# Patient Record
Sex: FEMALE | Race: WHITE | Employment: FULL TIME | URBAN - METROPOLITAN AREA
[De-identification: names, ages, dates, MRNs, and addresses within clinical notes are randomized per-mention and may not be internally consistent; named-entity substitution may affect disease eponyms.]

---

## 2017-01-24 DIAGNOSIS — N20.0 RENAL CALCULUS OR STONE: ICD-10-CM

## 2017-01-24 RX ORDER — OXYCODONE AND ACETAMINOPHEN 5; 325 MG/1; MG/1
1 TABLET ORAL
Qty: 30 TAB | Refills: 0 | Status: SHIPPED | OUTPATIENT
Start: 2017-01-24 | End: 2017-07-03 | Stop reason: SDUPTHER

## 2017-01-24 NOTE — TELEPHONE ENCOUNTER
----- Message from Alexandra Lee sent at 1/24/2017 10:36 AM EST -----  Regarding: Dr. Herman Morales Telephone  Patient would like a call regarding her back.  Contact is [phone]

## 2017-03-02 DIAGNOSIS — F32.A DEPRESSION, UNSPECIFIED DEPRESSION TYPE: ICD-10-CM

## 2017-03-02 RX ORDER — CITALOPRAM 20 MG/1
TABLET, FILM COATED ORAL
Qty: 30 TAB | Refills: 2 | Status: SHIPPED | OUTPATIENT
Start: 2017-03-02 | End: 2018-08-20

## 2017-04-03 RX ORDER — ZOLPIDEM TARTRATE 10 MG/1
TABLET ORAL
Qty: 30 TAB | Refills: 0 | OUTPATIENT
Start: 2017-04-03 | End: 2017-05-08 | Stop reason: SDUPTHER

## 2017-05-08 RX ORDER — ZOLPIDEM TARTRATE 10 MG/1
TABLET ORAL
Qty: 30 TAB | Refills: 3 | Status: SHIPPED | OUTPATIENT
Start: 2017-05-08 | End: 2017-08-04 | Stop reason: SDUPTHER

## 2017-07-03 ENCOUNTER — OFFICE VISIT (OUTPATIENT)
Dept: FAMILY MEDICINE CLINIC | Age: 47
End: 2017-07-03

## 2017-07-03 VITALS
HEART RATE: 77 BPM | OXYGEN SATURATION: 98 % | HEIGHT: 62 IN | WEIGHT: 186.2 LBS | RESPIRATION RATE: 22 BRPM | DIASTOLIC BLOOD PRESSURE: 72 MMHG | SYSTOLIC BLOOD PRESSURE: 110 MMHG | TEMPERATURE: 98.1 F | BODY MASS INDEX: 34.27 KG/M2

## 2017-07-03 DIAGNOSIS — R05.9 COUGH: ICD-10-CM

## 2017-07-03 DIAGNOSIS — E11.9 TYPE 2 DIABETES MELLITUS WITHOUT COMPLICATION, WITHOUT LONG-TERM CURRENT USE OF INSULIN (HCC): Primary | ICD-10-CM

## 2017-07-03 DIAGNOSIS — N20.0 RENAL CALCULUS OR STONE: ICD-10-CM

## 2017-07-03 DIAGNOSIS — M54.50 CHRONIC MIDLINE LOW BACK PAIN WITHOUT SCIATICA: ICD-10-CM

## 2017-07-03 DIAGNOSIS — E78.2 MIXED HYPERLIPIDEMIA: Chronic | ICD-10-CM

## 2017-07-03 DIAGNOSIS — G89.29 CHRONIC MIDLINE LOW BACK PAIN WITHOUT SCIATICA: ICD-10-CM

## 2017-07-03 DIAGNOSIS — F17.200 TOBACCO USE DISORDER: Chronic | ICD-10-CM

## 2017-07-03 LAB
BILIRUB UR QL STRIP: NEGATIVE
GLUCOSE POC: 131 MG/DL
GLUCOSE UR-MCNC: NEGATIVE MG/DL
HBA1C MFR BLD HPLC: 7 %
KETONES P FAST UR STRIP-MCNC: NEGATIVE MG/DL
PH UR STRIP: 6 [PH] (ref 4.6–8)
PROT UR QL STRIP: NEGATIVE MG/DL
SP GR UR STRIP: 1.02 (ref 1–1.03)
UA UROBILINOGEN AMB POC: NORMAL (ref 0.2–1)
URINALYSIS CLARITY POC: CLEAR
URINALYSIS COLOR POC: YELLOW
URINE BLOOD POC: NORMAL
URINE LEUKOCYTES POC: NEGATIVE
URINE NITRITES POC: NEGATIVE

## 2017-07-03 RX ORDER — OXYCODONE AND ACETAMINOPHEN 5; 325 MG/1; MG/1
1 TABLET ORAL
Qty: 30 TAB | Refills: 0 | Status: SHIPPED | OUTPATIENT
Start: 2017-07-03 | End: 2017-10-02 | Stop reason: SDUPTHER

## 2017-07-03 NOTE — PROGRESS NOTES
Subjective:   51 y.o. female for Well Woman Check. Her gyne and breast care is done elsewhere by her Ob-Gyne physician. Patient Active Problem List   Diagnosis Code    Tobacco use disorder F17.200    Renal calculus or stone N20.0    OA (osteoarthritis) M19.90    BMI 35.0-35.9,adult Z68.35    Mixed hyperlipidemia E78.2    Encounter for long-term (current) use of other medications Z79.899    Allergic rhinitis, cause unspecified J30.9    Insomnia G47.00    Symptomatic anemia D64.9    Midline low back pain without sciatica M54.5    Type 2 diabetes mellitus without complication ([facility] [address] W29.2     Patient Active Problem List    Diagnosis Date Noted    Type 2 diabetes mellitus without complication ([facility] [address] 46/04/2257    Midline low back pain without sciatica 05/22/2015    Symptomatic anemia 05/08/2015    Insomnia 10/18/2013    Allergic rhinitis, cause unspecified 04/23/2013    Mixed hyperlipidemia 07/08/2012    Encounter for long-term (current) use of other medications 07/08/2012    Tobacco use disorder 04/27/2010    Renal calculus or stone 04/27/2010    OA (osteoarthritis) 04/27/2010    BMI 35.0-35.9,adult 04/27/2010     Class: Chronic     Current Outpatient Prescriptions   Medication Sig Dispense Refill    zolpidem (AMBIEN) 10 mg tablet take 1 tablet by mouth at bedtime if needed for sleep 30 Tab 3    citalopram (CELEXA) 20 mg tablet take 1 tablet by mouth once daily 30 Tab 2    oxyCODONE-acetaminophen (PERCOCET) 5-325 mg per tablet Take 1 Tab by mouth every four (4) hours as needed for Pain. Max Daily Amount: 6 Tabs. 30 Tab 0    ferrous sulfate 325 mg (65 mg iron) tablet Take  by mouth Daily (before breakfast).  Blood-Glucose Meter monitoring kit Test daily as needed 1 Kit 0    glucose blood VI test strips (BLOOD GLUCOSE TEST) strip by Does Not Apply route Daily (before breakfast).  1 Package 11     No Known Allergies  Past Medical History:   Diagnosis Date    Anemia     Depression     Encounter for long-term (current) use of other medications 2012    LBP (low back pain) 2010    Mixed hyperlipidemia 2012    OA (osteoarthritis) 2010    Obesity 2010    Renal calculus or stone 2010    Tobacco use disorder 2010    Type II or unspecified type diabetes mellitus without mention of complication, not stated as uncontrolled 3/9/2015    Diagnosed 3/9/2015 with A1c of 8.8%; treatment initiated with dietary changes, exercise 5 days/wk and started on Metformin XR 500mg daily x 2 weeks, then increase to 500mg AC breakfast and dinner. Past Surgical History:   Procedure Laterality Date    HX ANKLE FRACTURE TX      HX CYST REMOVAL      HX GYN       X 3    HX TONSILLECTOMY       Family History   Problem Relation Age of Onset    Arthritis-osteo Mother     Cancer Father      Social History   Substance Use Topics    Smoking status: Current Every Day Smoker     Packs/day: 0.50    Smokeless tobacco: Never Used    Alcohol use No             Specific concerns today: check up. Review of Systems  Constitutional: negative  Eyes: negative  Ears, nose, mouth, throat, and face: negative  Respiratory: negative  Cardiovascular: negative  Gastrointestinal: negative  Genitourinary:negative  Integument/breast: negative    Objective:   Blood pressure 110/72, pulse 77, temperature 98.1 °F (36.7 °C), temperature source Oral, resp. rate 22, height 5' 2\" (1.575 m), weight 186 lb 3.2 oz (84.5 kg), SpO2 98 %.    Physical Examination:   General appearance - alert, well appearing, and in no distress  Mental status - alert, oriented to person, place, and time  Eyes - pupils equal and reactive, extraocular eye movements intact  Ears - bilateral TM's and external ear canals normal  Nose - normal and patent, no erythema, discharge or polyps  Mouth - mucous membranes moist, pharynx normal without lesions  Neck - supple, no significant adenopathy, carotids upstroke normal bilaterally, no bruits, thyroid exam: thyroid is normal in size without nodules or tenderness  Lymphatics - no palpable lymphadenopathy, no hepatosplenomegaly  Chest - clear to auscultation, no wheezes, rales or rhonchi, symmetric air entry  Heart - normal rate, regular rhythm, normal S1, S2, no murmurs, rubs, clicks or gallops  Abdomen - soft, nontender, nondistended, no masses or organomegaly  Rectal - normal rectal, no masses, guaiac negative stool obtained  Back exam - tenderness noted lumbar paraspinals  Musculoskeletal - no joint tenderness, deformity or swelling  Extremities - peripheral pulses normal, no pedal edema, no clubbing or cyanosis  Skin - normal coloration and turgor, no rashes, no suspicious skin lesions noted     Assessment/Plan:     lose weight, increase physical activity, stop smoking (advice and handout given), continue present plan, routine labs ordered  Diana was seen today for well woman. Diagnoses and all orders for this visit:    Type 2 diabetes mellitus without complication, without long-term current use of insulin (HCC)  -     AMB POC HEMOGLOBIN P2B  -     METABOLIC PANEL, COMPREHENSIVE  -     MICROALBUMIN, UR, RAND W/ MICROALBUMIN/CREA RATIO  -     AMB POC GLUCOSE, QUANTITATIVE, BLOOD    Mixed hyperlipidemia  -     LIPID PANEL    Tobacco use disorder  -     XR CHEST PA LAT; Future    Chronic midline low back pain without sciatica  -     AMB POC URINALYSIS DIP STICK AUTO W/O MICRO    Renal calculus or stone  -     oxyCODONE-acetaminophen (PERCOCET) 5-325 mg per tablet; Take 1 Tab by mouth every four (4) hours as needed for Pain. Max Daily Amount: 6 Tabs. Cough  -     XR CHEST PA LAT; Future      Follow-up Disposition:  Return in about 1 year (around 7/3/2018).

## 2017-07-03 NOTE — MR AVS SNAPSHOT
Visit Information Date & Time Provider Department Dept. Phone Encounter #  
 7/3/2017 10:15 AM Myrtha Opitz, [place] [phone] [id number] Follow-up Instructions Return in about 1 year (around 7/3/2018). Upcoming Health Maintenance Date Due  
 EYE EXAM RETINAL OR DILATED Q1 8/23/1979 Pneumococcal 19-64 Medium Risk (1 of 1 - PPSV23) 8/23/1988 HEMOGLOBIN A1C Q6M 1/1/2017 FOOT EXAM Q1 7/1/2017 MICROALBUMIN Q1 7/1/2017 LIPID PANEL Q1 7/1/2017 INFLUENZA AGE 9 TO ADULT 8/1/2017 PAP AKA CERVICAL CYTOLOGY 5/6/2018 DTaP/Tdap/Td series (2 - Td) 7/1/2026 Allergies as of 7/3/2017  Review Complete On: 7/3/2017 By: Christiano Mccray No Known Allergies Current Immunizations  Reviewed on 9/16/2015 No immunizations on file. Not reviewed this visit You Were Diagnosed With   
  
 Codes Comments Type 2 diabetes mellitus without complication, without long-term current use of insulin (HCC)    -  Primary ICD-10-CM: E11.9 ICD-9-CM: 250.00 Mixed hyperlipidemia     ICD-10-CM: E78.2 ICD-9-CM: 272.2 Tobacco use disorder     ICD-10-CM: F17.200 ICD-9-CM: 305.1 Chronic midline low back pain without sciatica     ICD-10-CM: M54.5, G89.29 ICD-9-CM: 724.2, 338.29 Renal calculus or stone     ICD-10-CM: N20.0 ICD-9-CM: 592.0 Cough     ICD-10-CM: R05 ICD-9-CM: 914. 2 Vitals BP Pulse Temp Resp Height(growth percentile) Weight(growth percentile) 110/72 (BP 1 Location: Left arm, BP Patient Position: Sitting) 77 98.1 °F (36.7 °C) (Oral) 22 5' 2\" (1.575 m) 186 lb 3.2 oz (84.5 kg) SpO2 BMI OB Status Smoking Status 98% 34.06 kg/m2 Having regular periods Current Every Day Smoker Vitals History BMI and BSA Data Body Mass Index Body Surface Area 34.06 kg/m 2 1.92 m 2 Preferred Pharmacy Pharmacy Name Phone [id number] Anitha Mendez [phone] Your Updated Medication List  
  
   
This list is accurate as of: 7/3/17 11:26 AM.  Always use your most recent med list.  
  
  
  
  
 Blood-Glucose Meter monitoring kit Test daily as needed  
  
 citalopram 20 mg tablet Commonly known as:  CELEXA  
take 1 tablet by mouth once daily  
  
 ferrous sulfate 325 mg (65 mg iron) tablet Take  by mouth Daily (before breakfast). glucose blood VI test strips strip Commonly known as:  blood glucose test  
by Does Not Apply route Daily (before breakfast). oxyCODONE-acetaminophen 5-325 mg per tablet Commonly known as:  PERCOCET Take 1 Tab by mouth every four (4) hours as needed for Pain. Max Daily Amount: 6 Tabs.  
  
 zolpidem 10 mg tablet Commonly known as:  AMBIEN  
take 1 tablet by mouth at bedtime if needed for sleep Prescriptions Printed Refills  
 oxyCODONE-acetaminophen (PERCOCET) 5-325 mg per tablet 0 Sig: Take 1 Tab by mouth every four (4) hours as needed for Pain. Max Daily Amount: 6 Tabs. Class: Print Route: Oral  
  
We Performed the Following AMB POC GLUCOSE, QUANTITATIVE, BLOOD [94275 CPT(R)] AMB POC HEMOGLOBIN A1C [93966 CPT(R)] AMB POC URINALYSIS DIP STICK AUTO W/O MICRO [37101 CPT(R)] LIPID PANEL [20148 CPT(R)] METABOLIC PANEL, COMPREHENSIVE [54722 CPT(R)] MICROALBUMIN, UR, RAND W/ MICROALBUMIN/CREA RATIO [id number] CPT(R)] Follow-up Instructions Return in about 1 year (around 7/3/2018). To-Do List   
 07/03/2017 Imaging:  XR CHEST PA LAT Introducing [facility] & HEALTH SERVICES! Dear Corey Bansal: Thank you for requesting a [employer] account. Our records indicate that you have previously registered for a [employer] account but its currently inactive. Please call our [employer] support line at [phone]. Additional Information If you have questions, please visit the Frequently Asked Questions section of the [employer] website at [email or web address] Remember, [employer] is NOT to be used for urgent needs. For medical emergencies, dial 911. Now available from your iPhone and Android! Please provide this summary of care documentation to your next provider. Your primary care clinician is listed as Collin Dennis. If you have any questions after today's visit, please call [phone].

## 2017-07-04 LAB
ALBUMIN SERPL-MCNC: 4.3 G/DL (ref 3.5–5.5)
ALBUMIN/CREAT UR: <2.9 MG/G CREAT (ref 0–30)
ALBUMIN/GLOB SERPL: 1.3 {RATIO} (ref 1.2–2.2)
ALP SERPL-CCNC: 89 IU/L (ref 39–117)
ALT SERPL-CCNC: 13 IU/L (ref 0–32)
AST SERPL-CCNC: 17 IU/L (ref 0–40)
BILIRUB SERPL-MCNC: 0.5 MG/DL (ref 0–1.2)
BUN SERPL-MCNC: 10 MG/DL (ref 6–24)
BUN/CREAT SERPL: 14 (ref 9–23)
CALCIUM SERPL-MCNC: 9.7 MG/DL (ref 8.7–10.2)
CHLORIDE SERPL-SCNC: 101 MMOL/L (ref 96–106)
CHOLEST SERPL-MCNC: 179 MG/DL (ref 100–199)
CO2 SERPL-SCNC: 22 MMOL/L (ref 18–29)
CREAT SERPL-MCNC: 0.69 MG/DL (ref 0.57–1)
CREAT UR-MCNC: 102.2 MG/DL
GLOBULIN SER CALC-MCNC: 3.3 G/DL (ref 1.5–4.5)
GLUCOSE SERPL-MCNC: 133 MG/DL (ref 65–99)
HDLC SERPL-MCNC: 32 MG/DL
INTERPRETATION, 910389: NORMAL
LDLC SERPL CALC-MCNC: 119 MG/DL (ref 0–99)
Lab: NORMAL
Lab: NORMAL
MICROALBUMIN UR-MCNC: <3 UG/ML
POTASSIUM SERPL-SCNC: 4.4 MMOL/L (ref 3.5–5.2)
PROT SERPL-MCNC: 7.6 G/DL (ref 6–8.5)
SODIUM SERPL-SCNC: 140 MMOL/L (ref 134–144)
TRIGL SERPL-MCNC: 140 MG/DL (ref 0–149)
VLDLC SERPL CALC-MCNC: 28 MG/DL (ref 5–40)

## 2017-08-04 RX ORDER — ZOLPIDEM TARTRATE 10 MG/1
TABLET ORAL
Qty: 30 TAB | Refills: 0 | Status: SHIPPED | OUTPATIENT
Start: 2017-08-04 | End: 2017-09-26 | Stop reason: SDUPTHER

## 2017-09-27 RX ORDER — ZOLPIDEM TARTRATE 10 MG/1
TABLET ORAL
Qty: 30 TAB | Refills: 3 | Status: SHIPPED | OUTPATIENT
Start: 2017-09-27 | End: 2018-01-16 | Stop reason: SDUPTHER

## 2017-10-02 DIAGNOSIS — N20.0 RENAL CALCULUS OR STONE: ICD-10-CM

## 2017-10-02 DIAGNOSIS — E11.9 TYPE 2 DIABETES MELLITUS WITHOUT COMPLICATION, WITHOUT LONG-TERM CURRENT USE OF INSULIN (HCC): Primary | ICD-10-CM

## 2017-10-02 DIAGNOSIS — G89.29 CHRONIC MIDLINE LOW BACK PAIN WITHOUT SCIATICA: ICD-10-CM

## 2017-10-02 DIAGNOSIS — M54.50 CHRONIC MIDLINE LOW BACK PAIN WITHOUT SCIATICA: ICD-10-CM

## 2017-10-02 RX ORDER — OXYCODONE AND ACETAMINOPHEN 5; 325 MG/1; MG/1
1 TABLET ORAL
Qty: 30 TAB | Refills: 0 | Status: SHIPPED | OUTPATIENT
Start: 2017-10-02 | End: 2018-08-14 | Stop reason: SDUPTHER

## 2018-03-11 DIAGNOSIS — G47.00 INSOMNIA, UNSPECIFIED TYPE: ICD-10-CM

## 2018-03-12 RX ORDER — ZOLPIDEM TARTRATE 10 MG/1
TABLET ORAL
Qty: 30 TAB | Refills: 1 | Status: SHIPPED | OUTPATIENT
Start: 2018-03-12 | End: 2018-05-03 | Stop reason: SDUPTHER

## 2018-03-23 ENCOUNTER — OFFICE VISIT (OUTPATIENT)
Dept: FAMILY MEDICINE CLINIC | Age: 48
End: 2018-03-23

## 2018-03-23 VITALS
DIASTOLIC BLOOD PRESSURE: 80 MMHG | HEIGHT: 62 IN | BODY MASS INDEX: 34.23 KG/M2 | RESPIRATION RATE: 28 BRPM | HEART RATE: 83 BPM | TEMPERATURE: 98.2 F | WEIGHT: 186 LBS | SYSTOLIC BLOOD PRESSURE: 130 MMHG | OXYGEN SATURATION: 97 %

## 2018-03-23 DIAGNOSIS — S23.9XXD THORACIC BACK SPRAIN, SUBSEQUENT ENCOUNTER: Primary | ICD-10-CM

## 2018-03-23 DIAGNOSIS — N20.0 RENAL CALCULUS OR STONE: ICD-10-CM

## 2018-03-23 RX ORDER — OXYCODONE AND ACETAMINOPHEN 5; 325 MG/1; MG/1
1 TABLET ORAL
Qty: 20 TAB | Refills: 0 | Status: SHIPPED | OUTPATIENT
Start: 2018-03-23 | End: 2018-08-20 | Stop reason: ALTCHOICE

## 2018-03-23 RX ORDER — PREDNISONE 10 MG/1
10 TABLET ORAL 2 TIMES DAILY
Qty: 10 TAB | Refills: 0 | Status: SHIPPED | OUTPATIENT
Start: 2018-03-23 | End: 2018-08-14 | Stop reason: ALTCHOICE

## 2018-03-23 NOTE — MR AVS SNAPSHOT
[address] [facility] 
 
 
 [address] [facility] [place] [facility] [id number] [phone] Patient: Stevenson Murcia MRN: [id number] :1970 Visit Information Date & Time Provider Department Dept. Phone Encounter #  
 3/23/2018 12:30 PM Francisco Javier Sanderson [phone] [id number] Follow-up Instructions Return in about 3 months (around 2018). Upcoming Health Maintenance Date Due  
 EYE EXAM RETINAL OR DILATED Q1 1979 Pneumococcal 19-64 Medium Risk (1 of 1 - PPSV23) 1988 FOOT EXAM Q1 2017 Influenza Age 5 to Adult 2017 HEMOGLOBIN A1C Q6M 1/3/2018 PAP AKA CERVICAL CYTOLOGY 2018 MICROALBUMIN Q1 7/3/2018 LIPID PANEL Q1 7/3/2018 DTaP/Tdap/Td series (2 - Td) 2026 Allergies as of 3/23/2018  Review Complete On: 3/23/2018 By: Daniel Torres No Known Allergies Current Immunizations  Reviewed on 2015 No immunizations on file. Not reviewed this visit You Were Diagnosed With   
  
 Codes Comments Thoracic back sprain, subsequent encounter    -  Primary ICD-10-CM: S23. 9XXD ICD-9-CM: V58.89, 847.1 Renal calculus or stone     ICD-10-CM: N20.0 ICD-9-CM: 592.0 Vitals BP Pulse Temp Resp Height(growth percentile) Weight(growth percentile) 130/80 (BP 1 Location: Left arm, BP Patient Position: Sitting) 83 98.2 °F (36.8 °C) (Oral) 28 5' 2\" (1.575 m) 186 lb (84.4 kg) SpO2 BMI OB Status Smoking Status 97% 34.02 kg/m2 Having regular periods Current Every Day Smoker Vitals History BMI and BSA Data Body Mass Index Body Surface Area 34.02 kg/m 2 1.92 m 2 Preferred Pharmacy Pharmacy Name Phone [id number] Anitha Nichole [phone] Your Updated Medication List  
  
   
This list is accurate as of 3/23/18  1:08 PM.  Always use your most recent med list.  
 Blood-Glucose Meter monitoring kit Test daily as needed  
  
 citalopram 20 mg tablet Commonly known as:  CELEXA  
take 1 tablet by mouth once daily  
  
 ferrous sulfate 325 mg (65 mg iron) tablet Take  by mouth Daily (before breakfast). * glucose blood VI test strips strip Commonly known as:  blood glucose test  
by Does Not Apply route Daily (before breakfast). * glucose blood VI test strips strip Commonly known as:  blood glucose test  
by Does Not Apply route Daily (before breakfast). * oxyCODONE-acetaminophen 5-325 mg per tablet Commonly known as:  PERCOCET Take 1 Tab by mouth every four (4) hours as needed for Pain. Max Daily Amount: 6 Tabs. * oxyCODONE-acetaminophen 5-325 mg per tablet Commonly known as:  PERCOCET Take 1 Tab by mouth every four (4) hours as needed for Pain. Max Daily Amount: 6 Tabs. predniSONE 10 mg tablet Commonly known as:  Jamila Skaggs Take 1 Tab by mouth two (2) times a day. zolpidem 10 mg tablet Commonly known as:  AMBIEN  
take 1 tablet by mouth at bedtime if needed for sleep * Notice: This list has 4 medication(s) that are the same as other medications prescribed for you. Read the directions carefully, and ask your doctor or other care provider to review them with you. Prescriptions Printed Refills  
 predniSONE (DELTASONE) 10 mg tablet 0 Sig: Take 1 Tab by mouth two (2) times a day. Class: Print Route: Oral  
 oxyCODONE-acetaminophen (PERCOCET) 5-325 mg per tablet 0 Sig: Take 1 Tab by mouth every four (4) hours as needed for Pain. Max Daily Amount: 6 Tabs. Class: Print Route: Oral  
  
We Performed the Following REFERRAL TO ORTHOPEDICS [GVF779 Custom] Follow-up Instructions Return in about 3 months (around 6/23/2018). To-Do List   
 03/23/2018 Imaging:  MRI [facility] SPINE WO CONT Referral Information Referral ID Referred By Referred To [id number] [address] [facility] [address] Phone: [phone] Visits Status Start Date End Date 1 New Request 3/23/18 3/23/19 If your referral has a status of pending review or denied, additional information will be sent to support the outcome of this decision. Introducing [facility] & HEALTH SERVICES! Dear Talon Hope: Thank you for requesting a [employer] account. Our records indicate that you have previously registered for a [employer] account but its currently inactive. Please call our [employer] support line at [phone]. Additional Information If you have questions, please visit the Frequently Asked Questions section of the [employer] website at [email or web address] Remember, [employer] is NOT to be used for urgent needs. For medical emergencies, dial 911. Now available from your iPhone and Android! Please provide this summary of care documentation to your next provider. Your primary care clinician is listed as Delores Miles. If you have any questions after today's visit, please call [phone].

## 2018-03-23 NOTE — PROGRESS NOTES
HISTORY OF PRESENT ILLNESS  Adela Smith is a 49 y.o. female. with moderately upper back pain x 1 week. Previously seen by ortho ,mri denied  Back Pain    The history is provided by the patient. This is a recurrent problem. The problem has not changed since onset. The problem occurs hourly. The pain is associated with no known injury. The pain is present in the thoracic spine. The quality of the pain is described as stabbing. The pain does not radiate. The pain is at a severity of 7/10. The pain is severe. The symptoms are aggravated by twisting, bending and certain positions. The pain is worse during the day. Pertinent negatives include no chest pain, no fever, no weight loss, no abdominal pain, no abdominal swelling, no bowel incontinence, no dysuria and no paresthesias. Review of Systems   Constitutional: Negative for chills, fever and weight loss. Cardiovascular: Negative for chest pain. Gastrointestinal: Negative for abdominal pain, blood in stool, bowel incontinence, constipation, diarrhea and heartburn. Genitourinary: Negative for dysuria, frequency, hematuria and urgency. Musculoskeletal: Positive for back pain. Negative for joint pain. Neurological: Negative for paresthesias. Psychiatric/Behavioral: Negative for depression. Physical Exam   Constitutional: She appears well-developed and well-nourished. She appears distressed. HENT:   Head: Normocephalic. Right Ear: External ear normal.   Nose: Nose normal.   Mouth/Throat: Oropharynx is clear and moist.   Cardiovascular: Normal rate and regular rhythm. Pulmonary/Chest: Effort normal and breath sounds normal.   Abdominal: Soft. Bowel sounds are normal.   Musculoskeletal:        Thoracic back: She exhibits decreased range of motion, tenderness and bony tenderness. She exhibits no deformity. Back:    SLRs 90/90,DTRs normal and symmetrical         ASSESSMENT and PLAN  Diagnoses and all orders for this visit:    1.  Thoracic back sprain, subsequent encounter  -     MRI [facility] SPINE WO CONT; Future  -     REFERRAL TO ORTHOPEDICS  -     predniSONE (DELTASONE) 10 mg tablet; Take 1 Tab by mouth two (2) times a day. -     oxyCODONE-acetaminophen (PERCOCET) 5-325 mg per tablet; Take 1 Tab by mouth every four (4) hours as needed for Pain. Max Daily Amount: 6 Tabs. 2. Renal calculus or stone      Follow-up Disposition:  Return in about 3 months (around 6/23/2018).

## 2018-05-03 DIAGNOSIS — G47.00 INSOMNIA, UNSPECIFIED TYPE: ICD-10-CM

## 2018-05-07 RX ORDER — ZOLPIDEM TARTRATE 10 MG/1
TABLET ORAL
Qty: 30 TAB | Refills: 1 | Status: SHIPPED | OUTPATIENT
Start: 2018-05-07 | End: 2018-07-02 | Stop reason: SDUPTHER

## 2018-07-02 ENCOUNTER — TELEPHONE (OUTPATIENT)
Dept: FAMILY MEDICINE CLINIC | Age: 48
End: 2018-07-02

## 2018-07-02 DIAGNOSIS — G47.00 INSOMNIA, UNSPECIFIED TYPE: ICD-10-CM

## 2018-07-02 RX ORDER — ZOLPIDEM TARTRATE 10 MG/1
TABLET ORAL
Qty: 30 TAB | Refills: 1 | Status: SHIPPED | OUTPATIENT
Start: 2018-07-02 | End: 2018-09-04 | Stop reason: SDUPTHER

## 2018-08-14 ENCOUNTER — OFFICE VISIT (OUTPATIENT)
Dept: FAMILY MEDICINE CLINIC | Age: 48
End: 2018-08-14

## 2018-08-14 VITALS
DIASTOLIC BLOOD PRESSURE: 82 MMHG | SYSTOLIC BLOOD PRESSURE: 122 MMHG | TEMPERATURE: 98.1 F | RESPIRATION RATE: 22 BRPM | WEIGHT: 187.8 LBS | HEIGHT: 62 IN | OXYGEN SATURATION: 98 % | BODY MASS INDEX: 34.56 KG/M2 | HEART RATE: 86 BPM

## 2018-08-14 DIAGNOSIS — M54.50 CHRONIC MIDLINE LOW BACK PAIN WITHOUT SCIATICA: Primary | ICD-10-CM

## 2018-08-14 DIAGNOSIS — G89.29 CHRONIC MIDLINE LOW BACK PAIN WITHOUT SCIATICA: Primary | ICD-10-CM

## 2018-08-14 DIAGNOSIS — S23.9XXD THORACIC BACK SPRAIN, SUBSEQUENT ENCOUNTER: ICD-10-CM

## 2018-08-14 RX ORDER — PREDNISONE 10 MG/1
10 TABLET ORAL 2 TIMES DAILY
Qty: 10 TAB | Refills: 0 | Status: SHIPPED | OUTPATIENT
Start: 2018-08-14 | End: 2018-08-20 | Stop reason: ALTCHOICE

## 2018-08-14 RX ORDER — OXYCODONE AND ACETAMINOPHEN 5; 325 MG/1; MG/1
1 TABLET ORAL
Qty: 30 TAB | Refills: 0 | Status: SHIPPED | OUTPATIENT
Start: 2018-08-14 | End: 2018-08-20 | Stop reason: SDUPTHER

## 2018-08-14 NOTE — MR AVS SNAPSHOT
[address] [facility] 
 
 
 [address] [facility] [id number] [phone] Patient: Brenda Parrish MRN: [id number] :1970 Visit Information Date & Time Provider Department Dept. Phone Encounter #  
 2018  2:45 PM Dolly Campbell [phone] [id number] Upcoming Health Maintenance Date Due  
 EYE EXAM RETINAL OR DILATED Q1 1979 Pneumococcal 19-64 Medium Risk (1 of 1 - PPSV23) 1988 FOOT EXAM Q1 2017 HEMOGLOBIN A1C Q6M 1/3/2018 PAP AKA CERVICAL CYTOLOGY 2018 MICROALBUMIN Q1 7/3/2018 LIPID PANEL Q1 7/3/2018 Influenza Age 5 to Adult 2018 DTaP/Tdap/Td series (2 - Td) 2026 Allergies as of 2018  Review Complete On: 2018 By: Benjy Csatellanos No Known Allergies Current Immunizations  Reviewed on 2015 No immunizations on file. Not reviewed this visit You Were Diagnosed With   
  
 Codes Comments Chronic midline low back pain without sciatica    -  Primary ICD-10-CM: M54.5, G89.29 ICD-9-CM: 724.2, 338.29 Thoracic back sprain, subsequent encounter     ICD-10-CM: S23. 9XXD ICD-9-CM: V58.89, 847.1 Vitals BP Pulse Temp Resp Height(growth percentile) Weight(growth percentile) 122/82 (BP 1 Location: Left arm, BP Patient Position: Sitting) 86 98.1 °F (36.7 °C) (Oral) 22 5' 2\" (1.575 m) 187 lb 12.8 oz (85.2 kg) SpO2 BMI OB Status Smoking Status 98% 34.35 kg/m2 Having regular periods Current Every Day Smoker Vitals History BMI and BSA Data Body Mass Index Body Surface Area  
 34.35 kg/m 2 1.93 m 2 Your Updated Medication List  
  
   
This list is accurate as of 18  3:21 PM.  Always use your most recent med list.  
  
  
  
  
 Blood-Glucose Meter monitoring kit Test daily as needed  
  
 citalopram 20 mg tablet Commonly known as:  CELEXA  
take 1 tablet by mouth once daily ferrous sulfate 325 mg (65 mg iron) tablet Take  by mouth Daily (before breakfast). * glucose blood VI test strips strip Commonly known as:  blood glucose test  
by Does Not Apply route Daily (before breakfast). * glucose blood VI test strips strip Commonly known as:  blood glucose test  
by Does Not Apply route Daily (before breakfast). * oxyCODONE-acetaminophen 5-325 mg per tablet Commonly known as:  PERCOCET Take 1 Tab by mouth every four (4) hours as needed for Pain. Max Daily Amount: 6 Tabs. * oxyCODONE-acetaminophen 5-325 mg per tablet Commonly known as:  PERCOCET Take 1 Tab by mouth every four (4) hours as needed for Pain. Max Daily Amount: 6 Tabs. predniSONE 10 mg tablet Commonly known as:  Shelton Felling Take 1 Tab by mouth two (2) times a day. zolpidem 10 mg tablet Commonly known as:  AMBIEN  
take 1 tablet by mouth at bedtime if needed for sleep * Notice: This list has 4 medication(s) that are the same as other medications prescribed for you. Read the directions carefully, and ask your doctor or other care provider to review them with you. Prescriptions Printed Refills  
 predniSONE (DELTASONE) 10 mg tablet 0 Sig: Take 1 Tab by mouth two (2) times a day. Class: Print Route: Oral  
 oxyCODONE-acetaminophen (PERCOCET) 5-325 mg per tablet 0 Sig: Take 1 Tab by mouth every four (4) hours as needed for Pain. Max Daily Amount: 6 Tabs. Class: Print Route: Oral  
  
Introducing \[facility]\"" & [facility] SERVICES! [employer] introduces [employer] patient portal. Now you can access parts of your medical record, email your doctor's office, and request medication refills online. 1. In your internet browser, go to [email or web address] 2. Click on the First Time User? Click Here link in the Sign In box. You will see the New Member Sign Up page. 3. Enter your [employer] Access Code exactly as it appears below.  You will not need to use this code after youve completed the sign-up process. If you do not sign up before the expiration date, you must request a new code. · [employer] Access Code: [email or web address] Expires: 11/12/2018  2:45 PM 
 
4. Enter the last four digits of your Social Security Number (xxxx) and Date of Birth (mm/dd/yyyy) as indicated and click Submit. You will be taken to the next sign-up page. 5. Create a [employer] ID. This will be your [employer] login ID and cannot be changed, so think of one that is secure and easy to remember. 6. Create a [employer] password. You can change your password at any time. 7. Enter your Password Reset Question and Answer. This can be used at a later time if you forget your password. 8. Enter your e-mail address. You will receive e-mail notification when new information is available in [address]. 9. Click Sign Up. You can now view and download portions of your medical record. 10. Click the Download Summary menu link to download a portable copy of your medical information. If you have questions, please visit the Frequently Asked Questions section of the [employer] website. Remember, [employer] is NOT to be used for urgent needs. For medical emergencies, dial 911. Now available from your iPhone and Android! Please provide this summary of care documentation to your next provider. Your primary care clinician is listed as Dennis Husain. If you have any questions after today's visit, please call [phone].

## 2018-08-14 NOTE — PROGRESS NOTES
HISTORY OF PRESENT ILLNESS  Adam Real is a 49 y.o. female. with recurrence of  upper back pain x 5 days,no apparent injury. Previously seen by ortho ,mri denied  Back Pain    The history is provided by the patient. This is a recurrent problem. The problem has been gradually worsening. The problem occurs hourly. The pain is associated with no known injury. The pain is present in the thoracic spine. The quality of the pain is described as stabbing. The pain does not radiate. The pain is at a severity of 6/10. The pain is severe. The symptoms are aggravated by twisting, bending and certain positions. The pain is worse during the day. Pertinent negatives include no chest pain, no fever, no weight loss, no abdominal pain, no abdominal swelling, no bowel incontinence, no dysuria and no paresthesias. Review of Systems   Constitutional: Negative for chills, fever and weight loss. Cardiovascular: Negative for chest pain and palpitations. Gastrointestinal: Negative for abdominal pain, blood in stool, bowel incontinence, constipation, diarrhea and heartburn. Genitourinary: Negative for dysuria, frequency, hematuria and urgency. Musculoskeletal: Positive for back pain. Negative for joint pain. Neurological: Negative for paresthesias. Psychiatric/Behavioral: Negative for depression. Physical Exam   Constitutional: She appears well-developed and well-nourished. She appears distressed. HENT:   Head: Normocephalic. Right Ear: External ear normal.   Nose: Nose normal.   Mouth/Throat: Oropharynx is clear and moist.   Cardiovascular: Normal rate and regular rhythm. Pulmonary/Chest: Effort normal and breath sounds normal.   Abdominal: Soft. Bowel sounds are normal.   Musculoskeletal:        Thoracic back: She exhibits decreased range of motion, tenderness and bony tenderness. She exhibits no deformity.         Back:    SLRs 90/90,DTRs normal and symmetrical  ROM limited       Diagnoses and all orders for this visit:    1. Chronic midline low back pain without sciatica  -     predniSONE (DELTASONE) 10 mg tablet; Take 1 Tab by mouth two (2) times a day. -     oxyCODONE-acetaminophen (PERCOCET) 5-325 mg per tablet; Take 1 Tab by mouth every four (4) hours as needed for Pain. Max Daily Amount: 6 Tabs. 2. Thoracic back sprain, subsequent encounter  -     predniSONE (DELTASONE) 10 mg tablet; Take 1 Tab by mouth two (2) times a day. -     oxyCODONE-acetaminophen (PERCOCET) 5-325 mg per tablet; Take 1 Tab by mouth every four (4) hours as needed for Pain. Max Daily Amount: 6 Tabs.       Follow-up Disposition: Not on File    Follow-up Disposition: Not on File

## 2018-08-20 ENCOUNTER — OFFICE VISIT (OUTPATIENT)
Dept: FAMILY MEDICINE CLINIC | Age: 48
End: 2018-08-20

## 2018-08-20 VITALS
DIASTOLIC BLOOD PRESSURE: 84 MMHG | BODY MASS INDEX: 34.52 KG/M2 | TEMPERATURE: 97.1 F | HEART RATE: 101 BPM | SYSTOLIC BLOOD PRESSURE: 123 MMHG | RESPIRATION RATE: 16 BRPM | WEIGHT: 187.6 LBS | HEIGHT: 62 IN | OXYGEN SATURATION: 95 %

## 2018-08-20 DIAGNOSIS — S29.019D ACUTE THORACIC MYOFASCIAL STRAIN, SUBSEQUENT ENCOUNTER: Primary | ICD-10-CM

## 2018-08-20 RX ORDER — CYCLOBENZAPRINE HCL 5 MG
TABLET ORAL
Qty: 30 TAB | Refills: 0 | Status: SHIPPED | OUTPATIENT
Start: 2018-08-20 | End: 2019-03-18 | Stop reason: SDUPTHER

## 2018-08-20 RX ORDER — NAPROXEN 500 MG/1
500 TABLET ORAL
Qty: 30 TAB | Refills: 0 | Status: SHIPPED | OUTPATIENT
Start: 2018-08-20 | End: 2019-03-18 | Stop reason: SDUPTHER

## 2018-08-20 RX ORDER — TRAMADOL HYDROCHLORIDE 50 MG/1
50 TABLET ORAL
Qty: 10 TAB | Refills: 0 | Status: SHIPPED | OUTPATIENT
Start: 2018-08-20 | End: 2020-12-22 | Stop reason: SDUPTHER

## 2018-08-20 NOTE — PROGRESS NOTES
Chief Complaint   Patient presents with    Back Pain     seen by Dr. Scar Phelps 8/14/18     Patient here for reoccurrent back pain. Seen by Dr. Idalia Velazquez on 8/14/18. Prednisone completed. Patient states no relief.

## 2018-08-20 NOTE — PROGRESS NOTES
HISTORY OF PRESENT ILLNESS  Toño Hopkins is a 49 y.o. female. HPI  Patient of Dr. Mireille Moore with PMHx significant for T2DM, renal calculi, OA, midline low back pain here for an acute visit with CC of low back pain. Was seen by her PCP for the same on 8/14/18, Rx'ed a medrol dose pack and percocet. Symptoms started 8/12/18 without any known injury. She was at a celebration of life the day that symptoms started, and was on her feet for a long time wearing flat sandals. She has recurrent back pain, though it has been improving over the past few months. She had pain in the upper left back. She has seen an orthopedist (Dr. Frida Bethea) in the remote past, last about 1 year ago, but the MRI that was ordered was denied by her insurance. Symptoms used to recur every 3 - 4 weeks, and have responded to medication in the past; this is the first episode of the symptoms in a few months. Pain is now concentrated on the left side above her bra strap, and is exacerbated with movement. Heat, rest, and Aspercreme have been helping, as well as hunching over. Pain is described as constant, and is a \"stabbing\" pain. Did PT in the remote past. Tried Aleve Back and Muscle, which did not help much. Past Medical History:   Diagnosis Date    Anemia     Depression     Encounter for long-term (current) use of other medications 7/8/2012    LBP (low back pain) 4/27/2010    Mixed hyperlipidemia 7/8/2012    OA (osteoarthritis) 4/27/2010    Obesity 4/27/2010    Renal calculus or stone 4/27/2010    Tobacco use disorder 4/27/2010    Type II or unspecified type diabetes mellitus without mention of complication, not stated as uncontrolled 3/9/2015    Diagnosed 3/9/2015 with A1c of 8.8%; treatment initiated with dietary changes, exercise 5 days/wk and started on Metformin XR 500mg daily x 2 weeks, then increase to 500mg AC breakfast and dinner.       Past Surgical History:   Procedure Laterality Date    HX ANKLE FRACTURE TX      HX CYST REMOVAL      HX GYN       X 3    HX TONSILLECTOMY       Family History   Problem Relation Age of Onset    Arthritis-osteo Mother     Cancer Father      Social History     Social History    Marital status:      Spouse name: N/A    Number of children: N/A    Years of education: N/A     Social History Main Topics    Smoking status: Current Every Day Smoker     Packs/day: 0.50    Smokeless tobacco: Never Used    Alcohol use No    Drug use: No    Sexual activity: Yes     Partners: Male     Other Topics Concern    None     Social History Narrative     Patient Active Problem List   Diagnosis Code    Tobacco use disorder F17.200    Renal calculus or stone N20.0    OA (osteoarthritis) M19.90    BMI 35.0-35.9,adult Z68.35    Mixed hyperlipidemia E78.2    Encounter for long-term (current) use of other medications Z79.899    Allergic rhinitis, cause unspecified J30.9    Insomnia G47.00    Symptomatic anemia D64.9    Midline low back pain without sciatica M54.5    Type 2 diabetes mellitus without complication ([facility] [address] L36.3     Outpatient Encounter Prescriptions as of 2018   Medication Sig Dispense Refill    cyclobenzaprine (FLEXERIL) 5 mg tablet Take 1 - 2 tablets by mouth every 8 hours as needed for muscle spasm. 30 Tab 0    naproxen (NAPROSYN) 500 mg tablet Take 1 Tab by mouth two (2) times daily as needed. Take with food or milk. Indications: Pain 30 Tab 0    traMADol (ULTRAM) 50 mg tablet Take 1 Tab by mouth every eight (8) hours as needed for Pain. Max Daily Amount: 150 mg. 10 Tab 0    glucose blood VI test strips (BLOOD GLUCOSE TEST) strip by Does Not Apply route Daily (before breakfast). 50 Strip 11    ferrous sulfate 325 mg (65 mg iron) tablet Take  by mouth Daily (before breakfast).  Blood-Glucose Meter monitoring kit Test daily as needed 1 Kit 0    glucose blood VI test strips (BLOOD GLUCOSE TEST) strip by Does Not Apply route Daily (before breakfast).  1 Package 11  [DISCONTINUED] predniSONE (DELTASONE) 10 mg tablet Take 1 Tab by mouth two (2) times a day. 10 Tab 0    [DISCONTINUED] oxyCODONE-acetaminophen (PERCOCET) 5-325 mg per tablet Take 1 Tab by mouth every four (4) hours as needed for Pain. Max Daily Amount: 6 Tabs. 30 Tab 0    zolpidem (AMBIEN) 10 mg tablet take 1 tablet by mouth at bedtime if needed for sleep 30 Tab 1    [DISCONTINUED] oxyCODONE-acetaminophen (PERCOCET) 5-325 mg per tablet Take 1 Tab by mouth every four (4) hours as needed for Pain. Max Daily Amount: 6 Tabs. (Patient not taking: Reported on 8/14/2018) 20 Tab 0    [DISCONTINUED] citalopram (CELEXA) 20 mg tablet take 1 tablet by mouth once daily 30 Tab 2     No facility-administered encounter medications on file as of 8/20/2018. Visit Vitals    /84 (BP 1 Location: Right arm, BP Patient Position: Sitting)    Pulse (!) 101    Temp 97.1 °F (36.2 °C) (Oral)    Resp 16    Ht 5' 2\" (1.575 m)    Wt 187 lb 9.6 oz (85.1 kg)    LMP 07/09/2018    SpO2 95%    BMI 34.31 kg/m2           Review of Systems   Constitutional: Negative for fever. Musculoskeletal: Positive for back pain. Negative for falls, joint pain, myalgias and neck pain. Neurological: Negative for tingling and focal weakness. Physical Exam   Constitutional: She is oriented to person, place, and time. She appears well-developed and well-nourished. No distress. In obvious discomfort   HENT:   Head: Normocephalic and atraumatic. Cardiovascular: Normal rate, regular rhythm and normal heart sounds. Pulmonary/Chest: Effort normal and breath sounds normal. No respiratory distress. She has no wheezes. Musculoskeletal:        Thoracic back: She exhibits decreased range of motion, tenderness, pain and spasm. She exhibits no bony tenderness, no swelling, no edema, no deformity and no laceration.    Tenderness to palpation without obvious defect over left lower scapula; muscle spasms noted   Neurological: She is alert and oriented to person, place, and time. She has normal reflexes. Skin: Skin is warm and dry. She is not diaphoretic. Psychiatric: She has a normal mood and affect. Her behavior is normal. Judgment normal.   Nursing note and vitals reviewed. ASSESSMENT and PLAN    ICD-10-CM ICD-9-CM    1. Acute thoracic myofascial strain, subsequent encounter S29.019D V58.89 cyclobenzaprine (FLEXERIL) 5 mg tablet      naproxen (NAPROSYN) 500 mg tablet      REFERRAL TO PHYSICAL THERAPY      traMADol (ULTRAM) 50 mg tablet     1. Acute thoracic back pain/muscle strain - Referred to PT for recurring pain, and patient given information to schedule. Rx for flexeril and naproxen, and #10 tramadol to be used for breakthrough pain only. Encouraged topical therapy, massage, light stretching. Follow up if symptoms worsen or fail to improve. Follow-up Disposition:  Return if symptoms worsen or fail to improve.

## 2018-08-20 NOTE — MR AVS SNAPSHOT
[facility] 
 
 
 [address] [facility] [id number] 
[phone] Patient: Renetta Henry MRN: [id number] :1970 Visit Information Date & Time Provider Department Dept. Phone Encounter #  
 2018 12:30 PM Sonja German NP [facility] [phone] [id number] Follow-up Instructions Return if symptoms worsen or fail to improve. Upcoming Health Maintenance Date Due  
 EYE EXAM RETINAL OR DILATED Q1 1979 Pneumococcal 19-64 Medium Risk (1 of 1 - PPSV23) 1988 FOOT EXAM Q1 2017 HEMOGLOBIN A1C Q6M 1/3/2018 PAP AKA CERVICAL CYTOLOGY 2018 MICROALBUMIN Q1 7/3/2018 LIPID PANEL Q1 7/3/2018 Influenza Age 5 to Adult 2018 DTaP/Tdap/Td series (2 - Td) 2026 Allergies as of 2018  Review Complete On: 2018 By: Sonja German NP No Known Allergies Current Immunizations  Reviewed on 2015 No immunizations on file. Not reviewed this visit You Were Diagnosed With   
  
 Codes Comments Acute thoracic myofascial strain, subsequent encounter    -  Primary ICD-10-CM: B59.311Y ICD-9-CM: V58.89 Vitals BP Pulse Temp Resp Height(growth percentile) Weight(growth percentile) 123/84 (BP 1 Location: Right arm, BP Patient Position: Sitting) (!) 101 97.1 °F (36.2 °C) (Oral) 16 5' 2\" (1.575 m) 187 lb 9.6 oz (85.1 kg) LMP SpO2 BMI OB Status Smoking Status 2018 95% 34.31 kg/m2 Perimenopausal Current Every Day Smoker Vitals History BMI and BSA Data Body Mass Index Body Surface Area  
 34.31 kg/m 2 1.93 m 2 Preferred Pharmacy Pharmacy Name Phone [id number] [address] Michael [facility]illa [phone] Your Updated Medication List  
  
   
This list is accurate as of 18  1:03 PM.  Always use your most recent med list.  
  
  
  
  
 Blood-Glucose Meter monitoring kit Test daily as needed  
  
 cyclobenzaprine 5 mg tablet Commonly known as:  FLEXERIL Take 1 - 2 tablets by mouth every 8 hours as needed for muscle spasm. ferrous sulfate 325 mg (65 mg iron) tablet Take  by mouth Daily (before breakfast). * glucose blood VI test strips strip Commonly known as:  blood glucose test  
by Does Not Apply route Daily (before breakfast). * glucose blood VI test strips strip Commonly known as:  blood glucose test  
by Does Not Apply route Daily (before breakfast). naproxen 500 mg tablet Commonly known as:  NAPROSYN Take 1 Tab by mouth two (2) times daily as needed. Take with food or milk. Indications: Pain  
  
 traMADol 50 mg tablet Commonly known as:  ULTRAM  
Take 1 Tab by mouth every eight (8) hours as needed for Pain. Max Daily Amount: 150 mg.  
  
 zolpidem 10 mg tablet Commonly known as:  AMBIEN  
take 1 tablet by mouth at bedtime if needed for sleep * Notice: This list has 2 medication(s) that are the same as other medications prescribed for you. Read the directions carefully, and ask your doctor or other care provider to review them with you. Prescriptions Printed Refills  
 traMADol (ULTRAM) 50 mg tablet 0 Sig: Take 1 Tab by mouth every eight (8) hours as needed for Pain. Max Daily Amount: 150 mg.  
 Class: Print Route: Oral  
  
Prescriptions Sent to Pharmacy Refills  
 cyclobenzaprine (FLEXERIL) 5 mg tablet 0 Sig: Take 1 - 2 tablets by mouth every 8 hours as needed for muscle spasm. Class: Normal  
 Pharmacy: [id number], [address] Ph #: [phone]  
 naproxen (NAPROSYN) 500 mg tablet 0 Sig: Take 1 Tab by mouth two (2) times daily as needed. Take with food or milk. Indications: Pain Class: Normal  
 Pharmacy: [id number], [address] Ph #: [phone]  Route: Oral  
  
 We Performed the Following REFERRAL TO PHYSICAL THERAPY [YON24 Custom] Follow-up Instructions Return if symptoms worsen or fail to improve. Referral Information Referral ID Referred By Referred To  
  
 [id number] Colin TEMPLETON Not Available Visits Status Start Date End Date 1 New Request 8/20/18 8/20/19 If your referral has a status of pending review or denied, additional information will be sent to support the outcome of this decision. Introducing [facility]! [employer] introduces [employer]amp patient portal. Now you can access parts of your medical record, email your doctor's office, and request medication refills online. 1. In your internet browser, go to [email or web address] 2. Click on the First Time User? Click Here link in the Sign In box. You will see the New Member Sign Up page. 3. Enter your Memamp Access Code exactly as it appears below. You will not need to use this code after youve completed the sign-up process. If you do not sign up before the expiration date, you must request a new code. · Memamp Access Code: [email or web address] Expires: 11/12/2018  2:45 PM 
 
4. Enter the last four digits of your Social Security Number (xxxx) and Date of Birth (mm/dd/yyyy) as indicated and click Submit. You will be taken to the next sign-up page. 5. Create a Memamp ID. This will be your Memamp login ID and cannot be changed, so think of one that is secure and easy to remember. 6. Create a Memamp password. You can change your password at any time. 7. Enter your Password Reset Question and Answer. This can be used at a later time if you forget your password. 8. Enter your e-mail address. You will receive e-mail notification when new information is available in [address] 9. Click Sign Up. You can now view and download portions of your medical record.  
10. Click the Download Summary menu link to download a portable copy of your medical information. If you have questions, please visit the Frequently Asked Questions section of the [employer] website. Remember, [employer] is NOT to be used for urgent needs. For medical emergencies, dial 911. Now available from your iPhone and Android! Please provide this summary of care documentation to your next provider. Your primary care clinician is listed as Merry Box. If you have any questions after today's visit, please call [phone].

## 2018-08-22 ENCOUNTER — APPOINTMENT (OUTPATIENT)
Dept: PHYSICAL THERAPY | Age: 48
End: 2018-08-22

## 2018-09-04 DIAGNOSIS — G47.00 INSOMNIA, UNSPECIFIED TYPE: ICD-10-CM

## 2018-09-04 RX ORDER — ZOLPIDEM TARTRATE 10 MG/1
TABLET ORAL
Qty: 30 TAB | Refills: 1 | Status: SHIPPED | OUTPATIENT
Start: 2018-09-04 | End: 2018-10-26 | Stop reason: SDUPTHER

## 2018-09-04 NOTE — TELEPHONE ENCOUNTER
Patient needs a refill on zolpidem (AMBIEN) 10 mg tablet and can be reached at [phone] with any questions.

## 2018-09-23 ENCOUNTER — HOSPITAL ENCOUNTER (EMERGENCY)
Age: 48
Discharge: HOME | End: 2018-09-23
Attending: EMERGENCY MEDICINE
Payer: COMMERCIAL

## 2018-09-23 VITALS
DIASTOLIC BLOOD PRESSURE: 80 MMHG | HEART RATE: 109 BPM | BODY MASS INDEX: 35.3 KG/M2 | RESPIRATION RATE: 16 BRPM | SYSTOLIC BLOOD PRESSURE: 149 MMHG | HEIGHT: 62 IN | TEMPERATURE: 97.8 F | OXYGEN SATURATION: 96 % | WEIGHT: 191.8 LBS

## 2018-09-23 DIAGNOSIS — S61.210A LACERATION OF RIGHT INDEX FINGER WITHOUT FOREIGN BODY WITHOUT DAMAGE TO NAIL, INITIAL ENCOUNTER: Primary | ICD-10-CM

## 2018-09-23 DIAGNOSIS — Z23 NEED FOR TETANUS BOOSTER: ICD-10-CM

## 2018-09-23 PROCEDURE — 74011250636 HC RX REV CODE- 250/636: Performed by: PHYSICIAN ASSISTANT

## 2018-09-23 PROCEDURE — 75810000293 HC SIMP/SUPERF WND  RPR

## 2018-09-23 PROCEDURE — 77030031132 HC SUT NYL COVD -A

## 2018-09-23 PROCEDURE — 90471 IMMUNIZATION ADMIN: CPT

## 2018-09-23 PROCEDURE — 77030018836 HC SOL IRR NACL ICUM -A

## 2018-09-23 PROCEDURE — 99282 EMERGENCY DEPT VISIT SF MDM: CPT

## 2018-09-23 PROCEDURE — 90715 TDAP VACCINE 7 YRS/> IM: CPT | Performed by: PHYSICIAN ASSISTANT

## 2018-09-23 RX ORDER — LIDOCAINE HYDROCHLORIDE 20 MG/ML
5 INJECTION, SOLUTION EPIDURAL; INFILTRATION; INTRACAUDAL; PERINEURAL ONCE
Status: COMPLETED | OUTPATIENT
Start: 2018-09-23 | End: 2018-09-23

## 2018-09-23 RX ADMIN — TETANUS TOXOID, REDUCED DIPHTHERIA TOXOID AND ACELLULAR PERTUSSIS VACCINE, ADSORBED 0.5 ML: 5; 2.5; 8; 8; 2.5 SUSPENSION INTRAMUSCULAR at 22:47

## 2018-09-23 RX ADMIN — LIDOCAINE HYDROCHLORIDE 100 MG: 20 INJECTION, SOLUTION EPIDURAL; INFILTRATION; INTRACAUDAL; PERINEURAL at 22:25

## 2018-09-23 NOTE — LETTER
[address] 
[facility] EMERGENCY DEPT 
[address] [id number] [phone] Work/School Note Date: 9/23/2018 To Whom It May concern: 
 
Elier Mckay was seen and treated today in the emergency room by the following provider(s): 
Attending Provider: Sheron Laboy MD 
Physician Assistant: David Gee PA-C. Elier Mckay may return to work on 25 September 2018. Sincerely, David Gee PA-C

## 2018-09-23 NOTE — LETTER
[address] 
[facility] EMERGENCY DEPT 
[address] [address] 
[phone] Work/School Note Date: 9/23/2018 To Whom It May concern: 
 
Hoang Lowery was seen and treated today in the emergency room by the following provider(s): 
Attending Provider: Hima Hill MD 
Physician Assistant: Dexter Busby PA-C. The daughter of Hoang Lowery may return to school on 25 September 2018. Sincerely, Dexter Busby PA-C

## 2018-09-24 NOTE — ED NOTES
Pt discharged by AWILDA Tyson. Pt provided with discharge instructions Rx and instructions on follow up care. Pt out of ED in stable condition accompanied by family.

## 2018-09-24 NOTE — DISCHARGE INSTRUCTIONS
Please follow up with your Primary care doctor, urgent care, or ED for suture removal in 10 days. After the sutures are removed, please place sunscreen on the area for next few months to prevent scarring. Please keep wound clean and dry for next 24 hours. After 24 hours can get the wound wet but keep clean. Please return to ER for signs of infection including redness around the site, pus drainage, fevers, worsening swelling despite pain medications and ice packs. Thank you!     Thank you for allowing us to provide you with excellent care today. We hope we addressed all of your concerns and needs. We strive to provide excellent quality care in the Emergency Department. You will receive a survey after your visit to evaluate the care you were provided.      Please rate us a level 5 (excellent), as anything less than excellent does not meet our goals.      If you feel that you have not received excellent quality care or timely care, please ask to speak to the nurse manager. Please choose us in the future for your continued health care needs. ______________________________________________________________________    The exam and treatment you received in the Emergency Department were for an urgent problem and are not intended as complete care. It is important that you follow-up with a doctor, nurse practitioner, or physician assistant to:  (1) confirm your diagnosis,  (2) re-evaluation of changes in your illness and treatment, and  (3) for ongoing care. If your symptoms become worse or you do not improve as expected and you are unable to reach your usual health care provider, you should return to the Emergency Department. We are available 24 hours a day. Take this sheet with you when you go to your follow-up visit. If you have any problem arranging the follow-up visit, contact [address] [place] [address] 21 [phone])    Make an appointment with your Primary Care doctor for follow up of this visit.  Return to the ER if you are unable to be seen in the time recommended on your discharge instructions. Cuts on the Hand Closed With Stitches: Care Instructions  Your Care Instructions    A cut on your hand can be on your fingers, your thumb, or the front or back of your hand. Sometimes a cut can injure the tendons, blood vessels, or nerves of your hand. The doctor used stitches to close the cut. Using stitches also helps the cut heal and reduces scarring. The doctor may have given you a splint to help prevent you from moving your hand, fingers, or thumb. If the cut went deep and through the skin, the doctor put in two layers of stitches. The deeper layer brings the deep part of the cut together. These stitches will dissolve and don't need to be removed. The stitches in the upper layer are the ones you see on the cut. You will probably have a bandage. You will need to have the stitches removed, usually in 7 to 14 days. The doctor may suggest that you see a hand specialist if the cut is very deep or if you have trouble moving your fingers or have less feeling in your hand. The doctor has checked you carefully, but problems can develop later. If you notice any problems or new symptoms, get medical treatment right away. Follow-up care is a key part of your treatment and safety. Be sure to make and go to all appointments, and call your doctor if you are having problems. It's also a good idea to know your test results and keep a list of the medicines you take. How can you care for yourself at home? · Keep the cut dry for the first 24 to 48 hours. After this, you can shower if your doctor okays it. Pat the cut dry. · Don't soak the cut, such as in a bathtub. Your doctor will tell you when it's safe to get the cut wet. · If your doctor told you how to care for your cut, follow your doctor's instructions.  If you did not get instructions, follow this general advice:  ¨ After the first 24 to 48 hours, wash around the cut with clean water 2 times a day. Don't use hydrogen peroxide or alcohol, which can slow healing. ¨ You may cover the cut with a thin layer of petroleum jelly, such as Vaseline, and a nonstick bandage. ¨ Apply more petroleum jelly and replace the bandage as needed. · Prop up the sore hand on a pillow anytime you sit or lie down during the next 3 days. Try to keep it above the level of your heart. This will help reduce swelling. · Avoid any activity that could cause your cut to reopen. · Do not remove the stitches on your own. Your doctor will tell you when to come back to have the stitches removed. · Be safe with medicines. Take pain medicines exactly as directed. ¨ If the doctor gave you a prescription medicine for pain, take it as prescribed. ¨ If you are not taking a prescription pain medicine, ask your doctor if you can take an over-the-counter medicine. When should you call for help? Call your doctor now or seek immediate medical care if:    · You have new pain, or your pain gets worse.     · The skin near the cut is cold or pale or changes color.     · You have tingling, weakness, or numbness near the cut.     · The cut starts to bleed, and blood soaks through the bandage. Oozing small amounts of blood is normal.     · You have trouble moving the area of the hand near the cut.     · You have symptoms of infection, such as:  ¨ Increased pain, swelling, warmth, or redness around the cut. ¨ Red streaks leading from the cut. ¨ Pus draining from the cut. ¨ A fever.    Watch closely for changes in your health, and be sure to contact your doctor if:    · You do not get better as expected. Where can you learn more? Go to http://coni-crow.info/. Enter T250 in the search box to learn more about \"Cuts on the Hand Closed With Stitches: Care Instructions. \"  Current as of: November 20, 2017  Content Version: 11.7  © 3377-1326 [employer].  Care instructions adapted under license by [employer] (which disclaims liability or warranty for this information). If you have questions about a medical condition or this instruction, always ask your healthcare professional. [address] any warranty or liability for your use of this information.

## 2018-09-24 NOTE — ED NOTES
Pt states she was cleaning a knife when it slipped and cut her right index finger.  Minimal bleeding at ER

## 2018-09-24 NOTE — ED PROVIDER NOTES
EMERGENCY DEPARTMENT HISTORY AND PHYSICAL EXAM 
 
 
Date: 9/23/2018 Patient Name: Hayes Saleh History of Presenting Illness Chief Complaint Patient presents with  Laceration  
  to right second finger with  knife. History Provided By: Patient HPI: Hayes Saleh, 51 y.o. female with PMHx significant for HLD, anemia, depression, and chronic back pain, presents ambulatory to the ED with cc of laceration to right index finger. Patient states that around 11 AM today she was cleaning a freshly sharpened 's knife in the sink when the knife slipped and cut her right index finger. She states that she attempted to clean the wound today, but due to persistent bleeding, she presents to the ED. Patient denies any difficulty moving her hand or fingers. She is unsure of her last tetanus shot. Patient denies any other injuries at this time. Patient notes she is right hand dominant. Chief Complaint: laceration Duration: 11 AM today Timing:  Acute Location: right index finger Quality: Aching Severity: 6 out of 10 Modifying Factors: none Associated Symptoms: denies any other associated signs or symptoms There are no other complaints, changes, or physical findings at this time. PCP: Maryjo Kaplan MD 
 
Current Outpatient Prescriptions Medication Sig Dispense Refill  zolpidem (AMBIEN) 10 mg tablet take 1 tablet by mouth at bedtime if needed for sleep 30 Tab 1  
 ferrous sulfate 325 mg (65 mg iron) tablet Take  by mouth Daily (before breakfast).  cyclobenzaprine (FLEXERIL) 5 mg tablet Take 1 - 2 tablets by mouth every 8 hours as needed for muscle spasm. 30 Tab 0  
 naproxen (NAPROSYN) 500 mg tablet Take 1 Tab by mouth two (2) times daily as needed. Take with food or milk. Indications: Pain 30 Tab 0  
 traMADol (ULTRAM) 50 mg tablet Take 1 Tab by mouth every eight (8) hours as needed for Pain.  Max Daily Amount: 150 mg. 10 Tab 0  
  glucose blood VI test strips (BLOOD GLUCOSE TEST) strip by Does Not Apply route Daily (before breakfast). 50 Strip 11  Blood-Glucose Meter monitoring kit Test daily as needed 1 Kit 0  
 glucose blood VI test strips (BLOOD GLUCOSE TEST) strip by Does Not Apply route Daily (before breakfast). 1 Package 11 Past History Past Medical History: 
Past Medical History:  
Diagnosis Date  Anemia  Depression  Encounter for long-term (current) use of other medications 2012  LBP (low back pain) 2010  Mixed hyperlipidemia 2012  OA (osteoarthritis) 2010  Obesity 2010  Renal calculus or stone 2010  Tobacco use disorder 2010  Type II or unspecified type diabetes mellitus without mention of complication, not stated as uncontrolled 3/9/2015 Diagnosed 3/9/2015 with A1c of 8.8%; treatment initiated with dietary changes, exercise 5 days/wk and started on Metformin XR 500mg daily x 2 weeks, then increase to 500mg AC breakfast and dinner. Past Surgical History: 
Past Surgical History:  
Procedure Laterality Date  HX ANKLE FRACTURE TX    
 HX CYST REMOVAL    
 HX GYN    
  X 3  
 HX TONSILLECTOMY Family History: 
Family History Problem Relation Age of Onset [facility] Arthritis-osteo Mother  Cancer Father Social History: 
Social History Substance Use Topics  Smoking status: Current Every Day Smoker Packs/day: 1.00  Smokeless tobacco: Never Used  Alcohol use 10.0 oz/week 20 Standard drinks or equivalent per week Comment: occasionally Allergies: 
No Known Allergies Review of Systems Review of Systems Constitutional: Negative for chills and fever. HENT: Negative for sore throat. Eyes: Negative for pain. Respiratory: Negative for cough and shortness of breath. Cardiovascular: Negative for chest pain.   
Gastrointestinal: Negative for abdominal pain, diarrhea, nausea and vomiting. Genitourinary: Negative for dysuria and hematuria. Musculoskeletal: Negative for arthralgias and myalgias. Skin: Positive for wound (right index finger). Negative for rash. Neurological: Negative for dizziness, light-headedness, numbness and headaches. Psychiatric/Behavioral: Negative for behavioral problems and confusion. Physical Exam  
Physical Exam  
Constitutional: She is oriented to person, place, and time. She appears well-developed and well-nourished. No distress. HENT:  
Head: Normocephalic and atraumatic. Right Ear: External ear normal.  
Left Ear: External ear normal.  
Nose: Nose normal.  
Eyes: Conjunctivae and EOM are normal.  
Neck: Normal range of motion. Neck supple. Cardiovascular: Normal rate, regular rhythm and normal heart sounds. Pulmonary/Chest: Effort normal and breath sounds normal. She has no decreased breath sounds. She has no wheezes. Abdominal: Soft. Bowel sounds are normal. She exhibits no distension. There is no tenderness. There is no guarding. Musculoskeletal: Normal range of motion. She exhibits no edema or tenderness. Hands: 
Neurological: She is alert and oriented to person, place, and time. Skin: Skin is warm and dry. Laceration noted. She is not diaphoretic. Psychiatric: She has a normal mood and affect. Her behavior is normal. Judgment normal.  
Nursing note and vitals reviewed. Diagnostic Study Results Labs - No results found for this or any previous visit (from the past 12 hour(s)). Radiologic Studies - Medical Decision Making I am the first provider for this patient. I reviewed the vital signs, available nursing notes, past medical history, past surgical history, family history and social history. Vital Signs-Reviewed the patient's vital signs. Patient Vitals for the past 12 hrs: 
 Temp Pulse Resp BP SpO2  
09/23/18 2201 97.8 °F (36.6 °C) (!) 109 16 149/80 96 % Records Reviewed: Nursing Notes and Old Medical Records Provider Notes (Medical Decision Making):  
Patient presents with laceration. DDx: simple laceration vs complex laceration (open fracture, foreign body retention). Will perform repair, update tetanus, and explore wound. Follow up plan and return instructions have been reviewed and discussed with the patient. The patient has had the opportunity to ask questions about their care. The patient expresses understanding and agreement with diagnosis, follow up and return instructions. The patient agrees to return for suture removal in the discussed time period and expresses understanding that leaving sutures in place for longer periods will lead to scarring and infection. The patient expresses understanding that although appropriate care was taken in wound management that scarring and infection can still occur. ED Course:  
Initial assessment performed. The patients presenting problems have been discussed, and they are in agreement with the care plan formulated and outlined with them. I have encouraged them to ask questions as they arise throughout their visit. Procedure Note - Digital Block:  
11:00 PM 
Performed by: Blank Sampson PA-C Lidocaine 2% without epinephrine used to perform digital block of Right index finger(s). The procedure took 15 minutes, and pt tolerated well. Procedure Note - Laceration Repair: 
11:15 PM 
Procedure by Blank Sampson PA-C. Complexity: Simple 2.5 cm linear laceration to right index finger was irrigated copiously with NS under jet lavage, prepped with Hibiclens and draped in a sterile fashion. The area was anesthetized with 4 mLs of  Lidocaine 2% without epinephrine via digital block. The wound was explored with the following results: No foreign bodies found, No tendon laceration seen.   The wound was repaired with One layer suture closure: Skin Layer:  5 sutures placed, stitch type:simple interrupted, suture: 5-0 nylon. .  The wound was closed with good hemostasis and approximation. Sterile dressing applied. Estimated blood loss: < 5 mL The procedure took 20 minutes, and pt tolerated well. Disposition: 
DISCHARGE NOTE: 
11:30 PM 
The care plan has been outline with the patient and/or family, who verbally conveyed understanding and agreement. Available results have been reviewed. Patient and/or family understand the follow up plan as outlined and discharge instructions. Should their condition deterioration at any time after discharge the patient agrees to return, follow up sooner than outlined or seek medical assistance at the closest Emergency Room as soon as possible. Questions have been answered. Discharge instructions and educational information regarding the patient's diagnosis as well a list of reasons why the patient would want to seek immediate medical attention, should their condition change, were reviewed directly with the patient/family PLAN: 
1. Discharge home 2. F/u with PCP in 10 days to have sutures removed 3. Do not soak wounds in water. Keep clean and dry. 4. Apply sunscreen to area after sutures are removed to prevent discoloration 5. Return precautions reviewed Discharge Medication List as of 9/23/2018 11:26 PM  
  
 
 
5. Follow-up Information Follow up With Details Comments Contact Info Moisés Norris MD In 10 days For suture removal [address] [facility] [address] [id number] [phone] [facility] EMERGENCY DEPT  As needed, If symptoms worsen, For suture removal [address] [facility] 
[phone] Return to ED if worse Diagnosis Clinical Impression: 1. Laceration of right index finger without foreign body without damage to nail, initial encounter 2. Need for tetanus booster This note will not be viewable in [address]

## 2018-10-26 DIAGNOSIS — G47.00 INSOMNIA, UNSPECIFIED TYPE: ICD-10-CM

## 2018-10-26 NOTE — TELEPHONE ENCOUNTER
Last Visit: 8/20  Next Appt: none  Previous Refill Encounter: 9/4-30+1    Requested Prescriptions     Pending Prescriptions Disp Refills    zolpidem (AMBIEN) 10 mg tablet 30 Tab 1     Sig: take 1 tablet by mouth at bedtime if needed for sleep

## 2018-10-29 RX ORDER — ZOLPIDEM TARTRATE 10 MG/1
TABLET ORAL
Qty: 30 TAB | Refills: 1 | Status: SHIPPED | OUTPATIENT
Start: 2018-10-29 | End: 2018-12-18 | Stop reason: SDUPTHER

## 2018-12-03 ENCOUNTER — OFFICE VISIT (OUTPATIENT)
Dept: FAMILY MEDICINE CLINIC | Age: 48
End: 2018-12-03

## 2018-12-03 DIAGNOSIS — E78.2 MIXED HYPERLIPIDEMIA: ICD-10-CM

## 2018-12-03 DIAGNOSIS — G89.29 CHRONIC MIDLINE LOW BACK PAIN WITHOUT SCIATICA: ICD-10-CM

## 2018-12-03 DIAGNOSIS — F17.200 TOBACCO USE DISORDER: ICD-10-CM

## 2018-12-03 DIAGNOSIS — E11.9 TYPE 2 DIABETES MELLITUS WITHOUT COMPLICATION, WITHOUT LONG-TERM CURRENT USE OF INSULIN (HCC): Primary | ICD-10-CM

## 2018-12-03 DIAGNOSIS — M54.50 CHRONIC MIDLINE LOW BACK PAIN WITHOUT SCIATICA: ICD-10-CM

## 2018-12-18 DIAGNOSIS — G47.00 INSOMNIA, UNSPECIFIED TYPE: ICD-10-CM

## 2018-12-18 RX ORDER — ZOLPIDEM TARTRATE 10 MG/1
TABLET ORAL
Qty: 30 TAB | Refills: 1 | Status: SHIPPED | OUTPATIENT
Start: 2018-12-18 | End: 2019-02-12 | Stop reason: SDUPTHER

## 2018-12-18 NOTE — TELEPHONE ENCOUNTER
Last Visit: 12/3  Next Appt: none  Previous Refill Encounter: 10/29-30+1    Requested Prescriptions     Pending Prescriptions Disp Refills    zolpidem (AMBIEN) 10 mg tablet 30 Tab 1     Sig: take 1 tablet by mouth at bedtime if needed for sleep

## 2018-12-30 PROBLEM — E66.01 SEVERE OBESITY (HCC): Status: ACTIVE | Noted: 2018-12-30

## 2019-01-11 DIAGNOSIS — F17.200 TOBACCO USE DISORDER: Primary | ICD-10-CM

## 2019-01-11 RX ORDER — BUPROPION HYDROCHLORIDE 150 MG/1
150 TABLET, EXTENDED RELEASE ORAL 2 TIMES DAILY
Qty: 60 TAB | Refills: 1 | Status: SHIPPED | OUTPATIENT
Start: 2019-01-11 | End: 2020-10-09

## 2019-02-12 DIAGNOSIS — G47.00 INSOMNIA, UNSPECIFIED TYPE: ICD-10-CM

## 2019-02-12 RX ORDER — ZOLPIDEM TARTRATE 10 MG/1
TABLET ORAL
Qty: 30 TAB | Refills: 1 | Status: SHIPPED | OUTPATIENT
Start: 2019-02-12 | End: 2019-02-19 | Stop reason: SDUPTHER

## 2019-02-12 NOTE — TELEPHONE ENCOUNTER
Last Visit: 12/3  Next Appt: None  Previous Refill Encounter: 12/18-30+1    Requested Prescriptions     Pending Prescriptions Disp Refills    zolpidem (AMBIEN) 10 mg tablet 30 Tab 1     Sig: take 1 tablet by mouth at bedtime if needed for sleep

## 2019-02-19 DIAGNOSIS — G47.00 INSOMNIA, UNSPECIFIED TYPE: ICD-10-CM

## 2019-02-19 RX ORDER — ZOLPIDEM TARTRATE 10 MG/1
TABLET ORAL
Qty: 30 TAB | Refills: 1 | Status: SHIPPED | OUTPATIENT
Start: 2019-02-19 | End: 2019-04-14 | Stop reason: SDUPTHER

## 2019-03-18 ENCOUNTER — OFFICE VISIT (OUTPATIENT)
Dept: FAMILY MEDICINE CLINIC | Age: 49
End: 2019-03-18

## 2019-03-18 VITALS
TEMPERATURE: 97.3 F | DIASTOLIC BLOOD PRESSURE: 84 MMHG | HEIGHT: 62 IN | BODY MASS INDEX: 33.82 KG/M2 | OXYGEN SATURATION: 100 % | HEART RATE: 80 BPM | SYSTOLIC BLOOD PRESSURE: 122 MMHG | RESPIRATION RATE: 22 BRPM | WEIGHT: 183.8 LBS

## 2019-03-18 DIAGNOSIS — E11.9 TYPE 2 DIABETES MELLITUS WITHOUT COMPLICATION, WITHOUT LONG-TERM CURRENT USE OF INSULIN (HCC): ICD-10-CM

## 2019-03-18 DIAGNOSIS — S29.019D ACUTE THORACIC MYOFASCIAL STRAIN, SUBSEQUENT ENCOUNTER: ICD-10-CM

## 2019-03-18 DIAGNOSIS — M54.6 THORACIC SPINE PAIN: Primary | ICD-10-CM

## 2019-03-18 LAB
GLUCOSE POC: 230 MG/DL
HBA1C MFR BLD HPLC: 7.5 %

## 2019-03-18 RX ORDER — CYCLOBENZAPRINE HCL 5 MG
TABLET ORAL
Qty: 30 TAB | Refills: 0 | Status: SHIPPED | OUTPATIENT
Start: 2019-03-18 | End: 2020-10-09

## 2019-03-18 RX ORDER — NAPROXEN 500 MG/1
500 TABLET ORAL 2 TIMES DAILY WITH MEALS
Qty: 30 TAB | Refills: 0 | Status: SHIPPED | OUTPATIENT
Start: 2019-03-18 | End: 2021-06-29 | Stop reason: ALTCHOICE

## 2019-03-18 RX ORDER — OXYCODONE AND ACETAMINOPHEN 5; 325 MG/1; MG/1
1 TABLET ORAL
Qty: 20 TAB | Refills: 0 | Status: SHIPPED | OUTPATIENT
Start: 2019-03-18 | End: 2019-04-18

## 2019-03-18 NOTE — LETTER
NOTIFICATION OF RETURN TO WORK / SCHOOL 
 
3/18/2019 12:19 PM 
 
Ms. Renetta Rubio [address] [facility] [address] [address] [place] To Whom It May Concern: 
 
Renetta Rubio was under the care of [facility] from 3/18/19. She will be able to return to work/school on 3/20/19 with no restrictions. If there are questions or concerns please have the patient contact our office. Sincerely, Elisa Perez MD

## 2019-03-18 NOTE — PROGRESS NOTES
HISTORY OF PRESENT ILLNESS  Agustín Encarnacion is a 51 y.o. female. with recurrence of  upper back pain x 3 days,no apparent injury. Previously seen by ortho ,mri denied. Has done well inpast year  Back Pain    The history is provided by the patient. This is a recurrent problem. The problem has not changed since onset. The problem occurs hourly. The pain is associated with no known injury. The pain is present in the thoracic spine. The quality of the pain is described as stabbing. The pain does not radiate. The pain is at a severity of 6/10. The pain is severe. The symptoms are aggravated by twisting, bending and certain positions. The pain is worse during the day. Pertinent negatives include no chest pain, no fever, no weight loss, no abdominal pain, no abdominal swelling, no bowel incontinence, no dysuria, no paresthesias and no weakness. Diabetes   The history is provided by the patient. This is a chronic problem. The problem occurs daily. The problem has not changed since onset. Pertinent negatives include no chest pain and no abdominal pain. Review of Systems   Constitutional: Negative for chills, fever and weight loss. Eyes: Negative for blurred vision. Cardiovascular: Negative for chest pain and palpitations. Gastrointestinal: Negative for abdominal pain, blood in stool, bowel incontinence, constipation, diarrhea and heartburn. Genitourinary: Negative for dysuria, frequency, hematuria and urgency. Musculoskeletal: Positive for back pain. Negative for joint pain. Neurological: Negative for weakness and paresthesias. Psychiatric/Behavioral: Negative for depression. Physical Exam   Constitutional: She appears well-developed and well-nourished. HENT:   Head: Normocephalic. Right Ear: External ear normal.   Nose: Nose normal.   Mouth/Throat: Oropharynx is clear and moist.   Cardiovascular: Normal rate and regular rhythm.    Pulmonary/Chest: Effort normal and breath sounds normal. Abdominal: Soft. Bowel sounds are normal.   Musculoskeletal:        Thoracic back: She exhibits decreased range of motion, tenderness and bony tenderness. She exhibits no deformity. Back:    SLRs 90/90,DTRs normal and symmetrical  ROM limited       Diagnoses and all orders for this visit:    1. Thoracic spine pain  -     cyclobenzaprine (FLEXERIL) 5 mg tablet; Take 1 - 2 tablets by mouth every 8 hours as needed for muscle spasm.  -     XR SPINE THORAC 3 V; Future  -     oxyCODONE-acetaminophen (PERCOCET) 5-325 mg per tablet; Take 1 Tab by mouth every six (6) hours as needed for Pain for up to 3 days. Max Daily Amount: 4 Tabs. -     MRI [facility] SPINE WO CONT; Future    2. Acute thoracic myofascial strain, subsequent encounter  -     cyclobenzaprine (FLEXERIL) 5 mg tablet; Take 1 - 2 tablets by mouth every 8 hours as needed for muscle spasm.  -     naproxen (NAPROSYN) 500 mg tablet; Take 1 Tab by mouth two (2) times daily (with meals). Take with food or milk. -     XR SPINE THORAC 3 V; Future  -     CBC WITH AUTOMATED DIFF  -     oxyCODONE-acetaminophen (PERCOCET) 5-325 mg per tablet; Take 1 Tab by mouth every six (6) hours as needed for Pain for up to 3 days. Max Daily Amount: 4 Tabs. -     MRI [facility] SPINE WO CONT; Future    3. Type 2 diabetes mellitus without complication, without long-term current use of insulin (HCC)  -     METABOLIC PANEL, COMPREHENSIVE  -     CBC WITH AUTOMATED DIFF  -     CHOLESTEROL, TOTAL  -     AMB POC GLUCOSE, QUANTITATIVE, BLOOD  -     AMB POC HEMOGLOBIN A1C        Follow-up Disposition:  Return in about 4 weeks (around 4/15/2019).

## 2019-03-19 LAB
ALBUMIN SERPL-MCNC: 4.1 G/DL (ref 3.5–5.5)
ALBUMIN/GLOB SERPL: 1.2 {RATIO} (ref 1.2–2.2)
ALP SERPL-CCNC: 101 IU/L (ref 39–117)
ALT SERPL-CCNC: 12 IU/L (ref 0–32)
AST SERPL-CCNC: 18 IU/L (ref 0–40)
BASOPHILS # BLD AUTO: 0 X10E3/UL (ref 0–0.2)
BASOPHILS NFR BLD AUTO: 0 %
BILIRUB SERPL-MCNC: <0.2 MG/DL (ref 0–1.2)
BUN SERPL-MCNC: 7 MG/DL (ref 6–24)
BUN/CREAT SERPL: 11 (ref 9–23)
CALCIUM SERPL-MCNC: 9.7 MG/DL (ref 8.7–10.2)
CHLORIDE SERPL-SCNC: 100 MMOL/L (ref 96–106)
CHOLEST SERPL-MCNC: 229 MG/DL (ref 100–199)
CO2 SERPL-SCNC: 22 MMOL/L (ref 20–29)
CREAT SERPL-MCNC: 0.66 MG/DL (ref 0.57–1)
EOSINOPHIL # BLD AUTO: 0.3 X10E3/UL (ref 0–0.4)
EOSINOPHIL NFR BLD AUTO: 3 %
ERYTHROCYTE [DISTWIDTH] IN BLOOD BY AUTOMATED COUNT: 14.3 % (ref 12.3–15.4)
GLOBULIN SER CALC-MCNC: 3.3 G/DL (ref 1.5–4.5)
GLUCOSE SERPL-MCNC: 209 MG/DL (ref 65–99)
HCT VFR BLD AUTO: 45.4 % (ref 34–46.6)
HGB BLD-MCNC: 14.8 G/DL (ref 11.1–15.9)
IMM GRANULOCYTES # BLD AUTO: 0 X10E3/UL (ref 0–0.1)
IMM GRANULOCYTES NFR BLD AUTO: 0 %
LYMPHOCYTES # BLD AUTO: 1.8 X10E3/UL (ref 0.7–3.1)
LYMPHOCYTES NFR BLD AUTO: 19 %
MCH RBC QN AUTO: 33 PG (ref 26.6–33)
MCHC RBC AUTO-ENTMCNC: 32.6 G/DL (ref 31.5–35.7)
MCV RBC AUTO: 101 FL (ref 79–97)
MONOCYTES # BLD AUTO: 0.5 X10E3/UL (ref 0.1–0.9)
MONOCYTES NFR BLD AUTO: 5 %
NEUTROPHILS # BLD AUTO: 7.3 X10E3/UL (ref 1.4–7)
NEUTROPHILS NFR BLD AUTO: 73 %
PLATELET # BLD AUTO: 175 X10E3/UL (ref 150–379)
POTASSIUM SERPL-SCNC: 4.3 MMOL/L (ref 3.5–5.2)
PROT SERPL-MCNC: 7.4 G/DL (ref 6–8.5)
RBC # BLD AUTO: 4.49 X10E6/UL (ref 3.77–5.28)
SODIUM SERPL-SCNC: 139 MMOL/L (ref 134–144)
WBC # BLD AUTO: 9.9 X10E3/UL (ref 3.4–10.8)

## 2019-04-14 DIAGNOSIS — G47.00 INSOMNIA, UNSPECIFIED TYPE: ICD-10-CM

## 2019-04-15 RX ORDER — ZOLPIDEM TARTRATE 10 MG/1
TABLET ORAL
Qty: 30 TAB | Refills: 1 | Status: SHIPPED | OUTPATIENT
Start: 2019-04-15 | End: 2019-06-06 | Stop reason: SDUPTHER

## 2019-04-18 DIAGNOSIS — M54.6 THORACIC SPINE PAIN: ICD-10-CM

## 2019-04-18 DIAGNOSIS — S29.019D ACUTE THORACIC MYOFASCIAL STRAIN, SUBSEQUENT ENCOUNTER: ICD-10-CM

## 2019-04-18 RX ORDER — OXYCODONE AND ACETAMINOPHEN 5; 325 MG/1; MG/1
1 TABLET ORAL
Qty: 15 TAB | Refills: 0 | Status: SHIPPED | OUTPATIENT
Start: 2019-04-18 | End: 2019-04-21

## 2019-04-18 RX ORDER — PREDNISONE 10 MG/1
10 TABLET ORAL 2 TIMES DAILY
Qty: 10 TAB | Refills: 0 | Status: SHIPPED | OUTPATIENT
Start: 2019-04-18 | End: 2020-06-11 | Stop reason: ALTCHOICE

## 2019-06-06 DIAGNOSIS — G47.00 INSOMNIA, UNSPECIFIED TYPE: ICD-10-CM

## 2019-06-10 RX ORDER — ZOLPIDEM TARTRATE 10 MG/1
TABLET ORAL
Qty: 30 TAB | Refills: 1 | Status: SHIPPED | OUTPATIENT
Start: 2019-06-10 | End: 2019-08-01 | Stop reason: SDUPTHER

## 2019-08-01 DIAGNOSIS — G47.00 INSOMNIA, UNSPECIFIED TYPE: ICD-10-CM

## 2019-08-02 ENCOUNTER — TELEPHONE (OUTPATIENT)
Dept: FAMILY MEDICINE CLINIC | Age: 49
End: 2019-08-02

## 2019-08-02 RX ORDER — ZOLPIDEM TARTRATE 10 MG/1
TABLET ORAL
Qty: 30 TAB | Refills: 1 | Status: SHIPPED | OUTPATIENT
Start: 2019-08-02 | End: 2019-10-02 | Stop reason: SDUPTHER

## 2019-10-02 DIAGNOSIS — G47.00 INSOMNIA, UNSPECIFIED TYPE: ICD-10-CM

## 2019-10-03 RX ORDER — ZOLPIDEM TARTRATE 10 MG/1
10 TABLET ORAL
Qty: 30 TAB | Refills: 1 | Status: SHIPPED | OUTPATIENT
Start: 2019-10-03 | End: 2019-10-04 | Stop reason: SDUPTHER

## 2019-10-04 DIAGNOSIS — G47.00 INSOMNIA, UNSPECIFIED TYPE: ICD-10-CM

## 2019-10-07 RX ORDER — ZOLPIDEM TARTRATE 10 MG/1
TABLET ORAL
Qty: 30 TAB | Refills: 1 | Status: SHIPPED | OUTPATIENT
Start: 2019-10-07 | End: 2019-12-02 | Stop reason: SDUPTHER

## 2019-12-28 DIAGNOSIS — G47.00 INSOMNIA, UNSPECIFIED TYPE: ICD-10-CM

## 2019-12-30 RX ORDER — ZOLPIDEM TARTRATE 10 MG/1
TABLET ORAL
Qty: 30 TAB | Refills: 1 | Status: SHIPPED | OUTPATIENT
Start: 2019-12-30 | End: 2020-02-24

## 2020-03-17 DIAGNOSIS — G47.00 INSOMNIA, UNSPECIFIED TYPE: ICD-10-CM

## 2020-03-18 RX ORDER — ZOLPIDEM TARTRATE 10 MG/1
TABLET ORAL
Qty: 30 TAB | Refills: 0 | Status: SHIPPED | OUTPATIENT
Start: 2020-03-18 | End: 2020-04-22 | Stop reason: SDUPTHER

## 2020-04-21 DIAGNOSIS — G47.00 INSOMNIA, UNSPECIFIED TYPE: ICD-10-CM

## 2020-04-22 DIAGNOSIS — G47.00 INSOMNIA, UNSPECIFIED TYPE: ICD-10-CM

## 2020-04-22 RX ORDER — ZOLPIDEM TARTRATE 10 MG/1
TABLET ORAL
Qty: 30 TAB | Refills: 0 | OUTPATIENT
Start: 2020-04-22

## 2020-04-22 RX ORDER — ZOLPIDEM TARTRATE 10 MG/1
TABLET ORAL
Qty: 30 TAB | Refills: 0 | Status: SHIPPED | OUTPATIENT
Start: 2020-04-22 | End: 2020-05-15

## 2020-05-15 DIAGNOSIS — G47.00 INSOMNIA, UNSPECIFIED TYPE: ICD-10-CM

## 2020-05-15 RX ORDER — ZOLPIDEM TARTRATE 10 MG/1
TABLET ORAL
Qty: 30 TAB | Refills: 0 | Status: SHIPPED | OUTPATIENT
Start: 2020-05-15 | End: 2020-06-11 | Stop reason: SDUPTHER

## 2020-06-11 ENCOUNTER — VIRTUAL VISIT (OUTPATIENT)
Dept: FAMILY MEDICINE CLINIC | Age: 50
End: 2020-06-11

## 2020-06-11 DIAGNOSIS — Z87.442 HISTORY OF RENAL STONE: ICD-10-CM

## 2020-06-11 DIAGNOSIS — G47.00 INSOMNIA, UNSPECIFIED TYPE: ICD-10-CM

## 2020-06-11 DIAGNOSIS — N23 RENAL COLIC ON LEFT SIDE: Primary | ICD-10-CM

## 2020-06-11 RX ORDER — ZOLPIDEM TARTRATE 10 MG/1
TABLET ORAL
Qty: 30 TAB | Refills: 0 | Status: SHIPPED | OUTPATIENT
Start: 2020-06-11 | End: 2020-07-06 | Stop reason: SDUPTHER

## 2020-06-11 RX ORDER — TAMSULOSIN HYDROCHLORIDE 0.4 MG/1
0.4 CAPSULE ORAL DAILY
Qty: 10 CAP | Refills: 1 | Status: SHIPPED | OUTPATIENT
Start: 2020-06-11 | End: 2020-08-07 | Stop reason: SDUPTHER

## 2020-06-11 RX ORDER — OXYCODONE AND ACETAMINOPHEN 5; 325 MG/1; MG/1
1 TABLET ORAL
Qty: 15 TAB | Refills: 0 | Status: SHIPPED | OUTPATIENT
Start: 2020-06-11 | End: 2020-06-14

## 2020-06-11 NOTE — PROGRESS NOTES
Chief Complaint   Patient presents with    UTI     Pt state she has back pain, urinary retention; she states may be poss kidney stone. 1. Have you been to the ER, urgent care clinic since your last visit? Hospitalized since your last visit? No    2. Have you seen or consulted any other health care providers outside of the [address] [place] [address] since your last visit? Include any pap smears or colon screening. No      Pt state no pain at the present time.

## 2020-06-11 NOTE — PROGRESS NOTES
HISTORY OF PRESENT ILLNESS  Patient encounter by synchronous (real time) audio-visual technology which is patient initiated. The Patient is aware that this encounter is a billable service with coverage determined by their insurance carrier,as discussed at the time of check in. The patient has given verbal consent to proceed    Jaki Haines is a 47 y.o. female. c/o worsening l flank pain x 2 days,similar to prior episodes of renalcolic/stone passage. No fever ,hematuria,nausea or vomiting  Flank Pain    The history is provided by the patient. This is a new problem. The current episode started more than 2 days ago. The problem has been gradually worsening. The problem occurs hourly. The pain is associated with no known injury. The pain is present in the lower back. The quality of the pain is described as aching. The pain does not radiate. The pain is at a severity of 6/10. The pain is moderate. Pertinent negatives include no fever, no abdominal pain, no abdominal swelling, no dysuria and no paresthesias. Kidney Stone   The history is provided by the patient. This is a recurrent problem. The problem occurs rarely. The problem has been gradually worsening. Pertinent negatives include no abdominal pain. Review of Systems   Constitutional: Positive for malaise/fatigue. Negative for chills and fever. Gastrointestinal: Negative for abdominal pain, blood in stool, constipation and melena. Genitourinary: Positive for flank pain and urgency. Negative for dysuria, frequency and hematuria. Skin: Negative for rash. Neurological: Negative for paresthesias. Physical Exam deferred    ASSESSMENT and PLAN  Diagnoses and all orders for this visit:    1. Renal colic on left side  -     tamsulosin (FLOMAX) 0.4 mg capsule; Take 1 Cap by mouth daily. -     oxyCODONE-acetaminophen (PERCOCET) 5-325 mg per tablet; Take 1 Tab by mouth every six (6) hours as needed for Pain for up to 3 days.  Max Daily Amount: 4 Tabs.    2. History of renal stone  -     tamsulosin (FLOMAX) 0.4 mg capsule; Take 1 Cap by mouth daily. -     oxyCODONE-acetaminophen (PERCOCET) 5-325 mg per tablet; Take 1 Tab by mouth every six (6) hours as needed for Pain for up to 3 days. Max Daily Amount: 4 Tabs. 3. Insomnia, unspecified type  -     zolpidem (AMBIEN) 10 mg tablet; Take 1 tablet by mouth nightly at bedtime    to strain urine,call or to ER if sx worsen    Due to this being a telehealth encounter (During  [address] [facility]),evaluation of the following organ systems was limited:Vitals/Constitutional/EENT,Respiratory,CV,GI,,MS,Neuro,Skin /Heme-Lymph-Imm  Pursuant to the emergency declaration under the [address] [place] [address] waiver authority and the [employer] and [facility] Appropriations Act,this Virtual Visit was conducted ,with patient consent,to reduce the patients risk of exposure to Covid 19 and to provide continuity of care  for an established patient. Services were provided through a video synchronous discussion virtually to substitute for in person appointment. This visit was completed using [employer]. me    Time in Virtual Visit:  15  minutes

## 2020-07-06 ENCOUNTER — OFFICE VISIT (OUTPATIENT)
Dept: FAMILY MEDICINE CLINIC | Age: 50
End: 2020-07-06

## 2020-07-06 VITALS
WEIGHT: 165.6 LBS | TEMPERATURE: 97.8 F | BODY MASS INDEX: 29.34 KG/M2 | DIASTOLIC BLOOD PRESSURE: 83 MMHG | SYSTOLIC BLOOD PRESSURE: 135 MMHG | HEART RATE: 99 BPM | RESPIRATION RATE: 18 BRPM | OXYGEN SATURATION: 97 % | HEIGHT: 63 IN

## 2020-07-06 DIAGNOSIS — G47.00 INSOMNIA, UNSPECIFIED TYPE: ICD-10-CM

## 2020-07-06 DIAGNOSIS — E78.2 MIXED HYPERLIPIDEMIA: ICD-10-CM

## 2020-07-06 DIAGNOSIS — Z80.0 FAMILY HISTORY OF COLON CANCER: ICD-10-CM

## 2020-07-06 DIAGNOSIS — F17.200 TOBACCO USE DISORDER: ICD-10-CM

## 2020-07-06 DIAGNOSIS — E11.9 TYPE 2 DIABETES MELLITUS WITHOUT COMPLICATION, WITHOUT LONG-TERM CURRENT USE OF INSULIN (HCC): ICD-10-CM

## 2020-07-06 DIAGNOSIS — Z00.00 ANNUAL PHYSICAL EXAM: Primary | ICD-10-CM

## 2020-07-06 DIAGNOSIS — Z87.442 HISTORY OF RENAL STONE: ICD-10-CM

## 2020-07-06 LAB
BILIRUB UR QL STRIP: NEGATIVE
GLUCOSE UR-MCNC: NORMAL MG/DL
HBA1C MFR BLD HPLC: 12.7 %
KETONES P FAST UR STRIP-MCNC: NORMAL MG/DL
PH UR STRIP: 5 [PH] (ref 4.6–8)
PROT UR QL STRIP: NEGATIVE
SP GR UR STRIP: 1.01 (ref 1–1.03)
UA UROBILINOGEN AMB POC: NORMAL (ref 0.2–1)
URINALYSIS CLARITY POC: CLEAR
URINALYSIS COLOR POC: YELLOW
URINE BLOOD POC: NORMAL
URINE LEUKOCYTES POC: NEGATIVE
URINE NITRITES POC: NEGATIVE

## 2020-07-06 RX ORDER — ZOLPIDEM TARTRATE 10 MG/1
TABLET ORAL
Qty: 30 TAB | Refills: 0 | Status: SHIPPED | OUTPATIENT
Start: 2020-07-06 | End: 2020-08-03

## 2020-07-06 RX ORDER — INSULIN PUMP SYRINGE, 3 ML
EACH MISCELLANEOUS
Qty: 1 KIT | Refills: 0 | Status: SHIPPED | OUTPATIENT
Start: 2020-07-06 | End: 2020-08-07 | Stop reason: SDUPTHER

## 2020-07-06 RX ORDER — GLIMEPIRIDE 2 MG/1
2 TABLET ORAL
Qty: 30 TAB | Refills: 5 | Status: SHIPPED | OUTPATIENT
Start: 2020-07-06 | End: 2021-07-08

## 2020-07-06 RX ORDER — METFORMIN HYDROCHLORIDE 750 MG/1
750 TABLET, EXTENDED RELEASE ORAL DAILY
Qty: 30 TAB | Refills: 5 | Status: SHIPPED | OUTPATIENT
Start: 2020-07-06 | End: 2021-06-21

## 2020-07-06 RX ORDER — IBUPROFEN 200 MG
CAPSULE ORAL
Qty: 50 STRIP | Refills: 5 | Status: SHIPPED | OUTPATIENT
Start: 2020-07-06 | End: 2020-08-07 | Stop reason: SDUPTHER

## 2020-07-06 RX ORDER — VARENICLINE TARTRATE 1 MG/1
1 TABLET, FILM COATED ORAL DAILY
Qty: 30 TAB | Refills: 2 | Status: SHIPPED | OUTPATIENT
Start: 2020-07-06 | End: 2020-07-16

## 2020-07-06 NOTE — PROGRESS NOTES
Subjective:   47 y.o. female for Well Woman Check. Her gyne and breast care is done elsewhere by her Ob-Gyne physician. Patient Active Problem List   Diagnosis Code    Tobacco use disorder F17.200    Renal calculus or stone N20.0    OA (osteoarthritis) M19.90    BMI 35.0-35.9,adult Z68.35    Mixed hyperlipidemia E78.2    Encounter for long-term (current) use of other medications Z79.899    Allergic rhinitis, cause unspecified J30.9    Insomnia G47.00    Symptomatic anemia D64.9    Midline low back pain without sciatica M54.5    Type 2 diabetes mellitus without complication (HCC) E11.3    Severe obesity ([facility]) E66.01     Patient Active Problem List    Diagnosis Date Noted    Severe obesity ([facility] [address] 12/30/2018    Type 2 diabetes mellitus without complication ([facility] [address] 70/32/2732    Midline low back pain without sciatica 05/22/2015    Symptomatic anemia 05/08/2015    Insomnia 10/18/2013    Allergic rhinitis, cause unspecified 04/23/2013    Mixed hyperlipidemia 07/08/2012    Encounter for long-term (current) use of other medications 07/08/2012    Tobacco use disorder 04/27/2010    Renal calculus or stone 04/27/2010    OA (osteoarthritis) 04/27/2010    BMI 35.0-35.9,adult 04/27/2010     Class: Chronic     Current Outpatient Medications   Medication Sig Dispense Refill    zolpidem (AMBIEN) 10 mg tablet Take 1 tablet by mouth nightly at bedtime 30 Tab 0    tamsulosin (FLOMAX) 0.4 mg capsule Take 1 Cap by mouth daily. 10 Cap 1    glucose blood VI test strips (BLOOD GLUCOSE TEST) strip by Does Not Apply route Daily (before breakfast). 50 Strip 11    ferrous sulfate 325 mg (65 mg iron) tablet Take  by mouth Daily (before breakfast).  Blood-Glucose Meter monitoring kit Test daily as needed 1 Kit 0    glucose blood VI test strips (BLOOD GLUCOSE TEST) strip by Does Not Apply route Daily (before breakfast).  1 Package 11    cyclobenzaprine (FLEXERIL) 5 mg tablet Take 1 - 2 tablets by mouth every 8 hours as needed for muscle spasm. (Patient not taking: Reported on 2020) 30 Tab 0    naproxen (NAPROSYN) 500 mg tablet Take 1 Tab by mouth two (2) times daily (with meals). Take with food or milk. (Patient not taking: Reported on 2020) 30 Tab 0    buPROPion SR (WELLBUTRIN SR) 150 mg SR tablet Take 1 Tab by mouth two (2) times a day. (Patient not taking: Reported on 2020) 60 Tab 1    traMADol (ULTRAM) 50 mg tablet Take 1 Tab by mouth every eight (8) hours as needed for Pain. Max Daily Amount: 150 mg. (Patient not taking: Reported on 2020) 10 Tab 0     No Known Allergies  Past Medical History:   Diagnosis Date    Anemia     Depression     Encounter for long-term (current) use of other medications 2012    LBP (low back pain) 2010    Mixed hyperlipidemia 2012    OA (osteoarthritis) 2010    Obesity 2010    Renal calculus or stone 2010    Tobacco use disorder 2010    Type II or unspecified type diabetes mellitus without mention of complication, not stated as uncontrolled 3/9/2015    Diagnosed 3/9/2015 with A1c of 8.8%; treatment initiated with dietary changes, exercise 5 days/wk and started on Metformin XR 500mg daily x 2 weeks, then increase to 500mg AC breakfast and dinner. Past Surgical History:   Procedure Laterality Date    BREAST SURGERY PROCEDURE UNLISTED      HX ANKLE FRACTURE TX      HX CYST REMOVAL      HX GYN       X 3    HX TONSILLECTOMY       Family History   Problem Relation Age of Onset    Arthritis-osteo Mother     Cancer Mother     Cancer Father      Social History     Tobacco Use    Smoking status: Current Every Day Smoker     Packs/day: 1.00    Smokeless tobacco: Never Used   Substance Use Topics    Alcohol use: Yes     Alcohol/week: 16.7 standard drinks     Types: 20 Standard drinks or equivalent per week     Comment: occasionally             Specific concerns today: check up  .     Review of Systems  Constitutional: negative  Eyes: negative  Ears, nose, mouth, throat, and face: negative  Respiratory: negative  Cardiovascular: negative  Gastrointestinal: negative  Genitourinary:negative  Integument/breast: negative  Musculoskeletal:positive for myalgias  Neurological: negative  Behavioral/Psych: negative    Objective:   Blood pressure 135/83, pulse 99, temperature 97.8 °F (36.6 °C), temperature source Oral, resp. rate 18, height 5' 3\" (1.6 m), weight 165 lb 9.6 oz (75.1 kg), SpO2 97 %. Physical Examination:   General appearance - alert, well appearing, and in no distress  Mental status - alert, oriented to person, place, and time  Eyes - pupils equal and reactive, extraocular eye movements intact  Ears - bilateral TM's and external ear canals normal  Nose - normal and patent, no erythema, discharge or polyps  Mouth - mucous membranes moist, pharynx normal without lesions  Neck - supple, no significant adenopathy, carotids upstroke normal bilaterally, no bruits, thyroid exam: thyroid is normal in size without nodules or tenderness  Chest - clear to auscultation, no wheezes, rales or rhonchi, symmetric air entry  Heart - normal rate, regular rhythm, normal S1, S2, no murmurs, rubs, clicks or gallops  Abdomen - soft, nontender, nondistended, no masses or organomegaly  Musculoskeletal - no joint tenderness, deformity or swelling  Extremities - peripheral pulses normal, no pedal edema, no clubbing or cyanosis  Skin - normal coloration and turgor, no rashes, no suspicious skin lesions noted     Assessment/Plan:   Well Woman Exam  lose weight, increase physical activity, limit alcohol consumption, stop smoking (advice and handout given), routine labs ordered, call if any problems  Diagnoses and all orders for this visit:    1. Annual physical exam    2. History of renal stone  -     CBC WITH AUTOMATED DIFF  -     AMB POC URINALYSIS DIP STICK AUTO W/O MICRO    3.  Insomnia, unspecified type  -     zolpidem (AMBIEN) 10 mg tablet; Take 1 tablet by mouth nightly at bedtime    4. Type 2 diabetes mellitus without complication, without long-term current use of insulin (HCC),uncontrolled  -     METABOLIC PANEL, COMPREHENSIVE  -     AMB POC HEMOGLOBIN A1C  -     metFORMIN ER (GLUCOPHAGE XR) 750 mg tablet; Take 1 Tab by mouth daily.  -     glimepiride (AMARYL) 2 mg tablet; Take 1 Tab by mouth every morning.  -     Blood-Glucose Meter monitoring kit; by Does Not Apply route Daily (before breakfast). -     glucose blood VI test strips (blood glucose test) strip; by Does Not Apply route Daily (before breakfast). 5. Mixed hyperlipidemia  -     LIPID PANEL    6. Tobacco use disorder  -     XR CHEST PA LAT; Future  -     varenicline (CHANTIX) 1 mg tablet; Take 1 Tab by mouth daily. 7. Family history of colon cancer  -     REFERRAL TO GASTROENTEROLOGY      Follow-up and Dispositions    · Return in about 1 year (around 7/6/2021).

## 2020-07-06 NOTE — PROGRESS NOTES
Chief Complaint   Patient presents with    Well Woman     physical     1. Have you been to the ER, urgent care clinic since your last visit? Hospitalized since your last visit?no    2. Have you seen or consulted any other health care providers outside of the [address] [place] [address] since your last visit? Include any pap smears or colon screening.  no

## 2020-07-07 LAB
ALBUMIN SERPL-MCNC: 4.5 G/DL (ref 3.8–4.8)
ALBUMIN/GLOB SERPL: 1.3 {RATIO} (ref 1.2–2.2)
ALP SERPL-CCNC: 131 IU/L (ref 39–117)
ALT SERPL-CCNC: 24 IU/L (ref 0–32)
AST SERPL-CCNC: 29 IU/L (ref 0–40)
BASOPHILS # BLD AUTO: 0.1 X10E3/UL (ref 0–0.2)
BASOPHILS NFR BLD AUTO: 1 %
BILIRUB SERPL-MCNC: 0.7 MG/DL (ref 0–1.2)
BUN SERPL-MCNC: 6 MG/DL (ref 6–24)
BUN/CREAT SERPL: 9 (ref 9–23)
CALCIUM SERPL-MCNC: 10.2 MG/DL (ref 8.7–10.2)
CHLORIDE SERPL-SCNC: 90 MMOL/L (ref 96–106)
CHOLEST SERPL-MCNC: 258 MG/DL (ref 100–199)
CO2 SERPL-SCNC: 24 MMOL/L (ref 20–29)
CREAT SERPL-MCNC: 0.64 MG/DL (ref 0.57–1)
EOSINOPHIL # BLD AUTO: 0.1 X10E3/UL (ref 0–0.4)
EOSINOPHIL NFR BLD AUTO: 1 %
ERYTHROCYTE [DISTWIDTH] IN BLOOD BY AUTOMATED COUNT: 12.9 % (ref 11.7–15.4)
GLOBULIN SER CALC-MCNC: 3.4 G/DL (ref 1.5–4.5)
GLUCOSE SERPL-MCNC: 334 MG/DL (ref 65–99)
HCT VFR BLD AUTO: 47.7 % (ref 34–46.6)
HDLC SERPL-MCNC: 23 MG/DL
HGB BLD-MCNC: 15.8 G/DL (ref 11.1–15.9)
IMM GRANULOCYTES # BLD AUTO: 0.1 X10E3/UL (ref 0–0.1)
IMM GRANULOCYTES NFR BLD AUTO: 1 %
INTERPRETATION, 910389: NORMAL
LDLC SERPL CALC-MCNC: ABNORMAL MG/DL (ref 0–99)
LYMPHOCYTES # BLD AUTO: 1.6 X10E3/UL (ref 0.7–3.1)
LYMPHOCYTES NFR BLD AUTO: 16 %
Lab: NORMAL
MCH RBC QN AUTO: 33.5 PG (ref 26.6–33)
MCHC RBC AUTO-ENTMCNC: 33.1 G/DL (ref 31.5–35.7)
MCV RBC AUTO: 101 FL (ref 79–97)
MONOCYTES # BLD AUTO: 0.6 X10E3/UL (ref 0.1–0.9)
MONOCYTES NFR BLD AUTO: 6 %
NEUTROPHILS # BLD AUTO: 7.6 X10E3/UL (ref 1.4–7)
NEUTROPHILS NFR BLD AUTO: 75 %
PLATELET # BLD AUTO: 214 X10E3/UL (ref 150–450)
POTASSIUM SERPL-SCNC: 4.3 MMOL/L (ref 3.5–5.2)
PROT SERPL-MCNC: 7.9 G/DL (ref 6–8.5)
RBC # BLD AUTO: 4.71 X10E6/UL (ref 3.77–5.28)
SODIUM SERPL-SCNC: 130 MMOL/L (ref 134–144)
TRIGL SERPL-MCNC: 868 MG/DL (ref 0–149)
VLDLC SERPL CALC-MCNC: ABNORMAL MG/DL (ref 5–40)
WBC # BLD AUTO: 10.1 X10E3/UL (ref 3.4–10.8)

## 2020-07-16 ENCOUNTER — TELEPHONE (OUTPATIENT)
Dept: FAMILY MEDICINE CLINIC | Age: 50
End: 2020-07-16

## 2020-07-16 DIAGNOSIS — F17.200 TOBACCO USE DISORDER: ICD-10-CM

## 2020-07-16 RX ORDER — VARENICLINE TARTRATE 1 MG/1
1 TABLET, FILM COATED ORAL 2 TIMES DAILY
Qty: 60 TAB | Refills: 2 | OUTPATIENT
Start: 2020-07-16 | End: 2020-11-30 | Stop reason: SDUPTHER

## 2020-07-31 DIAGNOSIS — S29.019D ACUTE THORACIC MYOFASCIAL STRAIN, SUBSEQUENT ENCOUNTER: Primary | ICD-10-CM

## 2020-07-31 RX ORDER — TIZANIDINE 2 MG/1
2 TABLET ORAL
Qty: 30 TAB | Refills: 1 | Status: SHIPPED | OUTPATIENT
Start: 2020-07-31 | End: 2020-12-22 | Stop reason: SDUPTHER

## 2020-07-31 RX ORDER — PREDNISONE 10 MG/1
10 TABLET ORAL 2 TIMES DAILY
Qty: 10 TAB | Refills: 0 | Status: SHIPPED | OUTPATIENT
Start: 2020-07-31 | End: 2020-08-07 | Stop reason: ALTCHOICE

## 2020-08-03 DIAGNOSIS — G47.00 INSOMNIA, UNSPECIFIED TYPE: ICD-10-CM

## 2020-08-03 RX ORDER — ZOLPIDEM TARTRATE 10 MG/1
TABLET ORAL
Qty: 30 TAB | Refills: 0 | Status: SHIPPED | OUTPATIENT
Start: 2020-08-03 | End: 2021-06-29

## 2020-08-07 ENCOUNTER — OFFICE VISIT (OUTPATIENT)
Dept: FAMILY MEDICINE CLINIC | Age: 50
End: 2020-08-07
Payer: COMMERCIAL

## 2020-08-07 VITALS
HEIGHT: 63 IN | WEIGHT: 172.4 LBS | DIASTOLIC BLOOD PRESSURE: 76 MMHG | HEART RATE: 81 BPM | RESPIRATION RATE: 18 BRPM | SYSTOLIC BLOOD PRESSURE: 124 MMHG | OXYGEN SATURATION: 98 % | BODY MASS INDEX: 30.55 KG/M2 | TEMPERATURE: 97.5 F

## 2020-08-07 DIAGNOSIS — Z23 ENCOUNTER FOR IMMUNIZATION: ICD-10-CM

## 2020-08-07 DIAGNOSIS — E78.2 MIXED HYPERLIPIDEMIA: ICD-10-CM

## 2020-08-07 DIAGNOSIS — F17.200 TOBACCO USE DISORDER: ICD-10-CM

## 2020-08-07 DIAGNOSIS — E11.9 TYPE 2 DIABETES MELLITUS WITHOUT COMPLICATION, WITHOUT LONG-TERM CURRENT USE OF INSULIN (HCC): Primary | ICD-10-CM

## 2020-08-07 DIAGNOSIS — B35.4 TINEA CORPORIS: ICD-10-CM

## 2020-08-07 PROCEDURE — 99212 OFFICE O/P EST SF 10 MIN: CPT | Performed by: FAMILY MEDICINE

## 2020-08-07 PROCEDURE — 90471 IMMUNIZATION ADMIN: CPT

## 2020-08-07 PROCEDURE — 90732 PPSV23 VACC 2 YRS+ SUBQ/IM: CPT

## 2020-08-07 RX ORDER — LANCETS
EACH MISCELLANEOUS 2 TIMES DAILY WITH MEALS
Qty: 1 PACKAGE | Refills: 11 | Status: ON HOLD | OUTPATIENT
Start: 2020-08-07 | End: 2021-11-18 | Stop reason: CLARIF

## 2020-08-07 RX ORDER — KETOCONAZOLE 20 MG/G
CREAM TOPICAL DAILY
Qty: 30 G | Refills: 1 | Status: SHIPPED | OUTPATIENT
Start: 2020-08-07 | End: 2021-06-29

## 2020-08-07 RX ORDER — VARENICLINE TARTRATE 1 MG/1
1 TABLET, FILM COATED ORAL 2 TIMES DAILY
Qty: 60 TAB | Refills: 1 | Status: SHIPPED | OUTPATIENT
Start: 2020-08-07 | End: 2020-09-06

## 2020-08-07 NOTE — PROGRESS NOTES
HISTORY OF PRESENT ILLNESS  Semaj Saenz is a 47 y.o. female. f/u new DM2,tolerating glimiperide ,metformin well,fasting sugars less than 180. New rash rt medial foot x weeks  Diabetes   The history is provided by the patient. This is a new problem. The current episode started more than 1 week ago. The problem has been gradually improving. Pertinent negatives include no chest pain, no abdominal pain, no headaches and no shortness of breath. Nicotine Dependence   The history is provided by the patient. This is a chronic problem. The problem occurs daily. The problem has not changed since onset. Pertinent negatives include no chest pain, no abdominal pain, no headaches and no shortness of breath. Skin Problem   The history is provided by the patient. This is a chronic problem. The problem occurs daily. The problem has not changed since onset. Pertinent negatives include no chest pain, no abdominal pain, no headaches and no shortness of breath. Review of Systems   Constitutional: Negative for malaise/fatigue. Respiratory: Negative for shortness of breath. Cardiovascular: Negative for chest pain. Gastrointestinal: Negative for abdominal pain, blood in stool and diarrhea. Genitourinary: Negative for frequency. Skin: Positive for itching and rash. Neurological: Negative for headaches. Physical Exam  Constitutional:       Appearance: Normal appearance. HENT:      Head: Normocephalic and atraumatic. Right Ear: Tympanic membrane normal.      Left Ear: Tympanic membrane normal.   Neck:      Musculoskeletal: Normal range of motion and neck supple. Cardiovascular:      Rate and Rhythm: Normal rate and regular rhythm. Pulses: Normal pulses. Heart sounds: Normal heart sounds. Skin:     General: Skin is warm and dry. Comments: Scaly red patch rt plantar arch   Neurological:      Mental Status: She is alert.          ASSESSMENT and PLAN  Diagnoses and all orders for this visit: 1. Type 2 diabetes mellitus without complication, without long-term current use of insulin (HCC),improving    -     lancets misc; Take  by mouth two (2) times daily (with meals). 2. Mixed hyperlipidemia    3. Tobacco use disorder  -     varenicline (CHANTIX) 1 mg tablet; Take 1 Tab by mouth two (2) times a day for 30 days. 4. Tinea corporis  -     ketoconazole (NIZORAL) 2 % topical cream; Apply  to affected area daily. 5. Encounter for immunization  -     PNEUMOCOCCAL POLYSACCHARIDE VACCINE, 23-VALENT, ADULT OR IMMUNOSUPPRESSED PT DOSE,    Continue current meds and treatments. Follow-up and Dispositions    · Return in about 2 months (around 10/7/2020).

## 2020-08-07 NOTE — PROGRESS NOTES
Chief Complaint   Patient presents with    Diabetes     follow up     1. Have you been to the ER, urgent care clinic since your last visit? Hospitalized since your last visit?no    2. Have you seen or consulted any other health care providers outside of the [address] [place] [address] since your last visit? Include any pap smears or colon screening.  no

## 2020-09-02 DIAGNOSIS — G47.00 INSOMNIA, UNSPECIFIED TYPE: Primary | ICD-10-CM

## 2020-09-02 RX ORDER — ZOLPIDEM TARTRATE 5 MG/1
5 TABLET ORAL
Qty: 30 TAB | Refills: 0 | Status: SHIPPED | OUTPATIENT
Start: 2020-09-02 | End: 2020-09-30

## 2020-09-29 DIAGNOSIS — G47.00 INSOMNIA, UNSPECIFIED TYPE: ICD-10-CM

## 2020-09-30 RX ORDER — ZOLPIDEM TARTRATE 5 MG/1
TABLET ORAL
Qty: 30 TAB | Refills: 0 | Status: SHIPPED | OUTPATIENT
Start: 2020-09-30 | End: 2020-10-26

## 2020-10-09 ENCOUNTER — OFFICE VISIT (OUTPATIENT)
Dept: FAMILY MEDICINE CLINIC | Age: 50
End: 2020-10-09
Payer: COMMERCIAL

## 2020-10-09 VITALS
HEART RATE: 87 BPM | BODY MASS INDEX: 33.03 KG/M2 | HEIGHT: 63 IN | TEMPERATURE: 97.1 F | DIASTOLIC BLOOD PRESSURE: 98 MMHG | RESPIRATION RATE: 20 BRPM | OXYGEN SATURATION: 98 % | SYSTOLIC BLOOD PRESSURE: 138 MMHG | WEIGHT: 186.4 LBS

## 2020-10-09 DIAGNOSIS — E11.9 TYPE 2 DIABETES MELLITUS WITHOUT COMPLICATION, WITHOUT LONG-TERM CURRENT USE OF INSULIN (HCC): Primary | ICD-10-CM

## 2020-10-09 DIAGNOSIS — M54.6 THORACIC SPINE PAIN: ICD-10-CM

## 2020-10-09 DIAGNOSIS — K63.5 HYPERPLASTIC COLONIC POLYP, UNSPECIFIED PART OF COLON: ICD-10-CM

## 2020-10-09 DIAGNOSIS — F17.200 TOBACCO USE DISORDER: ICD-10-CM

## 2020-10-09 DIAGNOSIS — E78.2 MIXED HYPERLIPIDEMIA: ICD-10-CM

## 2020-10-09 LAB
GLUCOSE POC: 229 MG/DL
HBA1C MFR BLD HPLC: 7.5 %

## 2020-10-09 PROCEDURE — 82947 ASSAY GLUCOSE BLOOD QUANT: CPT | Performed by: FAMILY MEDICINE

## 2020-10-09 PROCEDURE — 83036 HEMOGLOBIN GLYCOSYLATED A1C: CPT | Performed by: FAMILY MEDICINE

## 2020-10-09 PROCEDURE — 3051F HG A1C>EQUAL 7.0%<8.0%: CPT | Performed by: FAMILY MEDICINE

## 2020-10-09 PROCEDURE — 99213 OFFICE O/P EST LOW 20 MIN: CPT | Performed by: FAMILY MEDICINE

## 2020-10-09 RX ORDER — TRAMADOL HYDROCHLORIDE 50 MG/1
50 TABLET ORAL
Qty: 40 TAB | Refills: 1 | Status: SHIPPED | OUTPATIENT
Start: 2020-10-09 | End: 2020-10-12

## 2020-10-09 RX ORDER — TIZANIDINE 2 MG/1
2 TABLET ORAL
Qty: 40 TAB | Refills: 1 | Status: SHIPPED | OUTPATIENT
Start: 2020-10-09 | End: 2021-05-13 | Stop reason: SDUPTHER

## 2020-10-09 NOTE — PROGRESS NOTES
HISTORY OF PRESENT ILLNESS  Emmanuelle Barrios is a 45 y.o. female. f/u new DM2 ,tolerating meds ok,needs diet guidance. Has recurrance of l upper back pain/sprain  Diabetes   The history is provided by the patient. This is a new problem. The problem occurs daily. The problem has been gradually improving. Pertinent negatives include no shortness of breath. Back Pain    This is a recurrent problem. The current episode started more than 1 week ago. The problem has not changed since onset. The problem occurs hourly. The pain is associated with no known injury. The pain is present in the thoracic spine and left side. The quality of the pain is described as aching. The pain is at a severity of 5/10. The pain is moderate. The symptoms are aggravated by certain positions, twisting and bending. Pertinent negatives include no fever and no dysuria. Review of Systems   Constitutional: Negative for fever and malaise/fatigue. Respiratory: Negative for cough and shortness of breath. Genitourinary: Negative for dysuria, frequency, hematuria and urgency. Musculoskeletal: Positive for back pain. Negative for myalgias. Neurological: Negative for dizziness. Physical Exam  Vitals signs reviewed. Constitutional:       Appearance: Normal appearance. HENT:      Head: Normocephalic and atraumatic. Right Ear: Tympanic membrane normal.   Neck:      Musculoskeletal: Normal range of motion and neck supple. Cardiovascular:      Rate and Rhythm: Normal rate and regular rhythm. Heart sounds: Normal heart sounds. Pulmonary:      Effort: Pulmonary effort is normal.      Breath sounds: Normal breath sounds. Abdominal:      General: Abdomen is flat. Palpations: Abdomen is soft. Musculoskeletal:      Thoracic back: She exhibits decreased range of motion and tenderness. She exhibits no spasm. Back:    Neurological:      Mental Status: She is alert.          ASSESSMENT and PLAN  Diagnoses and all orders for this visit:    1. Type 2 diabetes mellitus without complication, without long-term current use of insulin ([facility]),improving control  -     METABOLIC PANEL, COMPREHENSIVE  -     AMB POC GLUCOSE, QUANTITATIVE, BLOOD  -     AMB POC HEMOGLOBIN A1C  -      DIABETES EDUCATION    2. Mixed hyperlipidemia  -     LIPID PANEL    3. Tobacco use disorder    4. Thoracic spine pain  -     tiZANidine (ZANAFLEX) 2 mg tablet; Take 1 Tab by mouth four (4) times daily as needed for Muscle Spasm(s). -     traMADoL (ULTRAM) 50 mg tablet; Take 1 Tab by mouth every six (6) hours as needed for Pain for up to 3 days. Max Daily Amount: 200 mg.    5. Hyperplastic colonic polyp, unspecified part of colon    Other orders  -     CVD REPORT  -     DIABETES PATIENT EDUCATION      Follow-up and Dispositions    · Return in about 3 months (around 1/9/2021).

## 2020-10-09 NOTE — PROGRESS NOTES
Chief Complaint   Patient presents with    Diabetes     f/u    Back Pain     Back pain x 1 week, left side worse than the right. 1. Have you been to the ER, urgent care clinic since your last visit? Hospitalized since your last visit?no    2. Have you seen or consulted any other health care providers outside of the [address] [place] [address] since your last visit? Include any pap smears or colon screening.  no

## 2020-10-10 LAB
ALBUMIN SERPL-MCNC: 4.2 G/DL (ref 3.8–4.9)
ALBUMIN/GLOB SERPL: 1.4 {RATIO} (ref 1.2–2.2)
ALP SERPL-CCNC: 119 IU/L (ref 39–117)
ALT SERPL-CCNC: 36 IU/L (ref 0–32)
AST SERPL-CCNC: 46 IU/L (ref 0–40)
BILIRUB SERPL-MCNC: 0.4 MG/DL (ref 0–1.2)
BUN SERPL-MCNC: 8 MG/DL (ref 6–24)
BUN/CREAT SERPL: 12 (ref 9–23)
CALCIUM SERPL-MCNC: 9.3 MG/DL (ref 8.7–10.2)
CHLORIDE SERPL-SCNC: 101 MMOL/L (ref 96–106)
CHOLEST SERPL-MCNC: 177 MG/DL (ref 100–199)
CO2 SERPL-SCNC: 20 MMOL/L (ref 20–29)
CREAT SERPL-MCNC: 0.65 MG/DL (ref 0.57–1)
GLOBULIN SER CALC-MCNC: 3 G/DL (ref 1.5–4.5)
GLUCOSE SERPL-MCNC: 227 MG/DL (ref 65–99)
HDLC SERPL-MCNC: 30 MG/DL
INTERPRETATION, 910389: NORMAL
LDLC SERPL CALC-MCNC: 92 MG/DL (ref 0–99)
Lab: NORMAL
POTASSIUM SERPL-SCNC: 3.9 MMOL/L (ref 3.5–5.2)
PROT SERPL-MCNC: 7.2 G/DL (ref 6–8.5)
SODIUM SERPL-SCNC: 138 MMOL/L (ref 134–144)
TRIGL SERPL-MCNC: 331 MG/DL (ref 0–149)
VLDLC SERPL CALC-MCNC: 55 MG/DL (ref 5–40)

## 2020-10-26 DIAGNOSIS — G47.00 INSOMNIA, UNSPECIFIED TYPE: ICD-10-CM

## 2020-10-26 RX ORDER — ZOLPIDEM TARTRATE 5 MG/1
TABLET ORAL
Qty: 30 TAB | Refills: 0 | Status: SHIPPED | OUTPATIENT
Start: 2020-10-26 | End: 2020-11-29

## 2020-10-29 ENCOUNTER — OFFICE VISIT (OUTPATIENT)
Dept: FAMILY MEDICINE CLINIC | Age: 50
End: 2020-10-29

## 2020-10-29 VITALS
SYSTOLIC BLOOD PRESSURE: 130 MMHG | WEIGHT: 187 LBS | DIASTOLIC BLOOD PRESSURE: 78 MMHG | HEART RATE: 84 BPM | BODY MASS INDEX: 33.13 KG/M2

## 2020-10-29 DIAGNOSIS — E11.9 TYPE 2 DIABETES MELLITUS WITHOUT COMPLICATION, WITHOUT LONG-TERM CURRENT USE OF INSULIN (HCC): Primary | ICD-10-CM

## 2020-10-29 RX ORDER — SEMAGLUTIDE 1.34 MG/ML
INJECTION, SOLUTION SUBCUTANEOUS
Qty: 1 PEN | Refills: 1 | Status: SHIPPED | OUTPATIENT
Start: 2020-10-29 | End: 2021-01-29

## 2020-10-29 NOTE — PROGRESS NOTES
CC:  Diabetes management/education    S/O: Ryanne Zuniga is a 45 y.o. female referred by Dr. Ruth Bishop MD for diabetes management. HPI:  Patient was seen for DM follow up due to requesting additional information about her BG control. Patient was diagnosed with diabetes approximately 4 years ago. Her most recent A1C was 7.5 on 10/9, which is down from 12.7 on 7/6. Patient states that she had gotten lax about what she was eating. She has been paying closer attention to what she is eating since her 7/6 A1C but she is still eating sweets and tends to over eat. Patient has an active prescription for Chantix. She has not started taking it yet but plans to start in November      Current Diabetes Regimen:  Metformin ER 750mg daily  Glimepiride 2mg daily     ROS:   Patient denies hypoglycemic and hyperglycemic signs/symptoms, chest pain, shortness of breath, neuropathy, vision changes, and any foot changes. Self-Monitoring Blood Glucose:  Every other day: 130-190 fasting    Diet:  Coffee with splenda and sugar free creamer, regular pepsi, tea with splenda, doesn't drink as much water as she should   Lower carb bread, likes carbs   Tenderized meat, hard for her to chew right now with gum disease  Fruit: all types  Vegetables: green beans, asparagus, broccoli, butter beans   Eats a desert every day     Exercise:  Is not able to with her back hurting        Data reviewed:  Lab Results   Component Value Date/Time    Hemoglobin A1c (POC) 7.5 10/09/2020 08:39 AM         Diabetes Health Maintenance:  Last foot exam: 7/1/2016  Last influenza vaccine: 10/7/2020  Last Pneumovax 23 vaccine: 8/7/2020  Last Prevnar-13 vaccine: None  Hepatitis B Series: None  ASA Therapy: None  ACE/ARB Therapy: None  Statin Therapy: None    Assessment/Plan:     1.   Diabetes:    Based on patient's A1C, concerns of weight gain on her current regimen, and tendency to snack during the day, will start patient on Ozempic 0.25mg weekly. Educated patient on how the medication works, how and where to inject it, potential side effects, how to store the medication, and how to pick a day to inject the medication. Will see patient in 2-3 weeks to assess how she is tolerating the Ozempic. Educated patient on side effects of her current medications including the likelihood of weight gain with glimepiride. Discussed dietary modifications with the patient such as using the plate method and being conscious of decreasing or eliminating the amount of carbs she eats with a meal if she is going to have a desert afterwards. 2.   Smoking Cessation  Will follow up with patient's plan to start Chantix at her follow up appointment in 2-3 weeks. Patient verbalized understanding of the information presented and all of the patients questions were answered. AVS was handed to the patient and information reviewed. Patient advised to call me or Dr. Sharonda Miller, Roz Montanez MD with any additional questions or concerns. Follow-up: 2-3 weeks to assess tolerability of Ozempic. Notification of recommendations will be sent to Dr. Colletta Pale, MD for review.       Kings Joshua, PharmD  PGY1 Resident  [facility]

## 2020-11-02 NOTE — PROGRESS NOTES
Agree with documentation noted by Shereen Dugan PharmD  Pt referred for diabetes education and consideration of additional medication therapy, new referral from Dr. Rosemary Frankel. Nutrition education and written materials provided. Ozempic started per Diabetes CPA with Dr. Corey Thacker in detail, how to inject, store, attach pen needle and dispose of pen needle, use on needle per injection , injection sites, rotation of sites, once weekly - pick day - what to do if miss day, potential side effects and what to do, holding pen in site after injecting to make sure medication is fully injected, expected effects, dose titration, amount of medication in pen, how the medication works, documenting doses given on ozempic box. Patient able to demonstrate on sample pen appropriate use and then inject her first dose on her own today in clinic correctly and without incident. Will follow up in 2-3 weeks; advised patient to call if any issues before this visit. Patient verbalized understanding of information presented. Answered all of the patient's questions.       PRIYA GarnerD, CDE     CLINICAL PHARMACY CONSULT: MED RECONCILIATION/REVIEW ADDENDUM    For Pharmacy Admin Tracking Only    PHSO: PHSO Patient?: Yes  Total # of Interventions Recommended: Count: 2  - New Order #: 2 New Medication Order Reason(s): Needs Additional Medication Therapy  Total Interventions Accepted: 2  Time Spent (min): 60

## 2020-11-19 ENCOUNTER — PATIENT MESSAGE (OUTPATIENT)
Dept: FAMILY MEDICINE CLINIC | Age: 50
End: 2020-11-19

## 2020-11-28 DIAGNOSIS — G47.00 INSOMNIA, UNSPECIFIED TYPE: ICD-10-CM

## 2020-11-29 RX ORDER — ZOLPIDEM TARTRATE 5 MG/1
TABLET ORAL
Qty: 30 TAB | Refills: 0 | Status: SHIPPED | OUTPATIENT
Start: 2020-11-29 | End: 2020-12-30

## 2020-11-30 DIAGNOSIS — F17.200 TOBACCO USE DISORDER: ICD-10-CM

## 2020-11-30 RX ORDER — VARENICLINE TARTRATE 1 MG/1
1 TABLET, FILM COATED ORAL 2 TIMES DAILY
Qty: 60 TAB | Refills: 2 | Status: SHIPPED | OUTPATIENT
Start: 2020-11-30 | End: 2021-05-13 | Stop reason: SDUPTHER

## 2020-12-22 DIAGNOSIS — S29.019D ACUTE THORACIC MYOFASCIAL STRAIN, SUBSEQUENT ENCOUNTER: ICD-10-CM

## 2020-12-22 RX ORDER — PREDNISONE 10 MG/1
10 TABLET ORAL 2 TIMES DAILY
Qty: 10 TAB | Refills: 0 | Status: SHIPPED | OUTPATIENT
Start: 2020-12-22 | End: 2021-05-13 | Stop reason: ALTCHOICE

## 2020-12-22 RX ORDER — TRAMADOL HYDROCHLORIDE 50 MG/1
50 TABLET ORAL
Qty: 40 TAB | Refills: 0 | Status: SHIPPED | OUTPATIENT
Start: 2020-12-22 | End: 2021-03-24

## 2020-12-22 RX ORDER — TIZANIDINE 2 MG/1
2 TABLET ORAL
Qty: 30 TAB | Refills: 1 | Status: SHIPPED | OUTPATIENT
Start: 2020-12-22 | End: 2021-05-13 | Stop reason: ALTCHOICE

## 2020-12-22 NOTE — TELEPHONE ENCOUNTER
----- Message from Olinda Richard sent at 12/22/2020 11:14 AM EST ----- Regarding: Dr Valentino Heaps 1 Level 1/Escalated Issue Caller's first and last name and relationship (if not the patient):N/A Best contact number(s):[phone] What are the symptoms:severe back pain across upper back above bra area Transfer successful - yes/no (include outcome):No/ no answer Transfer declined - yes/no (include reason):no Was caller advised to seek appropriate level of care - yes/no:yes Details to clarify the request:Pt is requesting a call back for assistance with severe back pain (8-10): across upper back above bra area Olinda Richard

## 2020-12-22 NOTE — TELEPHONE ENCOUNTER
Patient states that she is having back pain above her bra. Pain is rated 8/10. Patient is requesting muscle relaxer, prednisone and pain medication. Please call to advise. [phone]

## 2020-12-29 DIAGNOSIS — G47.00 INSOMNIA, UNSPECIFIED TYPE: ICD-10-CM

## 2020-12-30 RX ORDER — ZOLPIDEM TARTRATE 5 MG/1
TABLET ORAL
Qty: 30 TAB | Refills: 0 | Status: SHIPPED | OUTPATIENT
Start: 2020-12-30 | End: 2021-01-25

## 2021-01-25 DIAGNOSIS — G47.00 INSOMNIA, UNSPECIFIED TYPE: ICD-10-CM

## 2021-01-25 RX ORDER — ZOLPIDEM TARTRATE 5 MG/1
TABLET ORAL
Qty: 30 TAB | Refills: 0 | Status: SHIPPED | OUTPATIENT
Start: 2021-01-25 | End: 2021-02-23

## 2021-02-23 DIAGNOSIS — G47.00 INSOMNIA, UNSPECIFIED TYPE: ICD-10-CM

## 2021-02-23 RX ORDER — ZOLPIDEM TARTRATE 5 MG/1
TABLET ORAL
Qty: 30 TAB | Refills: 0 | Status: SHIPPED | OUTPATIENT
Start: 2021-02-23 | End: 2021-03-23

## 2021-03-22 DIAGNOSIS — G47.00 INSOMNIA, UNSPECIFIED TYPE: ICD-10-CM

## 2021-03-23 RX ORDER — ZOLPIDEM TARTRATE 5 MG/1
TABLET ORAL
Qty: 30 TAB | Refills: 0 | Status: SHIPPED | OUTPATIENT
Start: 2021-03-23 | End: 2021-04-20 | Stop reason: SDUPTHER

## 2021-03-24 DIAGNOSIS — S29.019D ACUTE THORACIC MYOFASCIAL STRAIN, SUBSEQUENT ENCOUNTER: ICD-10-CM

## 2021-03-24 RX ORDER — TRAMADOL HYDROCHLORIDE 50 MG/1
TABLET ORAL
Qty: 40 TAB | Refills: 0 | Status: SHIPPED | OUTPATIENT
Start: 2021-03-24 | End: 2021-04-23

## 2021-04-06 ENCOUNTER — TELEPHONE (OUTPATIENT)
Dept: FAMILY MEDICINE CLINIC | Age: 51
End: 2021-04-06

## 2021-04-20 DIAGNOSIS — G47.00 INSOMNIA, UNSPECIFIED TYPE: ICD-10-CM

## 2021-04-21 RX ORDER — ZOLPIDEM TARTRATE 5 MG/1
5 TABLET ORAL
Qty: 30 TAB | Refills: 0 | Status: SHIPPED | OUTPATIENT
Start: 2021-04-21 | End: 2021-05-24 | Stop reason: SDUPTHER

## 2021-05-13 ENCOUNTER — OFFICE VISIT (OUTPATIENT)
Dept: FAMILY MEDICINE CLINIC | Age: 51
End: 2021-05-13
Payer: COMMERCIAL

## 2021-05-13 VITALS
SYSTOLIC BLOOD PRESSURE: 118 MMHG | OXYGEN SATURATION: 98 % | DIASTOLIC BLOOD PRESSURE: 68 MMHG | HEART RATE: 66 BPM | WEIGHT: 175.6 LBS | TEMPERATURE: 98.1 F | RESPIRATION RATE: 18 BRPM | HEIGHT: 63 IN | BODY MASS INDEX: 31.11 KG/M2

## 2021-05-13 DIAGNOSIS — S29.019D ACUTE THORACIC MYOFASCIAL STRAIN, SUBSEQUENT ENCOUNTER: ICD-10-CM

## 2021-05-13 DIAGNOSIS — R19.06 EPIGASTRIC MASS: Primary | ICD-10-CM

## 2021-05-13 DIAGNOSIS — M54.6 THORACIC SPINE PAIN: ICD-10-CM

## 2021-05-13 DIAGNOSIS — E11.9 TYPE 2 DIABETES MELLITUS WITHOUT COMPLICATION, WITHOUT LONG-TERM CURRENT USE OF INSULIN (HCC): ICD-10-CM

## 2021-05-13 LAB — HBA1C MFR BLD HPLC: 7.4 %

## 2021-05-13 PROCEDURE — 99213 OFFICE O/P EST LOW 20 MIN: CPT | Performed by: FAMILY MEDICINE

## 2021-05-13 PROCEDURE — 83036 HEMOGLOBIN GLYCOSYLATED A1C: CPT | Performed by: FAMILY MEDICINE

## 2021-05-13 RX ORDER — TIZANIDINE 2 MG/1
2 TABLET ORAL
Qty: 40 TAB | Refills: 1 | Status: SHIPPED | OUTPATIENT
Start: 2021-05-13 | End: 2021-07-26 | Stop reason: SDUPTHER

## 2021-05-13 RX ORDER — OXYCODONE AND ACETAMINOPHEN 5; 325 MG/1; MG/1
1 TABLET ORAL
Qty: 30 TAB | Refills: 0 | Status: SHIPPED | OUTPATIENT
Start: 2021-05-13 | End: 2021-06-01 | Stop reason: SDUPTHER

## 2021-05-13 RX ORDER — IBUPROFEN 200 MG
CAPSULE ORAL
Qty: 50 STRIP | Refills: 11 | Status: ON HOLD | OUTPATIENT
Start: 2021-05-13 | End: 2021-11-18 | Stop reason: CLARIF

## 2021-05-13 NOTE — PROGRESS NOTES
Chief Complaint   Patient presents with    Cyst     Pt state she has 3 knots in upper abdominals.  Back Pain     Pt having back pain. 1. Have you been to the ER, urgent care clinic since your last visit? Hospitalized since your last visit? No    2. Have you seen or consulted any other health care providers outside of the [address] [place] [address] since your last visit? Include any pap smears or colon screening.  No

## 2021-05-14 LAB
ALBUMIN SERPL-MCNC: 3.1 G/DL (ref 3.5–5)
ALBUMIN/GLOB SERPL: 0.7 {RATIO} (ref 1.1–2.2)
ALP SERPL-CCNC: 216 U/L (ref 45–117)
ALT SERPL-CCNC: 26 U/L (ref 12–78)
ANION GAP SERPL CALC-SCNC: 8 MMOL/L (ref 5–15)
AST SERPL-CCNC: 77 U/L (ref 15–37)
BILIRUB SERPL-MCNC: 1 MG/DL (ref 0.2–1)
BUN SERPL-MCNC: 5 MG/DL (ref 6–20)
BUN/CREAT SERPL: 9 (ref 12–20)
CALCIUM SERPL-MCNC: 9.9 MG/DL (ref 8.5–10.1)
CHLORIDE SERPL-SCNC: 102 MMOL/L (ref 97–108)
CO2 SERPL-SCNC: 31 MMOL/L (ref 21–32)
CREAT SERPL-MCNC: 0.58 MG/DL (ref 0.55–1.02)
CREAT UR-MCNC: 204 MG/DL
GLOBULIN SER CALC-MCNC: 4.4 G/DL (ref 2–4)
GLUCOSE SERPL-MCNC: 153 MG/DL (ref 65–100)
MICROALBUMIN UR-MCNC: 1.61 MG/DL
MICROALBUMIN/CREAT UR-RTO: 8 MG/G (ref 0–30)
POTASSIUM SERPL-SCNC: 3.3 MMOL/L (ref 3.5–5.1)
PROT SERPL-MCNC: 7.5 G/DL (ref 6.4–8.2)
SODIUM SERPL-SCNC: 141 MMOL/L (ref 136–145)

## 2021-05-14 NOTE — PROGRESS NOTES
HISTORY OF PRESENT ILLNESS  Cory Hdez is a 45 y.o. female. has noted multiple midline epigastric nontender masses. Appear to be new and fairly small. F/U DM2 with good sugar control. Has recurrance of mid back myofascial  pain  Cyst  The history is provided by the patient. This is a new problem. The current episode started more than 1 week ago. The problem occurs daily. The problem has not changed since onset. Pertinent negatives include no chest pain and no abdominal pain. Back Pain   The history is provided by the patient. This is a recurrent problem. The current episode started more than 1 week ago. The problem has been gradually worsening. The pain is present in the middle back. The pain does not radiate. The pain is at a severity of 6/10. The pain is moderate. Pertinent negatives include no chest pain, no fever, no abdominal pain and no dysuria. Diabetes  The history is provided by the patient. This is a chronic problem. The problem occurs daily. The problem has been gradually improving. Pertinent negatives include no chest pain and no abdominal pain. Review of Systems   Constitutional: Positive for malaise/fatigue. Negative for fever. Cardiovascular: Negative for chest pain. Gastrointestinal: Negative for abdominal pain, blood in stool, constipation, heartburn and melena. Genitourinary: Negative for dysuria, frequency and urgency. Musculoskeletal: Positive for back pain. Physical Exam  Constitutional:       Appearance: Normal appearance. She is obese. Cardiovascular:      Rate and Rhythm: Normal rate and regular rhythm. Pulses: Normal pulses. Heart sounds: Normal heart sounds. Pulmonary:      Breath sounds: Normal breath sounds. Abdominal:      General: Abdomen is flat. Palpations: Abdomen is soft. There is mass. Comments: 3 small midline,nontender epigastric masses,soft;likely ventral hernias   Skin:     General: Skin is warm and dry.    Neurological: Mental Status: She is alert. ASSESSMENT and PLAN  Diagnoses and all orders for this visit:    1. Epigastric masses,likel hernias,will get CT  -     METABOLIC PANEL, COMPREHENSIVE; Future  -     CT ABD W CONT; Future    2. Type 2 diabetes mellitus without complication, without long-term current use of insulin (HCC),well controlled  -     glucose blood VI test strips (blood glucose test) strip; by Does Not Apply route Daily (before breakfast). -     AMB POC HEMOGLOBIN A1C  -     MICROALBUMIN, UR, RAND W/ MICROALB/CREAT RATIO; Future    3. Thoracic spine pain  -     tiZANidine (ZANAFLEX) 2 mg tablet; Take 1 Tab by mouth four (4) times daily as needed for Muscle Spasm(s). -     oxyCODONE-acetaminophen (PERCOCET) 5-325 mg per tablet; Take 1 Tab by mouth every six (6) hours as needed for Pain for up to 30 days. Max Daily Amount: 4 Tabs. 4. Acute thoracic myofascial strain, subsequent encounter,heat, rest,analgesics  -     MONITOR SCREEN 10-DRUG CLASS PROFILE; Future  -     oxyCODONE-acetaminophen (PERCOCET) 5-325 mg per tablet; Take 1 Tab by mouth every six (6) hours as needed for Pain for up to 30 days. Max Daily Amount: 4 Tabs. Follow-up and Dispositions    · Return in about 3 months (around 8/13/2021).

## 2021-05-17 LAB
AMPHETAMINES UR QL SCN: NEGATIVE NG/ML
BARBITURATES UR QL SCN: NEGATIVE NG/ML
BENZODIAZ UR QL SCN: NEGATIVE NG/ML
BZE UR QL SCN: NEGATIVE NG/ML
CANNABINOIDS UR QL SCN: POSITIVE NG/ML
CREAT UR-MCNC: 189.6 MG/DL (ref 20–300)
METHADONE UR QL SCN: NEGATIVE NG/ML
OPIATES UR QL SCN: NEGATIVE NG/ML
OXYCODONE+OXYMORPHONE UR QL SCN: NEGATIVE NG/ML
PCP UR QL: NEGATIVE NG/ML
PH UR: 6.1 [PH] (ref 4.5–8.9)
PLEASE NOTE:, 733163: ABNORMAL
PROPOXYPH UR QL SCN: NEGATIVE NG/ML

## 2021-05-24 DIAGNOSIS — G47.00 INSOMNIA, UNSPECIFIED TYPE: ICD-10-CM

## 2021-05-25 RX ORDER — ZOLPIDEM TARTRATE 5 MG/1
5 TABLET ORAL
Qty: 30 TABLET | Refills: 0 | Status: SHIPPED | OUTPATIENT
Start: 2021-05-25 | End: 2021-06-21 | Stop reason: SDUPTHER

## 2021-05-28 ENCOUNTER — HOSPITAL ENCOUNTER (OUTPATIENT)
Dept: CT IMAGING | Age: 51
Discharge: HOME | End: 2021-05-28
Attending: FAMILY MEDICINE
Payer: COMMERCIAL

## 2021-05-28 DIAGNOSIS — R19.06 EPIGASTRIC MASS: ICD-10-CM

## 2021-05-28 PROCEDURE — 74011000250 HC RX REV CODE- 250: Performed by: FAMILY MEDICINE

## 2021-05-28 PROCEDURE — 74160 CT ABDOMEN W/CONTRAST: CPT

## 2021-05-28 PROCEDURE — 74011000636 HC RX REV CODE- 636: Performed by: FAMILY MEDICINE

## 2021-05-28 RX ORDER — BARIUM SULFATE 20 MG/ML
900 SUSPENSION ORAL
Status: COMPLETED | OUTPATIENT
Start: 2021-05-28 | End: 2021-05-28

## 2021-05-28 RX ADMIN — IOPAMIDOL 100 ML: 755 INJECTION, SOLUTION INTRAVENOUS at 07:11

## 2021-05-28 RX ADMIN — BARIUM SULFATE 900 ML: 21 SUSPENSION ORAL at 07:11

## 2021-06-01 DIAGNOSIS — M54.6 THORACIC SPINE PAIN: ICD-10-CM

## 2021-06-01 DIAGNOSIS — S29.019D ACUTE THORACIC MYOFASCIAL STRAIN, SUBSEQUENT ENCOUNTER: ICD-10-CM

## 2021-06-02 DIAGNOSIS — K74.60 CIRRHOSIS OF LIVER WITHOUT ASCITES, UNSPECIFIED HEPATIC CIRRHOSIS TYPE (HCC): Primary | ICD-10-CM

## 2021-06-03 RX ORDER — OXYCODONE AND ACETAMINOPHEN 5; 325 MG/1; MG/1
1 TABLET ORAL
Qty: 30 TABLET | Refills: 0 | Status: SHIPPED | OUTPATIENT
Start: 2021-06-03 | End: 2021-08-10 | Stop reason: SDUPTHER

## 2021-06-07 ENCOUNTER — TELEPHONE (OUTPATIENT)
Dept: FAMILY MEDICINE CLINIC | Age: 51
End: 2021-06-07

## 2021-06-07 NOTE — TELEPHONE ENCOUNTER
TT 1   Diana Mejía  Female, 45 y.o., 1970  Weight:   175 lb 9.6 oz (79.7 kg)  MRN:   [id number]  Phone:   [phone] Rossi Gonzalez)  PCP:   Juany Hwang MD  Primary Cvg:   [facility]  Last Appt With Me  None  Next Appt With Me  None  Next Appt  06/29/2021 - Sania Jewell NP - [facility]  Test Results Question    Gordo Nichols MD 15 minutes ago (8:40 AM)     Hi     I forgot to ask what the three lumps in my upper stomach were .      Also is it okay for me to do gentle yoga?  I was thinking it might help my back.      Thanks  Khloe Engel     Encounter Messages    Read Composed From To  Subject   Y 6/7/2021  8:40 AM Diana Barrera MD  Test Results Question

## 2021-06-21 DIAGNOSIS — G47.00 INSOMNIA, UNSPECIFIED TYPE: ICD-10-CM

## 2021-06-21 RX ORDER — ZOLPIDEM TARTRATE 5 MG/1
5 TABLET ORAL
Qty: 30 TABLET | Refills: 0 | Status: SHIPPED | OUTPATIENT
Start: 2021-06-21 | End: 2021-07-19 | Stop reason: SDUPTHER

## 2021-06-29 ENCOUNTER — OFFICE VISIT (OUTPATIENT)
Dept: HEMATOLOGY | Age: 51
End: 2021-06-29
Payer: COMMERCIAL

## 2021-06-29 VITALS
SYSTOLIC BLOOD PRESSURE: 127 MMHG | HEART RATE: 105 BPM | DIASTOLIC BLOOD PRESSURE: 77 MMHG | BODY MASS INDEX: 30.12 KG/M2 | RESPIRATION RATE: 22 BRPM | WEIGHT: 170 LBS | HEIGHT: 63 IN | TEMPERATURE: 97.2 F | OXYGEN SATURATION: 98 %

## 2021-06-29 DIAGNOSIS — K74.69 OTHER CIRRHOSIS OF LIVER (HCC): ICD-10-CM

## 2021-06-29 DIAGNOSIS — R74.8 ELEVATED LIVER ENZYMES: Primary | ICD-10-CM

## 2021-06-29 PROBLEM — M54.6 RIGHT-SIDED THORACIC BACK PAIN: Status: ACTIVE | Noted: 2021-06-29

## 2021-06-29 PROCEDURE — 99204 OFFICE O/P NEW MOD 45 MIN: CPT | Performed by: NURSE PRACTITIONER

## 2021-06-29 NOTE — PROGRESS NOTES
[address] [facility]      Huy Matthews MD, Johanna Lambert, Franny Lane MD, MPH      CARLEY Sanchez, [facility]-BC     Sania Jewell, [facility]   Sunil Hansen, P-C    Kimo Moreno, [facility]       [facility] Mc[facility] 136    at [address], [address] [place] [address], [facility] 22.    [phone]    FAX: [address] [place] [address], [address]    [phone]    FAX: [phone]       Patient Care Team:  Tony Vigil MD as PCP - [facility], Ivan Ferro MD as PCP - [facility] Provider      Problem List  Date Reviewed: 6/30/2021        Codes Class Noted    Other cirrhosis of liver [facility]) ICD-10-CM: K74.69  ICD-9-CM: 571.5  6/30/2021        Right-sided thoracic back pain ICD-10-CM: M54.6  ICD-9-CM: 724.1  6/29/2021        Elevated liver enzymes ICD-10-CM: R74.8  ICD-9-CM: 790.5  6/29/2021        Severe obesity ([facility] [facility] ICD-10-CM: E66.01  ICD-9-CM: 278.01  12/30/2018        Type 2 diabetes mellitus without complication ([facility] TAN-99-RS: E11.9  ICD-9-CM: 250.00  7/1/2016        Symptomatic anemia ICD-10-CM: D64.9  ICD-9-CM: 285.9  5/8/2015        Insomnia ICD-10-CM: G47.00  ICD-9-CM: 780.52  10/18/2013        Allergic rhinitis, cause unspecified (Chronic) ICD-10-CM: J30.9  ICD-9-CM: 477.9  4/23/2013        Encounter for long-term (current) use of other medications ICD-10-CM: Z79.899  ICD-9-CM: V58.69  7/8/2012        Tobacco use disorder (Chronic) ICD-10-CM: F17.200  ICD-9-CM: 305.1  4/27/2010        Renal calculus or stone ICD-10-CM: N20.0  ICD-9-CM: 592.0  4/27/2010        BMI 35.0-35.9,adult (Chronic) ICD-10-CM: E06.61  ICD-9-CM: V85.35 Chronic 4/27/2010    Overview Signed 3/10/2015  2:30 AM by Gil Walter Anthropometric:   Weight Loss Metrics 3/9/2015 11/6/2013 10/28/2013 10/18/2013 9/10/2013 4/23/2013 1/29/2013   Today's Wt 192 lb 163 lb 12.8 oz 164 lb 14.5 oz 161 lb 161 lb 184 lb 9.6 oz 188 lb 6.4 oz   BMI 35.11 kg/m2 29.95 kg/m2 30.15 kg/m2 28.07 kg/m2 28.07 kg/m2 32.18 kg/m2 34.45 kg/m2                      The clinicians listed above have asked me to see Kimani Cedeño in consultation regarding management of cirrhosis secondary to an undefined cause, suspicious for alcohol or fatty liver. All medical records sent by the referring physicians were reviewed including imaging studies. The patient is a 45 y.o.  female who was found to have chronic liver disease and cirrhosis   in 5/2021 when a CT scan was done for evaluation of an abdominal hernia. She has a history of past heavy alcohol use up to 1 pint per day for 3 years. This was decreased to weekend use only 2 years ago. She has now remained abstinent since 6/01/2021. An assessment of liver fibrosis with biopsy or elastography has not been performed. Serologic evaluation for markers of chronic liver disease has either not been performed or the results are not available. The patient has not developed complications of cirrhosis to date. The patient does not have any symptoms which could be attributed to the liver disorder. The patient is not experiencing the following symptoms which are commonly seen in this liver disorder: fatigue, pain in the right side over the liver, yellowing of the eyes or skin, itching, or swelling of the abdomen. The patient completes all daily activities without any functional limitations. ASSESSMENT AND PLAN:  Cirrhosis  The diagnosis of cirrhosis is based upon imaging and laboratory studies. Cirrhosis is secondary to an undefined cause. There is a history of heavy alcohol use and risk factors for fatty liver. The patient has normal liver function.     The patient has never developed any complications of cirrhosis to date. The CTP is 5. Child class A. The MELD score is 6. Laboratory testing to monitor liver function and degree of liver injury ordered by outside provider reviewed. Will order follow-up testing today. This included BMP, hepatic panel, CBC with platelet count and INR. The patient has abstained from all alcohol since 5/2021. The AST and ALP are elevated. The ALT is normal. Liver function is normal.  The platelet count is   normal.      Serologic testing for causes of chronic liver disease were ordered. The patient has cirrhosis but does not require liver transplant evaluation testing at this time because   the MELD score is low. Screening for Esophageal varices   The patient has not had an EGD to screen for varices. There is no reason to perform EGD to screen for varices at this time since the PLT count is normal.     Hepatic encephalopathy   Overt HE has not developed to date. There is no reason for treatment with lactulose of xifaxan  There is no need to restrict dietary protein at this time. Screening for Hepatocellular Carcinoma  HCC screening was performed with CT of Abdomen 5/2021 and negative. AFP ordered today. Treatment of other medical problems in patients with chronic liver disease  There are no contraindications for the patient to take most medications that are necessary for treatment of other medical issues. The patient can take: Any medications utilized for treatment of DM. Statins to treat hypercholesterolemia. Normal doses of acetaminophen, as recommended on the label of the bottle, are not hepatotoxic except in the setting of daily alcohol use, even in patients with cirrhosis and can be utilized for pain. Counseling for alcohol in patients with chronic liver disease  The patient was counseled regarding alcohol consumption and the effect of alcohol on chronic liver disease. The patient has not consumed alcohol since 5/2021. There was prior heavy use. Osteoporosis  The risk of osteoporosis is increased in patients with cirrhosis. This should be ordered by the patients primary care physician. Vaccinations   The need for vaccination against viral hepatitis A and B will be assessed with serologic and instituted as appropriate. Routine vaccinations against other bacterial and viral agents can be performed as indicated. Annual flu vaccination should be administered if indicated. ALLERGIES  No Known Allergies    MEDICATIONS  Current Outpatient Medications   Medication Sig    zolpidem (AMBIEN) 5 mg tablet Take 1 Tablet by mouth nightly as needed for Sleep. Max Daily Amount: 5 mg.  metFORMIN ER (GLUCOPHAGE XR) 750 mg tablet TAKE ONE TABLET BY MOUTH ONE TIME DAILY     oxyCODONE-acetaminophen (PERCOCET) 5-325 mg per tablet Take 1 Tablet by mouth every six (6) hours as needed for Pain for up to 30 days. Max Daily Amount: 4 Tablets.  glucose blood VI test strips (blood glucose test) strip by Does Not Apply route Daily (before breakfast).  tiZANidine (ZANAFLEX) 2 mg tablet Take 1 Tab by mouth four (4) times daily as needed for Muscle Spasm(s).  semaglutide (Ozempic) 0.25 mg/0.2 mL (2 mg/1.5 mL) sub-q pen INJECT 0.25 MG ONCE WEEKLY FOR 2 WEEKS, THEN INCREASE TO 0.5MG ONCE WEEKLY THEREAFTER. (Patient taking differently: 0.5 mg. INJECT 0.25 MG ONCE WEEKLY FOR 2 WEEKS, THEN INCREASE TO 0.5MG ONCE WEEKLY THEREAFTER. Indications: Weekly)    lancets misc Take  by mouth two (2) times daily (with meals).  glimepiride (AMARYL) 2 mg tablet Take 1 Tab by mouth every morning.  ferrous sulfate 325 mg (65 mg iron) tablet Take  by mouth Daily (before breakfast).  Blood-Glucose Meter monitoring kit Test daily as needed     No current facility-administered medications for this visit. SYSTEM REVIEW NOT RELATED TO LIVER DISEASE OR REVIEWED ABOVE:  Constitution systems: Negative for fever, chills, weight gain, weight loss. Eyes: Negative for visual changes. ENT: Negative for sore throat, painful swallowing. Respiratory: Negative for cough, hemoptysis, SOB. Cardiology: Negative for chest pain, palpitations. GI:  Negative for constipation or diarrhea. : Negative for urinary frequency, dysuria, hematuria, nocturia. Skin: Negative for rash. Hematology: Negative for easy bruising, blood clots. Musculo-skelatal: Negative for back pain, muscle pain, weakness. Neurologic: Negative for headaches, dizziness, vertigo, memory problems not related to HE. Psychology: Negative for anxiety, depression. FAMILY HISTORY:  The father  of lung cancer   The mother has/had the following chronic disease(s): Colon cancer, COPD, Osteoporosis. There is no family history of liver disease. There is no family history of immune disorders. SOCIAL HISTORY:  The patient is . The patient has 3 children, 1 passed of SIDS. The patient currently smokes 1/2 pack of tobacco daily. The patient drank up to 1 pint per day for 3 years. She decreased to weekends only 2 years ago. All alcohol was discontinued 2021. The patient currently works full-time as . PHYSICAL EXAMINATION:  Visit Vitals  /77 (BP 1 Location: Right upper arm, BP Patient Position: Sitting, BP Cuff Size: Adult)   Pulse (!) 105   Temp 97.2 °F (36.2 °C) (Temporal)   Resp 22   Ht 5' 3\" (1.6 m)   Wt 170 lb (77.1 kg)   SpO2 98%   BMI 30.11 kg/m²     General: No acute distress. Eyes: Sclera anicteric. ENT: No oral lesions. Thyroid normal.  Nodes: No adenopathy. Skin: No spider angiomata. No jaundice. No palmar erythema. Respiratory: Lungs clear to auscultation. Cardiovascular: Regular heart rate. No murmurs. No JVD. Abdomen: Soft non-tender. Liver size normal to percussion/palpation. Spleen not palpable. No obvious ascites. Extremities: No edema. No muscle wasting.   No gross arthritic changes. Neurologic: Alert and oriented. Cranial nerves grossly intact. No asterixis. LABORATORY STUDIES:  [address] Units 6/29/2021 5/13/2021   WBC 3.4 - 10.8 x10E3/uL 7.7    ANC 1.4 - 7.0 x10E3/uL 6.0    HGB 11.1 - 15.9 g/dL 11.7     - 450 x10E3/uL 193    INR 0.9 - 1.2 1.0    AST 0 - 40 IU/L 57 (H) 77 (H)   ALT 0 - 32 IU/L 30 26   Alk Phos 48 - 121 IU/L 164 (H) 216 (H)   Bili, Total 0.0 - 1.2 mg/dL 1.0 1.0   Bili, Direct 0.00 - 0.40 mg/dL 0.53 (H)    Albumin 3.8 - 4.9 g/dL 4.0 3.1 (L)   BUN 6 - 24 mg/dL 7 5 (L)   Creat 0.57 - 1.00 mg/dL 0.64 0.58   Na 134 - 144 mmol/L 135 141   K 3.5 - 5.2 mmol/L 3.4 (L) 3.3 (L)   Cl 96 - 106 mmol/L 96 102   CO2 20 - 29 mmol/L 30 (H) 31   Glucose 65 - 99 mg/dL 164 (H) 153 (H)     Liver [facility] Latest Ref Rng & Units 10/9/2020   WBC 3.4 - 10.8 x10E3/uL    ANC 1.4 - 7.0 x10E3/uL    HGB 11.1 - 15.9 g/dL     - 450 x10E3/uL    INR 0.9 - 1.2    AST 0 - 40 IU/L 46 (H)   ALT 0 - 32 IU/L 36 (H)   Alk Phos 48 - 121 IU/L 119 (H)   Bili, Total 0.0 - 1.2 mg/dL 0.4   Bili, Direct 0.00 - 0.40 mg/dL    Albumin 3.8 - 4.9 g/dL 4.2   BUN 6 - 24 mg/dL 8   Creat 0.57 - 1.00 mg/dL 0.65   Na 134 - 144 mmol/L 138   K 3.5 - 5.2 mmol/L 3.9   Cl 96 - 106 mmol/L 101   CO2 20 - 29 mmol/L 20   Glucose 65 - 99 mg/dL 227 (H)     SEROLOGIES:  Serologies Latest Ref Rng & Units 6/29/2021   Hep A Ab, Total Negative Negative   Hep B Surface Ag Negative Negative   Hep B Core Ab, Total Negative Negative   Hep B Surface AB QL  Non Reactive   Ferritin 15 - 150 ng/mL 284 (H)   Iron % Saturation 15 - 55 % 12 (L)   Ceruloplasmin 19.0 - 39.0 mg/dL 30.2   Alpha-1 antitrypsin level 101 - 187 mg/dL 187     Additional serologies pending. LIVER HISTOLOGY:  Not available or performed    ENDOSCOPIC PROCEDURES:  Not available or performed    RADIOLOGY:  5/2021. CT of Abdomen. Cirrhotic appearance of the liver. Cholelithiasis. No ductal dilatation.  Right renal calculi. Splenomegaly. No ascites. OTHER TESTING:  Not available or performed    FOLLOW-UP:  All of the issues listed above in the Assessment and Plan were discussed with the patient. All questions were answered. The patient expressed a clear understanding of the above. [address] [address] in 4 weeks for a Fibroscan. [facility] of [address] [address] [facility] [id number] Juan Daniel Raya, [address] [place] [address] 22.  201 [facility]

## 2021-06-29 NOTE — PROGRESS NOTES
Identified pt with two pt identifiers(name and ). Reviewed record in preparation for visit and have obtained necessary documentation. Chief Complaint   Patient presents with    New Patient    Cirrhosis Of Liver      Vitals:    21 1526   BP: 127/77   Pulse: (!) 105   Resp: 22   Temp: 97.2 °F (36.2 °C)   TempSrc: Temporal   SpO2: 98%   Weight: 170 lb (77.1 kg)   Height: 5' 3\" (1.6 m)   PainSc:   7   PainLoc: Back       Health Maintenance Review: Patient reminded of \"due or due soon\" health maintenance. I have asked the patient to contact his/her primary care provider (PCP) for follow-up on his/her health maintenance. Coordination of Care Questionnaire:  :   1) Have you been to an emergency room, urgent care, or hospitalized since your last visit? If yes, where when, and reason for visit? no       2. Have seen or consulted any other health care provider since your last visit? If yes, where when, and reason for visit? NO      Patient is accompanied by patient I have received verbal consent from Hazel Davis to discuss any/all medical information while they are present in the room.

## 2021-06-30 PROBLEM — K74.69 OTHER CIRRHOSIS OF LIVER (HCC): Status: ACTIVE | Noted: 2021-06-30

## 2021-06-30 LAB
A1AT SERPL-MCNC: 187 MG/DL (ref 101–187)
ACTIN IGG SERPL-ACNC: 14 UNITS (ref 0–19)
AFP L3 MFR SERPL: 10.3 % (ref 0–9.9)
AFP SERPL-MCNC: 5.4 NG/ML (ref 0–8)
ALBUMIN SERPL-MCNC: 4 G/DL (ref 3.8–4.9)
ALP SERPL-CCNC: 164 IU/L (ref 48–121)
ALT SERPL-CCNC: 30 IU/L (ref 0–32)
AST SERPL-CCNC: 57 IU/L (ref 0–40)
BASOPHILS # BLD AUTO: 0.1 X10E3/UL (ref 0–0.2)
BASOPHILS NFR BLD AUTO: 1 %
BILIRUB DIRECT SERPL-MCNC: 0.53 MG/DL (ref 0–0.4)
BILIRUB SERPL-MCNC: 1 MG/DL (ref 0–1.2)
BUN SERPL-MCNC: 7 MG/DL (ref 6–24)
BUN/CREAT SERPL: 11 (ref 9–23)
CALCIUM SERPL-MCNC: 11.4 MG/DL (ref 8.7–10.2)
CERULOPLASMIN SERPL-MCNC: 30.2 MG/DL (ref 19–39)
CHLORIDE SERPL-SCNC: 96 MMOL/L (ref 96–106)
CO2 SERPL-SCNC: 30 MMOL/L (ref 20–29)
CREAT SERPL-MCNC: 0.64 MG/DL (ref 0.57–1)
EOSINOPHIL # BLD AUTO: 0.1 X10E3/UL (ref 0–0.4)
EOSINOPHIL NFR BLD AUTO: 2 %
ERYTHROCYTE [DISTWIDTH] IN BLOOD BY AUTOMATED COUNT: 13.9 % (ref 11.7–15.4)
FERRITIN SERPL-MCNC: 284 NG/ML (ref 15–150)
GLUCOSE SERPL-MCNC: 164 MG/DL (ref 65–99)
HAV AB SER QL IA: NEGATIVE
HBV CORE AB SERPL QL IA: NEGATIVE
HBV SURFACE AB SER QL: NON REACTIVE
HBV SURFACE AG SERPL QL IA: NEGATIVE
HCT VFR BLD AUTO: 35.7 % (ref 34–46.6)
HGB BLD-MCNC: 11.7 G/DL (ref 11.1–15.9)
IMM GRANULOCYTES # BLD AUTO: 0 X10E3/UL (ref 0–0.1)
IMM GRANULOCYTES NFR BLD AUTO: 0 %
INR PPP: 1 (ref 0.9–1.2)
IRON SATN MFR SERPL: 12 % (ref 15–55)
IRON SERPL-MCNC: 44 UG/DL (ref 27–159)
LYMPHOCYTES # BLD AUTO: 1.1 X10E3/UL (ref 0.7–3.1)
LYMPHOCYTES NFR BLD AUTO: 14 %
MCH RBC QN AUTO: 35.5 PG (ref 26.6–33)
MCHC RBC AUTO-ENTMCNC: 32.8 G/DL (ref 31.5–35.7)
MCV RBC AUTO: 108 FL (ref 79–97)
MITOCHONDRIA M2 IGG SER-ACNC: 41.7 UNITS (ref 0–20)
MONOCYTES # BLD AUTO: 0.4 X10E3/UL (ref 0.1–0.9)
MONOCYTES NFR BLD AUTO: 6 %
NEUTROPHILS # BLD AUTO: 6 X10E3/UL (ref 1.4–7)
NEUTROPHILS NFR BLD AUTO: 77 %
PLATELET # BLD AUTO: 193 X10E3/UL (ref 150–450)
POTASSIUM SERPL-SCNC: 3.4 MMOL/L (ref 3.5–5.2)
PROT SERPL-MCNC: 7.5 G/DL (ref 6–8.5)
PROTHROMBIN TIME: 11.2 SEC (ref 9.1–12)
RBC # BLD AUTO: 3.3 X10E6/UL (ref 3.77–5.28)
SODIUM SERPL-SCNC: 135 MMOL/L (ref 134–144)
TIBC SERPL-MCNC: 382 UG/DL (ref 250–450)
UIBC SERPL-MCNC: 338 UG/DL (ref 131–425)
WBC # BLD AUTO: 7.7 X10E3/UL (ref 3.4–10.8)

## 2021-07-01 LAB
ANA TITR SER IF: NEGATIVE {TITER}
C-ANCA TITR SER IF: NORMAL TITER
P-ANCA ATYPICAL TITR SER IF: NORMAL TITER
P-ANCA TITR SER IF: NORMAL TITER

## 2021-07-06 DIAGNOSIS — K74.3 PRIMARY BILIARY CHOLANGITIS (HCC): Primary | ICD-10-CM

## 2021-07-06 RX ORDER — URSODIOL 500 MG/1
500 TABLET, FILM COATED ORAL 2 TIMES DAILY
Qty: 60 TABLET | Refills: 5 | Status: ON HOLD | OUTPATIENT
Start: 2021-07-06 | End: 2021-11-18 | Stop reason: CLARIF

## 2021-07-06 NOTE — PROGRESS NOTES
My chart message and VM left for patient regarding blood work results. The labs are consistent with underlying PBC. Plan to start MANE. She will return for Fibroscan.

## 2021-07-08 ENCOUNTER — TELEPHONE (OUTPATIENT)
Dept: FAMILY MEDICINE CLINIC | Age: 51
End: 2021-07-08

## 2021-07-08 NOTE — TELEPHONE ENCOUNTER
Johnny Leyva,     I went to the liver doctor and it looks like I have [facility].      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 This is treated with a medication called ursodiol. I think this in combination with the alcohol use caused more damage in the liver. I would like to get you started on the medication prior to you coming back for your next visit. I also left you a voicemail. I am happy to discuss further by phone. Please let me know when you receive this or my voicemail so I can order the medication.       Sania Jewell NP      I just wanted to keep you in the loop.     Thanks  Bard Chen

## 2021-07-19 DIAGNOSIS — G47.00 INSOMNIA, UNSPECIFIED TYPE: ICD-10-CM

## 2021-07-19 RX ORDER — ZOLPIDEM TARTRATE 5 MG/1
5 TABLET ORAL
Qty: 30 TABLET | Refills: 0 | Status: SHIPPED | OUTPATIENT
Start: 2021-07-19 | End: 2021-08-17 | Stop reason: SDUPTHER

## 2021-07-26 DIAGNOSIS — M54.6 THORACIC SPINE PAIN: ICD-10-CM

## 2021-07-26 RX ORDER — TIZANIDINE 2 MG/1
2 TABLET ORAL
Qty: 40 TABLET | Refills: 1 | Status: SHIPPED | OUTPATIENT
Start: 2021-07-26 | End: 2021-08-25 | Stop reason: SDUPTHER

## 2021-08-10 ENCOUNTER — OFFICE VISIT (OUTPATIENT)
Dept: FAMILY MEDICINE CLINIC | Age: 51
End: 2021-08-10
Payer: COMMERCIAL

## 2021-08-10 VITALS
SYSTOLIC BLOOD PRESSURE: 102 MMHG | HEIGHT: 63 IN | OXYGEN SATURATION: 99 % | BODY MASS INDEX: 29.77 KG/M2 | DIASTOLIC BLOOD PRESSURE: 62 MMHG | HEART RATE: 84 BPM | TEMPERATURE: 97.9 F | RESPIRATION RATE: 18 BRPM | WEIGHT: 168 LBS

## 2021-08-10 DIAGNOSIS — K74.3 PRIMARY BILIARY CIRRHOSIS (HCC): ICD-10-CM

## 2021-08-10 DIAGNOSIS — E11.9 TYPE 2 DIABETES MELLITUS WITHOUT COMPLICATION, WITHOUT LONG-TERM CURRENT USE OF INSULIN (HCC): ICD-10-CM

## 2021-08-10 DIAGNOSIS — F17.200 TOBACCO USE DISORDER: ICD-10-CM

## 2021-08-10 DIAGNOSIS — E78.2 MIXED HYPERLIPIDEMIA: ICD-10-CM

## 2021-08-10 DIAGNOSIS — M54.6 THORACIC SPINE PAIN: Primary | ICD-10-CM

## 2021-08-10 DIAGNOSIS — S29.019D ACUTE THORACIC MYOFASCIAL STRAIN, SUBSEQUENT ENCOUNTER: ICD-10-CM

## 2021-08-10 PROCEDURE — 3051F HG A1C>EQUAL 7.0%<8.0%: CPT | Performed by: FAMILY MEDICINE

## 2021-08-10 PROCEDURE — 99213 OFFICE O/P EST LOW 20 MIN: CPT | Performed by: FAMILY MEDICINE

## 2021-08-10 RX ORDER — OXYCODONE AND ACETAMINOPHEN 5; 325 MG/1; MG/1
1 TABLET ORAL
Qty: 30 TABLET | Refills: 0 | Status: SHIPPED | OUTPATIENT
Start: 2021-08-10 | End: 2021-08-20 | Stop reason: SDUPTHER

## 2021-08-10 NOTE — LETTER
NOTIFICATION OF RETURN TO WORK / SCHOOL    8/10/2021 10:51 AM    Ms. Virginie Shah  [address] [phone]  . To Whom It May Concern:    Virginie Shah was under the care of [facility] from 8/10/21     She will be able to return to work/school on 8/11/21 with no restrictions. If there are questions or concerns please have the patient contact our office.     Sincerely,      Fawn Joe MD

## 2021-08-10 NOTE — PROGRESS NOTES
Chief Complaint   Patient presents with    Back Pain     Pt having back pain. 1. Have you been to the ER, urgent care clinic since your last visit? Hospitalized since your last visit? No  2. Have you seen or consulted any other health care providers outside of the [address] [place] [address] since your last visit? Include any pap smears or colon screening.  No

## 2021-08-10 NOTE — PROGRESS NOTES
HISTORY OF PRESENT ILLNESS  Tatyana Aquino is a 45 y.o. female. f/u  recurrance of l upper back pain/sprain ongoing for many years. Seen by ortho in the past without improvement when MRI as denied. No apparent injury. Seeing Hepatology for PBR. Has halted alcohol use  Back Pain   The history is provided by the patient. This is a recurrent problem. The problem has not changed since onset. The problem occurs hourly. The pain is associated with no known injury. The pain is present in the thoracic spine and left side. The quality of the pain is described as aching. The pain is at a severity of 5/10. The pain is moderate. The symptoms are aggravated by certain positions, twisting and bending. Pertinent negatives include no fever and no dysuria. Review of Systems   Constitutional: Negative for fever and malaise/fatigue. Respiratory: Negative for cough. Genitourinary: Negative for dysuria, frequency, hematuria and urgency. Musculoskeletal: Positive for back pain. Negative for myalgias. Neurological: Negative for dizziness. Physical Exam  Vitals reviewed. Constitutional:       Appearance: Normal appearance. HENT:      Head: Normocephalic and atraumatic. Right Ear: Tympanic membrane normal.   Cardiovascular:      Rate and Rhythm: Normal rate and regular rhythm. Heart sounds: Normal heart sounds. Pulmonary:      Effort: Pulmonary effort is normal.      Breath sounds: Normal breath sounds. Abdominal:      General: Abdomen is flat. Palpations: Abdomen is soft. Musculoskeletal:      Cervical back: Normal range of motion and neck supple. Thoracic back: Tenderness present. No swelling, deformity, spasms or bony tenderness. Decreased range of motion. No scoliosis. Back:    Skin:     General: Skin is warm and dry. Neurological:      General: No focal deficit present. Mental Status: She is alert and oriented to person, place, and time.    Psychiatric:         Behavior: Behavior normal.         Diagnoses and all orders for this visit:    1. Thoracic spine pain,chronic,severe  -     oxyCODONE-acetaminophen (PERCOCET) 5-325 mg per tablet; Take 1 Tablet by mouth every six (6) hours as needed for Pain for up to 30 days. Max Daily Amount: 4 Tablets.  -     REFERRAL TO ORTHOPEDICS  -     [facility] SPINE W CONT; Future    2. Primary biliary cirrhosis ([facility]    3. Type 2 diabetes mellitus without complication, without long-term current use of insulin ([facility]    4. Tobacco use disorder    5. Mixed hyperlipidemia    6. Acute thoracic myofascial strain, subsequent encounter  -     oxyCODONE-acetaminophen (PERCOCET) 5-325 mg per tablet; Take 1 Tablet by mouth every six (6) hours as needed for Pain for up to 30 days. Max Daily Amount: 4 Tablets.

## 2021-08-16 ENCOUNTER — OFFICE VISIT (OUTPATIENT)
Dept: HEMATOLOGY | Age: 51
End: 2021-08-16
Payer: COMMERCIAL

## 2021-08-16 VITALS
DIASTOLIC BLOOD PRESSURE: 68 MMHG | BODY MASS INDEX: 30.55 KG/M2 | HEIGHT: 63 IN | WEIGHT: 172.4 LBS | SYSTOLIC BLOOD PRESSURE: 122 MMHG | TEMPERATURE: 98 F | HEART RATE: 81 BPM | OXYGEN SATURATION: 98 % | RESPIRATION RATE: 20 BRPM

## 2021-08-16 DIAGNOSIS — K74.3 PRIMARY BILIARY CHOLANGITIS (HCC): ICD-10-CM

## 2021-08-16 DIAGNOSIS — K74.69 OTHER CIRRHOSIS OF LIVER (HCC): Primary | ICD-10-CM

## 2021-08-16 PROCEDURE — 91200 LIVER ELASTOGRAPHY: CPT | Performed by: NURSE PRACTITIONER

## 2021-08-16 PROCEDURE — 99214 OFFICE O/P EST MOD 30 MIN: CPT | Performed by: NURSE PRACTITIONER

## 2021-08-16 NOTE — PROGRESS NOTES
Hetal Lozano is a 45 y.o. female    Chief Complaint   Patient presents with    Follow-up     Fibroscan      1. Have you been to the ER, urgent care clinic since your last visit? Hospitalized since your last visit? No     2. Have you seen or consulted any other health care providers outside of the [address] [place] [address] since your last visit? Include any pap smears or colon screening.   No        Visit Vitals  /68   Pulse 81   Temp 98 °F (36.7 °C)   Resp 20   Ht 5' 3\" (1.6 m)   Wt 172 lb 6.4 oz (78.2 kg)   SpO2 98%   BMI 30.54 kg/m²

## 2021-08-16 NOTE — PROGRESS NOTES
[address] [facility]      Jacki Menezes MD, Reji Hernández, Faye Yousif MD, MPH      Jose Hay, PA-KODAK Linda, ACN-BC     Sania Jewell, [facility]   Aaron Quintero, FNP-C    Cassy Valencia, [facility]       Shamika Deputado Saurav De Lainez 136    at [facility]    [address] [facility], [address] [place] [address] Anna Raya  22.    [phone]    FAX: [address], [address]    [phone]    FAX: [phone]       Patient Care Team:  Britta Lao MD as PCP - [facility], Dontae Figueroa MD as PCP - [facility] Provider      Problem List  Date Reviewed: 6/30/2021        Codes Class Noted    Other cirrhosis of liver [facility]) ICD-10-CM: K74.69  ICD-9-CM: 571.5  6/30/2021        Right-sided thoracic back pain ICD-10-CM: M54.6  ICD-9-CM: 724.1  6/29/2021        Elevated liver enzymes ICD-10-CM: R74.8  ICD-9-CM: 790.5  6/29/2021        Severe obesity ([facility] ICD-10-CM: E66.01  ICD-9-CM: 278.01  12/30/2018        Type 2 diabetes mellitus without complication ([facility] JACLYN-37-DI: E11.9  ICD-9-CM: 250.00  7/1/2016        Symptomatic anemia ICD-10-CM: D64.9  ICD-9-CM: 285.9  5/8/2015        Insomnia ICD-10-CM: G47.00  ICD-9-CM: 780.52  10/18/2013        Allergic rhinitis, cause unspecified (Chronic) ICD-10-CM: J30.9  ICD-9-CM: 477.9  4/23/2013        Encounter for long-term (current) use of other medications ICD-10-CM: Z79.899  ICD-9-CM: V58.69  7/8/2012        Tobacco use disorder (Chronic) ICD-10-CM: F17.200  ICD-9-CM: 305.1  4/27/2010        Renal calculus or stone ICD-10-CM: N20.0  ICD-9-CM: 592.0  4/27/2010        BMI 35.0-35.9,adult (Chronic) ICD-10-CM: L18.34  ICD-9-CM: V85.35 Chronic 4/27/2010    Overview Signed 3/10/2015  2:30 AM by Colonel Hurst Anthropometric:   Weight Loss Metrics 3/9/2015 11/6/2013 10/28/2013 10/18/2013 9/10/2013 4/23/2013 1/29/2013   Today's Wt 192 lb 163 lb 12.8 oz 164 lb 14.5 oz 161 lb 161 lb 184 lb 9.6 oz 188 lb 6.4 oz   BMI 35.11 kg/m2 29.95 kg/m2 30.15 kg/m2 28.07 kg/m2 28.07 kg/m2 32.18 kg/m2 34.45 kg/m2                    Brandon Pennington is being seen at [address] for management 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 The active problem list, all pertinent past medical history, medications,   radiologic findings and laboratory findings related to the liver disorder were reviewed and discussed with the patient. The patient is a 45 y.o.  female who was found to have chronic liver disease and cirrhosis   in 5/2021 when a CT scan was done for evaluation of an abdominal hernia. She has a history of past heavy alcohol use up to 1 pint per day for 3 years. This was decreased to weekend use only 2 years ago. She has now remained abstinent since 6/01/2021. An assessment of liver fibrosis with biopsy or elastography has not been performed. She returns for Fibroscan today. Serologic evaluation for markers of chronic liver disease was positive for AMA. Treatment for PBC was initiated with MANE 1000 mg daily 7/2021. She is tolerating the medication well with only mild intermittent GI discomfort. The patient has not developed complications of cirrhosis to date. The patient does not have any symptoms which could be attributed to the liver disorder. The patient is not experiencing the following symptoms which are commonly seen in this liver disorder: fatigue, pain in the right side over the liver, yellowing of the eyes or skin, itching, or swelling of the abdomen. The patient completes all daily activities without any functional limitations. ASSESSMENT AND PLAN:  Cirrhosis  The diagnosis of cirrhosis is based upon imaging, Fibroscan and laboratory studies. Cirrhosis is secondary to PBC and alcohol. She has remained abstinent since 6/2021. The patient has normal liver function. The patient has never developed any complications of cirrhosis to date. The CTP is 5. Child class A. The MELD score is 6. Primary Biliary Cholangitis  The diagnosis is based upon serology and an elevation in ALP. A liver biopsy has not been performed. Assessment of liver fibrosis was performed with Fibroscan in the office today. The result was 75.0 kPa which correlates with cirrhosis. The CAP score of 350 suggests hepatic steatosis. Have performed laboratory testing to monitor liver function and degree of liver injury. This included BMP, hepatic panel, CBC with platelet count and INR. Laboratory testing from 6/29/2021 reviewed in detail. Follow-up testing ordered today to evaluate response to treatment. Liver function and platelet count are normal.     The AMA is strongly positive and the ALP is significantly elevated. The patient meets criteria for PBC and histologic confirmation is not necessary at this time. The patient is being treated with MANE 1000 mg every day     The side effects of MANE including a 2% risk of itching and a 2% risk of diarrhea were discussed. Hypercholesterolemia   This is common in patients with PBC. Controlled clinical trials demonstrate that women with PBC do not have an increased risk of CAD depsite the elevated total cholesterol. The cholesterol is typically HDL and does not always require treatment. Stains are not contraindicated if felt to be clinically warranted. Vitamin malabsorption  Patients with PBC do not efficiently absorb fat soluble vitamins A,D,E, K. It has been recommended that the patient take multivitamins with excess fat soluble vitamins. Breast cancer screeing   Women with PBC have an increased risk of breast cancer and should undergo regular mammography screenings.     Screening for Esophageal varices   The patient has not had an EGD to screen for varices. There is no reason to perform EGD to screen for varices at this time since the PLT count is normal.     Hepatic encephalopathy   Overt HE has not developed to date. There is no reason for treatment with lactulose of xifaxan  There is no need to restrict dietary protein at this time. Screening for Hepatocellular Carcinoma  HCC screening was performed with CT of Abdomen 5/2021 and negative. AFP L3 was slightly elevated. Will repeat today. Ultrasound ordered to be due 11/2021. Treatment of other medical problems in patients with chronic liver disease  There are no contraindications for the patient to take most medications that are necessary for treatment of other medical issues. The patient can take: Any medications utilized for treatment of DM. Statins to treat hypercholesterolemia. Normal doses of acetaminophen, as recommended on the label of the bottle, are not hepatotoxic except in the setting of daily alcohol use, even in patients with cirrhosis and can be utilized for pain. Counseling for alcohol in patients with chronic liver disease  The patient was counseled regarding alcohol consumption and the effect of alcohol on chronic liver disease. The patient has not consumed alcohol since 5/2021. There was prior heavy use. Osteoporosis  The risk of osteoporosis is increased in patients with cirrhosis. This should be ordered by the patients primary care physician. Vaccinations   The need for vaccination against viral hepatitis A and B will be assessed with serologic and instituted as appropriate. Routine vaccinations against other bacterial and viral agents can be performed as indicated. Annual flu vaccination should be administered if indicated.       ALLERGIES  No Known Allergies    MEDICATIONS  Current Outpatient Medications   Medication Sig    oxyCODONE-acetaminophen (PERCOCET) 5-325 mg per tablet Take 1 Tablet by mouth every six (6) hours as needed for Pain for up to 30 days. Max Daily Amount: 4 Tablets.  semaglutide (Ozempic) 0.25 mg or 0.5 mg/dose (2 mg/1.5 ml) subq pen 0.5 mg by SubCUTAneous route every seven (7) days. INJECT 0.25 MG ONCE WEEKLY FOR 2 WEEKS, THEN INCREASE TO 0.5MG ONCE WEEKLY THEREAFTER. Indications: Weekly    tiZANidine (ZANAFLEX) 2 mg tablet Take 1 Tablet by mouth four (4) times daily as needed for Muscle Spasm(s).  zolpidem (AMBIEN) 5 mg tablet Take 1 Tablet by mouth nightly as needed for Sleep. Max Daily Amount: 5 mg.  glimepiride (AMARYL) 2 mg tablet TAKE ONE TABLET BY MOUTH EVERY DAY IN THE MORNING.  ursodioL (MANE Forte) 500 mg tablet Take 1 Tablet by mouth two (2) times a day.  metFORMIN ER (GLUCOPHAGE XR) 750 mg tablet TAKE ONE TABLET BY MOUTH ONE TIME DAILY     glucose blood VI test strips (blood glucose test) strip by Does Not Apply route Daily (before breakfast).  lancets misc Take  by mouth two (2) times daily (with meals).  ferrous sulfate 325 mg (65 mg iron) tablet Take  by mouth Daily (before breakfast).  Blood-Glucose Meter monitoring kit Test daily as needed     No current facility-administered medications for this visit. SYSTEM REVIEW NOT RELATED TO LIVER DISEASE OR REVIEWED ABOVE:  Constitution systems: Negative for fever, chills, weight gain, weight loss. Eyes: Negative for visual changes. ENT: Negative for sore throat, painful swallowing. Respiratory: Negative for cough, hemoptysis, SOB. Cardiology: Negative for chest pain, palpitations. GI:  Negative for constipation or diarrhea. : Negative for urinary frequency, dysuria, hematuria, nocturia. Skin: Negative for rash. Hematology: Negative for easy bruising, blood clots. Musculo-skeletal: Negative for back pain, muscle pain, weakness. Neurologic: Negative for headaches, dizziness, vertigo, memory problems not related to HE. Psychology: Negative for anxiety, depression. FAMILY HISTORY:  The father  of lung cancer   The mother has/had the following chronic disease(s): Colon cancer, COPD, Osteoporosis. There is no family history of liver disease. There is no family history of immune disorders. SOCIAL HISTORY:  The patient is . The patient has 3 children, 1 passed of SIDS. The patient currently smokes 1/2 pack of tobacco daily. The patient drank up to 1 pint per day for 3 years. She decreased to weekends only 2 years ago. All alcohol was discontinued 2021. The patient currently works full-time as . PHYSICAL EXAMINATION:  Visit Vitals  /68   Pulse 81   Temp 98 °F (36.7 °C)   Resp 20   Ht 5' 3\" (1.6 m)   Wt 172 lb 6.4 oz (78.2 kg)   SpO2 98%   BMI 30.54 kg/m²       General: No acute distress. Eyes: Sclera anicteric. ENT: No oral lesions. Thyroid normal.  Nodes: No adenopathy. Skin: No spider angiomata. No jaundice. No palmar erythema. Respiratory: Lungs clear to auscultation. Cardiovascular: Regular heart rate. No murmurs. No JVD. Abdomen: Soft non-tender, liver size normal to percussion/palpation. Spleen not palpable. No obvious ascites. Extremities: No edema. No muscle wasting. No gross arthritic changes. Neurologic: Alert and oriented. Cranial nerves grossly intact. No asterixis.     LABORATORY STUDIES:  Liver [place] of [address] [place] [address] Units 2021   WBC 3.4 - 10.8 x10E3/uL 7.7    ANC 1.4 - 7.0 x10E3/uL 6.0    HGB 11.1 - 15.9 g/dL 11.7     - 450 x10E3/uL 193    INR 0.9 - 1.2 1.0    AST 0 - 40 IU/L 57 (H) 77 (H)   ALT 0 - 32 IU/L 30 26   Alk Phos 48 - 121 IU/L 164 (H) 216 (H)   Bili, Total 0.0 - 1.2 mg/dL 1.0 1.0   Bili, Direct 0.00 - 0.40 mg/dL 0.53 (H)    Albumin 3.8 - 4.9 g/dL 4.0 3.1 (L)   BUN 6 - 24 mg/dL 7 5 (L)   Creat 0.57 - 1.00 mg/dL 0.64 0.58   Na 134 - 144 mmol/L 135 141   K 3.5 - 5.2 mmol/L 3.4 (L) 3.3 (L)   Cl 96 - 106 mmol/L 96 102   CO2 20 - 29 mmol/L 30 (H) 31   Glucose 65 - 99 mg/dL 164 (H) 153 (H)     Liver [place] of [address] [place] [address] Ref Rng & Units 10/9/2020   WBC 3.4 - 10.8 x10E3/uL    ANC 1.4 - 7.0 x10E3/uL    HGB 11.1 - 15.9 g/dL     - 450 x10E3/uL    INR 0.9 - 1.2    AST 0 - 40 IU/L 46 (H)   ALT 0 - 32 IU/L 36 (H)   Alk Phos 48 - 121 IU/L 119 (H)   Bili, Total 0.0 - 1.2 mg/dL 0.4   Bili, Direct 0.00 - 0.40 mg/dL    Albumin 3.8 - 4.9 g/dL 4.2   BUN 6 - 24 mg/dL 8   Creat 0.57 - 1.00 mg/dL 0.65   Na 134 - 144 mmol/L 138   K 3.5 - 5.2 mmol/L 3.9   Cl 96 - 106 mmol/L 101   CO2 20 - 29 mmol/L 20   Glucose 65 - 99 mg/dL 227 (H)     Cancer Screening Latest Ref Rng & Units 6/29/2021   AFP, Serum 0.0 - 8.0 ng/mL 5.4   AFP-L3% 0.0 - 9.9 % 10.3 (H)     Laboratory testing from [facility] reviewed in detail. Additional testing included to evaluate progression or regression of disease. Laboratory testing results from today will be communicated by My Chart. SEROLOGIES:  Serologies Latest Ref Rng & Units 6/29/2021   Hep A Ab, Total Negative Negative   Hep B Surface Ag Negative Negative   Hep B Core Ab, Total Negative Negative   Hep B Surface AB QL  Non Reactive   Ferritin 15 - 150 ng/mL 284 (H)   Iron % Saturation 15 - 55 % 12 (L)   LIMA, IFA  Negative   C-ANCA Neg:<1:20 titer <1:20   P-ANCA Neg:<1:20 titer <1:20   ANCA Neg:<1:20 titer <1:20   ASMCA 0 - 19 Units 14   M2 Ab 0.0 - 20.0 Units 41.7 (H)   Ceruloplasmin 19.0 - 39.0 mg/dL 30.2   Alpha-1 antitrypsin level 101 - 187 mg/dL 187     LIVER HISTOLOGY:  8/2021. FibroScan performed at [address]. EkPa was 75.0. IQR/med 0%. . The results suggested a fibrosis level of F4. The CAP score suggests there is hepatic steatosis. ENDOSCOPIC PROCEDURES:  Not available or performed    RADIOLOGY:  5/2021. CT of Abdomen. Cirrhotic appearance of the liver. Cholelithiasis. No ductal dilatation. Right renal calculi. Splenomegaly. No ascites.      OTHER TESTING:  Not available or performed    FOLLOW-UP:  All of the issues listed above in the Assessment and Plan were discussed with the patient. All questions were answered. The patient expressed a clear understanding of the above. [address] [address] in 3 months to assess the effects and tolerability of MANE. Sania Jewell, [facility] of [address] [id number] Juan Daniel Raya, [address] [place] [address] 22.  [address] [facility]

## 2021-08-17 DIAGNOSIS — G47.00 INSOMNIA, UNSPECIFIED TYPE: ICD-10-CM

## 2021-08-17 LAB
ALBUMIN SERPL-MCNC: 3.7 G/DL (ref 3.8–4.9)
ALP SERPL-CCNC: 146 IU/L (ref 48–121)
ALT SERPL-CCNC: 10 IU/L (ref 0–32)
AST SERPL-CCNC: 31 IU/L (ref 0–40)
BASOPHILS # BLD AUTO: 0 X10E3/UL (ref 0–0.2)
BASOPHILS NFR BLD AUTO: 0 %
BILIRUB DIRECT SERPL-MCNC: 0.33 MG/DL (ref 0–0.4)
BILIRUB SERPL-MCNC: 0.6 MG/DL (ref 0–1.2)
BUN SERPL-MCNC: 5 MG/DL (ref 6–24)
BUN/CREAT SERPL: 10 (ref 9–23)
CALCIUM SERPL-MCNC: 9.5 MG/DL (ref 8.7–10.2)
CHLORIDE SERPL-SCNC: 103 MMOL/L (ref 96–106)
CO2 SERPL-SCNC: 26 MMOL/L (ref 20–29)
CREAT SERPL-MCNC: 0.51 MG/DL (ref 0.57–1)
EOSINOPHIL # BLD AUTO: 0.1 X10E3/UL (ref 0–0.4)
EOSINOPHIL NFR BLD AUTO: 2 %
ERYTHROCYTE [DISTWIDTH] IN BLOOD BY AUTOMATED COUNT: 14.6 % (ref 11.7–15.4)
GLUCOSE SERPL-MCNC: 125 MG/DL (ref 65–99)
HCT VFR BLD AUTO: 35.6 % (ref 34–46.6)
HGB BLD-MCNC: 11.5 G/DL (ref 11.1–15.9)
IMM GRANULOCYTES # BLD AUTO: 0 X10E3/UL (ref 0–0.1)
IMM GRANULOCYTES NFR BLD AUTO: 0 %
INR PPP: 1.1 (ref 0.9–1.2)
LYMPHOCYTES # BLD AUTO: 0.8 X10E3/UL (ref 0.7–3.1)
LYMPHOCYTES NFR BLD AUTO: 9 %
MCH RBC QN AUTO: 31.9 PG (ref 26.6–33)
MCHC RBC AUTO-ENTMCNC: 32.3 G/DL (ref 31.5–35.7)
MCV RBC AUTO: 99 FL (ref 79–97)
MONOCYTES # BLD AUTO: 0.5 X10E3/UL (ref 0.1–0.9)
MONOCYTES NFR BLD AUTO: 6 %
NEUTROPHILS # BLD AUTO: 7.6 X10E3/UL (ref 1.4–7)
NEUTROPHILS NFR BLD AUTO: 83 %
PLATELET # BLD AUTO: 148 X10E3/UL (ref 150–450)
POTASSIUM SERPL-SCNC: 4 MMOL/L (ref 3.5–5.2)
PROT SERPL-MCNC: 7.1 G/DL (ref 6–8.5)
PROTHROMBIN TIME: 11.6 SEC (ref 9.1–12)
RBC # BLD AUTO: 3.6 X10E6/UL (ref 3.77–5.28)
SODIUM SERPL-SCNC: 141 MMOL/L (ref 134–144)
WBC # BLD AUTO: 9.1 X10E3/UL (ref 3.4–10.8)

## 2021-08-17 RX ORDER — ZOLPIDEM TARTRATE 5 MG/1
5 TABLET ORAL
Qty: 30 TABLET | Refills: 0 | Status: SHIPPED | OUTPATIENT
Start: 2021-08-17 | End: 2021-09-15 | Stop reason: SDUPTHER

## 2021-08-18 LAB
AFP L3 MFR SERPL: 9.4 % (ref 0–9.9)
AFP SERPL-MCNC: 3.8 NG/ML (ref 0–8)

## 2021-08-20 DIAGNOSIS — S29.019D ACUTE THORACIC MYOFASCIAL STRAIN, SUBSEQUENT ENCOUNTER: ICD-10-CM

## 2021-08-20 DIAGNOSIS — M54.6 THORACIC SPINE PAIN: ICD-10-CM

## 2021-08-20 RX ORDER — OXYCODONE AND ACETAMINOPHEN 5; 325 MG/1; MG/1
1 TABLET ORAL
Qty: 30 TABLET | Refills: 0 | Status: SHIPPED | OUTPATIENT
Start: 2021-08-20 | End: 2021-08-30 | Stop reason: SDUPTHER

## 2021-08-20 RX ORDER — OXYCODONE AND ACETAMINOPHEN 5; 325 MG/1; MG/1
1 TABLET ORAL
Qty: 30 TABLET | Refills: 0 | Status: CANCELLED | OUTPATIENT
Start: 2021-08-20 | End: 2021-09-19

## 2021-08-20 NOTE — PROGRESS NOTES
Called patient with blood work results. The liver numbers have improved. Will continue medications as ordered.

## 2021-08-21 ENCOUNTER — HOSPITAL ENCOUNTER (OUTPATIENT)
Dept: MRI IMAGING | Age: 51
Discharge: HOME | End: 2021-08-21
Attending: FAMILY MEDICINE
Payer: COMMERCIAL

## 2021-08-21 DIAGNOSIS — M54.6 THORACIC SPINE PAIN: ICD-10-CM

## 2021-08-21 PROCEDURE — 72146 MRI CHEST SPINE W/O DYE: CPT

## 2021-08-25 ENCOUNTER — OFFICE VISIT (OUTPATIENT)
Dept: FAMILY MEDICINE CLINIC | Age: 51
End: 2021-08-25
Payer: COMMERCIAL

## 2021-08-25 VITALS
RESPIRATION RATE: 20 BRPM | HEIGHT: 63 IN | HEART RATE: 84 BPM | OXYGEN SATURATION: 98 % | DIASTOLIC BLOOD PRESSURE: 76 MMHG | BODY MASS INDEX: 29.23 KG/M2 | SYSTOLIC BLOOD PRESSURE: 138 MMHG | TEMPERATURE: 97.8 F | WEIGHT: 165 LBS

## 2021-08-25 DIAGNOSIS — E11.9 TYPE 2 DIABETES MELLITUS WITHOUT COMPLICATION, WITHOUT LONG-TERM CURRENT USE OF INSULIN (HCC): ICD-10-CM

## 2021-08-25 DIAGNOSIS — K74.3 PRIMARY BILIARY CIRRHOSIS (HCC): ICD-10-CM

## 2021-08-25 DIAGNOSIS — F17.200 TOBACCO USE DISORDER: ICD-10-CM

## 2021-08-25 DIAGNOSIS — E78.2 MIXED HYPERLIPIDEMIA: ICD-10-CM

## 2021-08-25 DIAGNOSIS — Z00.00 ANNUAL PHYSICAL EXAM: Primary | ICD-10-CM

## 2021-08-25 DIAGNOSIS — M54.6 THORACIC SPINE PAIN: ICD-10-CM

## 2021-08-25 PROCEDURE — 3051F HG A1C>EQUAL 7.0%<8.0%: CPT | Performed by: FAMILY MEDICINE

## 2021-08-25 PROCEDURE — 99396 PREV VISIT EST AGE 40-64: CPT | Performed by: FAMILY MEDICINE

## 2021-08-25 RX ORDER — TIZANIDINE 2 MG/1
2 TABLET ORAL
Qty: 40 TABLET | Refills: 1 | Status: SHIPPED | OUTPATIENT
Start: 2021-08-25 | End: 2021-10-01 | Stop reason: SDUPTHER

## 2021-08-25 NOTE — PROGRESS NOTES
Chief Complaint   Patient presents with    Well Woman     physical      1. Have you been to the ER, urgent care clinic since your last visit? Hospitalized since your last visit?no    2. Have you seen or consulted any other health care providers outside of the [address] [place] [address] since your last visit? Include any pap smears or colon screening.  no

## 2021-08-25 NOTE — PROGRESS NOTES
Subjective:   45 y.o. female for Well Woman Check. Her gyne and breast care is done elsewhere by her Ob-Gyne physician. Patient Active Problem List    Diagnosis Date Noted    Primary biliary cholangitis ([facility] 08/16/2021    Other cirrhosis of liver ([facility] 06/30/2021    Right-sided thoracic back pain 06/29/2021    Elevated liver enzymes 06/29/2021    Severe obesity ([facility] 12/30/2018    Type 2 diabetes mellitus without complication ([facility] 30/26/0678    Symptomatic anemia 05/08/2015    Insomnia 10/18/2013    Allergic rhinitis, cause unspecified 04/23/2013    Encounter for long-term (current) use of other medications 07/08/2012    Tobacco use disorder 04/27/2010    Renal calculus or stone 04/27/2010    BMI 35.0-35.9,adult 04/27/2010     Current Outpatient Medications   Medication Sig Dispense Refill    oxyCODONE-acetaminophen (PERCOCET) 5-325 mg per tablet Take 1 Tablet by mouth every six (6) hours as needed for Pain for up to 30 days. Max Daily Amount: 4 Tablets. 30 Tablet 0    zolpidem (AMBIEN) 5 mg tablet Take 1 Tablet by mouth nightly as needed for Sleep. Max Daily Amount: 5 mg. 30 Tablet 0    semaglutide (Ozempic) 0.25 mg or 0.5 mg/dose (2 mg/1.5 ml) subq pen 0.5 mg by SubCUTAneous route every seven (7) days. INJECT 0.25 MG ONCE WEEKLY FOR 2 WEEKS, THEN INCREASE TO 0.5MG ONCE WEEKLY THEREAFTER. Indications: Weekly 4 Pen 5    tiZANidine (ZANAFLEX) 2 mg tablet Take 1 Tablet by mouth four (4) times daily as needed for Muscle Spasm(s). 40 Tablet 1    glimepiride (AMARYL) 2 mg tablet TAKE ONE TABLET BY MOUTH EVERY DAY IN THE MORNING. 30 Tablet 5    ursodioL (MANE Forte) 500 mg tablet Take 1 Tablet by mouth two (2) times a day. 60 Tablet 5    metFORMIN ER (GLUCOPHAGE XR) 750 mg tablet TAKE ONE TABLET BY MOUTH ONE TIME DAILY  30 Tablet 11    glucose blood VI test strips (blood glucose test) strip by Does Not Apply route Daily (before breakfast).  50 Strip 11    lancets misc Take  by mouth two (2) times daily (with meals). 1 Package 11    Blood-Glucose Meter monitoring kit Test daily as needed 1 Kit 0    ferrous sulfate 325 mg (65 mg iron) tablet Take  by mouth Daily (before breakfast). (Patient not taking: Reported on 2021)       No Known Allergies  Past Medical History:   Diagnosis Date    Anemia     Depression     Encounter for long-term (current) use of other medications 2012    LBP (low back pain) 2010    Mixed hyperlipidemia 2012    OA (osteoarthritis) 2010    Obesity 2010    Renal calculus or stone 2010    Tobacco use disorder 2010    Type II or unspecified type diabetes mellitus without mention of complication, not stated as uncontrolled 3/9/2015    Diagnosed 3/9/2015 with A1c of 8.8%; treatment initiated with dietary changes, exercise 5 days/wk and started on Metformin XR 500mg daily x 2 weeks, then increase to 500mg AC breakfast and dinner. Past Surgical History:   Procedure Laterality Date    HX ANKLE FRACTURE TX      HX CYST REMOVAL      HX GYN       X 3    HX TONSILLECTOMY      DE BREAST SURGERY PROCEDURE UNLISTED       Family History   Problem Relation Age of Onset    Arthritis-osteo Mother     Cancer Mother     Cancer Father      Social History     Tobacco Use    Smoking status: Current Every Day Smoker     Packs/day: 1.00    Smokeless tobacco: Never Used   Substance Use Topics    Alcohol use:  Yes     Alcohol/week: 16.7 standard drinks     Types: 20 Standard drinks or equivalent per week     Comment: occasionally             Specific concerns today: check up    Review of Systems  Constitutional: positive for fatigue  Eyes: negative  Ears, nose, mouth, throat, and face: negative  Respiratory: negative  Cardiovascular: negative  Gastrointestinal: positive for nausea  Genitourinary:negative  Integument/breast: abdominal wall subcutaneous nodules  Hematologic/lymphatic: negative  Musculoskeletal:positive for back pain  Neurological: negative  Behavioral/Psych: negative    Objective:   Blood pressure 138/76, pulse 84, temperature 97.8 °F (36.6 °C), temperature source Oral, resp. rate 20, height 5' 3\" (1.6 m), weight 165 lb (74.8 kg), SpO2 98 %. Physical Examination:   General appearance - alert, well appearing, and in no distress  Mental status - alert, oriented to person, place, and time  Eyes - pupils equal and reactive, extraocular eye movements intact  Ears - bilateral TM's and external ear canals normal  Nose - normal and patent, no erythema, discharge or polyps  Mouth - mucous membranes moist, pharynx normal without lesions  Neck - supple, no significant adenopathy, carotids upstroke normal bilaterally, no bruits, thyroid exam: thyroid is normal in size without nodules or tenderness  Chest - clear to auscultation, no wheezes, rales or rhonchi, symmetric air entry  Heart - normal rate, regular rhythm, normal S1, S2, no murmurs, rubs, clicks or gallops  Abdomen - soft, nontender, nondistended, no masses or organomegaly  Musculoskeletal - no joint tenderness, deformity or swelling. TenderT Spine ,limited ROM  Extremities - peripheral pulses normal, no pedal edema, no clubbing or cyanosis  Skin - normal coloration and turgor, no rashes, no suspicious skin lesions noted     Assessment/Plan:     continue present plan, call if any problems  Diagnoses and all orders for this visit:    1. Annual physical exam    2. Primary biliary cirrhosis ([address]    3. Type 2 diabetes mellitus without complication, without long-term current use of insulin ([address]    4. Thoracic spine pain,has Ortho appt coming up  -     tiZANidine (ZANAFLEX) 2 mg tablet; Take 1 Tablet by mouth four (4) times daily as needed for Muscle Spasm(s). 5. Tobacco use disorder    6. Mixed hyperlipidemia      Follow-up and Dispositions    · Return in about 3 months (around 11/25/2021).

## 2021-08-30 DIAGNOSIS — S29.019D ACUTE THORACIC MYOFASCIAL STRAIN, SUBSEQUENT ENCOUNTER: ICD-10-CM

## 2021-08-30 DIAGNOSIS — M54.6 THORACIC SPINE PAIN: ICD-10-CM

## 2021-08-30 RX ORDER — OXYCODONE AND ACETAMINOPHEN 5; 325 MG/1; MG/1
1 TABLET ORAL
Qty: 30 TABLET | Refills: 0 | Status: SHIPPED | OUTPATIENT
Start: 2021-08-30 | End: 2021-10-01 | Stop reason: SDUPTHER

## 2021-08-30 NOTE — TELEPHONE ENCOUNTER
Patient is calling, because she needs a refill on her:oxyCODONE-acetaminophen (PERCOCET) 5-325 mg per tablet. She cannot get into for the [facility] doctor soon.   Please call [phone].      Order Details

## 2021-09-08 ENCOUNTER — TELEPHONE (OUTPATIENT)
Dept: FAMILY MEDICINE CLINIC | Age: 51
End: 2021-09-08

## 2021-09-08 DIAGNOSIS — M54.6 THORACIC SPINE PAIN: Primary | ICD-10-CM

## 2021-09-08 RX ORDER — TRAMADOL HYDROCHLORIDE 50 MG/1
50 TABLET ORAL
Qty: 40 TABLET | Refills: 0 | Status: SHIPPED | OUTPATIENT
Start: 2021-09-08 | End: 2021-10-08

## 2021-09-08 NOTE — TELEPHONE ENCOUNTER
TT 1   Diana Mejía  Female, 45 y.o., 1970  Weight:   165 lb (74.8 kg)  MRN:   [id number]  Phone:   [phone] (M)  PCP:   Aruna Fernandez MD  Primary Cvg:   [facility]  Last Appt With Me  None  Next Appt With Me  None  Next Appt  11/16/2021 - Sania Jewell, NP - [address]  Prescription Question    Cherelle Sky MD 1 hour ago (9:27 AM)     Hi     I am still not able to get into the back doctor until 9/13. In the past you gave me a pain medication that was not oxycodone. I believe it started with a T.      Could I get a refill on that medication?    Thanks  Tete Gonzalez     Encounter Messages    Read Composed From To  Subject   Y 9/8/2021  9:27 AM Diana Anderson, Trish Swan MD  Prescription Question

## 2021-09-15 DIAGNOSIS — G47.00 INSOMNIA, UNSPECIFIED TYPE: ICD-10-CM

## 2021-09-16 RX ORDER — ZOLPIDEM TARTRATE 5 MG/1
5 TABLET ORAL
Qty: 30 TABLET | Refills: 0 | Status: SHIPPED | OUTPATIENT
Start: 2021-09-16 | End: 2021-10-13 | Stop reason: SDUPTHER

## 2021-10-01 DIAGNOSIS — S29.019D ACUTE THORACIC MYOFASCIAL STRAIN, SUBSEQUENT ENCOUNTER: ICD-10-CM

## 2021-10-01 DIAGNOSIS — M54.6 THORACIC SPINE PAIN: ICD-10-CM

## 2021-10-01 RX ORDER — OXYCODONE AND ACETAMINOPHEN 5; 325 MG/1; MG/1
1 TABLET ORAL
Qty: 30 TABLET | Refills: 0 | Status: SHIPPED | OUTPATIENT
Start: 2021-10-01 | End: 2021-10-21 | Stop reason: SDUPTHER

## 2021-10-01 RX ORDER — TIZANIDINE 2 MG/1
2 TABLET ORAL
Qty: 40 TABLET | Refills: 1 | Status: SHIPPED | OUTPATIENT
Start: 2021-10-01 | End: 2021-10-21 | Stop reason: SDUPTHER

## 2021-10-04 ENCOUNTER — TELEPHONE (OUTPATIENT)
Dept: HEMATOLOGY | Age: 51
End: 2021-10-04

## 2021-10-04 DIAGNOSIS — K74.69 OTHER CIRRHOSIS OF LIVER (HCC): Primary | ICD-10-CM

## 2021-10-04 NOTE — TELEPHONE ENCOUNTER
Called patient to let her know what was available, no answer, LVM to call the office back at [phone] asap          ----- Message from Sania Kan NP sent at 10/4/2021  1:19 PM EDT -----  Do I have any research slots or 4:20 slots to fit this patient in for a 2 week follow-up. She was hospitalized.

## 2021-10-13 DIAGNOSIS — G47.00 INSOMNIA, UNSPECIFIED TYPE: ICD-10-CM

## 2021-10-15 RX ORDER — ZOLPIDEM TARTRATE 5 MG/1
5 TABLET ORAL
Qty: 30 TABLET | Refills: 0 | Status: SHIPPED | OUTPATIENT
Start: 2021-10-15 | End: 2021-11-12 | Stop reason: SDUPTHER

## 2021-10-19 ENCOUNTER — OFFICE VISIT (OUTPATIENT)
Dept: HEMATOLOGY | Age: 51
End: 2021-10-19
Payer: COMMERCIAL

## 2021-10-19 VITALS
HEART RATE: 73 BPM | HEIGHT: 63 IN | SYSTOLIC BLOOD PRESSURE: 159 MMHG | TEMPERATURE: 97.2 F | OXYGEN SATURATION: 99 % | DIASTOLIC BLOOD PRESSURE: 80 MMHG | BODY MASS INDEX: 28.77 KG/M2 | WEIGHT: 162.4 LBS | RESPIRATION RATE: 20 BRPM

## 2021-10-19 DIAGNOSIS — K74.69 OTHER CIRRHOSIS OF LIVER (HCC): Primary | ICD-10-CM

## 2021-10-19 PROCEDURE — 99214 OFFICE O/P EST MOD 30 MIN: CPT | Performed by: HOSPITALIST

## 2021-10-19 RX ORDER — NADOLOL 40 MG/1
40 TABLET ORAL DAILY
Qty: 90 TABLET | Refills: 3 | Status: SHIPPED | OUTPATIENT
Start: 2021-10-19

## 2021-10-19 RX ORDER — NADOLOL 40 MG/1
40 TABLET ORAL DAILY
COMMUNITY
End: 2021-10-19 | Stop reason: SDUPTHER

## 2021-10-19 NOTE — PROGRESS NOTES
Ora Nicole is a 45 y.o. female    Chief Complaint   Patient presents with   [address]     Patient is a hospital follow up from [facility]      1. Have you been to the ER, urgent care clinic since your last visit? Hospitalized since your last visit? No     2. Have you seen or consulted any other health care providers outside of the [address] [place] [address] since your last visit? Include any pap smears or colon screening.   No     Visit Vitals  BP (!) 159/80   Pulse 73   Temp 97.2 °F (36.2 °C)   Resp 20   Ht 5' 3\" (1.6 m)   Wt 162 lb 6.4 oz (73.7 kg)   SpO2 99%   BMI 28.77 kg/m²

## 2021-10-19 NOTE — PROGRESS NOTES
[address] [facility]      Cynthia Michelle MD, Magalie Shelbye, Rosalio Smith MD, MPH      Kaitlyn Hebert, PAAURELIO Renee, ACN-BC     Sania Jewell, [facility]   Chandana Oakley, FNP-C    Timmy Ramirez, [facility]       Shamika Deputado Saurav De Lainez 136    at [address], [address] [place] [address], [facility].    [phone]    FAX: [address] [place] [address], [address], [address]    [phone]    FAX: [phone]       Patient Care Team:  Celestino Frankel MD as PCP - [facility], Brent Pearson MD as PCP - [facility] Provider      Problem List  Date Reviewed: 10/19/2021        Codes Class Noted    Primary biliary cholangitis [facility]) ICD-10-CM: K74.3  ICD-9-CM: 571.6  8/16/2021        Other cirrhosis of liver ([facility] 75.) ICD-10-CM: K74.69  ICD-9-CM: 571.5  6/30/2021        Right-sided thoracic back pain ICD-10-CM: M54.6  ICD-9-CM: 724.1  6/29/2021        Elevated liver enzymes ICD-10-CM: R74.8  ICD-9-CM: 790.5  6/29/2021        Severe obesity ([facility] 75.) ICD-10-CM: E66.01  ICD-9-CM: 278.01  12/30/2018        Type 2 diabetes mellitus without complication ([facility] 75.) WSO-32-KV: E11.9  ICD-9-CM: 250.00  7/1/2016        Symptomatic anemia ICD-10-CM: D64.9  ICD-9-CM: 285.9  5/8/2015        Insomnia ICD-10-CM: G47.00  ICD-9-CM: 780.52  10/18/2013        Allergic rhinitis, cause unspecified (Chronic) ICD-10-CM: J30.9  ICD-9-CM: 477.9  4/23/2013        Encounter for long-term (current) use of other medications ICD-10-CM: Z79.899  ICD-9-CM: V58.69  7/8/2012        Tobacco use disorder (Chronic) ICD-10-CM: P24.698  ICD-9-CM: 305.1  4/27/2010        Renal calculus or stone ICD-10-CM: N20.0  ICD-9-CM: 592.0  4/27/2010        BMI 35.0-35.9,adult (Chronic) ICD-10-CM: O95.28  ICD-9-CM: V85.35 Chronic 4/27/2010    Overview Signed 3/10/2015  2:30 AM by Rom GOMEZ     Anthropometric:   Weight Loss Metrics 3/9/2015 11/6/2013 10/28/2013 10/18/2013 9/10/2013 4/23/2013 1/29/2013   Today's Wt 192 lb 163 lb 12.8 oz 164 lb 14.5 oz 161 lb 161 lb 184 lb 9.6 oz 188 lb 6.4 oz   BMI 35.11 kg/m2 29.95 kg/m2 30.15 kg/m2 28.07 kg/m2 28.07 kg/m2 32.18 kg/m2 34.45 kg/m2                    Michaela Santana is being seen at [address] for management 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 The active problem list, all pertinent past medical history, medications,   radiologic findings and laboratory findings related to the liver disorder were reviewed and discussed with the patient. The patient is a 45 y.o.  female who was found to have chronic liver disease and cirrhosis   in 5/2021 when a CT scan was done for evaluation of an abdominal hernia. She has a history of past heavy alcohol use up to 1 pint per day for 3 years. This was decreased to weekend use only 2 years ago. She has now remained abstinent since 6/01/2021. An assessment of liver fibrosis with biopsy or elastography has not been performed. She returns for Fibroscan today. Serologic evaluation for markers of chronic liver disease was positive for AMA. Treatment for PBC was initiated with MANE 1000 mg daily 7/2021. She is tolerating the medication well with only mild intermittent GI discomfort. The patient has not developed complications of cirrhosis to date. The patient does not have any symptoms which could be attributed to the liver disorder. The patient is not experiencing the following symptoms which are commonly seen in this liver disorder: fatigue, pain in the right side over the liver, yellowing of the eyes or skin, itching, or swelling of the abdomen. The patient completes all daily activities without any functional limitations.       Since last clinic appointment  She was last seen in our clinic in 08/2021  Since last clinic visit; she was admitted at [facility] on 09/23- 09/28 for upper GI bleeding due to esophageal variceal hemorrhage    She underwent EGD on admission and four bands were placed  She was also transfused with 2 units of packed red blood cell    Presently, she reports fatigue  There is no abdominal pain over the liver, no jaundice, ascites or lower extremity swelling. ASSESSMENT AND PLAN:  Cirrhosis  The diagnosis of cirrhosis is based upon imaging, Fibroscan and laboratory studies. Cirrhosis is secondary to PBC and alcohol. She has remained abstinent since 6/2021. The patient has normal liver function. The patient has never developed any complications of cirrhosis to date. The CTP is 5. Child class A. The MELD score is 6. We will repeat laboratory studies to monitor liver enzymes and function and to recalculate meld score. Primary Biliary Cholangitis  The diagnosis is based upon serology and an elevation in ALP. A liver biopsy has not been performed. Assessment of liver fibrosis was performed with Fibroscan in the office today. The result was 75.0 kPa which correlates with cirrhosis. The CAP score of 350 suggests hepatic steatosis. Have performed laboratory testing to monitor liver function and degree of liver injury. This included BMP, hepatic panel, CBC with platelet count and INR. Laboratory testing from 6/29/2021 reviewed in detail. Follow-up testing ordered today to evaluate response to treatment. Liver function and platelet count are normal.     The AMA is strongly positive and the ALP is significantly elevated. The patient meets criteria for PBC and histologic confirmation is not necessary at this time.     The patient is being treated with MANE 1000 mg every day   The side effects of MANE including a 2% risk of itching and a 2% risk of diarrhea were discussed. Hypercholesterolemia   This is common in patients with PBC. Controlled clinical trials demonstrate that women with PBC do not have an increased risk of CAD depsite the elevated total cholesterol. The cholesterol is typically HDL and does not always require treatment. Stains are not contraindicated if felt to be clinically warranted. Vitamin malabsorption  Patients with PBC do not efficiently absorb fat soluble vitamins A,D,E, K. It has been recommended that the patient take multivitamins with excess fat soluble vitamins. Breast cancer screeing   Women with PBC have an increased risk of breast cancer and should undergo regular mammography screenings. Screening for Esophageal varices   EGD was performed in 09/27/2021 at [facility]. There was esophageal varices and 4 bands were placed  We will schedule for repeat EGD within 4 weeks for banding protocol. Continue nadolol 40 mg daily for secondary esophageal variceal hemorrhage prophylaxis    Hepatic encephalopathy   Overt HE has not developed to date. There is no reason for treatment with lactulose of xifaxan  There is no need to restrict dietary protein at this time. Screening for Hepatocellular Carcinoma  HCC screening was performed with CT of Abdomen 5/2021 and negative. AFP L3 was slightly elevated. Repeat ultrasound of the liver was recently performed on admission at [facility]. Recommend obtaining results of recent liver ultrasound. Treatment of other medical problems in patients with chronic liver disease  There are no contraindications for the patient to take most medications that are necessary for treatment of other medical issues. The patient can take: Any medications utilized for treatment of DM. Statins to treat hypercholesterolemia.      Normal doses of acetaminophen, as recommended on the label of the bottle, are not hepatotoxic except in the setting of daily alcohol use, even in patients with cirrhosis and can be utilized for pain. Counseling for alcohol in patients with chronic liver disease  The patient was counseled regarding alcohol consumption and the effect of alcohol on chronic liver disease. The patient has not consumed alcohol since 5/2021. There was prior heavy use. Osteoporosis  The risk of osteoporosis is increased in patients with cirrhosis. This should be ordered by the patients primary care physician. Vaccinations   Vaccination for viral hepatitis A is recommended since the patient has no serologic evidence of previous exposure or vaccination with immunity. Routine vaccinations against other bacterial and viral agents can be performed as indicated. Annual flu vaccination should be administered if indicated. ALLERGIES  No Known Allergies    MEDICATIONS  Current Outpatient Medications   Medication Sig    nadoloL (CORGARD) 40 mg tablet Take 1 Tablet by mouth daily. Indications: prevent bleeding varicose vein in the esophagus    zolpidem (AMBIEN) 5 mg tablet Take 1 Tablet by mouth nightly as needed for Sleep. Max Daily Amount: 5 mg.  tiZANidine (ZANAFLEX) 2 mg tablet Take 1 Tablet by mouth four (4) times daily as needed for Muscle Spasm(s).  oxyCODONE-acetaminophen (PERCOCET) 5-325 mg per tablet Take 1 Tablet by mouth every six (6) hours as needed for Pain for up to 30 days. Max Daily Amount: 4 Tablets.  semaglutide (Ozempic) 0.25 mg or 0.5 mg/dose (2 mg/1.5 ml) subq pen 0.5 mg by SubCUTAneous route every seven (7) days. INJECT 0.25 MG ONCE WEEKLY FOR 2 WEEKS, THEN INCREASE TO 0.5MG ONCE WEEKLY THEREAFTER. Indications: Weekly    glimepiride (AMARYL) 2 mg tablet TAKE ONE TABLET BY MOUTH EVERY DAY IN THE MORNING.  ursodioL (MANE Forte) 500 mg tablet Take 1 Tablet by mouth two (2) times a day.     metFORMIN ER (GLUCOPHAGE XR) 750 mg tablet TAKE ONE TABLET BY MOUTH ONE TIME DAILY     glucose blood VI test strips (blood glucose test) strip by Does Not Apply route Daily (before breakfast).  lancets misc Take  by mouth two (2) times daily (with meals).  Blood-Glucose Meter monitoring kit Test daily as needed    ferrous sulfate 325 mg (65 mg iron) tablet Take  by mouth Daily (before breakfast). Indications: OTC (Patient not taking: Reported on 10/19/2021)     No current facility-administered medications for this visit. SYSTEM REVIEW NOT RELATED TO LIVER DISEASE OR REVIEWED ABOVE:  Constitution systems: Negative for fever, chills, weight gain, weight loss. Eyes: Negative for visual changes. ENT: Negative for sore throat, painful swallowing. Respiratory: Negative for cough, hemoptysis, SOB. Cardiology: Negative for chest pain, palpitations. GI:  Negative for constipation or diarrhea. : Negative for urinary frequency, dysuria, hematuria, nocturia. Skin: Negative for rash. Hematology: Negative for easy bruising, blood clots. Musculo-skeletal: Negative for back pain, muscle pain, weakness. Neurologic: Negative for headaches, dizziness, vertigo, memory problems not related to HE. Psychology: Negative for anxiety, depression. FAMILY HISTORY:  The father  of lung cancer   The mother has/had the following chronic disease(s): Colon cancer, COPD, Osteoporosis. There is no family history of liver disease. There is no family history of immune disorders. SOCIAL HISTORY:  The patient is . The patient has 3 children, 1 passed of SIDS. The patient currently smokes 1/2 pack of tobacco daily. The patient drank up to 1 pint per day for 3 years. She decreased to weekends only 2 years ago. All alcohol was discontinued 2021. The patient currently works full-time as . PHYSICAL EXAMINATION:  Visit Vitals  BP (!) 159/80   Pulse 73   Temp 97.2 °F (36.2 °C)   Resp 20   Ht 5' 3\" (1.6 m)   Wt 162 lb 6.4 oz (73.7 kg)   SpO2 99%   BMI 28.77 kg/m²       General: No acute distress. Eyes: Sclera anicteric.    ENT: No oral lesions. Thyroid normal.  Nodes: No adenopathy. Skin: No spider angiomata. No jaundice. No palmar erythema. Respiratory: Lungs clear to auscultation. Cardiovascular: Regular heart rate. No murmurs. No JVD. Abdomen: Soft non-tender, hepatomegaly. Spleen not palpable. No obvious ascites. Extremities: No edema. No muscle wasting. No gross arthritic changes. Neurologic: Alert and oriented. Cranial nerves grossly intact. No asterixis. LABORATORY STUDIES:  [address] Units 6/29/2021 5/13/2021   WBC 3.4 - 10.8 x10E3/uL 7.7    ANC 1.4 - 7.0 x10E3/uL 6.0    HGB 11.1 - 15.9 g/dL 11.7     - 450 x10E3/uL 193    INR 0.9 - 1.2 1.0    AST 0 - 40 IU/L 57 (H) 77 (H)   ALT 0 - 32 IU/L 30 26   Alk Phos 48 - 121 IU/L 164 (H) 216 (H)   Bili, Total 0.0 - 1.2 mg/dL 1.0 1.0   Bili, Direct 0.00 - 0.40 mg/dL 0.53 (H)    Albumin 3.8 - 4.9 g/dL 4.0 3.1 (L)   BUN 6 - 24 mg/dL 7 5 (L)   Creat 0.57 - 1.00 mg/dL 0.64 0.58   Na 134 - 144 mmol/L 135 141   K 3.5 - 5.2 mmol/L 3.4 (L) 3.3 (L)   Cl 96 - 106 mmol/L 96 102   CO2 20 - 29 mmol/L 30 (H) 31   Glucose 65 - 99 mg/dL 164 (H) 153 (H)     Liver [facility] Latest Ref Rng & Units 10/9/2020   WBC 3.4 - 10.8 x10E3/uL    ANC 1.4 - 7.0 x10E3/uL    HGB 11.1 - 15.9 g/dL     - 450 x10E3/uL    INR 0.9 - 1.2    AST 0 - 40 IU/L 46 (H)   ALT 0 - 32 IU/L 36 (H)   Alk Phos 48 - 121 IU/L 119 (H)   Bili, Total 0.0 - 1.2 mg/dL 0.4   Bili, Direct 0.00 - 0.40 mg/dL    Albumin 3.8 - 4.9 g/dL 4.2   BUN 6 - 24 mg/dL 8   Creat 0.57 - 1.00 mg/dL 0.65   Na 134 - 144 mmol/L 138   K 3.5 - 5.2 mmol/L 3.9   Cl 96 - 106 mmol/L 101   CO2 20 - 29 mmol/L 20   Glucose 65 - 99 mg/dL 227 (H)     Cancer Screening Latest Ref Rng & Units 6/29/2021   AFP, Serum 0.0 - 8.0 ng/mL 5.4   AFP-L3% 0.0 - 9.9 % 10.3 (H)     Laboratory testing from [id number] reviewed in detail. Additional testing included to evaluate progression or regression of disease.  Laboratory testing results from today will be communicated by My Chart. SEROLOGIES:  Serologies Latest Ref Rng & Units 6/29/2021   Hep A Ab, Total Negative Negative   Hep B Surface Ag Negative Negative   Hep B Core Ab, Total Negative Negative   Hep B Surface AB QL  Non Reactive   Ferritin 15 - 150 ng/mL 284 (H)   Iron % Saturation 15 - 55 % 12 (L)   LIMA, IFA  Negative   C-ANCA Neg:<1:20 titer <1:20   P-ANCA Neg:<1:20 titer <1:20   ANCA Neg:<1:20 titer <1:20   ASMCA 0 - 19 Units 14   M2 Ab 0.0 - 20.0 Units 41.7 (H)   Ceruloplasmin 19.0 - 39.0 mg/dL 30.2   Alpha-1 antitrypsin level 101 - 187 mg/dL 187     LIVER HISTOLOGY:  8/2021. FibroScan performed at [address]. EkPa was 75.0. IQR/med 0%. . The results suggested a fibrosis level of F4. The CAP score suggests there is hepatic steatosis. ENDOSCOPIC PROCEDURES:  09/2021: EGD performed at [facility] showed esophageal varices. Four bands was placed    RADIOLOGY:  5/2021. CT of Abdomen. Cirrhotic appearance of the liver. Cholelithiasis. No ductal dilatation. Right renal calculi. Splenomegaly. No ascites. OTHER TESTING:  Not available or performed    FOLLOW-UP:  All of the issues listed above in the Assessment and Plan were discussed with the patient. All questions were answered. The patient expressed a clear understanding of the above. [facility] [place] [address] in 4 weeks with [facility] for routine monitoring.       Berny Bruner MD, MPH  Advanced Hepatology  [facility] 13 of [address] [facility] [address] [address] [address] [place], [address] [facility] 22.  [phone]  [address] [facility]

## 2021-10-19 NOTE — LETTER
10/19/2021    Patient: Venkatesh Moser   YOB: 1970   Date of Visit: 10/19/2021     Dawood Deal, [address] [address]  [address] [place]    Dear Dawood Deal MD,      Thank you for referring Ms. Mulu Goetz to [address] for evaluation. My notes for this consultation are attached. If you have questions, please do not hesitate to call me. I look forward to following your patient along with you.       Sincerely,    Silvana Shepherd MD

## 2021-10-20 DIAGNOSIS — K74.69 OTHER CIRRHOSIS OF LIVER (HCC): Primary | ICD-10-CM

## 2021-10-20 DIAGNOSIS — K21.01 GASTROESOPHAGEAL REFLUX DISEASE WITH ESOPHAGITIS AND HEMORRHAGE: Primary | ICD-10-CM

## 2021-10-20 LAB
ALBUMIN SERPL-MCNC: 3.9 G/DL (ref 3.8–4.9)
ALBUMIN/GLOB SERPL: 1 {RATIO} (ref 1.2–2.2)
ALP SERPL-CCNC: 139 IU/L (ref 44–121)
ALT SERPL-CCNC: 22 IU/L (ref 0–32)
AST SERPL-CCNC: 34 IU/L (ref 0–40)
BASOPHILS # BLD AUTO: 0.1 X10E3/UL (ref 0–0.2)
BASOPHILS NFR BLD AUTO: 0 %
BILIRUB SERPL-MCNC: 1.1 MG/DL (ref 0–1.2)
BUN SERPL-MCNC: 9 MG/DL (ref 6–24)
BUN/CREAT SERPL: 13 (ref 9–23)
CALCIUM SERPL-MCNC: 9.8 MG/DL (ref 8.7–10.2)
CHLORIDE SERPL-SCNC: 108 MMOL/L (ref 96–106)
CO2 SERPL-SCNC: 22 MMOL/L (ref 20–29)
CREAT SERPL-MCNC: 0.68 MG/DL (ref 0.57–1)
EOSINOPHIL # BLD AUTO: 0.1 X10E3/UL (ref 0–0.4)
EOSINOPHIL NFR BLD AUTO: 1 %
ERYTHROCYTE [DISTWIDTH] IN BLOOD BY AUTOMATED COUNT: 18.7 % (ref 11.7–15.4)
GLOBULIN SER CALC-MCNC: 3.8 G/DL (ref 1.5–4.5)
GLUCOSE SERPL-MCNC: 162 MG/DL (ref 65–99)
HCT VFR BLD AUTO: 32.1 % (ref 34–46.6)
HGB BLD-MCNC: 10.3 G/DL (ref 11.1–15.9)
IMM GRANULOCYTES # BLD AUTO: 0.1 X10E3/UL (ref 0–0.1)
IMM GRANULOCYTES NFR BLD AUTO: 1 %
INR PPP: 1.1 (ref 0.9–1.2)
LYMPHOCYTES # BLD AUTO: 1.1 X10E3/UL (ref 0.7–3.1)
LYMPHOCYTES NFR BLD AUTO: 6 %
MCH RBC QN AUTO: 31.1 PG (ref 26.6–33)
MCHC RBC AUTO-ENTMCNC: 32.1 G/DL (ref 31.5–35.7)
MCV RBC AUTO: 97 FL (ref 79–97)
MONOCYTES # BLD AUTO: 1.1 X10E3/UL (ref 0.1–0.9)
MONOCYTES NFR BLD AUTO: 6 %
NEUTROPHILS # BLD AUTO: 16.8 X10E3/UL (ref 1.4–7)
NEUTROPHILS NFR BLD AUTO: 86 %
PLATELET # BLD AUTO: 178 X10E3/UL (ref 150–450)
POTASSIUM SERPL-SCNC: 4.1 MMOL/L (ref 3.5–5.2)
PROT SERPL-MCNC: 7.7 G/DL (ref 6–8.5)
PROTHROMBIN TIME: 11.9 SEC (ref 9.1–12)
RBC # BLD AUTO: 3.31 X10E6/UL (ref 3.77–5.28)
SODIUM SERPL-SCNC: 142 MMOL/L (ref 134–144)
WBC # BLD AUTO: 19.4 X10E3/UL (ref 3.4–10.8)

## 2021-10-20 RX ORDER — PANTOPRAZOLE SODIUM 40 MG/1
40 TABLET, DELAYED RELEASE ORAL DAILY
Qty: 30 TABLET | Refills: 5 | Status: SHIPPED | OUTPATIENT
Start: 2021-10-20 | End: 2022-07-18

## 2021-10-21 ENCOUNTER — TELEPHONE (OUTPATIENT)
Dept: FAMILY MEDICINE CLINIC | Age: 51
End: 2021-10-21

## 2021-10-21 DIAGNOSIS — M54.6 THORACIC SPINE PAIN: ICD-10-CM

## 2021-10-21 DIAGNOSIS — S29.019D ACUTE THORACIC MYOFASCIAL STRAIN, SUBSEQUENT ENCOUNTER: ICD-10-CM

## 2021-10-21 RX ORDER — TIZANIDINE 2 MG/1
2 TABLET ORAL
Qty: 40 TABLET | Refills: 1 | Status: SHIPPED | OUTPATIENT
Start: 2021-10-21 | End: 2021-12-28 | Stop reason: SDUPTHER

## 2021-10-21 RX ORDER — OXYCODONE AND ACETAMINOPHEN 5; 325 MG/1; MG/1
1 TABLET ORAL
Qty: 30 TABLET | Refills: 0 | Status: SHIPPED | OUTPATIENT
Start: 2021-10-21 | End: 2021-11-02 | Stop reason: SDUPTHER

## 2021-10-21 NOTE — TELEPHONE ENCOUNTER
Hi     I need another game plan for my back. I went to [facility] and had to see 3 different doctors. They decided to give me 4 shots in my back last Thursday. The insurance company refused to pay for it so I paid out of pocket $2200.00. But it has not touched the pain at all. It is still the same.     I am over this pain and not being able to do anything.  What would be my next move?     Thanks  Godfrey Fry  [phone]

## 2021-10-26 ENCOUNTER — TELEPHONE (OUTPATIENT)
Dept: HEMATOLOGY | Age: 51
End: 2021-10-26

## 2021-10-26 NOTE — TELEPHONE ENCOUNTER
Faxed lab orders to [facility] per Sania Jewell NP          ----- Message from April Taty Clifford NP sent at 10/25/2021 12:33 PM EDT -----  Can you please send lab orders to this location for Diana:    Pema Engel on [facility] [address] by my work.     I'm sure you could call if you need more information

## 2021-10-29 LAB
BASOPHILS # BLD AUTO: 0.1 X10E3/UL (ref 0–0.2)
BASOPHILS NFR BLD AUTO: 1 %
EOSINOPHIL # BLD AUTO: 0.2 X10E3/UL (ref 0–0.4)
EOSINOPHIL NFR BLD AUTO: 2 %
ERYTHROCYTE [DISTWIDTH] IN BLOOD BY AUTOMATED COUNT: 17.4 % (ref 11.7–15.4)
HCT VFR BLD AUTO: 34.5 % (ref 34–46.6)
HGB BLD-MCNC: 11.1 G/DL (ref 11.1–15.9)
IMM GRANULOCYTES # BLD AUTO: 0.1 X10E3/UL (ref 0–0.1)
IMM GRANULOCYTES NFR BLD AUTO: 1 %
LYMPHOCYTES # BLD AUTO: 1.1 X10E3/UL (ref 0.7–3.1)
LYMPHOCYTES NFR BLD AUTO: 9 %
MCH RBC QN AUTO: 31.4 PG (ref 26.6–33)
MCHC RBC AUTO-ENTMCNC: 32.2 G/DL (ref 31.5–35.7)
MCV RBC AUTO: 98 FL (ref 79–97)
MONOCYTES # BLD AUTO: 0.7 X10E3/UL (ref 0.1–0.9)
MONOCYTES NFR BLD AUTO: 5 %
NEUTROPHILS # BLD AUTO: 10.8 X10E3/UL (ref 1.4–7)
NEUTROPHILS NFR BLD AUTO: 82 %
PLATELET # BLD AUTO: 213 X10E3/UL (ref 150–450)
RBC # BLD AUTO: 3.53 X10E6/UL (ref 3.77–5.28)
WBC # BLD AUTO: 12.9 X10E3/UL (ref 3.4–10.8)

## 2021-10-30 LAB
ALBUMIN SERPL-MCNC: 3.8 G/DL (ref 3.8–4.9)
ALP SERPL-CCNC: 140 IU/L (ref 44–121)
ALT SERPL-CCNC: 26 IU/L (ref 0–32)
AST SERPL-CCNC: 35 IU/L (ref 0–40)
BILIRUB DIRECT SERPL-MCNC: 0.37 MG/DL (ref 0–0.4)
BILIRUB SERPL-MCNC: 0.9 MG/DL (ref 0–1.2)
BUN SERPL-MCNC: 10 MG/DL (ref 6–24)
BUN/CREAT SERPL: 14 (ref 9–23)
CALCIUM SERPL-MCNC: 10.2 MG/DL (ref 8.7–10.2)
CHLORIDE SERPL-SCNC: 101 MMOL/L (ref 96–106)
CO2 SERPL-SCNC: 25 MMOL/L (ref 20–29)
CREAT SERPL-MCNC: 0.72 MG/DL (ref 0.57–1)
GLUCOSE SERPL-MCNC: 237 MG/DL (ref 65–99)
POTASSIUM SERPL-SCNC: 4.1 MMOL/L (ref 3.5–5.2)
PROT SERPL-MCNC: 7.3 G/DL (ref 6–8.5)
SODIUM SERPL-SCNC: 139 MMOL/L (ref 134–144)

## 2021-11-01 NOTE — PROGRESS NOTES
My chart message sent regarding the blood work results. The ALP is elevated but stable. The other liver numbers and function are normal. The WBC has decreased. The elevation was most likely due to steroid injections for her chronic back pain.  The blood counts and renal function are normal.

## 2021-11-02 ENCOUNTER — TELEPHONE (OUTPATIENT)
Dept: FAMILY MEDICINE CLINIC | Age: 51
End: 2021-11-02

## 2021-11-02 DIAGNOSIS — M54.6 THORACIC SPINE PAIN: ICD-10-CM

## 2021-11-02 DIAGNOSIS — S29.019D ACUTE THORACIC MYOFASCIAL STRAIN, SUBSEQUENT ENCOUNTER: ICD-10-CM

## 2021-11-02 RX ORDER — OXYCODONE AND ACETAMINOPHEN 5; 325 MG/1; MG/1
1 TABLET ORAL
Qty: 30 TABLET | Refills: 0 | Status: ON HOLD | OUTPATIENT
Start: 2021-11-02 | End: 2021-11-18 | Stop reason: CLARIF

## 2021-11-02 NOTE — TELEPHONE ENCOUNTER
Hello,     It appears the shot did not work on the left area of my back.  I called [facility] and they are moving my follow up appt from December to Nov. 10th to discuss our next move.     I know you don't like to give me pain medicine but can you give me something else until I get to see the doctor?     Thanks  Sonny Zaldivar  [phone]

## 2021-11-12 ENCOUNTER — TELEPHONE (OUTPATIENT)
Dept: FAMILY MEDICINE CLINIC | Age: 51
End: 2021-11-12

## 2021-11-12 DIAGNOSIS — G47.00 INSOMNIA, UNSPECIFIED TYPE: ICD-10-CM

## 2021-11-13 RX ORDER — ZOLPIDEM TARTRATE 5 MG/1
5 TABLET ORAL
Qty: 30 TABLET | Refills: 0 | Status: SHIPPED | OUTPATIENT
Start: 2021-11-13 | End: 2021-12-09 | Stop reason: SDUPTHER

## 2021-11-15 ENCOUNTER — HOSPITAL ENCOUNTER (OUTPATIENT)
Dept: PREADMISSION TESTING | Age: 51
Discharge: HOME | End: 2021-11-15
Attending: INTERNAL MEDICINE
Payer: COMMERCIAL

## 2021-11-15 ENCOUNTER — TRANSCRIBE ORDER (OUTPATIENT)
Dept: REGISTRATION | Age: 51
End: 2021-11-15

## 2021-11-15 DIAGNOSIS — Z01.812 PRE-PROCEDURE LAB EXAM: ICD-10-CM

## 2021-11-15 DIAGNOSIS — Z01.812 PRE-PROCEDURE LAB EXAM: Primary | ICD-10-CM

## 2021-11-15 PROCEDURE — U0005 INFEC AGEN DETEC AMPLI PROBE: HCPCS

## 2021-11-16 LAB
SARS-COV-2, XPLCVT: NOT DETECTED
SOURCE, COVRS: NORMAL

## 2021-11-18 ENCOUNTER — HOSPITAL ENCOUNTER (OUTPATIENT)
Age: 51
Setting detail: OUTPATIENT SURGERY
Discharge: HOME | End: 2021-11-18
Attending: INTERNAL MEDICINE | Admitting: INTERNAL MEDICINE
Payer: COMMERCIAL

## 2021-11-18 ENCOUNTER — ANESTHESIA EVENT (OUTPATIENT)
Dept: ENDOSCOPY | Age: 51
End: 2021-11-18
Payer: COMMERCIAL

## 2021-11-18 ENCOUNTER — ANESTHESIA (OUTPATIENT)
Dept: ENDOSCOPY | Age: 51
End: 2021-11-18
Payer: COMMERCIAL

## 2021-11-18 VITALS
DIASTOLIC BLOOD PRESSURE: 90 MMHG | TEMPERATURE: 97.8 F | WEIGHT: 155.2 LBS | RESPIRATION RATE: 13 BRPM | SYSTOLIC BLOOD PRESSURE: 154 MMHG | BODY MASS INDEX: 27.5 KG/M2 | HEART RATE: 76 BPM | OXYGEN SATURATION: 99 % | HEIGHT: 63 IN

## 2021-11-18 DIAGNOSIS — I85.10 SECONDARY ESOPHAGEAL VARICES WITHOUT BLEEDING (HCC): ICD-10-CM

## 2021-11-18 DIAGNOSIS — K31.89 PORTAL HYPERTENSIVE GASTROPATHY (HCC): ICD-10-CM

## 2021-11-18 DIAGNOSIS — K31.89 RETAINED FOOD IN STOMACH: ICD-10-CM

## 2021-11-18 DIAGNOSIS — K76.6 PORTAL HYPERTENSIVE GASTROPATHY (HCC): ICD-10-CM

## 2021-11-18 PROCEDURE — 43244 EGD VARICES LIGATION: CPT | Performed by: INTERNAL MEDICINE

## 2021-11-18 PROCEDURE — 74011250636 HC RX REV CODE- 250/636: Performed by: INTERNAL MEDICINE

## 2021-11-18 PROCEDURE — 74011000250 HC RX REV CODE- 250: Performed by: NURSE ANESTHETIST, CERTIFIED REGISTERED

## 2021-11-18 PROCEDURE — 74011250636 HC RX REV CODE- 250/636: Performed by: NURSE ANESTHETIST, CERTIFIED REGISTERED

## 2021-11-18 PROCEDURE — 2709999900 HC NON-CHARGEABLE SUPPLY: Performed by: INTERNAL MEDICINE

## 2021-11-18 PROCEDURE — 76060000031 HC ANESTHESIA FIRST 0.5 HR: Performed by: INTERNAL MEDICINE

## 2021-11-18 PROCEDURE — 77030014243 HC BND LIG VRCES BSC -D: Performed by: INTERNAL MEDICINE

## 2021-11-18 PROCEDURE — 76040000019: Performed by: INTERNAL MEDICINE

## 2021-11-18 RX ORDER — EPINEPHRINE 0.1 MG/ML
1 INJECTION INTRACARDIAC; INTRAVENOUS
Status: DISCONTINUED | OUTPATIENT
Start: 2021-11-18 | End: 2021-11-18 | Stop reason: HOSPADM

## 2021-11-18 RX ORDER — FENTANYL CITRATE 50 UG/ML
50-200 INJECTION, SOLUTION INTRAMUSCULAR; INTRAVENOUS
Status: DISCONTINUED | OUTPATIENT
Start: 2021-11-18 | End: 2021-11-18 | Stop reason: HOSPADM

## 2021-11-18 RX ORDER — SODIUM CHLORIDE 0.9 % (FLUSH) 0.9 %
5-40 SYRINGE (ML) INJECTION AS NEEDED
Status: DISCONTINUED | OUTPATIENT
Start: 2021-11-18 | End: 2021-11-18 | Stop reason: HOSPADM

## 2021-11-18 RX ORDER — SODIUM CHLORIDE 0.9 % (FLUSH) 0.9 %
5-40 SYRINGE (ML) INJECTION EVERY 8 HOURS
Status: DISCONTINUED | OUTPATIENT
Start: 2021-11-18 | End: 2021-11-18 | Stop reason: HOSPADM

## 2021-11-18 RX ORDER — SODIUM CHLORIDE 9 MG/ML
INJECTION, SOLUTION INTRAVENOUS
Status: DISCONTINUED | OUTPATIENT
Start: 2021-11-18 | End: 2021-11-18 | Stop reason: HOSPADM

## 2021-11-18 RX ORDER — LIDOCAINE HYDROCHLORIDE 20 MG/ML
INJECTION, SOLUTION EPIDURAL; INFILTRATION; INTRACAUDAL; PERINEURAL AS NEEDED
Status: DISCONTINUED | OUTPATIENT
Start: 2021-11-18 | End: 2021-11-18 | Stop reason: HOSPADM

## 2021-11-18 RX ORDER — MIDAZOLAM HYDROCHLORIDE 1 MG/ML
5-10 INJECTION, SOLUTION INTRAMUSCULAR; INTRAVENOUS
Status: DISCONTINUED | OUTPATIENT
Start: 2021-11-18 | End: 2021-11-18 | Stop reason: HOSPADM

## 2021-11-18 RX ORDER — FLUMAZENIL 0.1 MG/ML
0.2 INJECTION INTRAVENOUS
Status: DISCONTINUED | OUTPATIENT
Start: 2021-11-18 | End: 2021-11-18 | Stop reason: HOSPADM

## 2021-11-18 RX ORDER — NALOXONE HYDROCHLORIDE 0.4 MG/ML
0.4 INJECTION, SOLUTION INTRAMUSCULAR; INTRAVENOUS; SUBCUTANEOUS
Status: DISCONTINUED | OUTPATIENT
Start: 2021-11-18 | End: 2021-11-18 | Stop reason: HOSPADM

## 2021-11-18 RX ORDER — ATROPINE SULFATE 0.1 MG/ML
0.5 INJECTION INTRAVENOUS
Status: DISCONTINUED | OUTPATIENT
Start: 2021-11-18 | End: 2021-11-18 | Stop reason: HOSPADM

## 2021-11-18 RX ORDER — PROPOFOL 10 MG/ML
INJECTION, EMULSION INTRAVENOUS AS NEEDED
Status: DISCONTINUED | OUTPATIENT
Start: 2021-11-18 | End: 2021-11-18 | Stop reason: HOSPADM

## 2021-11-18 RX ORDER — SODIUM CHLORIDE 9 MG/ML
50 INJECTION, SOLUTION INTRAVENOUS CONTINUOUS
Status: DISCONTINUED | OUTPATIENT
Start: 2021-11-18 | End: 2021-11-18 | Stop reason: HOSPADM

## 2021-11-18 RX ORDER — URSODIOL 500 MG/1
500 TABLET, FILM COATED ORAL 2 TIMES DAILY
COMMUNITY
End: 2021-12-03 | Stop reason: SDUPTHER

## 2021-11-18 RX ORDER — DEXTROMETHORPHAN/PSEUDOEPHED 2.5-7.5/.8
1.2 DROPS ORAL
Status: DISCONTINUED | OUTPATIENT
Start: 2021-11-18 | End: 2021-11-18 | Stop reason: HOSPADM

## 2021-11-18 RX ADMIN — LIDOCAINE HYDROCHLORIDE 60 MG: 20 INJECTION, SOLUTION EPIDURAL; INFILTRATION; INTRACAUDAL; PERINEURAL at 13:37

## 2021-11-18 RX ADMIN — PROPOFOL 25 MG: 10 INJECTION, EMULSION INTRAVENOUS at 13:41

## 2021-11-18 RX ADMIN — SODIUM CHLORIDE: 900 INJECTION, SOLUTION INTRAVENOUS at 13:15

## 2021-11-18 RX ADMIN — PROPOFOL 25 MG: 10 INJECTION, EMULSION INTRAVENOUS at 13:42

## 2021-11-18 RX ADMIN — PROPOFOL 50 MG: 10 INJECTION, EMULSION INTRAVENOUS at 13:39

## 2021-11-18 RX ADMIN — FENTANYL CITRATE 50 MCG: 50 INJECTION, SOLUTION INTRAMUSCULAR; INTRAVENOUS at 14:31

## 2021-11-18 RX ADMIN — PROPOFOL 25 MG: 10 INJECTION, EMULSION INTRAVENOUS at 13:44

## 2021-11-18 RX ADMIN — PROPOFOL 100 MG: 10 INJECTION, EMULSION INTRAVENOUS at 13:37

## 2021-11-18 RX ADMIN — PROPOFOL 50 MG: 10 INJECTION, EMULSION INTRAVENOUS at 13:38

## 2021-11-18 RX ADMIN — FENTANYL CITRATE 50 MCG: 50 INJECTION, SOLUTION INTRAMUSCULAR; INTRAVENOUS at 14:06

## 2021-11-18 RX ADMIN — PROPOFOL 25 MG: 10 INJECTION, EMULSION INTRAVENOUS at 13:43

## 2021-11-18 NOTE — DISCHARGE INSTRUCTIONS
[facility] [facility] Hospital[facility] Leonette Freund, MD, MPH      Oliver Leblanc, CARLEY Mckeon, [facility]-BC     Sania Jewell, [facility]-BC   FREDDY Garcia [facility]       Shamika Mcnair [facility] [address]    at [address], [address] Anna Sandoval  22.    [phone]    FAX: [address] [place] [address], [address]    [phone]    FAX: [phone]         ENDOSCOPY WITH BANDING DISCHARGE INSTRUCTIONS    Marcin Varma  1970  Date: 11/18/2021    DISCOMFORT:  Use lozenges or warm salt water gargle for sore thoat  Apply warm compress to IV site if red. If redness or soreness persists call the office. You may experience gas and bloating. Walking and belching will help relieve this. You may experience chest pain or discomfort or feel as though food is \"sticking\" in your food pipe for a few days after the procedure. This is a normal feeling after banding of esophageal varices. CHEST PRESSURE:  Banding of dilated veins (varices) in the food tube (esophagus) can be painful in some persons. The pain is caused by squeezing the varices and lining of the esophagus by the bands used to seal the varices. You can take liquid pain medication either acetaminophen or alternative. The best treatment for this is to drink a an \"Icee\" or \"Slurpee\" since the ice crystals will freeze the nerve endings in the food tube and relieve the pain. DIET:  Regular food may dislodge the bands placed on the varices. For this reason you should only have liquid for the rest of today. Eat only soft food that does not need to be chewed all day tomorrow. You may advance to your regular diet in 2 days.     ACTIVITY:  Spend the remainder of the day resting. Avoid any strenuous activity. You may not operate a vehicle for 12 hours. You may not engage in an occupation involving machinery or appliances for rest of today. Avoid making any critical decisions for at least 24 hour. Call the [facility] [employer] if you have any of the following:  Increasing chest or abdominal pain, nausea, vomiting, vomiting blood, abdominal distension or swelling, fever or chills, bloody discharge from nose or mouth or shortness of breath. Follow-up Instructions:  Call Dr. Connie Teixeira for any questions or problems at the phone number listed above. If a biopsy was performed, you will be contacted by the office staff or Dr Connie Teixeira within 1 week. If you have not heard from us by then you may call the office at the phone number listed above to inquire about the results. ENDOSCOPY FINDINGS:  Multiple varices were found in the esophagus (food tube). 7 bands were placed to seal the varices and reduce the risk of bleeding. The office will call to schedule this. Keep follow-up appointment with April on 1/18/2021. DISCHARGE SUMMARY from the Nurse: The following personal items collected during your admission are returned to you:   Dental Appliance: Dental Appliances: None  Vision: Visual Aid: None  Hearing Aid:    Jewelry:    Clothing:    Other Valuables:    Valuables sent to safe:                Learning About Coronavirus (COVID-19)  Coronavirus (COVID-19): Overview  What is coronavirus (VHICL-97)? The coronavirus disease (COVID-19) is caused by a virus. It is an illness that was first found in [place], [place], in December 2019. It has since spread worldwide. The virus can cause fever, cough, and trouble breathing. In severe cases, it can cause pneumonia and make it hard to breathe without help. It can cause death. Coronaviruses are a large group of viruses. They cause the common cold.  They also cause more serious illnesses like Middle East respiratory syndrome (MERS) and severe acute respiratory syndrome (SARS). COVID-19 is caused by a novel coronavirus. That means it's a new type that has not been seen in people before. This virus spreads person-to-person through droplets from coughing and sneezing. It can also spread when you are close to someone who is infected. And it can spread when you touch something that has the virus on it, such as a doorknob or a tabletop. What can you do to protect yourself from coronavirus (COVID-19)? The best way to protect yourself from getting sick is to:  · Avoid areas where there is an outbreak. · Avoid contact with people who may be infected. · Wash your hands often with soap or alcohol-based hand sanitizers. · Avoid crowds and try to stay at least 6 feet away from other people. · Wash your hands often, especially after you cough or sneeze. Use soap and water, and scrub for at least 20 seconds. If soap and water aren't available, use an alcohol-based hand . · Avoid touching your mouth, nose, and eyes. What can you do to avoid spreading the virus to others? To help avoid spreading the virus to others:  · Cover your mouth with a tissue when you cough or sneeze. Then throw the tissue in the trash. · Use a disinfectant to clean things that you touch often. · Stay home if you are sick or have been exposed to the virus. Don't go to school, work, or public areas. And don't use public transportation. · If you are sick:  ? Leave your home only if you need to get medical care. But call the doctor's office first so they know you're coming. And wear a face mask, if you have one.  ? If you have a face mask, wear it whenever you're around other people. It can help stop the spread of the virus when you cough or sneeze. ? Clean and disinfect your home every day. Use household  and disinfectant wipes or sprays. Take special care to clean things that you grab with your hands.  These include doorknobs, remote controls, phones, and handles on your refrigerator and microwave. And don't forget countertops, tabletops, bathrooms, and computer keyboards. When to call for help  Call 911 anytime you think you may need emergency care. For example, call if:  · You have severe trouble breathing. (You can't talk at all.)  · You have constant chest pain or pressure. · You are severely dizzy or lightheaded. · You are confused or can't think clearly. · Your face and lips have a blue color. · You pass out (lose consciousness) or are very hard to wake up. Call your doctor now if you develop symptoms such as:  · Shortness of breath. · Fever. · Cough. If you need to get care, call ahead to the doctor's office for instructions before you go. Make sure you wear a face mask, if you have one, to prevent exposing other people to the virus. Where can you get the latest information? The following health organizations are tracking and studying this virus. Their websites contain the most up-to-date information. Juan C Galvan also learn what to do if you think you may have been exposed to the virus. · U.S. Centers for Disease Control and Prevention (CDC): The CDC provides updated news about the disease and travel advice. The website also tells you how to prevent the spread of infection. www.cdc.gov  · World Health Organization [facility]): WHO offers information about the virus outbreaks. WHO also has travel advice. www.who.int  Current as of: April 1, 2020               Content Version: 12.4  © 2006-2020 Healthwise, Incorporated. Care instructions adapted under license by your healthcare professional. If you have questions about a medical condition or this instruction, always ask your healthcare professional. [address] any warranty or liability for your use of this information.

## 2021-11-18 NOTE — ROUTINE PROCESS
Luis Thomas  1970  [id number]    Situation:  Verbal report received from: [place]  Procedure: Procedure(s) with comments:  UPPER ENDOSCOPY (EGD)   :-  ENDOSCOPIC BANDING OR LIGATION - x6    Background:    Preoperative diagnosis: Liver, cirrhosis, portal (HCC) [K74.69]  Postoperative diagnosis: portal gastropathy, esophageal varices    :  Dr. Elise Nichols  Assistant(s): Endoscopy Technician-1: Karmen Quintero  Endoscopy RN-1: Arabella Smith RN    Specimens: * No specimens in log *  H. Pylori  no    Assessment:  Intra-procedure medications   Anesthesia gave intra-procedure sedation and medications, see anesthesia flow sheet yes    Intravenous fluids: NS@ KVO     Vital signs stable     Abdominal assessment: round and soft     Recommendation:  Discharge patient per MD order.   Family or Friend Mom  Permission to share finding with family or friend no

## 2021-11-18 NOTE — PROCEDURES
Nani Primrose, MD, Candace Figueroa MD, MPH      Karissa Torres, CARLEY Rivera, [facility]     Sania Jewell, [facility]   Yara Guevara, FNSKYLA Navarro, [facility]       Shamika Mcnair [facility] 136    at [facility]    [address], [address] [address].    [phone]    FAX: [address] [place] [address], [address] [place] [address] - [address]    [phone]    FAX: [phone]         UPPER ENDOSCOPY PROCEDURE NOTE    Calvinwesley Power  1970    INDICATION: Cirrhosis. Screening for esophageal varices with variceal ligation if indicated. : Tristan Logan MD    SURGICAL ASSISTANT:  None    PROSTHETIC DEVISES, TISSUE GRAFTS, ORGAN TRANSPLANTS:  Not applicable     ANESTHESIA/SEDATION: Propofol was administered by anesthesia      PROCEDURE DESCRIPTION:  Informed consent was obtained from the patient for the procedure. All risks and benefits of the procedure explained. The procedure was performed in the endoscopy suite. The patient was laying on a stretcher and moved to the left lateral decubitus position prior to administration of sedation. Sedation was administered by anesthesiology. See their note for details. The endoscope was inserted into the mouth and advanced under direct vision to the second portion of the duodenum. Careful inspection of upper gastrointestinal tract was made as the endoscope was inserted and withdrawn. Retroflexion of the endoscope to view of the cardia of the stomach was performed. After withdrawing the endoscope the banding devise was placed on the tip of the endoscope. The scope was then reinserted under direct inspection and advanced to the esophagus.   Banding of esophageal varices was performed as described below. The scope was then removed. FINDINGS:  Esophagus:    Multiple Large varices were present. There was signs of impending or recent bleeding which included red whale markings, hemocystic spots,    Stomach: Moderate portal hypertensive gastropathy of the body of the stomach  No gastric varices identified. Duodenum:   Normal bulb and second portion    SPECIMENS COLLECTED:   None    INTERVENTIONS:   7 bands placed were placed on esophageal varices. COMPLICATIONS: None. The patient tolerated the procedure well. EBL: Negligible. RECOMMENDATIONS:  Observe until discharge parameters are achieved. Liquid diet today. Soft food tomorrow. Resume general diet thereafter. Repeat endoscopy to reassess varices and need for additional banding in 6 weeks. Follow-up Liver [place] office as scheduled.       MD Den Garcia[address] 22.  [phone]  [address]

## 2021-11-18 NOTE — H&P
Roz Maldonado MD, Donneta Holter, MD, MPH      Mainor Montalvo, PAAURELIO Cazares, [facility]     Sania Jewell, [facility]   GLADYS Hawkins-KODAK Whitt, [facility]       Shamika Mcnair [facility] 136    at [address], [facility] Anna Sandoval  22.    [phone]    FAX: [address] [place] [address], [address]    [phone]    FAX: [phone]         PRE-PROCEDURE NOTE - EGD    H and P from last office visit reviewed. Allergies reviewed. Out-patient medicaton list reviewed. Patient Active Problem List   Diagnosis Code    Tobacco use disorder F17.200    Renal calculus or stone N20.0    BMI 35.0-35.9,adult Z68.35    Encounter for long-term (current) use of other medications Z79.899    Allergic rhinitis, cause unspecified J30.9    Insomnia G47.00    Symptomatic anemia D64.9    Type 2 diabetes mellitus without complication (HCC) A23.8    Severe obesity (HCC) E66.01    Right-sided thoracic back pain M54.6    Elevated liver enzymes R74.8    Other cirrhosis of liver ([facility] [address] K74.69    Primary biliary cholangitis (HCC) K74.3       No Known Allergies    No current facility-administered medications on file prior to encounter. Current Outpatient Medications on File Prior to Encounter   Medication Sig Dispense Refill    ursodioL (MANE Forte) 500 mg tablet Take 500 mg by mouth two (2) times a day.  glimepiride (AMARYL) 2 mg tablet TAKE ONE TABLET BY MOUTH EVERY DAY IN THE MORNING. 30 Tablet 5    metFORMIN ER (GLUCOPHAGE XR) 750 mg tablet TAKE ONE TABLET BY MOUTH ONE TIME DAILY  30 Tablet 11    ferrous sulfate 325 mg (65 mg iron) tablet Take  by mouth Daily (before breakfast).  Indications: OTC      semaglutide (Ozempic) 0.25 mg or 0.5 mg/dose (2 mg/1.5 ml) subq pen 0.5 mg by SubCUTAneous route every seven (7) days. INJECT 0.25 MG ONCE WEEKLY FOR 2 WEEKS, THEN INCREASE TO 0.5MG ONCE WEEKLY THEREAFTER. Indications: Weekly 4 Pen 5       For EGD to assess for esophageal and gastric varices. Plan to perform banding if indicated based upon variceal size and appearance. The risks of the procedure were discussed with the patient. These included reaction to anesthesia, pain, perforation and bleeding. All questions were answered. The patient wishes to proceed with the procedure. The patient was counseled at length about the risks of nilo Covid-19 in the radha-operative and post-operative states including the recovery window of their procedure. The patient was made aware that nilo Covid-19 after a surgical procedure may worsen their prognosis for recovering from the virus and lend to a higher morbidity and or mortality risk. The patient was given the options of postponing their procedure. All of the risks, benefits, and alternatives were discussed. The patient does  wish to proceed with the procedure. PHYSICAL EXAMINATION:  Visit Vitals  /76   Pulse 67   Temp 97.8 °F (36.6 °C)   Resp 15   Ht 5' 2.5\" (1.588 m)   Wt 155 lb 3.2 oz (70.4 kg)   SpO2 97%   Breastfeeding No   BMI 27.93 kg/m²       General: No acute distress. Eyes: Sclera anicteric. ENT: No oral lesions. Thyroid normal.  Nodes: No adenopathy. Skin: No spider angiomata. No jaundice. No palmar erythema. Respiratory: Lungs clear to auscultation. Cardiovascular: Regular heart rate. No murmurs. No JVD. Abdomen: Soft non-tender, liver size normal to percussion/palpation. Spleen not palpable. No obvious ascites. Extremities: No edema. No muscle wasting. No gross arthritic changes. Neurologic: Alert and oriented. Cranial nerves grossly intact. No asterixis.       MOST RECENT LABORATORY STUDIES:  Lab Results   Component Value Date/Time    WBC 12.9 (H) 10/29/2021 01:35 PM    HGB 11.1 10/29/2021 01:35 PM    HCT 34.5 10/29/2021 01:35 PM    PLATELET 897 29/30/9648 01:35 PM    MCV 98 (H) 10/29/2021 01:35 PM     Lab Results   Component Value Date/Time    INR 1.1 10/19/2021 12:00 AM    INR 1.1 08/16/2021 12:36 PM    INR 1.0 06/29/2021 05:03 PM    Prothrombin time 11.9 10/19/2021 12:00 AM    Prothrombin time 11.6 08/16/2021 12:36 PM    Prothrombin time 11.2 06/29/2021 05:03 PM       ASSESSMENT AND PLAN:  EGD to assess for esophageal and/or gastric varices.   Sedation per anesthesiology      MD Shayla Carrington 13  [address], [address] [place] [address] 22.  [phone]  [address] [place] [address]

## 2021-11-18 NOTE — ANESTHESIA POSTPROCEDURE EVALUATION
Procedure(s):  UPPER ENDOSCOPY (EGD)   :-  ENDOSCOPIC BANDING OR LIGATION. MAC    <BSHSIANPOST>    INITIAL Post-op Vital signs:   Vitals Value Taken Time   /84 11/18/21 1357   Temp 36.6 °C (97.8 °F) 11/18/21 1357   Pulse 81 11/18/21 1400   Resp 19 11/18/21 1400   SpO2 98 % 11/18/21 1400   Vitals shown include unvalidated device data.

## 2021-11-18 NOTE — ANESTHESIA PREPROCEDURE EVALUATION
Relevant Problems   No relevant active problems       Anesthetic History   No history of anesthetic complications            Review of Systems / Medical History  Patient summary reviewed, nursing notes reviewed and pertinent labs reviewed    Pulmonary  Within defined limits                 Neuro/Psych   Within defined limits           Cardiovascular  Within defined limits                     GI/Hepatic/Renal           Liver disease     Endo/Other    Diabetes         Other Findings              Physical Exam    Airway  Mallampati: II  TM Distance: > 6 cm  Neck ROM: normal range of motion   Mouth opening: Normal     Cardiovascular  Regular rate and rhythm,  S1 and S2 normal,  no murmur, click, rub, or gallop             Dental  No notable dental hx       Pulmonary  Breath sounds clear to auscultation               Abdominal  GI exam deferred       Other Findings            Anesthetic Plan    ASA: 3  Anesthesia type: MAC            Anesthetic plan and risks discussed with: Patient

## 2021-11-22 DIAGNOSIS — K74.69 OTHER CIRRHOSIS OF LIVER (HCC): Primary | ICD-10-CM

## 2021-12-03 ENCOUNTER — PATIENT MESSAGE (OUTPATIENT)
Dept: HEMATOLOGY | Age: 51
End: 2021-12-03

## 2021-12-03 DIAGNOSIS — K74.3 PRIMARY BILIARY CHOLANGITIS (HCC): Primary | ICD-10-CM

## 2021-12-03 RX ORDER — URSODIOL 500 MG/1
500 TABLET, FILM COATED ORAL 2 TIMES DAILY
Qty: 60 TABLET | Refills: 5 | Status: SHIPPED | OUTPATIENT
Start: 2021-12-03 | End: 2022-10-27 | Stop reason: SDUPTHER

## 2021-12-03 NOTE — TELEPHONE ENCOUNTER
[email or web address] Spoke w/patient after discussing her concern about Ursodiol deactivated per pharmacy. April, NP sent a new script to her pharmacy. April don't deactivate patient medication. Patient verbalize understanding. (KF)

## 2021-12-09 DIAGNOSIS — G47.00 INSOMNIA, UNSPECIFIED TYPE: ICD-10-CM

## 2021-12-09 RX ORDER — ZOLPIDEM TARTRATE 5 MG/1
5 TABLET ORAL
Qty: 30 TABLET | Refills: 0 | Status: SHIPPED | OUTPATIENT
Start: 2021-12-09 | End: 2022-01-07

## 2021-12-28 DIAGNOSIS — M54.6 THORACIC SPINE PAIN: ICD-10-CM

## 2021-12-29 RX ORDER — TIZANIDINE 2 MG/1
2 TABLET ORAL
Qty: 40 TABLET | Refills: 1 | Status: SHIPPED | OUTPATIENT
Start: 2021-12-29 | End: 2022-01-18 | Stop reason: SDUPTHER

## 2022-01-04 DIAGNOSIS — G47.00 INSOMNIA, UNSPECIFIED TYPE: ICD-10-CM

## 2022-01-06 ENCOUNTER — HOSPITAL ENCOUNTER (OUTPATIENT)
Age: 52
Setting detail: OUTPATIENT SURGERY
Discharge: HOME | End: 2022-01-06
Attending: INTERNAL MEDICINE | Admitting: INTERNAL MEDICINE
Payer: COMMERCIAL

## 2022-01-06 ENCOUNTER — ANESTHESIA EVENT (OUTPATIENT)
Dept: ENDOSCOPY | Age: 52
End: 2022-01-06
Payer: COMMERCIAL

## 2022-01-06 ENCOUNTER — ANESTHESIA (OUTPATIENT)
Dept: ENDOSCOPY | Age: 52
End: 2022-01-06
Payer: COMMERCIAL

## 2022-01-06 VITALS
TEMPERATURE: 98 F | RESPIRATION RATE: 16 BRPM | DIASTOLIC BLOOD PRESSURE: 87 MMHG | OXYGEN SATURATION: 97 % | HEART RATE: 69 BPM | SYSTOLIC BLOOD PRESSURE: 155 MMHG

## 2022-01-06 DIAGNOSIS — K76.6 PORTAL HYPERTENSIVE GASTROPATHY (HCC): ICD-10-CM

## 2022-01-06 DIAGNOSIS — I85.10 SECONDARY ESOPHAGEAL VARICES WITHOUT BLEEDING (HCC): ICD-10-CM

## 2022-01-06 DIAGNOSIS — K31.89 PORTAL HYPERTENSIVE GASTROPATHY (HCC): ICD-10-CM

## 2022-01-06 LAB
COVID-19 RAPID TEST, COVR: NOT DETECTED
SOURCE, COVRS: NORMAL

## 2022-01-06 PROCEDURE — 77030014243 HC BND LIG VRCES BSC -D: Performed by: INTERNAL MEDICINE

## 2022-01-06 PROCEDURE — 76060000031 HC ANESTHESIA FIRST 0.5 HR: Performed by: INTERNAL MEDICINE

## 2022-01-06 PROCEDURE — 74011250636 HC RX REV CODE- 250/636: Performed by: INTERNAL MEDICINE

## 2022-01-06 PROCEDURE — 76040000019: Performed by: INTERNAL MEDICINE

## 2022-01-06 PROCEDURE — 87635 SARS-COV-2 COVID-19 AMP PRB: CPT

## 2022-01-06 PROCEDURE — 43244 EGD VARICES LIGATION: CPT | Performed by: INTERNAL MEDICINE

## 2022-01-06 PROCEDURE — 74011000250 HC RX REV CODE- 250: Performed by: NURSE ANESTHETIST, CERTIFIED REGISTERED

## 2022-01-06 PROCEDURE — 2709999900 HC NON-CHARGEABLE SUPPLY: Performed by: INTERNAL MEDICINE

## 2022-01-06 PROCEDURE — 74011250636 HC RX REV CODE- 250/636: Performed by: NURSE ANESTHETIST, CERTIFIED REGISTERED

## 2022-01-06 RX ORDER — FLUMAZENIL 0.1 MG/ML
0.2 INJECTION INTRAVENOUS
Status: DISCONTINUED | OUTPATIENT
Start: 2022-01-06 | End: 2022-01-06 | Stop reason: HOSPADM

## 2022-01-06 RX ORDER — PROPOFOL 10 MG/ML
INJECTION, EMULSION INTRAVENOUS AS NEEDED
Status: DISCONTINUED | OUTPATIENT
Start: 2022-01-06 | End: 2022-01-06 | Stop reason: HOSPADM

## 2022-01-06 RX ORDER — MIDAZOLAM HYDROCHLORIDE 1 MG/ML
5-10 INJECTION, SOLUTION INTRAMUSCULAR; INTRAVENOUS
Status: DISCONTINUED | OUTPATIENT
Start: 2022-01-06 | End: 2022-01-06 | Stop reason: HOSPADM

## 2022-01-06 RX ORDER — FENTANYL CITRATE 50 UG/ML
50-200 INJECTION, SOLUTION INTRAMUSCULAR; INTRAVENOUS
Status: DISCONTINUED | OUTPATIENT
Start: 2022-01-06 | End: 2022-01-06 | Stop reason: HOSPADM

## 2022-01-06 RX ORDER — SODIUM CHLORIDE 0.9 % (FLUSH) 0.9 %
5-40 SYRINGE (ML) INJECTION AS NEEDED
Status: DISCONTINUED | OUTPATIENT
Start: 2022-01-06 | End: 2022-01-06 | Stop reason: HOSPADM

## 2022-01-06 RX ORDER — DICLOFENAC SODIUM 75 MG/1
TABLET, DELAYED RELEASE ORAL
COMMUNITY
End: 2022-04-18

## 2022-01-06 RX ORDER — EPINEPHRINE 0.1 MG/ML
1 INJECTION INTRACARDIAC; INTRAVENOUS
Status: DISCONTINUED | OUTPATIENT
Start: 2022-01-06 | End: 2022-01-06 | Stop reason: HOSPADM

## 2022-01-06 RX ORDER — SODIUM CHLORIDE 9 MG/ML
INJECTION, SOLUTION INTRAVENOUS
Status: DISCONTINUED | OUTPATIENT
Start: 2022-01-06 | End: 2022-01-06 | Stop reason: HOSPADM

## 2022-01-06 RX ORDER — SODIUM CHLORIDE 0.9 % (FLUSH) 0.9 %
5-40 SYRINGE (ML) INJECTION EVERY 8 HOURS
Status: DISCONTINUED | OUTPATIENT
Start: 2022-01-06 | End: 2022-01-06 | Stop reason: HOSPADM

## 2022-01-06 RX ORDER — DEXTROMETHORPHAN/PSEUDOEPHED 2.5-7.5/.8
1.2 DROPS ORAL
Status: DISCONTINUED | OUTPATIENT
Start: 2022-01-06 | End: 2022-01-06 | Stop reason: HOSPADM

## 2022-01-06 RX ORDER — SODIUM CHLORIDE 9 MG/ML
50 INJECTION, SOLUTION INTRAVENOUS CONTINUOUS
Status: DISCONTINUED | OUTPATIENT
Start: 2022-01-06 | End: 2022-01-06 | Stop reason: HOSPADM

## 2022-01-06 RX ORDER — NALOXONE HYDROCHLORIDE 0.4 MG/ML
0.4 INJECTION, SOLUTION INTRAMUSCULAR; INTRAVENOUS; SUBCUTANEOUS
Status: DISCONTINUED | OUTPATIENT
Start: 2022-01-06 | End: 2022-01-06 | Stop reason: HOSPADM

## 2022-01-06 RX ORDER — ATROPINE SULFATE 0.1 MG/ML
0.5 INJECTION INTRAVENOUS
Status: DISCONTINUED | OUTPATIENT
Start: 2022-01-06 | End: 2022-01-06 | Stop reason: HOSPADM

## 2022-01-06 RX ORDER — LIDOCAINE HYDROCHLORIDE 20 MG/ML
INJECTION, SOLUTION EPIDURAL; INFILTRATION; INTRACAUDAL; PERINEURAL AS NEEDED
Status: DISCONTINUED | OUTPATIENT
Start: 2022-01-06 | End: 2022-01-06 | Stop reason: HOSPADM

## 2022-01-06 RX ADMIN — FENTANYL CITRATE 50 MCG: 0.05 INJECTION, SOLUTION INTRAMUSCULAR; INTRAVENOUS at 16:01

## 2022-01-06 RX ADMIN — LIDOCAINE HYDROCHLORIDE 80 MG: 20 INJECTION, SOLUTION EPIDURAL; INFILTRATION; INTRACAUDAL; PERINEURAL at 15:34

## 2022-01-06 RX ADMIN — PROPOFOL 50 MG: 10 INJECTION, EMULSION INTRAVENOUS at 15:34

## 2022-01-06 RX ADMIN — PROPOFOL 50 MG: 10 INJECTION, EMULSION INTRAVENOUS at 15:38

## 2022-01-06 RX ADMIN — PROPOFOL 30 MG: 10 INJECTION, EMULSION INTRAVENOUS at 15:43

## 2022-01-06 RX ADMIN — SODIUM CHLORIDE: 900 INJECTION, SOLUTION INTRAVENOUS at 15:26

## 2022-01-06 RX ADMIN — PROPOFOL 50 MG: 10 INJECTION, EMULSION INTRAVENOUS at 15:41

## 2022-01-06 RX ADMIN — PROPOFOL 50 MG: 10 INJECTION, EMULSION INTRAVENOUS at 15:36

## 2022-01-06 NOTE — PROCEDURES
Jonathan Mendoza MD, Cristine Mendes MD, MPH      Aamir Mathis, CARLEY Malin, [facility]-BC     Sania Jewell, [facility]   Percy Croft [facility]-C    Igor Glez, [facility]       Shamika Mcnair [facility] [address] 136    at [address], [address] [place] [address] 22.    [phone]    FAX: [address] [place] [address], [address] [place] [address] - [address]    [phone]    FAX: [phone]         UPPER ENDOSCOPY PROCEDURE NOTE    Louie Bryan  1970    INDICATION: Cirrhosis. Screening for esophageal varices with variceal ligation if indicated. : Humphrey Smith MD    SURGICAL ASSISTANT:  None    PROSTHETIC DEVISES, TISSUE GRAFTS, ORGAN TRANSPLANTS:  Not applicable     ANESTHESIA/SEDATION: Propofol was administered by anesthesia      PROCEDURE DESCRIPTION:  Informed consent was obtained from the patient for the procedure. All risks and benefits of the procedure explained. The procedure was performed in the endoscopy suite. The patient was laying on a stretcher and moved to the left lateral decubitus position prior to administration of sedation. Sedation was administered by anesthesiology. See their note for details. The endoscope was inserted into the mouth and advanced under direct vision to the second portion of the duodenum. Careful inspection of upper gastrointestinal tract was made as the endoscope was inserted and withdrawn. Retroflexion of the endoscope to view of the cardia of the stomach was performed. After withdrawing the endoscope the banding devise was placed on the tip of the endoscope. The scope was then reinserted under direct inspection and advanced to the esophagus.   Banding of esophageal varices was performed as described below. The scope was then removed. FINDINGS:  Esophagus:    A few Medium varices were present. There was no stigmata of recent bleeding. Banding of esophageal varices was performed. Good hemostsis with no active bleeding was observed at the end of the procedure. Scars of previous banding. Stomach:   Mild portal hypertensive gastropathy of the body of the stomach  No gastric varices identified. Duodenum:   Normal bulb and second portion    SPECIMENS COLLECTED:   None    INTERVENTIONS:   3 bands placed were placed on esophageal varices. COMPLICATIONS: None. The patient tolerated the procedure well. EBL: Negligible. RECOMMENDATIONS:  Observe until discharge parameters are achieved. Liquid diet today. Soft food tomorrow. Resume general diet thereafter. Repeat endoscopy to reassess varices and need for additional banding in 6 months. Follow-up Liver [facility] office as scheduled.       MD Paola [address]  [address] [place] [address] A, [address] [place] [address] 22.  [phone]  [address]

## 2022-01-06 NOTE — ROUTINE PROCESS
Mark Greenberg  1970  [id number]    Situation:  Verbal report received from: JEAN CARLOS Munson RN  Procedure: Procedure(s):  ESOPHAGOGASTRODUODENOSCOPY (EGD) Will need rapid  ENDOSCOPIC BANDING OR LIGATION    Background:    Preoperative diagnosis: OTHER CIRRHOSIS OF LIVER  Postoperative diagnosis: Esophageal varices    :  Dr. Dov Wakefield  Assistant(s): Endoscopy Technician-1: Mehdi Ray  Endoscopy RN-1: Barrett Farrar RN    Specimens: * No specimens in log *  H. Pylori  no    Assessment:  Intra-procedure medications   Anesthesia gave intra-procedure sedation and medications, see anesthesia flow sheet yes    Intravenous fluids: NS@ KVO     Vital signs stable     Abdominal assessment: round and soft     Recommendation:  Discharge patient per MD order.     Family or Friend   Permission to share finding with family or friend yes

## 2022-01-06 NOTE — PERIOP NOTES

## 2022-01-06 NOTE — H&P
[address] [facility], MD, Aristeo Brothers MD, MPH      Shane Carnes, CARLEY Johnson, [facility]     Sania MCKEON Fanta [facility]   Kayla Pope SKYLA Singletary [facility]       Shamika Mcnair [facility] 136    at [address]    [address] [place] [address] Ave, [address] [address]    [phone]    FAX: [address] [place] [address], [address]    [phone]    FAX: [phone]         PRE-PROCEDURE NOTE - EGD    H and P from last office visit reviewed. Allergies reviewed. Out-patient medicaton list reviewed. Patient Active Problem List   Diagnosis Code    Tobacco use disorder F17.200    Renal calculus or stone N20.0    BMI 35.0-35.9,adult Z68.35    Encounter for long-term (current) use of other medications Z79.899    Allergic rhinitis, cause unspecified J30.9    Insomnia G47.00    Symptomatic anemia D64.9    Type 2 diabetes mellitus without complication (HCC) O51.7    Severe obesity (HCC) E66.01    Right-sided thoracic back pain M54.6    Elevated liver enzymes R74.8    Other cirrhosis of liver ([facility] [address] K74.69    Primary biliary cholangitis (HCC) K74.3       No Known Allergies    No current facility-administered medications on file prior to encounter. Current Outpatient Medications on File Prior to Encounter   Medication Sig Dispense Refill    zolpidem (AMBIEN) 5 mg tablet Take 1 Tablet by mouth nightly as needed for Sleep. Max Daily Amount: 5 mg. 30 Tablet 0    ursodioL (MANE Forte) 500 mg tablet Take 1 Tablet by mouth two (2) times a day. 60 Tablet 5    pantoprazole (PROTONIX) 40 mg tablet Take 1 Tablet by mouth daily. 30 Tablet 5    nadoloL (CORGARD) 40 mg tablet Take 1 Tablet by mouth daily.  Indications: prevent bleeding varicose vein in the esophagus 90 Tablet 3    semaglutide (Ozempic) 0.25 mg or 0.5 mg/dose (2 mg/1.5 ml) subq pen 0.5 mg by SubCUTAneous route every seven (7) days. INJECT 0.25 MG ONCE WEEKLY FOR 2 WEEKS, THEN INCREASE TO 0.5MG ONCE WEEKLY THEREAFTER. Indications: Weekly 4 Pen 5    glimepiride (AMARYL) 2 mg tablet TAKE ONE TABLET BY MOUTH EVERY DAY IN THE MORNING. 30 Tablet 5    metFORMIN ER (GLUCOPHAGE XR) 750 mg tablet TAKE ONE TABLET BY MOUTH ONE TIME DAILY  30 Tablet 11    ferrous sulfate 325 mg (65 mg iron) tablet Take  by mouth Daily (before breakfast). Indications: OTC         For EGD to assess for esophageal and gastric varices. Plan to perform banding if indicated based upon variceal size and appearance. The risks of the procedure were discussed with the patient. These included reaction to anesthesia, pain, perforation and bleeding. All questions were answered. The patient wishes to proceed with the procedure. The patient was counseled at length about the risks of nilo Covid-19 in the radha-operative and post-operative states including the recovery window of their procedure. The patient was made aware that nilo Covid-19 after a surgical procedure may worsen their prognosis for recovering from the virus and lend to a higher morbidity and or mortality risk. The patient was given the options of postponing their procedure. All of the risks, benefits, and alternatives were discussed. The patient does  wish to proceed with the procedure. PHYSICAL EXAMINATION:  There were no vitals taken for this visit. General: No acute distress. Eyes: Sclera anicteric. ENT: No oral lesions. Thyroid normal.  Nodes: No adenopathy. Skin: No spider angiomata. No jaundice. No palmar erythema. Respiratory: Lungs clear to auscultation. Cardiovascular: Regular heart rate. No murmurs. No JVD. Abdomen: Soft non-tender, liver size normal to percussion/palpation.   Spleen not palpable. No obvious ascites. Extremities: No edema. No muscle wasting. No gross arthritic changes. Neurologic: Alert and oriented. Cranial nerves grossly intact. No asterixis. MOST RECENT LABORATORY STUDIES:  Lab Results   Component Value Date/Time    WBC 12.9 (H) 10/29/2021 01:35 PM    HGB 11.1 10/29/2021 01:35 PM    HCT 34.5 10/29/2021 01:35 PM    PLATELET 636 58/53/2160 01:35 PM    MCV 98 (H) 10/29/2021 01:35 PM     Lab Results   Component Value Date/Time    INR 1.1 10/19/2021 12:00 AM    INR 1.1 08/16/2021 12:36 PM    INR 1.0 06/29/2021 05:03 PM    Prothrombin time 11.9 10/19/2021 12:00 AM    Prothrombin time 11.6 08/16/2021 12:36 PM    Prothrombin time 11.2 06/29/2021 05:03 PM       ASSESSMENT AND PLAN:  EGD to assess for esophageal and/or gastric varices.   Sedation per anesthesiology      MD Shayla Lopez 13 of [address], [address] [place] [address] 22.  [phone]  [address] [place] [address]

## 2022-01-07 RX ORDER — ZOLPIDEM TARTRATE 5 MG/1
TABLET ORAL
Qty: 30 TABLET | Refills: 0 | Status: SHIPPED | OUTPATIENT
Start: 2022-01-07 | End: 2022-02-03 | Stop reason: SDUPTHER

## 2022-01-07 NOTE — ANESTHESIA POSTPROCEDURE EVALUATION
Post-Anesthesia Evaluation and Assessment    Patient: Barb Banegas MRN: [id number]  SSN: [id number]    YOB: 1970  Age: 45 y.o. Sex: female      I have evaluated the patient and they are stable and ready for discharge from the PACU. Cardiovascular Function/Vital Signs  Visit Vitals  BP (!) 155/87   Pulse 69   Temp 36.7 °C (98 °F)   Resp 16   SpO2 97%   Breastfeeding No       Patient is status post MAC anesthesia for Procedure(s):  ESOPHAGOGASTRODUODENOSCOPY (EGD) Will need rapid  ENDOSCOPIC BANDING OR LIGATION. Nausea/Vomiting: None    Postoperative hydration reviewed and adequate. Pain:  Pain Scale 1: Numeric (0 - 10) (01/06/22 1601)  Pain Intensity 1: 8 (01/06/22 1601)   Managed    Neurological Status: At baseline    Mental Status, Level of Consciousness: Alert and  oriented to person, place, and time    Pulmonary Status:   O2 Device: None (Room air) (01/06/22 1553)   Adequate oxygenation and airway patent    Complications related to anesthesia: None    Post-anesthesia assessment completed. No concerns    Signed By: Roverto uDbon MD     January 7, 2022              Procedure(s):  ESOPHAGOGASTRODUODENOSCOPY (EGD) Will need rapid  ENDOSCOPIC BANDING OR LIGATION. MAC    <BSHSIANPOST>    INITIAL Post-op Vital signs:   Vitals Value Taken Time   /87 01/06/22 1624   Temp     Pulse 69 01/06/22 1624   Resp 16 01/06/22 1624   SpO2 97 % 01/06/22 1625   Vitals shown include unvalidated device data.

## 2022-01-14 ENCOUNTER — TELEPHONE (OUTPATIENT)
Dept: FAMILY MEDICINE CLINIC | Age: 52
End: 2022-01-14

## 2022-01-14 NOTE — TELEPHONE ENCOUNTER
----- Message from RINΟΒΟΛΟΣ DIRK Oakes sent at 1/14/2022  1:32 PM EST -----  Regarding: My back  Hi,    Can I get a referral to a different orthopedic doctor? Damian Mahoney is not working for me. I had to pay out of pocket for the 4 shots in my back and they missed. I am still in pain in one spot. I keep taking advil and I am almost out of the muscle relaxer (which do help alittle). I just can't keep going like this. I am unable to do much more than work on my computer. I stay on a heating pad and now back looks like it is burned. My quality of life is just not there and I have been dealing with this since last April.      Thanks  ΣΤΡΟΒΟΛΟΣ

## 2022-01-18 ENCOUNTER — TELEPHONE (OUTPATIENT)
Dept: FAMILY MEDICINE CLINIC | Age: 52
End: 2022-01-18

## 2022-01-18 ENCOUNTER — OFFICE VISIT (OUTPATIENT)
Dept: HEMATOLOGY | Age: 52
End: 2022-01-18
Payer: COMMERCIAL

## 2022-01-18 VITALS
TEMPERATURE: 96.9 F | HEART RATE: 68 BPM | RESPIRATION RATE: 16 BRPM | SYSTOLIC BLOOD PRESSURE: 139 MMHG | HEIGHT: 63 IN | WEIGHT: 163.8 LBS | OXYGEN SATURATION: 98 % | DIASTOLIC BLOOD PRESSURE: 77 MMHG | BODY MASS INDEX: 29.02 KG/M2

## 2022-01-18 DIAGNOSIS — M54.6 THORACIC SPINE PAIN: ICD-10-CM

## 2022-01-18 DIAGNOSIS — K74.69 OTHER CIRRHOSIS OF LIVER (HCC): Primary | ICD-10-CM

## 2022-01-18 PROCEDURE — 99214 OFFICE O/P EST MOD 30 MIN: CPT | Performed by: NURSE PRACTITIONER

## 2022-01-18 RX ORDER — TIZANIDINE 2 MG/1
2 TABLET ORAL
Qty: 40 TABLET | Refills: 2 | Status: SHIPPED | OUTPATIENT
Start: 2022-01-18 | End: 2022-02-21 | Stop reason: SDUPTHER

## 2022-01-18 NOTE — TELEPHONE ENCOUNTER
back    Mari Lerma MD 16 hours ago (8:35 PM)           Hi     Dr. Vickie Matias called me this morning but I missed his call. He left me a voicemail that he would call me back. Can you have him call me again? I know he his a busy person.      Thanks for your help! Eric Frias 4 days ago     EDIE Stubbs,  Your message was sent to Dr. Vickie Matias . Mari Lerma MD 4 days ago           Hi,     Can I get a referral to a different orthopedic doctor? Giovanni Salas is not working for me. I had to pay out of pocket for the 4 shots in my back and they missed. I am still in pain in one spot. I keep taking advil and I am almost out of the muscle relaxer (which do help alittle). I just can't keep going like this. I am unable to do much more than work on my computer. I stay on a heating pad and now back looks like it is burned.  My quality of life is just not there and I have been dealing with this since last April.      Thanks

## 2022-01-18 NOTE — PROGRESS NOTES
Identified pt with two pt identifiers(name and ). Reviewed record in preparation for visit and have obtained necessary documentation. Chief Complaint   Patient presents with    Other     PBC   4 week f/u      Vitals:    22 1301 22 1306   BP: (!) 148/84 139/77   Pulse: 68    Resp: 16    Temp: 96.9 °F (36.1 °C)    TempSrc: Temporal    SpO2: 98%    Weight: 163 lb 12.8 oz (74.3 kg)    Height: 5' 2.5\" (1.588 m)    PainSc:    7   PainLoc:  Back   LMP: 2018       Health Maintenance Review: Patient reminded of \"due or due soon\" health maintenance. I have asked the patient to contact his/her primary care provider (PCP) for follow-up on his/her health maintenance. Coordination of Care Questionnaire:  :   1) Have you been to an emergency room, urgent care, or hospitalized since your last visit? If yes, where when, and reason for visit? no       2. Have seen or consulted any other health care provider since your last visit? If yes, where when, and reason for visit? NO      Patient is accompanied by self I have received verbal consent from Anurag Leung to discuss any/all medical information while they are present in the room.

## 2022-01-18 NOTE — PROGRESS NOTES
[address] [facility]      Carrie Rogers MD, Halina Martin, Altaf Sal MD, MPH      Juan Robbins, CARLEY Rico, [facility]     Sania Jewell, [facility]   Ion Canas, P-KODAK Kate, [facility]       Shamika Mcnair [facility] 136    at [address]    [address] [facility], [address] [place] [address], [place] [address]. [phone]    FAX: [address] [place] [address] [address], [address]    [phone]    FAX: [phone]       Patient Care Team:  Harry Burnette MD as PCP - [facility], Missy Millard MD as PCP - [facility] Provider      Problem List  Date Reviewed: 10/19/2021          Codes Class Noted    Primary biliary cholangitis [facility]) ICD-10-CM: K74.3  ICD-9-CM: 571.6  8/16/2021        Other cirrhosis of liver ([facility].) ICD-10-CM: K74.69  ICD-9-CM: 571.5  6/30/2021        Right-sided thoracic back pain ICD-10-CM: M54.6  ICD-9-CM: 724.1  6/29/2021        Elevated liver enzymes ICD-10-CM: R74.8  ICD-9-CM: 790.5  6/29/2021        Severe obesity ([facility].) ICD-10-CM: E66.01  ICD-9-CM: 278.01  12/30/2018        Type 2 diabetes mellitus without complication ([facility].) RRQ-08-JJ: E11.9  ICD-9-CM: 250.00  7/1/2016        Symptomatic anemia ICD-10-CM: D64.9  ICD-9-CM: 285.9  5/8/2015        Insomnia ICD-10-CM: G47.00  ICD-9-CM: 780.52  10/18/2013        Allergic rhinitis, cause unspecified (Chronic) ICD-10-CM: J30.9  ICD-9-CM: 477.9  4/23/2013        Encounter for long-term (current) use of other medications ICD-10-CM: Z79.899  ICD-9-CM: V58.69  7/8/2012        Tobacco use disorder (Chronic) ICD-10-CM: O22.986  ICD-9-CM: 305.1  4/27/2010        Renal calculus or stone ICD-10-CM: N20.0  ICD-9-CM: 592.0  4/27/2010        BMI 35.0-35.9,adult (Chronic) ICD-10-CM: J64.53  ICD-9-CM: V85.35 Chronic 4/27/2010    Overview Signed 3/10/2015  2:30 AM by Jaye GOMEZ     Anthropometric:   Weight Loss Metrics 3/9/2015 11/6/2013 10/28/2013 10/18/2013 9/10/2013 4/23/2013 1/29/2013   Today's Wt 192 lb 163 lb 12.8 oz 164 lb 14.5 oz 161 lb 161 lb 184 lb 9.6 oz 188 lb 6.4 oz   BMI 35.11 kg/m2 29.95 kg/m2 30.15 kg/m2 28.07 kg/m2 28.07 kg/m2 32.18 kg/m2 34.45 kg/m2                      Angélica Nagel is being seen at [facility] for management 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 The active problem list, all pertinent past medical history, medications,   radiologic findings and laboratory findings related to the liver disorder were reviewed and discussed with the patient. The patient is a 45 y.o.  female who was found to have chronic liver disease and cirrhosis   in 5/2021 when a CT scan was done for evaluation of an abdominal hernia. She has a history of past heavy alcohol use up to 1 pint per day for 3 years. This was decreased to weekend use only 2 years ago. She has now remained abstinent since 6/01/2021. An assessment of liver fibrosis with biopsy or elastography has not been performed. She returns for Fibroscan today. Serologic evaluation for markers of chronic liver disease was positive for AMA. Treatment for PBC was initiated with MANE 1000 mg daily 7/2021. She is tolerating the medication well with only mild intermittent GI discomfort. The patient has developed the following complications of cirrhosis: Variceal bleed 9/2021. Since the last office visit the patient underwent and EGD 11/2021 by Dr. Johnnie Cassidy with 7 bands placed and again 1/2021 with 3 bands placed. The patient does not have any symptoms which could be attributed to the liver disorder.     The patient is not experiencing the following symptoms which are commonly seen in this liver disorder: fatigue, pain in the right side over the liver, yellowing of the eyes or skin, itching, or swelling of the abdomen. The patient completes all daily activities without any functional limitations. ASSESSMENT AND PLAN:  Cirrhosis  The diagnosis of cirrhosis is based upon imaging, Fibroscan and laboratory studies. Cirrhosis is secondary to PBC and alcohol. She has remained abstinent since 6/2021. Have performed laboratory testing to monitor liver function and degree of liver injury. This included BMP, hepatic panel, and CBC with platelet count, and INR. Laboratory testing from 10/29/2021 reviewed in detail. Follow-up testing ordered today. The liver transaminases are now normal. The ALP is elevated but improved. The patient has developed the following complications of liver disease: Variceal bleed 9/2021. The CTP is 5. Child class A. The MELD score is 7. The patient does not require a liver transplant at this time due to low MELD. Primary Biliary Cholangitis  The diagnosis is based upon serology and an elevation in ALP. A liver biopsy has not been performed. Assessment of liver fibrosis was performed with Fibroscan in the office today. The result was 75.0 kPa which correlates with cirrhosis. The CAP score of 350 suggests hepatic steatosis. The AMA is strongly positive and the ALP is significantly elevated. The patient meets criteria for PBC and histologic confirmation is not necessary at this time. The patient is being treated with MANE 1000 mg every day. Will continue MANE at 1000 mg daily. The side effects of MANE including a 2% risk of itching and a 2% risk of diarrhea were discussed. Hypercholesterolemia   This is common in patients with PBC. Controlled clinical trials demonstrate that women with PBC do not have an increased risk of CAD depsite the elevated total cholesterol. The cholesterol is typically HDL and does not always require treatment.     Stains are not contraindicated if felt to be clinically warranted. Vitamin malabsorption  Patients with PBC do not efficiently absorb fat soluble vitamins A,D,E, K. It has been recommended that the patient take multivitamins with excess fat soluble vitamins. Breast cancer screeing   Women with PBC have an increased risk of breast cancer and should undergo regular mammography screenings. Screening for Esophageal varices   EGD was performed in 09/27/2021 at [facility]. There was esophageal varices and 4 bands were placed  EGD performed by Dr. Annabella Barnett 11/2021 with 7 bands placed. EGD performed by Dr. Annabella Barnett 1/2022 with 3 bands placed. Repeat in 6 months. Continue nadolol 40 mg daily for secondary esophageal variceal hemorrhage prophylaxis    The patient was instructed to discontinue Diclofenac as this can increase risk for bleeding and ulcers. Hepatic encephalopathy   Overt HE has not developed to date. There is no reason for treatment with lactulose of xifaxan  There is no need to restrict dietary protein at this time. Screening for Hepatocellular Carcinoma  [address] screening was last performed with ultrasound 9/2021 and negative. AFP normal 8/2021. The next imaging will be due 2/2022. AFP and ultrasound ordered. Treatment of other medical problems in patients with chronic liver disease  There are no contraindications for the patient to take most medications that are necessary for treatment of other medical issues. The patient can take: Any medications utilized for treatment of DM. Statins to treat hypercholesterolemia. Normal doses of acetaminophen, as recommended on the label of the bottle, are not hepatotoxic except in the setting of daily alcohol use, even in patients with cirrhosis and can be utilized for pain. Counseling for alcohol in patients with chronic liver disease  The patient was counseled regarding alcohol consumption and the effect of alcohol on chronic liver disease. The patient has not consumed alcohol since 5/2021.  There was prior heavy use. Osteoporosis  The risk of osteoporosis is increased in patients with cirrhosis. This should be ordered by the patients primary care physician. Vaccinations   Vaccination for viral hepatitis A is recommended since the patient has no serologic evidence of previous exposure or vaccination with immunity. Routine vaccinations against other bacterial and viral agents can be performed as indicated. Annual flu vaccination should be administered if indicated. ALLERGIES  No Known Allergies    MEDICATIONS  Current Outpatient Medications   Medication Sig    tiZANidine (ZANAFLEX) 2 mg tablet Take 1 Tablet by mouth four (4) times daily as needed for Muscle Spasm(s).  zolpidem (AMBIEN) 5 mg tablet TAKE 1 TABLET BY MOUTH NIGHTLY AS NEEDED FOR SLEEP - MAX DAILY AMOUNT: 5 MG    diclofenac EC (VOLTAREN) 75 mg EC tablet Take  by mouth.  ursodioL (MANE Forte) 500 mg tablet Take 1 Tablet by mouth two (2) times a day.  pantoprazole (PROTONIX) 40 mg tablet Take 1 Tablet by mouth daily.  nadoloL (CORGARD) 40 mg tablet Take 1 Tablet by mouth daily. Indications: prevent bleeding varicose vein in the esophagus    semaglutide (Ozempic) 0.25 mg or 0.5 mg/dose (2 mg/1.5 ml) subq pen 0.5 mg by SubCUTAneous route every seven (7) days. INJECT 0.25 MG ONCE WEEKLY FOR 2 WEEKS, THEN INCREASE TO 0.5MG ONCE WEEKLY THEREAFTER. Indications: Weekly    glimepiride (AMARYL) 2 mg tablet TAKE ONE TABLET BY MOUTH EVERY DAY IN THE MORNING.  metFORMIN ER (GLUCOPHAGE XR) 750 mg tablet TAKE ONE TABLET BY MOUTH ONE TIME DAILY     ferrous sulfate 325 mg (65 mg iron) tablet Take  by mouth Daily (before breakfast). Indications: OTC     No current facility-administered medications for this visit. SYSTEM REVIEW NOT RELATED TO LIVER DISEASE OR REVIEWED ABOVE:  Constitution systems: Negative for fever, chills, weight gain, weight loss. Eyes: Negative for visual changes.   ENT: Negative for sore throat, painful swallowing. Respiratory: Negative for cough, hemoptysis, SOB. Cardiology: Negative for chest pain, palpitations. GI:  Negative for constipation or diarrhea. : Negative for urinary frequency, dysuria, hematuria, nocturia. Skin: Negative for rash. Hematology: Negative for easy bruising, blood clots. Musculo-skeletal: Negative for back pain, muscle pain, weakness. Neurologic: Negative for headaches, dizziness, vertigo, memory problems not related to HE. Psychology: Negative for anxiety, depression. FAMILY HISTORY:  The father  of lung cancer   The mother has/had the following chronic disease(s): Colon cancer, COPD, Osteoporosis. There is no family history of liver disease. There is no family history of immune disorders. SOCIAL HISTORY:  The patient is . The patient has 3 children, 1 passed of SIDS. The patient currently smokes 1/2 pack of tobacco daily. The patient drank up to 1 pint per day for 3 years. She decreased to weekends only 2 years ago. All alcohol was discontinued 2021. The patient currently works full-time as . PHYSICAL EXAMINATION:  Visit Vitals  /77   Pulse 68   Temp 96.9 °F (36.1 °C) (Temporal)   Resp 16   Ht 5' 2.5\" (1.588 m)   Wt 163 lb 12.8 oz (74.3 kg)   LMP 2018   SpO2 98%   BMI 29.48 kg/m²       General: No acute distress. Eyes: Sclera anicteric. ENT: No oral lesions. Thyroid normal.  Nodes: No adenopathy. Skin: No spider angiomata. No jaundice. No palmar erythema. Respiratory: Lungs clear to auscultation. Cardiovascular: Regular heart rate. No murmurs. No JVD. Abdomen: Soft non-tender, liver size normal to percussion/palpation. Spleen not palpable. No obvious ascites. Extremities: No edema. No muscle wasting. No gross arthritic changes. Neurologic: Alert and oriented. Cranial nerves grossly intact. No asterixis.     LABORATORY STUDIES:  Liver [facility] Latest Ref Rng & Units 10/29/2021 10/19/2021   WBC 3.4 - 10.8 x10E3/uL 12.9 (H) 19.4 (H)   ANC 1.4 - 7.0 x10E3/uL 10.8 (H) 16.8 (H)   HGB 11.1 - 15.9 g/dL 11.1 10.3 (L)    - 450 x10E3/uL 213 178   INR 0.9 - 1.2  1.1   AST 0 - 40 IU/L 35 34   ALT 0 - 32 IU/L 26 22   Alk Phos 44 - 121 IU/L 140 (H) 139 (H)   Bili, Total 0.0 - 1.2 mg/dL 0.9 1.1   Bili, Direct 0.00 - 0.40 mg/dL 0.37    Albumin 3.8 - 4.9 g/dL 3.8 3.9   BUN 6 - 24 mg/dL 10 9   Creat 0.57 - 1.00 mg/dL 0.72 0.68   Na 134 - 144 mmol/L 139 142   K 3.5 - 5.2 mmol/L 4.1 4.1   Cl 96 - 106 mmol/L 101 108 (H)   CO2 20 - 29 mmol/L 25 22   Glucose 65 - 99 mg/dL 237 (H) 162 (H)     Liver [place] [address] Ref Rng & Units 8/16/2021   WBC 3.4 - 10.8 x10E3/uL 9.1   ANC 1.4 - 7.0 x10E3/uL 7.6 (H)   HGB 11.1 - 15.9 g/dL 11.5    - 450 x10E3/uL 148 (L)   INR 0.9 - 1.2 1.1   AST 0 - 40 IU/L 31   ALT 0 - 32 IU/L 10   Alk Phos 44 - 121 IU/L 146 (H)   Bili, Total 0.0 - 1.2 mg/dL 0.6   Bili, Direct 0.00 - 0.40 mg/dL 0.33   Albumin 3.8 - 4.9 g/dL 3.7 (L)   BUN 6 - 24 mg/dL 5 (L)   Creat 0.57 - 1.00 mg/dL 0.51 (L)   Na 134 - 144 mmol/L 141   K 3.5 - 5.2 mmol/L 4.0   Cl 96 - 106 mmol/L 103   CO2 20 - 29 mmol/L 26   Glucose 65 - 99 mg/dL 125 (H)     Cancer Screening Latest Ref Rng & Units 8/16/2021 6/29/2021   AFP, Serum 0.0 - 8.0 ng/mL 3.8 5.4   AFP-L3% 0.0 - 9.9 % 9.4 10.3 (H)     Laboratory testing from 10/29/2021 reviewed in detail. Additional testing included to evaluate progression or regression of disease. Laboratory testing results from today will be communicated by My Chart.      SEROLOGIES:  Serologies Latest Ref Rng & Units 6/29/2021   Hep A Ab, Total Negative Negative   Hep B Surface Ag Negative Negative   Hep B Core Ab, Total Negative Negative   Hep B Surface AB QL  Non Reactive   Ferritin 15 - 150 ng/mL 284 (H)   Iron % Saturation 15 - 55 % 12 (L)   LIMA, IFA  Negative   C-ANCA Neg:<1:20 titer <1:20   P-ANCA Neg:<1:20 titer <1:20   ANCA Neg:<1:20 titer <1:20   ASMCA 0 - 19 Units 14   M2 Ab 0.0 - 20.0 Units 41.7 (H)   Ceruloplasmin 19.0 - 39.0 mg/dL 30.2   Alpha-1 antitrypsin level 101 - 187 mg/dL 187     LIVER HISTOLOGY:  8[facility]2021. [facility]. EkPa was 75.0. IQR/med 0%. . The results suggested a fibrosis level of F4. The CAP score suggests there is hepatic steatosis. ENDOSCOPIC PROCEDURES:  09/2021: EGD performed at [facility] showed esophageal varices. Four bands was placed  11/2021. EGD by Dr. Acosta Gorman. Esophageal varices with 7 bands placed. 1/2022. EGD by Dr. Acosta Gorman. Esophageal varices with 3 bands placed. Repeat in 6 months. RADIOLOGY:  5/2021. CT of Abdomen. Cirrhotic appearance of the liver. Cholelithiasis. No ductal dilatation. Right renal calculi. Splenomegaly. No ascites. OTHER TESTING:  Not available or performed    FOLLOW-UP:  All of the issues listed above in the Assessment and Plan were discussed with the patient. All questions were answered. The patient expressed a clear understanding of the above. [facility] 87 in 3 months for routine monitoring. [facility] of [address] [id number] Juan Daniel Raya, [address] [facility], [facility] 22.  [address] [facility]

## 2022-01-19 LAB
ALBUMIN SERPL-MCNC: 4.2 G/DL (ref 3.8–4.9)
ALP SERPL-CCNC: 134 IU/L (ref 44–121)
ALT SERPL-CCNC: 18 IU/L (ref 0–32)
AST SERPL-CCNC: 40 IU/L (ref 0–40)
BASOPHILS # BLD AUTO: 0.1 X10E3/UL (ref 0–0.2)
BASOPHILS NFR BLD AUTO: 1 %
BILIRUB DIRECT SERPL-MCNC: 0.45 MG/DL (ref 0–0.4)
BILIRUB SERPL-MCNC: 1.2 MG/DL (ref 0–1.2)
BUN SERPL-MCNC: 12 MG/DL (ref 6–24)
BUN/CREAT SERPL: 18 (ref 9–23)
CALCIUM SERPL-MCNC: 10.6 MG/DL (ref 8.7–10.2)
CHLORIDE SERPL-SCNC: 104 MMOL/L (ref 96–106)
CO2 SERPL-SCNC: 27 MMOL/L (ref 20–29)
CREAT SERPL-MCNC: 0.68 MG/DL (ref 0.57–1)
EOSINOPHIL # BLD AUTO: 0.3 X10E3/UL (ref 0–0.4)
EOSINOPHIL NFR BLD AUTO: 3 %
ERYTHROCYTE [DISTWIDTH] IN BLOOD BY AUTOMATED COUNT: 14 % (ref 11.7–15.4)
GLUCOSE SERPL-MCNC: 101 MG/DL (ref 65–99)
HCT VFR BLD AUTO: 45.3 % (ref 34–46.6)
HGB BLD-MCNC: 15.4 G/DL (ref 11.1–15.9)
IMM GRANULOCYTES # BLD AUTO: 0 X10E3/UL (ref 0–0.1)
IMM GRANULOCYTES NFR BLD AUTO: 0 %
INR PPP: 1.2 (ref 0.9–1.2)
LYMPHOCYTES # BLD AUTO: 1.1 X10E3/UL (ref 0.7–3.1)
LYMPHOCYTES NFR BLD AUTO: 13 %
MCH RBC QN AUTO: 33.6 PG (ref 26.6–33)
MCHC RBC AUTO-ENTMCNC: 34 G/DL (ref 31.5–35.7)
MCV RBC AUTO: 99 FL (ref 79–97)
MONOCYTES # BLD AUTO: 0.5 X10E3/UL (ref 0.1–0.9)
MONOCYTES NFR BLD AUTO: 6 %
NEUTROPHILS # BLD AUTO: 6.7 X10E3/UL (ref 1.4–7)
NEUTROPHILS NFR BLD AUTO: 77 %
PLATELET # BLD AUTO: 173 X10E3/UL (ref 150–450)
POTASSIUM SERPL-SCNC: 4 MMOL/L (ref 3.5–5.2)
PROT SERPL-MCNC: 8.3 G/DL (ref 6–8.5)
PROTHROMBIN TIME: 12.2 SEC (ref 9.1–12)
RBC # BLD AUTO: 4.58 X10E6/UL (ref 3.77–5.28)
SODIUM SERPL-SCNC: 145 MMOL/L (ref 134–144)
WBC # BLD AUTO: 8.7 X10E3/UL (ref 3.4–10.8)

## 2022-01-20 LAB
AFP L3 MFR SERPL: NORMAL % (ref 0–9.9)
AFP SERPL-MCNC: 3.7 NG/ML (ref 0–8)

## 2022-01-21 NOTE — PROGRESS NOTES
Letter sent via my chart regarding the blood work results which continue to improve.  Liver function is normal.

## 2022-02-03 DIAGNOSIS — G47.00 INSOMNIA, UNSPECIFIED TYPE: ICD-10-CM

## 2022-02-04 RX ORDER — ZOLPIDEM TARTRATE 5 MG/1
5 TABLET ORAL
Qty: 30 TABLET | Refills: 1 | Status: SHIPPED | OUTPATIENT
Start: 2022-02-04 | End: 2022-03-09 | Stop reason: SDUPTHER

## 2022-02-21 DIAGNOSIS — M54.6 THORACIC SPINE PAIN: ICD-10-CM

## 2022-02-21 RX ORDER — TIZANIDINE 2 MG/1
2 TABLET ORAL
Qty: 40 TABLET | Refills: 2 | Status: SHIPPED | OUTPATIENT
Start: 2022-02-21 | End: 2022-04-22 | Stop reason: SDUPTHER

## 2022-02-25 ENCOUNTER — TELEPHONE (OUTPATIENT)
Dept: FAMILY MEDICINE CLINIC | Age: 52
End: 2022-02-25

## 2022-02-25 DIAGNOSIS — M54.6 THORACIC SPINE PAIN: ICD-10-CM

## 2022-02-25 DIAGNOSIS — S29.019D ACUTE THORACIC MYOFASCIAL STRAIN, SUBSEQUENT ENCOUNTER: ICD-10-CM

## 2022-02-25 RX ORDER — OXYCODONE AND ACETAMINOPHEN 5; 325 MG/1; MG/1
1 TABLET ORAL
Qty: 20 TABLET | Refills: 0 | Status: SHIPPED | OUTPATIENT
Start: 2022-02-25 | End: 2022-03-14 | Stop reason: SDUPTHER

## 2022-02-25 NOTE — TELEPHONE ENCOUNTER
TT       1           Diana Blanco  Female, 45 y.o., 1970  Weight:   163 lb 12.8 oz (74.3 kg)    MRN:   [id number]  Phone:   [phone] [facility])              PCP:   Annel Steven MD  Primary Cvg:   [facility]    Last Appt With Me  None    Next Appt With Me  None    Next Appt  04/18/2022 - Sania Jewell, NP - [facility]                    My back    Malcolm Trejo MD 16 minutes ago (10:01 AM)           Hi Dr. Shea Dao,     My back is getting worse. I have been taking aleve and Advil but it is not touching the pain.      Can I get something for the pain?     I know you told me to stick with [facility]  but I can't get in for a awhile. I have a friend that used a pain management group at [facility]. I am considering going to them. Lindsay Ornelas has not helped the pain and they gave me a medication that they should not have due to my liver issues. My liver doctor was upset they gave it to me and told to stop it immediately. I have told them about my liver issue and all medications I was taking. So my confidence in them has gone downhill.      I can not do everyday activities anymore. It is getting unbearable.      Any help or advise would be appreciated. I am just tried of having the pain.  It affecting every part of my life.      Thanks  Layo Mendoza               Encounter Messages    Read Composed From To  Subject   Y 2/25/2022 10:01 AM Nuria Verma MD  My back

## 2022-03-09 DIAGNOSIS — G47.00 INSOMNIA, UNSPECIFIED TYPE: ICD-10-CM

## 2022-03-09 RX ORDER — ZOLPIDEM TARTRATE 5 MG/1
5 TABLET ORAL
Qty: 30 TABLET | Refills: 1 | Status: SHIPPED | OUTPATIENT
Start: 2022-03-09 | End: 2022-05-07

## 2022-03-14 DIAGNOSIS — S29.019D ACUTE THORACIC MYOFASCIAL STRAIN, SUBSEQUENT ENCOUNTER: ICD-10-CM

## 2022-03-14 DIAGNOSIS — M54.6 THORACIC SPINE PAIN: ICD-10-CM

## 2022-03-14 RX ORDER — OXYCODONE AND ACETAMINOPHEN 5; 325 MG/1; MG/1
1 TABLET ORAL
Qty: 20 TABLET | Refills: 0 | Status: SHIPPED | OUTPATIENT
Start: 2022-03-14 | End: 2022-04-13

## 2022-03-18 PROBLEM — K74.3 PRIMARY BILIARY CHOLANGITIS (HCC): Status: ACTIVE | Noted: 2021-08-16

## 2022-03-19 PROBLEM — M54.6 RIGHT-SIDED THORACIC BACK PAIN: Status: ACTIVE | Noted: 2021-06-29

## 2022-03-19 PROBLEM — E66.01 SEVERE OBESITY (HCC): Status: ACTIVE | Noted: 2018-12-30

## 2022-03-19 PROBLEM — R74.8 ELEVATED LIVER ENZYMES: Status: ACTIVE | Noted: 2021-06-29

## 2022-03-20 PROBLEM — K74.69 OTHER CIRRHOSIS OF LIVER (HCC): Status: ACTIVE | Noted: 2021-06-30

## 2022-03-22 ENCOUNTER — OFFICE VISIT (OUTPATIENT)
Dept: FAMILY MEDICINE CLINIC | Age: 52
End: 2022-03-22
Payer: COMMERCIAL

## 2022-03-22 VITALS
HEART RATE: 67 BPM | OXYGEN SATURATION: 98 % | DIASTOLIC BLOOD PRESSURE: 78 MMHG | WEIGHT: 163 LBS | HEIGHT: 63 IN | BODY MASS INDEX: 28.88 KG/M2 | SYSTOLIC BLOOD PRESSURE: 136 MMHG | TEMPERATURE: 97.8 F | RESPIRATION RATE: 18 BRPM

## 2022-03-22 DIAGNOSIS — E78.2 MIXED HYPERLIPIDEMIA: ICD-10-CM

## 2022-03-22 DIAGNOSIS — K74.3 PRIMARY BILIARY CIRRHOSIS (HCC): ICD-10-CM

## 2022-03-22 DIAGNOSIS — M54.6 THORACIC SPINE PAIN: Primary | ICD-10-CM

## 2022-03-22 DIAGNOSIS — E11.9 TYPE 2 DIABETES MELLITUS WITHOUT COMPLICATION, WITHOUT LONG-TERM CURRENT USE OF INSULIN (HCC): ICD-10-CM

## 2022-03-22 LAB — HBA1C MFR BLD HPLC: 6.3 %

## 2022-03-22 PROCEDURE — 3044F HG A1C LEVEL LT 7.0%: CPT | Performed by: FAMILY MEDICINE

## 2022-03-22 PROCEDURE — 83036 HEMOGLOBIN GLYCOSYLATED A1C: CPT | Performed by: FAMILY MEDICINE

## 2022-03-22 PROCEDURE — 99213 OFFICE O/P EST LOW 20 MIN: CPT | Performed by: FAMILY MEDICINE

## 2022-03-22 NOTE — LETTER
NOTIFICATION RETURN TO WORK / SCHOOL    5/16/2022 9:29 AM    Ms. Mri Wick  [address] [facility] [address] [phone]      To Whom It May Concern:    Mir Wick is currently under the care of [facility] from 5/10/22-5/13/22. She will return to work/school on 5/16/22. If there are questions or concerns please have the patient contact our office.         Sincerely,      Day Richmond MD

## 2022-03-22 NOTE — PROGRESS NOTES
HISTORY OF PRESENT ILLNESS  Renee Kim is a 45 y.o. female. f/u  recurrance of l upper back pain/sprain ongoing for many years. Seen by Dr Bernardo Tanner in past with some relief from [facility]. Seeing Hepatology for PBR. LFTs have returned to normal.Feeling pretty well  Back Pain   The history is provided by the patient. This is a recurrent problem. The problem has not changed since onset. The problem occurs hourly. The pain is associated with no known injury. The pain is present in the thoracic spine and left side. The quality of the pain is described as aching. The pain is at a severity of 5/10. The pain is moderate. The symptoms are aggravated by certain positions, twisting and bending. Pertinent negatives include no chest pain, no fever and no dysuria. Abnormal Lab Results  The history is provided by the patient. This is a chronic problem. The problem occurs daily. The problem has not changed since onset. Pertinent negatives include no chest pain. Blood sugar problem  The history is provided by the patient. This is a chronic problem. The problem has been gradually improving. Pertinent negatives include no chest pain. Review of Systems   Constitutional: Negative for fever and malaise/fatigue. Respiratory: Negative for cough. Cardiovascular: Negative for chest pain, palpitations and orthopnea. Genitourinary: Negative for dysuria, frequency, hematuria and urgency. Musculoskeletal: Positive for back pain. Negative for myalgias. Neurological: Negative for dizziness. Physical Exam  Vitals reviewed. Constitutional:       Appearance: Normal appearance. HENT:      Head: Normocephalic and atraumatic. Right Ear: Tympanic membrane normal.      Nose: Nose normal.   Eyes:      Pupils: Pupils are equal, round, and reactive to light. Cardiovascular:      Rate and Rhythm: Normal rate and regular rhythm. Heart sounds: Normal heart sounds.    Pulmonary:      Effort: Pulmonary effort is normal.      Breath sounds: Normal breath sounds. Abdominal:      General: Abdomen is flat. Palpations: Abdomen is soft. Musculoskeletal:      Cervical back: Normal range of motion and neck supple. Thoracic back: Tenderness present. No swelling, spasms or bony tenderness. Decreased range of motion. Back:    Skin:     General: Skin is warm and dry. Neurological:      General: No focal deficit present. Mental Status: She is alert and oriented to person, place, and time. Psychiatric:         Behavior: Behavior normal.             Diagnoses and all orders for this visit:    1. Thoracic spine pain,to see Dr Nanyc cummings  2. Type 2 diabetes mellitus without complication, without long-term current use of insulin (HCC),well controlled  -     AMB POC HEMOGLOBIN A1C    3. Primary biliary cirrhosis (HCC),improving    4. Mixed hyperlipidemia      Follow-up and Dispositions    · Return in about 3 months (around 6/22/2022). Diagnoses and all orders for this visit:    1. Thoracic spine pain    2. Type 2 diabetes mellitus without complication, without long-term current use of insulin (HCC)  -     AMB POC HEMOGLOBIN A1C    3. Primary biliary cirrhosis ([facility]    4. Mixed hyperlipidemia      Follow-up and Dispositions    · Return in about 3 months (around 6/22/2022). Follow-up and Dispositions    · Return in about 3 months (around 6/22/2022).

## 2022-03-29 ENCOUNTER — HOSPITAL ENCOUNTER (OUTPATIENT)
Dept: ULTRASOUND IMAGING | Age: 52
Discharge: HOME | End: 2022-03-29
Attending: NURSE PRACTITIONER
Payer: COMMERCIAL

## 2022-03-29 DIAGNOSIS — K74.69 OTHER CIRRHOSIS OF LIVER (HCC): ICD-10-CM

## 2022-03-29 PROCEDURE — 76700 US EXAM ABDOM COMPLETE: CPT

## 2022-04-18 ENCOUNTER — OFFICE VISIT (OUTPATIENT)
Dept: HEMATOLOGY | Age: 52
End: 2022-04-18
Payer: COMMERCIAL

## 2022-04-18 VITALS
BODY MASS INDEX: 30.69 KG/M2 | TEMPERATURE: 98.1 F | SYSTOLIC BLOOD PRESSURE: 164 MMHG | DIASTOLIC BLOOD PRESSURE: 91 MMHG | HEART RATE: 64 BPM | WEIGHT: 166.8 LBS | OXYGEN SATURATION: 96 % | HEIGHT: 62 IN

## 2022-04-18 DIAGNOSIS — K74.69 OTHER CIRRHOSIS OF LIVER (HCC): Primary | ICD-10-CM

## 2022-04-18 DIAGNOSIS — E11.9 TYPE 2 DIABETES MELLITUS WITHOUT COMPLICATION, WITHOUT LONG-TERM CURRENT USE OF INSULIN (HCC): ICD-10-CM

## 2022-04-18 DIAGNOSIS — K74.3 PRIMARY BILIARY CHOLANGITIS (HCC): ICD-10-CM

## 2022-04-18 PROCEDURE — 99214 OFFICE O/P EST MOD 30 MIN: CPT | Performed by: NURSE PRACTITIONER

## 2022-04-18 NOTE — PROGRESS NOTES
[facility], Al BOOTH, [facility], [place]      CARLEY Alcazar, ACNP-BC     Sania Jewell, [facility]NP-BC   GLADYS Gomez-KODAK Neves, [facility]       Shamika Deputado Saurav De [address]    at [address], [address] [place] [address], [facility] 22. [phone]    FAX: [address] [place] [address]    at [address], [address]    [phone]    FAX: [phone]       Patient Care Team:  Bonney Severs, MD as PCP - Ana Ruggiero, Mulu Chairez MD as PCP - [facility] Provider      Problem List  Date Reviewed: 1/18/2022          Codes Class Noted    Primary biliary cholangitis [facility]) ICD-10-CM: K74.3  ICD-9-CM: 571.6  8/16/2021        Other cirrhosis of liver ([facility] ICD-10-CM: K74.69  ICD-9-CM: 571.5  6/30/2021        Right-sided thoracic back pain ICD-10-CM: M54.6  ICD-9-CM: 724.1  6/29/2021        Elevated liver enzymes ICD-10-CM: R74.8  ICD-9-CM: 790.5  6/29/2021        Severe obesity ([facility] ICD-10-CM: E66.01  ICD-9-CM: 278.01  12/30/2018        Type 2 diabetes mellitus without complication ([facility] [id number]: E11.9  ICD-9-CM: 250.00  7/1/2016        Symptomatic anemia ICD-10-CM: D64.9  ICD-9-CM: 285.9  5/8/2015        Insomnia ICD-10-CM: G47.00  ICD-9-CM: 780.52  10/18/2013        Allergic rhinitis, cause unspecified (Chronic) ICD-10-CM: J30.9  ICD-9-CM: 477.9  4/23/2013        Encounter for long-term (current) use of other medications ICD-10-CM: Z79.899  ICD-9-CM: V58.69  7/8/2012        Tobacco use disorder (Chronic) ICD-10-CM: M26.086  ICD-9-CM: 305.1  4/27/2010        Renal calculus or stone ICD-10-CM: N20.0  ICD-9-CM: 592.0  4/27/2010        BMI 35.0-35.9,adult (Chronic) ICD-10-CM: M19.04  ICD-9-CM: V85.35 Chronic 4/27/2010    Overview Signed 3/10/2015  2:30 AM by Cornelius GOMEZ     Anthropometric:   Weight Loss Metrics 3/9/2015 11/6/2013 10/28/2013 10/18/2013 9/10/2013 4/23/2013 1/29/2013   Today's Wt 192 lb 163 lb 12.8 oz 164 lb 14.5 oz 161 lb 161 lb 184 lb 9.6 oz 188 lb 6.4 oz   BMI 35.11 kg/m2 29.95 kg/m2 30.15 kg/m2 28.07 kg/m2 28.07 kg/m2 32.18 kg/m2 34.45 kg/m2                        Nancy Bills is being seen at [address] for management 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 The active problem list, all pertinent past medical history, medications,   radiologic findings and laboratory findings related to the liver disorder were reviewed and discussed with the patient. The patient is a 45 y.o.  female who was found to have chronic liver disease and cirrhosis   in 5/2021 when a CT scan was done for evaluation of an abdominal hernia. She has a history of past heavy alcohol use up to 1 pint per day for 3 years. This was decreased to weekend use only 2 years ago. She has now remained abstinent since 6/01/2021. Assessment of liver fibrosis was performed with Fibroscan 8/2021. The result was 75.0 kPa which correlates with cirrhosis. The CAP score of 350 suggests hepatic steatosis. Serologic evaluation for markers of chronic liver disease was positive for AMA. Treatment for PBC was initiated with MANE 1000 mg daily 7/2021. She is tolerating the medication well with no reported side effects. The patient has developed the following complications of cirrhosis: Variceal bleed 9/2021, treated at [facility]. EGD performed by Dr. Cassia Gil 11/2021 varices, 7 bands and again 1/2022 3 bands. Since the last office visit the patient has been experiencing severe back pain. Injections were not successful. The patient does not have any symptoms which could be attributed to the liver disorder.     The patient is not experiencing the following symptoms which are commonly seen in this liver disorder: fatigue, pain in the right side over the liver, yellowing of the eyes or skin, itching, or swelling of the abdomen. The patient completes all daily activities without any functional limitations. ASSESSMENT AND PLAN:  Cirrhosis  The diagnosis of cirrhosis is based upon imaging, Fibroscan and laboratory studies. Cirrhosis is secondary to PBC and alcohol. She has remained abstinent since 6/2021. Assessment of liver fibrosis was performed with Fibroscan 8/2021. The result was 75.0 kPa which correlates with cirrhosis. The CAP score of 350 suggests hepatic steatosis. Have performed laboratory testing to monitor liver function and degree of liver injury. This included BMP, hepatic panel, CBC with platelet count and INR. Laboratory testing from 1/18/2022 reviewed in detail. Follow-up testing ordered today. The liver transaminases are normal. The ALP is elevated but has improved to the 130-140 range. The liver function and platelet count are normal.     The patient has developed the following complications of liver disease: Variceal bleed 9/2021. The CTP is 5. Child class A. The MELD score is 9. The patient does not require a liver transplant at this time due to low MELD. Primary Biliary Cholangitis  The diagnosis is based upon serology and an elevation in ALP. A liver biopsy has not been performed. Assessment of liver fibrosis was performed with Fibroscan 8/2021. The result was 75.0 kPa which correlates with cirrhosis. The CAP score of 350 suggests hepatic steatosis. The AMA is strongly positive and the ALP is significantly elevated. The patient meets criteria for PBC and histologic confirmation is not necessary at this time. The patient is being treated with MANE 1000 mg every day. Will continue MANE at 1000 mg daily. The side effects of MANE including a 2% risk of itching and a 2% risk of diarrhea were discussed.     Hypercholesterolemia   This is common in patients with PBC. Controlled clinical trials demonstrate that women with PBC do not have an increased risk of CAD depsite the elevated total cholesterol. The cholesterol is typically HDL and does not always require treatment. Stains are not contraindicated if felt to be clinically warranted. Vitamin malabsorption  Patients with PBC do not efficiently absorb fat soluble vitamins A,D,E, K. It has been recommended that the patient take multivitamins with excess fat soluble vitamins. Breast cancer screeing   Women with PBC have an increased risk of breast cancer and should undergo regular mammography screenings. Screening for Esophageal varices   EGD was performed in 09/27/2021 at [facility]. There was esophageal varices and 4 bands were placed  EGD performed by Dr. Mustapha Starr 11/2021 with 7 bands placed. EGD performed by Dr. Mustapha Starr 1/2022 with 3 bands placed. Repeat in 6 months. Continue nadolol 40 mg daily for secondary esophageal variceal hemorrhage prophylaxis. EGD ordered to be performed 7/2022. The patient was instructed to discontinue Diclofenac as this can increase risk for bleeding and ulcers. Hepatic encephalopathy   Overt HE has not developed to date. There is no reason for treatment with lactulose of xifaxan  There is no need to restrict dietary protein at this time. Screening for Hepatocellular Carcinoma  [address] screening was last performed with ultrasound 3/2022 and negative. AFP normal 1/2022. The next imaging will be due 9/2022. Treatment of other medical problems in patients with chronic liver disease  There are no contraindications for the patient to take most medications that are necessary for treatment of other medical issues. The patient can take: Any medications utilized for treatment of DM. Statins to treat hypercholesterolemia.      Normal doses of acetaminophen, as recommended on the label of the bottle, are not hepatotoxic except in the setting of daily alcohol use, even in patients with cirrhosis and can be utilized for pain. Counseling for alcohol in patients with chronic liver disease  The patient was counseled regarding alcohol consumption and the effect of alcohol on chronic liver disease. The patient has not consumed alcohol since 5/2021. There was prior heavy use. Osteoporosis  The risk of osteoporosis is increased in patients with cirrhosis. This should be ordered by the patients primary care physician. Vaccinations   Vaccination for viral hepatitis A is recommended since the patient has no serologic evidence of previous exposure or vaccination with immunity. Routine vaccinations against other bacterial and viral agents can be performed as indicated. Annual flu vaccination should be administered if indicated. ALLERGIES  No Known Allergies    MEDICATIONS  Current Outpatient Medications   Medication Sig    zolpidem (AMBIEN) 5 mg tablet Take 1 Tablet by mouth nightly as needed for Sleep. Max Daily Amount: 5 mg.  tiZANidine (ZANAFLEX) 2 mg tablet Take 1 Tablet by mouth four (4) times daily as needed for Muscle Spasm(s).  ursodioL (MANE Forte) 500 mg tablet Take 1 Tablet by mouth two (2) times a day.  pantoprazole (PROTONIX) 40 mg tablet Take 1 Tablet by mouth daily.  nadoloL (CORGARD) 40 mg tablet Take 1 Tablet by mouth daily. Indications: prevent bleeding varicose vein in the esophagus    semaglutide (Ozempic) 0.25 mg or 0.5 mg/dose (2 mg/1.5 ml) subq pen 0.5 mg by SubCUTAneous route every seven (7) days. INJECT 0.25 MG ONCE WEEKLY FOR 2 WEEKS, THEN INCREASE TO 0.5MG ONCE WEEKLY THEREAFTER. Indications: Weekly    glimepiride (AMARYL) 2 mg tablet TAKE ONE TABLET BY MOUTH EVERY DAY IN THE MORNING.  metFORMIN ER (GLUCOPHAGE XR) 750 mg tablet TAKE ONE TABLET BY MOUTH ONE TIME DAILY     ferrous sulfate 325 mg (65 mg iron) tablet Take  by mouth Daily (before breakfast).  Indications: OTC     No current facility-administered medications for this visit. SYSTEM REVIEW NOT RELATED TO LIVER DISEASE OR REVIEWED ABOVE:  Constitution systems: Negative for fever, chills, weight gain, weight loss. Eyes: Negative for visual changes. ENT: Negative for sore throat, painful swallowing. Respiratory: Negative for cough, hemoptysis, SOB. Cardiology: Negative for chest pain, palpitations. GI:  Negative for constipation or diarrhea. : Negative for urinary frequency, dysuria, hematuria, nocturia. Skin: Negative for rash. Hematology: Negative for easy bruising, blood clots. Musculo-skeletal: Negative for back pain, muscle pain, weakness. Neurologic: Negative for headaches, dizziness, vertigo, memory problems not related to HE. Psychology: Negative for anxiety, depression. FAMILY HISTORY:  The father  of lung cancer   The mother has/had the following chronic disease(s): Colon cancer, COPD, Osteoporosis. There is no family history of liver disease. There is no family history of immune disorders. SOCIAL HISTORY:  The patient is . The patient has 3 children, 1 passed of SIDS. The patient currently smokes 1/2 pack of tobacco daily. The patient drank up to 1 pint per day for 3 years. She decreased to weekends only 2 years ago. All alcohol was discontinued 2021. The patient currently works full-time as . PHYSICAL EXAMINATION:  Visit Vitals  BP (!) 164/91 (BP 1 Location: Right arm, BP Patient Position: Sitting, BP Cuff Size: Adult)   Pulse 64   Temp 98.1 °F (36.7 °C) (Temporal)   Ht 5' 2\" (1.575 m)   Wt 166 lb 12.8 oz (75.7 kg)   LMP 2018   SpO2 96%   BMI 30.51 kg/m²       General: No acute distress. Eyes: Sclera anicteric. ENT: No oral lesions. Thyroid normal.  Nodes: No adenopathy. Skin: No spider angiomata. No jaundice. No palmar erythema. Respiratory: Lungs clear to auscultation. Cardiovascular: Regular heart rate.   No murmurs. No JVD. Abdomen: Soft non-tender, liver size normal to percussion/palpation. Spleen not palpable. No obvious ascites. Extremities: No edema. No muscle wasting. No gross arthritic changes. Neurologic: Alert and oriented. Cranial nerves grossly intact. No asterixis. LABORATORY STUDIES:  [facility] of [address] Units 1/18/2022 10/29/2021   WBC 3.4 - 10.8 x10E3/uL 8.7 12.9 (H)   ANC 1.4 - 7.0 x10E3/uL 6.7 10.8 (H)   HGB 11.1 - 15.9 g/dL 15.4 11.1    - 450 x10E3/uL 173 213   INR 0.9 - 1.2 1.2    AST 0 - 40 IU/L 40 35   ALT 0 - 32 IU/L 18 26   Alk Phos 44 - 121 IU/L 134 (H) 140 (H)   Bili, Total 0.0 - 1.2 mg/dL 1.2 0.9   Bili, Direct 0.00 - 0.40 mg/dL 0.45 (H) 0.37   Albumin 3.8 - 4.9 g/dL 4.2 3.8   BUN 6 - 24 mg/dL 12 10   Creat 0.57 - 1.00 mg/dL 0.68 0.72   Na 134 - 144 mmol/L 145 (H) 139   K 3.5 - 5.2 mmol/L 4.0 4.1   Cl 96 - 106 mmol/L 104 101   CO2 20 - 29 mmol/L 27 25   Glucose 65 - 99 mg/dL 101 (H) 237 (H)     [facility] of [address] [place] [address] Ref Rng & Units 10/19/2021   WBC 3.4 - 10.8 x10E3/uL 19.4 (H)   ANC 1.4 - 7.0 x10E3/uL 16.8 (H)   HGB 11.1 - 15.9 g/dL 10.3 (L)    - 450 x10E3/uL 178   INR 0.9 - 1.2 1.1   AST 0 - 40 IU/L 34   ALT 0 - 32 IU/L 22   Alk Phos 44 - 121 IU/L 139 (H)   Bili, Total 0.0 - 1.2 mg/dL 1.1   Bili, Direct 0.00 - 0.40 mg/dL    Albumin 3.8 - 4.9 g/dL 3.9   BUN 6 - 24 mg/dL 9   Creat 0.57 - 1.00 mg/dL 0.68   Na 134 - 144 mmol/L 142   K 3.5 - 5.2 mmol/L 4.1   Cl 96 - 106 mmol/L 108 (H)   CO2 20 - 29 mmol/L 22   Glucose 65 - 99 mg/dL 162 (H)     Cancer Screening Latest Ref Rng & Units 1/18/2022 8/16/2021 6/29/2021   AFP, Serum 0.0 - 8.0 ng/mL 3.7 3.8 5.4   AFP-L3% 0.0 - 9.9 % Comment 9.4 10.3 (H)     Laboratory testing from 1/18/2022 reviewed in detail. Additional testing included to evaluate progression or regression of disease. Laboratory testing results from today will be communicated by My Chart.      SEROLOGIES:  Serologies Latest Ref Rng & Units 6/29/2021   Hep A Ab, Total Negative Negative   Hep B Surface Ag Negative Negative   Hep B Core Ab, Total Negative Negative   Hep B Surface AB QL  Non Reactive   Ferritin 15 - 150 ng/mL 284 (H)   Iron % Saturation 15 - 55 % 12 (L)   LIMA, IFA  Negative   C-ANCA Neg:<1:20 titer <1:20   P-ANCA Neg:<1:20 titer <1:20   ANCA Neg:<1:20 titer <1:20   ASMCA 0 - 19 Units 14   M2 Ab 0.0 - 20.0 Units 41.7 (H)   Ceruloplasmin 19.0 - 39.0 mg/dL 30.2   Alpha-1 antitrypsin level 101 - 187 mg/dL 187     LIVER HISTOLOGY:  8/2021. Fib[facility]. EkPa was 75.0. IQR/med 0%. . The results suggested a fibrosis level of F4. The CAP score suggests there is hepatic steatosis. ENDOSCOPIC PROCEDURES:  09/2021: EGD performed at [address] [place] [address] showed esophageal varices. Four bands was placed  11/2021. EGD by Dr. Marquis Avila. Esophageal varices with 7 bands placed. 1/2022. EGD by Dr. Marquis Avila. Esophageal varices with 3 bands placed. Repeat in 6 months. RADIOLOGY:  5/2021. CT of Abdomen. Cirrhotic appearance of the liver. Cholelithiasis. No ductal dilatation. Right renal calculi. Splenomegaly. No ascites. 3/2022. Ultrasound of liver. Echogenic consistent with cirrhosis. No liver mass lesions. No dilated bile ducts. No ascites. OTHER TESTING:  Not available or performed    FOLLOW-UP:  All of the issues listed above in the Assessment and Plan were discussed with the patient. All questions were answered. The patient expressed a clear understanding of the above. [facility] in 3 months for ongoing monitoring. SUZY [facility] of [address] [address] Juan Daniel Raya, [facility] 22.  [facility]

## 2022-04-18 NOTE — PROGRESS NOTES
Identified pt with two pt identifiers(name and ). Reviewed record in preparation for visit and have obtained necessary documentation. Chief Complaint   Patient presents with    Cirrhosis Of Liver     3month f/u with April      Vitals:    22 1337   BP: (!) 164/91   Pulse: 64   Temp: 98.1 °F (36.7 °C)   TempSrc: Temporal   SpO2: 96%   Weight: 166 lb 12.8 oz (75.7 kg)   Height: 5' 2\" (1.575 m)   PainSc:   7   PainLoc: Back   LMP: 2018       Health Maintenance Review: Patient reminded of \"due or due soon\" health maintenance. I have asked the patient to contact his/her primary care provider (PCP) for follow-up on his/her health maintenance. Coordination of Care Questionnaire:  :   1) Have you been to an emergency room, urgent care, or hospitalized since your last visit? If yes, where when, and reason for visit? no       2. Have seen or consulted any other health care provider since your last visit? If yes, where when, and reason for visit? YES. PCP      Patient is accompanied by self I have received verbal consent from Alexis Downing to discuss any/all medical information while they are present in the room.

## 2022-04-19 LAB
ALBUMIN SERPL-MCNC: 4.1 G/DL (ref 3.8–4.9)
ALP SERPL-CCNC: 158 IU/L (ref 44–121)
ALT SERPL-CCNC: 26 IU/L (ref 0–32)
AST SERPL-CCNC: 47 IU/L (ref 0–40)
BASOPHILS # BLD AUTO: 0.1 X10E3/UL (ref 0–0.2)
BASOPHILS NFR BLD AUTO: 1 %
BILIRUB DIRECT SERPL-MCNC: 0.5 MG/DL (ref 0–0.4)
BILIRUB SERPL-MCNC: 2.2 MG/DL (ref 0–1.2)
BUN SERPL-MCNC: 7 MG/DL (ref 6–24)
BUN/CREAT SERPL: 12 (ref 9–23)
CALCIUM SERPL-MCNC: 10.1 MG/DL (ref 8.7–10.2)
CHLORIDE SERPL-SCNC: 101 MMOL/L (ref 96–106)
CHOLEST SERPL-MCNC: 160 MG/DL (ref 100–199)
CO2 SERPL-SCNC: 24 MMOL/L (ref 20–29)
CREAT SERPL-MCNC: 0.57 MG/DL (ref 0.57–1)
EGFR: 109 ML/MIN/1.73
EOSINOPHIL # BLD AUTO: 0.2 X10E3/UL (ref 0–0.4)
EOSINOPHIL NFR BLD AUTO: 2 %
ERYTHROCYTE [DISTWIDTH] IN BLOOD BY AUTOMATED COUNT: 12.8 % (ref 11.7–15.4)
GLUCOSE SERPL-MCNC: 155 MG/DL (ref 65–99)
HCT VFR BLD AUTO: 41.2 % (ref 34–46.6)
HDLC SERPL-MCNC: 31 MG/DL
HGB BLD-MCNC: 14.5 G/DL (ref 11.1–15.9)
IMM GRANULOCYTES # BLD AUTO: 0 X10E3/UL (ref 0–0.1)
IMM GRANULOCYTES NFR BLD AUTO: 0 %
INR PPP: 1.2 (ref 0.9–1.2)
LDLC SERPL CALC-MCNC: 107 MG/DL (ref 0–99)
LYMPHOCYTES # BLD AUTO: 0.9 X10E3/UL (ref 0.7–3.1)
LYMPHOCYTES NFR BLD AUTO: 11 %
MCH RBC QN AUTO: 36.5 PG (ref 26.6–33)
MCHC RBC AUTO-ENTMCNC: 35.2 G/DL (ref 31.5–35.7)
MCV RBC AUTO: 104 FL (ref 79–97)
MONOCYTES # BLD AUTO: 0.5 X10E3/UL (ref 0.1–0.9)
MONOCYTES NFR BLD AUTO: 6 %
NEUTROPHILS # BLD AUTO: 6.5 X10E3/UL (ref 1.4–7)
NEUTROPHILS NFR BLD AUTO: 80 %
PLATELET # BLD AUTO: 135 X10E3/UL (ref 150–450)
POTASSIUM SERPL-SCNC: 3.8 MMOL/L (ref 3.5–5.2)
PROT SERPL-MCNC: 7.6 G/DL (ref 6–8.5)
PROTHROMBIN TIME: 12.3 SEC (ref 9.1–12)
RBC # BLD AUTO: 3.97 X10E6/UL (ref 3.77–5.28)
SODIUM SERPL-SCNC: 141 MMOL/L (ref 134–144)
TRIGL SERPL-MCNC: 122 MG/DL (ref 0–149)
VLDLC SERPL CALC-MCNC: 22 MG/DL (ref 5–40)
WBC # BLD AUTO: 8.2 X10E3/UL (ref 3.4–10.8)

## 2022-04-22 DIAGNOSIS — M54.6 THORACIC SPINE PAIN: ICD-10-CM

## 2022-04-22 RX ORDER — TIZANIDINE 2 MG/1
2 TABLET ORAL
Qty: 40 TABLET | Refills: 2 | Status: SHIPPED | OUTPATIENT
Start: 2022-04-22 | End: 2022-06-09 | Stop reason: SDUPTHER

## 2022-05-07 DIAGNOSIS — G47.00 INSOMNIA, UNSPECIFIED TYPE: ICD-10-CM

## 2022-05-07 RX ORDER — ZOLPIDEM TARTRATE 5 MG/1
TABLET ORAL
Qty: 30 TABLET | Refills: 1 | Status: SHIPPED | OUTPATIENT
Start: 2022-05-07 | End: 2022-06-09 | Stop reason: SDUPTHER

## 2022-05-18 ENCOUNTER — OFFICE VISIT (OUTPATIENT)
Dept: FAMILY MEDICINE CLINIC | Age: 52
End: 2022-05-18
Payer: COMMERCIAL

## 2022-05-18 VITALS
HEART RATE: 64 BPM | HEIGHT: 62 IN | OXYGEN SATURATION: 97 % | DIASTOLIC BLOOD PRESSURE: 66 MMHG | TEMPERATURE: 97.8 F | RESPIRATION RATE: 18 BRPM | BODY MASS INDEX: 30.55 KG/M2 | SYSTOLIC BLOOD PRESSURE: 134 MMHG | WEIGHT: 166 LBS

## 2022-05-18 DIAGNOSIS — K74.3 PRIMARY BILIARY CIRRHOSIS (HCC): ICD-10-CM

## 2022-05-18 DIAGNOSIS — M54.6 THORACIC SPINE PAIN: Primary | ICD-10-CM

## 2022-05-18 DIAGNOSIS — E11.9 TYPE 2 DIABETES MELLITUS WITHOUT COMPLICATION, WITHOUT LONG-TERM CURRENT USE OF INSULIN (HCC): ICD-10-CM

## 2022-05-18 PROCEDURE — 3044F HG A1C LEVEL LT 7.0%: CPT | Performed by: FAMILY MEDICINE

## 2022-05-18 PROCEDURE — 99213 OFFICE O/P EST LOW 20 MIN: CPT | Performed by: FAMILY MEDICINE

## 2022-05-18 RX ORDER — OXYCODONE AND ACETAMINOPHEN 5; 325 MG/1; MG/1
1 TABLET ORAL
Qty: 120 TABLET | Refills: 0 | Status: SHIPPED | OUTPATIENT
Start: 2022-05-18 | End: 2022-06-28 | Stop reason: SDUPTHER

## 2022-05-18 NOTE — PROGRESS NOTES
HISTORY OF PRESENT ILLNESS  Michell Manley is a 45 y.o. female. f/u  recurrance of severe l upper back pain/sprain ongoing for many years. Seen by Dr Tia Jones in past with some relief from ROGERIO,has appt in June. .Seeing Hepatology for [facility]. LFTs have returned to normal.Felt to be doing well  Back Pain   The history is provided by the patient. This is a recurrent problem. The problem has not changed since onset. The problem occurs hourly. The pain is associated with no known injury. The pain is present in the thoracic spine and left side. The quality of the pain is described as aching. The pain is at a severity of 5/10. The pain is moderate. The symptoms are aggravated by certain positions, twisting and bending. Pertinent negatives include no fever and no dysuria. Abnormal Lab Results  The history is provided by the patient. This is a chronic problem. The problem occurs daily. The problem has not changed since onset. Review of Systems   Constitutional: Negative for fever and malaise/fatigue. Respiratory: Negative for cough. Cardiovascular: Negative for palpitations and orthopnea. Genitourinary: Negative for dysuria, frequency, hematuria and urgency. Musculoskeletal: Positive for back pain. Negative for myalgias. Neurological: Negative for dizziness. Physical Exam  Vitals reviewed. Constitutional:       Appearance: Normal appearance. HENT:      Head: Normocephalic and atraumatic. Right Ear: Tympanic membrane normal.      Nose: Nose normal.   Eyes:      Pupils: Pupils are equal, round, and reactive to light. Cardiovascular:      Rate and Rhythm: Normal rate and regular rhythm. Heart sounds: Normal heart sounds. Pulmonary:      Effort: Pulmonary effort is normal.      Breath sounds: Normal breath sounds. Abdominal:      General: Abdomen is flat. Palpations: Abdomen is soft. Musculoskeletal:      Cervical back: Normal, normal range of motion and neck supple.       Thoracic back: Tenderness present. No swelling, spasms or bony tenderness. Decreased range of motion. Lumbar back: Normal.        Back:    Skin:     General: Skin is warm and dry. Neurological:      General: No focal deficit present. Mental Status: She is alert and oriented to person, place, and time. Psychiatric:         Behavior: Behavior normal.                   Follow-up and Dispositions    · Return in about 2 months (around 7/18/2022). Diagnoses and all orders for this visit:    1. Thoracic spine pain,intractable,awaiting Pain Management visit. Opioid risks discussed  -     oxyCODONE-acetaminophen (PERCOCET) 5-325 mg per tablet; Take 1 Tablet by mouth every four (4) hours as needed for Pain for up to 30 days. Max Daily Amount: 6 Tablets. -     MONITOR SCREEN 10-DRUG CLASS PROFILE; Future    2. Primary biliary cirrhosis ([facility] [address]    3. Type 2 diabetes mellitus without complication, without long-term current use of insulin ([facility] [address]      Follow-up and Dispositions    · Return in about 2 months (around 7/18/2022). Follow-up and Dispositions    · Return in about 2 months (around 7/18/2022).

## 2022-05-18 NOTE — PROGRESS NOTES
Chief Complaint   Patient presents with    Back Pain     Pt having back pain. 1. \"Have you been to the ER, urgent care clinic since your last visit? Hospitalized since your last visit? \" No    2. \"Have you seen or consulted any other health care providers outside of the [address] [place] [address] since your last visit? \" No     3. For patients aged 39-70: Has the patient had a colonoscopy / FIT/ Cologuard? Yes - Care Gap present. Most recent result on file      If the patient is female:    4. For patients aged 41-77: Has the patient had a mammogram within the past 2 years? Yes - Care Gap present. Most recent result on file      5. For patients aged 21-65: Has the patient had a pap smear? Yes - Care Gap present.  Most recent result on file    Health Maintenance Due   Topic Date Due    Hepatitis C Screening  Never done    Eye Exam Retinal or Dilated  Never done    Foot Exam Q1  07/01/2017    Shingrix Vaccine Age 50> (1 of 2) Never done    Cervical cancer screen  05/06/2020    Pneumococcal 0-64 years (2 - PCV) 08/07/2021    COVID-19 Vaccine (3 - Booster for Moderna series) 09/27/2021    MICROALBUMIN Q1  05/13/2022    Breast Cancer Screen Mammogram  06/17/2022

## 2022-05-19 LAB
AMPHETAMINES UR QL SCN: NEGATIVE NG/ML
BARBITURATES UR QL SCN: NEGATIVE NG/ML
BENZODIAZ UR QL SCN: NEGATIVE NG/ML
BZE UR QL SCN: NEGATIVE NG/ML
CANNABINOIDS UR QL SCN: POSITIVE NG/ML
CREAT UR-MCNC: 72.5 MG/DL (ref 20–300)
METHADONE UR QL SCN: NEGATIVE NG/ML
OPIATES UR QL SCN: NEGATIVE NG/ML
OXYCODONE+OXYMORPHONE UR QL SCN: NEGATIVE NG/ML
PCP UR QL: NEGATIVE NG/ML
PH UR: 6.1 [PH] (ref 4.5–8.9)
PLEASE NOTE:, 733163: ABNORMAL
PROPOXYPH UR QL SCN: NEGATIVE NG/ML

## 2022-06-09 ENCOUNTER — TELEPHONE (OUTPATIENT)
Dept: FAMILY MEDICINE CLINIC | Age: 52
End: 2022-06-09

## 2022-06-09 DIAGNOSIS — M54.6 THORACIC SPINE PAIN: ICD-10-CM

## 2022-06-09 DIAGNOSIS — G47.00 INSOMNIA, UNSPECIFIED TYPE: ICD-10-CM

## 2022-06-09 NOTE — TELEPHONE ENCOUNTER
TT         1           Diana CAVAZOS Wanda Westfall  Female, 45 y.o., 1970  Weight:   166 lb (75.3 kg)    MRN:   [id number]  Phone:   [phone] (M)              PCP:   Elijah Dash MD  Primary Cvg:   [facility]    Last Appt With Me  None    Next Appt With Me  None    Next Appt  07/07/2022 - Dany Diaz MD - [facility] LIVER [facility]                    back    Shelvy Cabot, MD 23 minutes ago (9:56 AM)           Hi     I  saw the back doctor yesterday and I have appointment to get the shoots on Aug1.  It is taking awhile because he is off 2 weeks in June and 2 weeks in July.     Thanks  Elva Batch               Encounter Messages    Read Composed From To  Subject   Y 6/9/2022  9:56 AM Diana Huynh MD  back

## 2022-06-10 RX ORDER — TIZANIDINE 2 MG/1
2 TABLET ORAL
Qty: 40 TABLET | Refills: 2 | Status: SHIPPED | OUTPATIENT
Start: 2022-06-10

## 2022-06-10 RX ORDER — ZOLPIDEM TARTRATE 5 MG/1
5 TABLET ORAL
Qty: 30 TABLET | Refills: 1 | Status: SHIPPED | OUTPATIENT
Start: 2022-06-10 | End: 2022-09-06

## 2022-06-28 ENCOUNTER — TELEPHONE (OUTPATIENT)
Dept: FAMILY MEDICINE CLINIC | Age: 52
End: 2022-06-28

## 2022-06-28 DIAGNOSIS — M54.6 THORACIC SPINE PAIN: Primary | ICD-10-CM

## 2022-06-28 DIAGNOSIS — M54.6 THORACIC SPINE PAIN: ICD-10-CM

## 2022-06-28 RX ORDER — OXYCODONE AND ACETAMINOPHEN 5; 325 MG/1; MG/1
1 TABLET ORAL
Qty: 120 TABLET | Refills: 0 | Status: SHIPPED | OUTPATIENT
Start: 2022-06-28 | End: 2022-07-22 | Stop reason: SDUPTHER

## 2022-06-28 NOTE — TELEPHONE ENCOUNTER
TT         1           Diana Delarosa Althea  Female, 45 y.o., 1970  Weight:   166 lb (75.3 kg)    MRN:   [id number]  Phone:   [phone] (M)              PCP:   Casandra Dsouza MD  Primary Cvg:   [facility]    Last Appt With Me  None    Next Appt With Me  None    Next Appt  07/07/2022 - Ryan Koroma MD - [facility] LIVER [facility] medication    Nicole Pan MD 21 hours ago (1:10 PM)           Hello,     I have about 4 more of the pain medication  let. My appointment for shots in my back is 8/1. Can I get a refill just to make until then?   I do not take more than 2  per day.      Thanks  Alysha Cazares               Encounter Messages    Read Composed From To  Subject   Y 6/27/2022  1:10 PM Diana Celestin MD  [facility] medication

## 2022-07-22 DIAGNOSIS — M54.6 THORACIC SPINE PAIN: ICD-10-CM

## 2022-07-22 RX ORDER — OXYCODONE AND ACETAMINOPHEN 5; 325 MG/1; MG/1
1 TABLET ORAL
Qty: 7 TABLET | Refills: 0 | Status: SHIPPED | OUTPATIENT
Start: 2022-07-22 | End: 2022-07-25

## 2022-07-22 NOTE — TELEPHONE ENCOUNTER
----- Message from ΣΤΡ[place]ΒΟΛΟΣ DIRK Mccall sent at 7/21/2022  3:54 PM EDT -----  Regarding: pain medication  Hi,    When you called into the pharmacy for 120 of the pain medication they told me they did not have it all but I could pickup what they had and  the rest later. They filled 113 of 120 but when I went to get the balance they said that is not how they proceed it and that I would need for you to call the balance of 7 in for me. This pharmacy seems to be going down hill. Also I wanted you to know the back doctor's office called me yesterday to let  me know the insurance company will not pay for it. As long as they don't miss and it does help me with the pain I guess I am okay with the 2600.00    Thanks  Diana            The 7 pills were loaded and sent to you to approve Dr. Rosetta Liu.   Pt can be reached at [phone]

## 2022-07-27 ENCOUNTER — TELEPHONE (OUTPATIENT)
Dept: FAMILY MEDICINE CLINIC | Age: 52
End: 2022-07-27

## 2022-07-27 NOTE — TELEPHONE ENCOUNTER
----- Message from RINΟΒΟΛΟCATHY CAVAZOS Kym sent at 7/27/2022 12:01 PM EDT -----  Regarding: Back Pain  Hi,    My appointment for the shots are on Monday at 8:20. I have 2 pain medication left. Can I get a few more to last me until Monday? I only take about 2 a day. I hope the shots work this time.      Thanks  Private Practice  [phone]

## 2022-07-28 DIAGNOSIS — M54.6 THORACIC SPINE PAIN: ICD-10-CM

## 2022-07-28 RX ORDER — OXYCODONE AND ACETAMINOPHEN 5; 325 MG/1; MG/1
1 TABLET ORAL
Qty: 120 TABLET | Refills: 0 | Status: SHIPPED | OUTPATIENT
Start: 2022-07-28 | End: 2022-09-22 | Stop reason: SDUPTHER

## 2022-08-24 ENCOUNTER — TELEPHONE (OUTPATIENT)
Dept: FAMILY MEDICINE CLINIC | Age: 52
End: 2022-08-24

## 2022-08-24 DIAGNOSIS — M54.6 THORACIC SPINE PAIN: Primary | ICD-10-CM

## 2022-08-24 RX ORDER — PREDNISONE 5 MG/1
TABLET ORAL
Qty: 21 TABLET | Refills: 0 | Status: SHIPPED | OUTPATIENT
Start: 2022-08-24

## 2022-08-24 RX ORDER — TIZANIDINE 2 MG/1
2 TABLET ORAL
Qty: 30 TABLET | Refills: 1 | Status: SHIPPED | OUTPATIENT
Start: 2022-08-24 | End: 2022-10-31 | Stop reason: SDUPTHER

## 2022-08-24 NOTE — TELEPHONE ENCOUNTER
Pt requesting a call back because she is having severe back pain. She can be reached at [phone].

## 2022-09-06 DIAGNOSIS — G47.00 INSOMNIA, UNSPECIFIED TYPE: ICD-10-CM

## 2022-09-06 RX ORDER — ZOLPIDEM TARTRATE 5 MG/1
TABLET ORAL
Qty: 30 TABLET | Refills: 0 | Status: SHIPPED | OUTPATIENT
Start: 2022-09-06 | End: 2022-10-06

## 2022-09-21 ENCOUNTER — TELEPHONE (OUTPATIENT)
Dept: FAMILY MEDICINE CLINIC | Age: 52
End: 2022-09-21

## 2022-09-21 NOTE — TELEPHONE ENCOUNTER
Hello,  Can you call me about my back? My number is [phone]. The back doctor missed again. Now he is telling me the shots will not work. This is after I have spent over $5,000.00. Now I have lost my job partly due to my back issues. Randall Ortiz  ----------------------------------------------------------------------------  Pt can't come in the office because she lost her insurance. Please call.

## 2022-09-22 ENCOUNTER — TELEPHONE (OUTPATIENT)
Dept: FAMILY MEDICINE CLINIC | Age: 52
End: 2022-09-22

## 2022-09-22 DIAGNOSIS — M54.6 THORACIC SPINE PAIN: Primary | ICD-10-CM

## 2022-09-22 RX ORDER — OXYCODONE AND ACETAMINOPHEN 5; 325 MG/1; MG/1
1 TABLET ORAL
Qty: 120 TABLET | Refills: 0 | Status: SHIPPED | OUTPATIENT
Start: 2022-09-22 | End: 2022-10-22

## 2022-09-22 NOTE — TELEPHONE ENCOUNTER
----- Message from CATHYΤΡΟΒΟΛΟCATHY Singh sent at 9/21/2022  4:58 PM EDT -----  Regarding: my back  I missed Dr Prado Marking call. Can you have him call me again?  Thanks   [phone]

## 2022-10-04 DIAGNOSIS — G47.00 INSOMNIA, UNSPECIFIED TYPE: ICD-10-CM

## 2022-10-05 ENCOUNTER — TELEPHONE (OUTPATIENT)
Dept: FAMILY MEDICINE CLINIC | Age: 52
End: 2022-10-05

## 2022-10-06 RX ORDER — ZOLPIDEM TARTRATE 5 MG/1
TABLET ORAL
Qty: 30 TABLET | Refills: 0 | Status: SHIPPED | OUTPATIENT
Start: 2022-10-06

## 2022-10-27 DIAGNOSIS — K74.3 PRIMARY BILIARY CHOLANGITIS (HCC): ICD-10-CM

## 2022-10-27 RX ORDER — URSODIOL 500 MG/1
500 TABLET, FILM COATED ORAL 2 TIMES DAILY
Qty: 60 TABLET | Refills: 5 | Status: SHIPPED | OUTPATIENT
Start: 2022-10-27 | End: 2022-10-28 | Stop reason: SDUPTHER

## 2022-10-28 DIAGNOSIS — K74.3 PRIMARY BILIARY CHOLANGITIS (HCC): ICD-10-CM

## 2022-10-28 RX ORDER — URSODIOL 500 MG/1
500 TABLET, FILM COATED ORAL 2 TIMES DAILY
Qty: 60 TABLET | Refills: 5 | Status: SHIPPED | OUTPATIENT
Start: 2022-10-28

## 2022-11-07 ENCOUNTER — OFFICE VISIT (OUTPATIENT)
Dept: FAMILY MEDICINE CLINIC | Age: 52
End: 2022-11-07

## 2022-11-07 VITALS
HEIGHT: 62 IN | WEIGHT: 159.2 LBS | BODY MASS INDEX: 29.3 KG/M2 | DIASTOLIC BLOOD PRESSURE: 74 MMHG | SYSTOLIC BLOOD PRESSURE: 118 MMHG | OXYGEN SATURATION: 99 % | HEART RATE: 83 BPM

## 2022-11-07 DIAGNOSIS — G47.00 INSOMNIA, UNSPECIFIED TYPE: ICD-10-CM

## 2022-11-07 DIAGNOSIS — K74.69 OTHER CIRRHOSIS OF LIVER (HCC): ICD-10-CM

## 2022-11-07 DIAGNOSIS — K74.3 PRIMARY BILIARY CIRRHOSIS (HCC): Primary | ICD-10-CM

## 2022-11-07 DIAGNOSIS — J18.9 COMMUNITY ACQUIRED PNEUMONIA, UNSPECIFIED LATERALITY: ICD-10-CM

## 2022-11-07 DIAGNOSIS — E11.9 TYPE 2 DIABETES MELLITUS WITHOUT COMPLICATION, WITHOUT LONG-TERM CURRENT USE OF INSULIN (HCC): ICD-10-CM

## 2022-11-07 DIAGNOSIS — M54.6 THORACIC SPINE PAIN: ICD-10-CM

## 2022-11-07 DIAGNOSIS — E78.2 MIXED HYPERLIPIDEMIA: ICD-10-CM

## 2022-11-07 DIAGNOSIS — J96.01 ACUTE HYPOXEMIC RESPIRATORY FAILURE (HCC): ICD-10-CM

## 2022-11-07 LAB — HBA1C MFR BLD HPLC: 7.6 %

## 2022-11-07 PROCEDURE — 83036 HEMOGLOBIN GLYCOSYLATED A1C: CPT | Performed by: FAMILY MEDICINE

## 2022-11-07 PROCEDURE — 3051F HG A1C>EQUAL 7.0%<8.0%: CPT | Performed by: FAMILY MEDICINE

## 2022-11-07 PROCEDURE — 99214 OFFICE O/P EST MOD 30 MIN: CPT | Performed by: FAMILY MEDICINE

## 2022-11-07 RX ORDER — IBUPROFEN 200 MG
CAPSULE ORAL
Qty: 50 STRIP | Refills: 5 | Status: SHIPPED | OUTPATIENT
Start: 2022-11-07

## 2022-11-07 NOTE — PROGRESS NOTES
HISTORY OF PRESENT ILLNESS  Ce Torres is a 47 y.o. female. f/u hospitalization at [facility] for respiratory failure requiring intubation briefly. discharged 9/28/22. Feeling well,advised to f/u with cardiology. F/U pbc dm2,chronic back pain  Hospital Follow Up  The history is provided by the Patient. This is a new problem. The problem occurs daily. The problem has been resolved. Associated symptoms include shortness of breath. Pertinent negatives include no chest pain and no abdominal pain. Breathing Problem  The history is provided by the Patient. This is a new problem. The current episode started more than 1 week ago. The problem has been resolved. Pertinent negatives include no fever, no orthopnea, no chest pain and no abdominal pain. Diabetes  The history is provided by the Patient. This is a chronic problem. The problem occurs daily. Associated symptoms include shortness of breath. Pertinent negatives include no chest pain and no abdominal pain. Back Pain   The history is provided by the Patient. This is a chronic problem. The problem has been gradually improving. Pertinent negatives include no chest pain, no fever, no abdominal pain and no dysuria. Review of Systems   Constitutional:  Positive for malaise/fatigue. Negative for chills and fever. Respiratory:  Positive for shortness of breath. Cardiovascular:  Negative for chest pain, palpitations and orthopnea. Gastrointestinal:  Positive for nausea. Negative for abdominal pain. Genitourinary:  Negative for dysuria. Musculoskeletal:  Positive for back pain. Neurological:  Negative for dizziness. Physical Exam  Constitutional:       Appearance: Normal appearance. Cardiovascular:      Rate and Rhythm: Normal rate and regular rhythm. Pulses: Normal pulses. Heart sounds: Normal heart sounds. Pulmonary:      Effort: Pulmonary effort is normal.      Breath sounds: Normal breath sounds.    Abdominal:      Palpations: Abdomen is soft.   Musculoskeletal:      Cervical back: Normal range of motion and neck supple. Skin:     General: Skin is warm and dry. Neurological:      Mental Status: She is alert and oriented to person, place, and time. ASSESSMENT and PLAN  Diagnoses and all orders for this visit:    1. Primary biliary cirrhosis (HCC)  -     CBC WITH AUTOMATED DIFF; Future  -     METABOLIC PANEL, COMPREHENSIVE; Future    2. Acute hypoxemic respiratory failure (HCC)    3. Community acquired pneumonia, unspecified laterality    4. Other cirrhosis of liver ([facility] [address]    5. Mixed hyperlipidemia    6. Thoracic spine pain  -     XR CHEST PA LAT; Future    7. Type 2 diabetes mellitus without complication, without long-term current use of insulin (HCC)  -     AMB POC HEMOGLOBIN A1C  -     glucose blood VI test strips (blood glucose test) strip; by Does Not Apply route Daily (before breakfast).

## 2022-11-07 NOTE — PROGRESS NOTES
Chief Complaint   Patient presents with    [address] d/c 9/28/22      1. \"Have you been to the ER, urgent care clinic since your last visit? Hospitalized since your last visit? \"  Yes, [facility] d/c 9/28/22    2. \"Have you seen or consulted any other health care providers outside of the [address] [place] [address] since your last visit? \" No     3. For patients aged 39-70: Has the patient had a colonoscopy / FIT/ Cologuard? Yes - no Care Gap present      If the patient is female:    4. For patients aged 41-77: Has the patient had a mammogram within the past 2 years? No      5. For patients aged 21-65: Has the patient had a pap smear?  No

## 2022-11-08 LAB
ALBUMIN SERPL-MCNC: 3.8 G/DL (ref 3.8–4.9)
ALBUMIN/GLOB SERPL: 1 {RATIO} (ref 1.2–2.2)
ALP SERPL-CCNC: 169 IU/L (ref 44–121)
ALT SERPL-CCNC: 31 IU/L (ref 0–32)
AST SERPL-CCNC: 56 IU/L (ref 0–40)
BASOPHILS # BLD AUTO: 0.1 X10E3/UL (ref 0–0.2)
BASOPHILS NFR BLD AUTO: 1 %
BILIRUB SERPL-MCNC: 1.8 MG/DL (ref 0–1.2)
BUN SERPL-MCNC: 8 MG/DL (ref 6–24)
BUN/CREAT SERPL: 14 (ref 9–23)
CALCIUM SERPL-MCNC: 10.9 MG/DL (ref 8.7–10.2)
CHLORIDE SERPL-SCNC: 102 MMOL/L (ref 96–106)
CO2 SERPL-SCNC: 25 MMOL/L (ref 20–29)
CREAT SERPL-MCNC: 0.58 MG/DL (ref 0.57–1)
EGFR: 108 ML/MIN/1.73
EOSINOPHIL # BLD AUTO: 0.2 X10E3/UL (ref 0–0.4)
EOSINOPHIL NFR BLD AUTO: 2 %
ERYTHROCYTE [DISTWIDTH] IN BLOOD BY AUTOMATED COUNT: 12.5 % (ref 11.7–15.4)
GLOBULIN SER CALC-MCNC: 3.9 G/DL (ref 1.5–4.5)
GLUCOSE SERPL-MCNC: 186 MG/DL (ref 70–99)
HCT VFR BLD AUTO: 40.2 % (ref 34–46.6)
HGB BLD-MCNC: 13.7 G/DL (ref 11.1–15.9)
IMM GRANULOCYTES # BLD AUTO: 0 X10E3/UL (ref 0–0.1)
IMM GRANULOCYTES NFR BLD AUTO: 0 %
LYMPHOCYTES # BLD AUTO: 1.1 X10E3/UL (ref 0.7–3.1)
LYMPHOCYTES NFR BLD AUTO: 12 %
MCH RBC QN AUTO: 35.4 PG (ref 26.6–33)
MCHC RBC AUTO-ENTMCNC: 34.1 G/DL (ref 31.5–35.7)
MCV RBC AUTO: 104 FL (ref 79–97)
MONOCYTES # BLD AUTO: 0.5 X10E3/UL (ref 0.1–0.9)
MONOCYTES NFR BLD AUTO: 6 %
NEUTROPHILS # BLD AUTO: 7.2 X10E3/UL (ref 1.4–7)
NEUTROPHILS NFR BLD AUTO: 79 %
PLATELET # BLD AUTO: 156 X10E3/UL (ref 150–450)
POTASSIUM SERPL-SCNC: 4.2 MMOL/L (ref 3.5–5.2)
PROT SERPL-MCNC: 7.7 G/DL (ref 6–8.5)
RBC # BLD AUTO: 3.87 X10E6/UL (ref 3.77–5.28)
SODIUM SERPL-SCNC: 140 MMOL/L (ref 134–144)
WBC # BLD AUTO: 9.1 X10E3/UL (ref 3.4–10.8)

## 2022-11-08 RX ORDER — ZOLPIDEM TARTRATE 5 MG/1
5 TABLET ORAL
Qty: 30 TABLET | Refills: 0 | Status: SHIPPED | OUTPATIENT
Start: 2022-11-08

## 2022-11-18 ENCOUNTER — TELEPHONE (OUTPATIENT)
Dept: FAMILY MEDICINE CLINIC | Age: 52
End: 2022-11-18

## 2022-11-18 DIAGNOSIS — M54.6 THORACIC SPINE PAIN: Primary | ICD-10-CM

## 2022-11-18 RX ORDER — OXYCODONE AND ACETAMINOPHEN 5; 325 MG/1; MG/1
1 TABLET ORAL
Qty: 100 TABLET | Refills: 0 | Status: SHIPPED | OUTPATIENT
Start: 2022-11-18 | End: 2022-12-18

## 2022-11-18 NOTE — TELEPHONE ENCOUNTER
----- Message from CATHYΤΡΟΒΟΛΟCATHY Oakes sent at 11/18/2022  1:01 PM EST -----  Regarding: back pain  Hi Dr Micheal Olmos,   My back is doing a little better, but I am still in pain. Can you refill my prescription for pain?    Thanks,  ΣΤ[place]ΒΟ[place]ΟΣ  [phone]  ---------------------------------------------------------------------------  Pt pain refill sent to Dr. Michael Olmos for approval.

## 2022-11-22 ENCOUNTER — OFFICE VISIT (OUTPATIENT)
Dept: HEMATOLOGY | Age: 52
End: 2022-11-22
Payer: COMMERCIAL

## 2022-11-22 VITALS
BODY MASS INDEX: 29.63 KG/M2 | HEIGHT: 62 IN | DIASTOLIC BLOOD PRESSURE: 56 MMHG | SYSTOLIC BLOOD PRESSURE: 117 MMHG | TEMPERATURE: 97.1 F | OXYGEN SATURATION: 100 % | WEIGHT: 161 LBS | HEART RATE: 56 BPM

## 2022-11-22 DIAGNOSIS — K74.69 OTHER CIRRHOSIS OF LIVER (HCC): Primary | ICD-10-CM

## 2022-11-22 DIAGNOSIS — K74.3 PRIMARY BILIARY CHOLANGITIS (HCC): ICD-10-CM

## 2022-11-22 PROCEDURE — 99214 OFFICE O/P EST MOD 30 MIN: CPT | Performed by: NURSE PRACTITIONER

## 2022-11-22 NOTE — PROGRESS NOTES
2030 [facility], MD, [address], [place], [place]      Lottielisa Grullon, PA-C Ransom Halsted, ACNP-BC     Sania Jewell, [facility]   FREDDY Hughes, [facility]-BC       Shamika Mcnair [facility] 136    at [address]    217 [facility], [address]  22.    [phone]    FAX: [address] [place] [address] Avenue    [address], [address]    [phone]    FAX: [phone]       Patient Care Team:  Manuelito Nichols MD as PCP - Jim aPlacios MD as PCP - [facility] Provider      Problem List  Date Reviewed: 4/18/2022            Codes Class Noted    Primary biliary cholangitis [facility]) ICD-10-CM: K74.3  ICD-9-CM: 571.6  8/16/2021        Other cirrhosis of liver ([facility] 75.) ICD-10-CM: K74.69  ICD-9-CM: 571.5  6/30/2021        Right-sided thoracic back pain ICD-10-CM: M54.6  ICD-9-CM: 724.1  6/29/2021        Elevated liver enzymes ICD-10-CM: R74.8  ICD-9-CM: 790.5  6/29/2021        Severe obesity ([facility] ICD-10-CM: E66.01  ICD-9-CM: 278.01  12/30/2018        Type 2 diabetes mellitus without complication ([facility] 75.) XCS-[id number]-GODINEZ: E11.9  ICD-9-CM: 250.00  7/1/2016        Symptomatic anemia ICD-10-CM: D64.9  ICD-9-CM: 285.9  5/8/2015        Insomnia ICD-10-CM: G47.00  ICD-9-CM: 780.52  10/18/2013        Allergic rhinitis, cause unspecified (Chronic) ICD-10-CM: J30.9  ICD-9-CM: 477.9  4/23/2013        Encounter for long-term (current) use of other medications ICD-10-CM: Z79.899  ICD-9-CM: V58.69  7/8/2012        Tobacco use disorder (Chronic) ICD-10-CM: J35.080  ICD-9-CM: 305.1  4/27/2010        Renal calculus or stone ICD-10-CM: N20.0  ICD-9-CM: 592.0  4/27/2010        BMI 35.0-35.9,adult (Chronic) ICD-10-CM: A65.02  ICD-9-CM: V85.35 Chronic 4/27/2010    Overview Signed 3/10/2015  2:30 AM by Charol Skiff P     Anthropometric:   Weight Loss Metrics 3/9/2015 11/6/2013 10/28/2013 10/18/2013 9/10/2013 4/23/2013 1/29/2013   Today's Wt 192 lb 163 lb 12.8 oz 164 lb 14.5 oz 161 lb 161 lb 184 lb 9.6 oz 188 lb 6.4 oz   BMI 35.11 kg/m2 29.95 kg/m2 30.15 kg/m2 28.07 kg/m2 28.07 kg/m2 32.18 kg/m2 34.45 kg/m2                 Vinay Gilliland is being seen at [facility] for management 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 The active problem list, all pertinent past medical history, medications,   radiologic findings and laboratory findings related to the liver disorder were reviewed and discussed with the patient. The patient is a 47 y.o.  female who was found to have chronic liver disease and cirrhosis   in 5/2021 when a CT scan was done for evaluation of an abdominal hernia. She has a history of past heavy alcohol use up to 1 pint per day for 3 years. This was decreased to weekend use only 2 years ago. She has now remained abstinent since 6/01/2021. Assessment of liver fibrosis was performed with Fibroscan 8/2021. The result was 75.0 kPa which correlates with cirrhosis. The CAP score of 350 suggests hepatic steatosis. Serologic evaluation for markers of chronic liver disease was positive for AMA. Treatment for PBC was initiated with MANE 1000 mg daily 7/2021. She is tolerating the medication well with no reported side effects. The patient has developed the following complications of cirrhosis: Variceal bleed 9/2021, treated at [facility]. EGD performed by Dr. Mo Herrera 11/2021 varices, 7 bands and again 1/2022 3 bands. Since the last office visit the patient developed acute shortness of breath 9/2022 which prompted an ER visit. Oxygen saturation decreased to 40% which ended up in being placed on a ventilator. She was transferred to [facility] and recovered with extubation.  Radiology imaging was consistent with possible pneumonia. Cardiac workup is being scheduled. The patient does not have any symptoms which could be attributed to the liver disorder. The patient is not experiencing the following symptoms which are commonly seen in this liver disorder: fatigue, pain in the right side over the liver, yellowing of the eyes or skin, itching, or swelling of the abdomen. The patient completes all daily activities without any functional limitations. ASSESSMENT AND PLAN:  Cirrhosis  The diagnosis of cirrhosis is based upon imaging, Fibroscan and laboratory studies. Cirrhosis is secondary to PBC and alcohol. She has remained abstinent since 6/2021. Assessment of liver fibrosis was performed with Fibroscan 8/2021. The result was 75.0 kPa which correlates with cirrhosis. The CAP score of 350 suggests hepatic steatosis. Have performed laboratory testing to monitor liver function and degree of liver injury. This included BMP, hepatic panel, CBC with platelet count and INR. Laboratory testing from 11/07/2022 reviewed in detail. Follow-up testing ordered today. The AST is elevated. The ALT is normal. The ALP is elevated. The liver function is normal. The platelet count is normal.     The patient has developed the following complications of liver disease: Variceal bleed 9/2021. The CTP is 5. Child class A. The MELD score is 11. The patient does not require a liver transplant at this time due to low MELD. Primary Biliary Cholangitis  The diagnosis is based upon serology and an elevation in ALP. A liver biopsy has not been performed. Assessment of liver fibrosis was performed with Fibroscan 8/2021. The result was 75.0 kPa which correlates with cirrhosis. The CAP score of 350 suggests hepatic steatosis. The AMA is strongly positive and the ALP is significantly elevated. The patient meets criteria for PBC and histologic confirmation is not necessary at this time.     The patient is being treated with MANE 1000 mg every day. Will continue MANE at 1000 mg daily. The side effects of MANE including a 2% risk of itching and a 2% risk of diarrhea were discussed. Hypercholesterolemia   This is common in patients with PBC. Controlled clinical trials demonstrate that women with PBC do not have an increased risk of CAD depsite the elevated total cholesterol. The cholesterol is typically HDL and does not always require treatment. Stains are not contraindicated if felt to be clinically warranted. Vitamin malabsorption  Patients with PBC do not efficiently absorb fat soluble vitamins A,D,E, K. It has been recommended that the patient take multivitamins with excess fat soluble vitamins. Breast cancer screeing   Women with PBC have an increased risk of breast cancer and should undergo regular mammography screenings. Screening for Esophageal varices   EGD was performed in 09/27/2021 at [facility]. There was esophageal varices and 4 bands were placed  EGD performed by Dr. Johnny Myers 11/2021 with 7 bands placed. EGD performed by Dr. Johnny Myers 1/2022 with 3 bands placed. Repeat in 6 months. Continue nadolol 40 mg daily for secondary esophageal variceal hemorrhage prophylaxis. EGD ordered to be performed 7/2022. This was cancelled due to changes in insurance. Will order EGD to be performed in 1/2023. The patient was instructed to discontinue Diclofenac as this can increase risk for bleeding and ulcers. Hepatic encephalopathy   Overt HE has not developed to date. There is no reason for treatment with lactulose of xifaxan  There is no need to restrict dietary protein at this time. Screening for Hepatocellular Carcinoma  [facility]. screening was last performed with ultrasound 9/2022 and negative. AFP normal 1/2022. Will order AFP today.      Treatment of other medical problems in patients with chronic liver disease  There are no contraindications for the patient to take most medications that are necessary for treatment of other medical issues. The patient can take: Any medications utilized for treatment of DM. Statins to treat hypercholesterolemia. Normal doses of acetaminophen, as recommended on the label of the bottle, are not hepatotoxic except in the setting of daily alcohol use, even in patients with cirrhosis and can be utilized for pain. Counseling for alcohol in patients with chronic liver disease  The patient was counseled regarding alcohol consumption and the effect of alcohol on chronic liver disease. The patient has not consumed alcohol since 5/2021. There was prior heavy use. Osteoporosis  The risk of osteoporosis is increased in patients with cirrhosis. This should be ordered by the patients primary care physician. Vaccinations   Vaccination for viral hepatitis A is recommended since the patient has no serologic evidence of previous exposure or vaccination with immunity. Routine vaccinations against other bacterial and viral agents can be performed as indicated. Annual flu vaccination should be administered if indicated. ALLERGIES  No Known Allergies    MEDICATIONS  Current Outpatient Medications   Medication Sig    oxyCODONE-acetaminophen (PERCOCET) 5-325 mg per tablet Take 1 Tablet by mouth every four (4) hours as needed for Pain for up to 30 days. Max Daily Amount: 6 Tablets. zolpidem (AMBIEN) 5 mg tablet Take 1 Tablet by mouth nightly as needed for Sleep. Max Daily Amount: 5 mg.    glucose blood VI test strips (blood glucose test) strip by Does Not Apply route Daily (before breakfast). tiZANidine (ZANAFLEX) 2 mg tablet Take 1 Tablet by mouth three (3) times daily as needed for Muscle Spasm(s). (Patient not taking: Reported on 11/7/2022)    ursodioL (MANE Forte) 500 mg tablet Take 1 Tablet by mouth two (2) times a day.     metFORMIN ER (GLUCOPHAGE XR) 750 mg tablet TAKE ONE TABLET BY MOUTH ONE TIME DAILY    Ozempic 0.25 mg or 0.5 mg/dose (2 mg/1.5 ml) subq pen INJECT 0.25 MG SUBCUTANEOUSLY ONCE A WEEK X 2 WEEKS THEN INCREASE TO 0.5 MG ONCE A WEEK    pantoprazole (PROTONIX) 40 mg tablet TAKE 1 TABLET BY MOUTH ONCE DAILY    tiZANidine (ZANAFLEX) 2 mg tablet Take 1 Tablet by mouth four (4) times daily as needed for Muscle Spasm(s). nadoloL (CORGARD) 40 mg tablet Take 1 Tablet by mouth daily. Indications: prevent bleeding varicose vein in the esophagus    glimepiride (AMARYL) 2 mg tablet TAKE ONE TABLET BY MOUTH EVERY DAY IN THE MORNING. No current facility-administered medications for this visit. SYSTEM REVIEW NOT RELATED TO LIVER DISEASE OR REVIEWED ABOVE:  Constitution systems: Negative for fever, chills, weight gain, weight loss. Eyes: Negative for visual changes. ENT: Negative for sore throat, painful swallowing. Respiratory: Negative for cough, hemoptysis, SOB. Cardiology: Negative for chest pain, palpitations. GI:  Negative for constipation or diarrhea. : Negative for urinary frequency, dysuria, hematuria, nocturia. Skin: Negative for rash. Hematology: Negative for easy bruising, blood clots. Musculo-skeletal: Negative for back pain, muscle pain, weakness. Neurologic: Negative for headaches, dizziness, vertigo, memory problems not related to HE. Psychology: Negative for anxiety, depression. FAMILY HISTORY:  The father  of lung cancer   The mother has/had the following chronic disease(s): Colon cancer, COPD, Osteoporosis. There is no family history of liver disease. There is no family history of immune disorders. SOCIAL HISTORY:  The patient is . The patient has 3 children, 1 passed of SIDS. The patient currently smokes 1/2 pack of tobacco daily. The patient drank up to 1 pint per day for 3 years. She decreased to weekends only 2 years ago. All alcohol was discontinued 2021. The patient currently works full-time as .       PHYSICAL EXAMINATION:  Visit Vitals  BP (!) 117/56 (BP 1 Location: Left upper arm, BP Patient Position: Sitting, BP Cuff Size: Adult)   Pulse (!) 56   Temp 97.1 °F (36.2 °C) (Temporal)   Ht 5' 2\" (1.575 m)   Wt 161 lb (73 kg)   LMP 07/09/2018   SpO2 100%   BMI 29.45 kg/m²       General: No acute distress. Eyes: Sclera anicteric. ENT: No oral lesions. Thyroid normal.  Nodes: No adenopathy. Skin: No spider angiomata. No jaundice. No palmar erythema. Respiratory: Lungs clear to auscultation. Cardiovascular: Regular heart rate. No murmurs. No JVD. Abdomen: Soft non-tender, liver size normal to percussion/palpation. Spleen not palpable. No obvious ascites. Extremities: No edema. No muscle wasting. No gross arthritic changes. Neurologic: Alert and oriented. Cranial nerves grossly intact. No asterixis. LABORATORY STUDIES:  Liver [place] of [address] [place] [address] 11/7/2022 4/18/2022   WBC 3.4 - 10.8 x10E3/uL 9.1 8.2   ANC 1.4 - 7.0 x10E3/uL 7.2 (H) 6.5   HGB 11.1 - 15.9 g/dL 13.7 14.5    - 450 x10E3/uL 156 135 (L)   INR 0.9 - 1.2  1.2   AST 0 - 40 IU/L 56 (H) 47 (H)   ALT 0 - 32 IU/L 31 26   Alk Phos 44 - 121 IU/L 169 (H) 158 (H)   Bili, Total 0.0 - 1.2 mg/dL 1.8 (H) 2.2 (H)   Bili, Direct 0.00 - 0.40 mg/dL  0.50 (H)   Albumin 3.8 - 4.9 g/dL 3.8 4.1   BUN 6 - 24 mg/dL 8 7   Creat 0.57 - 1.00 mg/dL 0.58 0.57   Na 134 - 144 mmol/L 140 141   K 3.5 - 5.2 mmol/L 4.2 3.8   Cl 96 - 106 mmol/L 102 101   CO2 20 - 29 mmol/L 25 24   Glucose 70 - 99 mg/dL 186 (H) 155 (H)     Cancer Screening Latest Ref Rng & Units 1/18/2022 8/16/2021 6/29/2021   AFP, Serum 0.0 - 8.0 ng/mL 3.7 3.8 5.4   AFP-L3% 0.0 - 9.9 % Comment 9.4 10.3 (H)     Laboratory testing from 11/07/2022 reviewed in detail. Additional testing included to evaluate progression or regression of disease. Laboratory testing results from today will be communicated by My Chart.      SEROLOGIES:  Serologies Latest Ref Rng & Units 6/29/2021   Hep A Ab, Total Negative Negative   Hep B Surface Ag Negative Negative   Hep B Core Ab, Total Negative Negative   Hep B Surface AB QL  Non Reactive   Ferritin 15 - 150 ng/mL 284 (H)   Iron % Saturation 15 - 55 % 12 (L)   LIMA, IFA  Negative   C-ANCA Neg:<1:20 titer <1:20   P-ANCA Neg:<1:20 titer <1:20   ANCA Neg:<1:20 titer <1:20   ASMCA 0 - 19 Units 14   M2 Ab 0.0 - 20.0 Units 41.7 (H)   Ceruloplasmin 19.0 - 39.0 mg/dL 30.2   Alpha-1 antitrypsin level 101 - 187 mg/dL 187     LIVER HISTOLOGY:  8/2021. [facility]. EkPa was 75.0. IQR/med 0%. . The results suggested a fibrosis level of F4. The CAP score suggests there is hepatic steatosis. ENDOSCOPIC PROCEDURES:  09/2021: EGD performed at [address] [place] [address] showed esophageal varices. Four bands was placed  11/2021. EGD by Dr. Zofia Hernandez. Esophageal varices with 7 bands placed. 1/2022. EGD by Dr. Zofia Hernandez. Esophageal varices with 3 bands placed. Repeat in 6 months. RADIOLOGY:  5/2021. CT of Abdomen. Cirrhotic appearance of the liver. Cholelithiasis. No ductal dilatation. Right renal calculi. Splenomegaly. No ascites. 3/2022. Ultrasound of liver. Echogenic consistent with cirrhosis. No liver mass lesions. No dilated bile ducts. No ascites. 9/2022. Ultrasound of liver. Echogenic consistent with cirrhosis. No liver mass lesions. No dilated bile ducts. No ascites. OTHER TESTING:  Not available or performed    FOLLOW-UP:  All of the issues listed above in the Assessment and Plan were discussed with the patient. All questions were answered. The patient expressed a clear understanding of the above. [address] [facility] 87 in 4 months for ongoing management and treatment. SUZY Gonzalez-BC  [facility] of [address] [place], 2000 [facility], [facility] 22.  201 [facility]

## 2022-11-22 NOTE — PROGRESS NOTES
Identified pt with two pt identifiers(name and ). Reviewed record in preparation for visit and have obtained necessary documentation. Chief Complaint   Patient presents with    Follow-up      Vitals:    22 1241   BP: (!) 117/56   Pulse: (!) 56   Temp: 97.1 °F (36.2 °C)   TempSrc: Temporal   SpO2: 100%   Weight: 161 lb (73 kg)   Height: 5' 2\" (1.575 m)   PainSc:   6   PainLoc: Back   LMP: 2018       Health Maintenance Review: Patient reminded of \"due or due soon\" health maintenance. I have asked the patient to contact his/her primary care provider (PCP) for follow-up on his/her health maintenance. Coordination of Care Questionnaire:  :   1) Have you been to an emergency room, urgent care, or hospitalized since your last visit? If yes, where when, and reason for visit? yes  per pt she was dx with pneumonia       2. Have seen or consulted any other health care provider since your last visit? If yes, where when, and reason for visit? YES/ check up      Patient is accompanied by self I have received verbal consent from Luisito Devine to discuss any/all medical information while they are present in the room.

## 2022-11-23 LAB
AFP L3 MFR SERPL: 8.7 % (ref 0–9.9)
AFP SERPL-MCNC: 4.7 NG/ML (ref 0–9.2)
ALBUMIN SERPL-MCNC: 4 G/DL (ref 3.8–4.9)
ALP SERPL-CCNC: 163 IU/L (ref 44–121)
ALT SERPL-CCNC: 24 IU/L (ref 0–32)
AST SERPL-CCNC: 53 IU/L (ref 0–40)
BASOPHILS # BLD AUTO: 0.1 X10E3/UL (ref 0–0.2)
BASOPHILS NFR BLD AUTO: 1 %
BILIRUB DIRECT SERPL-MCNC: 1.1 MG/DL (ref 0–0.4)
BILIRUB SERPL-MCNC: 2.3 MG/DL (ref 0–1.2)
BUN SERPL-MCNC: 10 MG/DL (ref 6–24)
BUN/CREAT SERPL: 17 (ref 9–23)
CALCIUM SERPL-MCNC: 10.8 MG/DL (ref 8.7–10.2)
CHLORIDE SERPL-SCNC: 101 MMOL/L (ref 96–106)
CO2 SERPL-SCNC: 27 MMOL/L (ref 20–29)
CREAT SERPL-MCNC: 0.58 MG/DL (ref 0.57–1)
EGFR: 108 ML/MIN/1.73
EOSINOPHIL # BLD AUTO: 0.2 X10E3/UL (ref 0–0.4)
EOSINOPHIL NFR BLD AUTO: 3 %
ERYTHROCYTE [DISTWIDTH] IN BLOOD BY AUTOMATED COUNT: 12.5 % (ref 11.7–15.4)
GLUCOSE SERPL-MCNC: 117 MG/DL (ref 70–99)
HCT VFR BLD AUTO: 39.6 % (ref 34–46.6)
HGB BLD-MCNC: 13.8 G/DL (ref 11.1–15.9)
IMM GRANULOCYTES # BLD AUTO: 0 X10E3/UL (ref 0–0.1)
IMM GRANULOCYTES NFR BLD AUTO: 0 %
LYMPHOCYTES # BLD AUTO: 1 X10E3/UL (ref 0.7–3.1)
LYMPHOCYTES NFR BLD AUTO: 15 %
MCH RBC QN AUTO: 35.3 PG (ref 26.6–33)
MCHC RBC AUTO-ENTMCNC: 34.8 G/DL (ref 31.5–35.7)
MCV RBC AUTO: 101 FL (ref 79–97)
MONOCYTES # BLD AUTO: 0.5 X10E3/UL (ref 0.1–0.9)
MONOCYTES NFR BLD AUTO: 7 %
NEUTROPHILS # BLD AUTO: 5.1 X10E3/UL (ref 1.4–7)
NEUTROPHILS NFR BLD AUTO: 74 %
PLATELET # BLD AUTO: 130 X10E3/UL (ref 150–450)
POTASSIUM SERPL-SCNC: 4.9 MMOL/L (ref 3.5–5.2)
PROT SERPL-MCNC: 7.8 G/DL (ref 6–8.5)
RBC # BLD AUTO: 3.91 X10E6/UL (ref 3.77–5.28)
SODIUM SERPL-SCNC: 140 MMOL/L (ref 134–144)
WBC # BLD AUTO: 6.8 X10E3/UL (ref 3.4–10.8)

## 2022-11-23 NOTE — PROGRESS NOTES
Letter sent regarding the blood work results. The INR and AFP are still pending. The liver enzymes and function are stable.

## 2022-11-26 LAB
INR PPP: 1.4 (ref 0.9–1.2)
PROTHROMBIN TIME: 14.2 SEC (ref 9.1–12)

## 2022-12-05 DIAGNOSIS — G47.00 INSOMNIA, UNSPECIFIED TYPE: ICD-10-CM

## 2022-12-05 RX ORDER — ZOLPIDEM TARTRATE 5 MG/1
5 TABLET ORAL
Qty: 30 TABLET | Refills: 0 | Status: SHIPPED | OUTPATIENT
Start: 2022-12-05

## 2022-12-05 NOTE — TELEPHONE ENCOUNTER
Last Visit: 11/7/22 with MD Yana Tamayo  Next Appointment: Shelle Soulier to follow-up in 4 weeks  Previous Refill Encounter(s): 11/8/22 #30    Requested Prescriptions     Pending Prescriptions Disp Refills    zolpidem (AMBIEN) 5 mg tablet 30 Tablet 0     Sig: Take 1 Tablet by mouth nightly as needed for Sleep. Max Daily Amount: 5 mg. For [address] [facility] in place:   Recommendation Provided To:    Intervention Detail: New Rx: 1, reason: Patient Preference  Gap Closed?:   Intervention Accepted By:   Time Spent (min): 5

## 2022-12-27 DIAGNOSIS — M54.6 THORACIC SPINE PAIN: ICD-10-CM

## 2022-12-27 RX ORDER — TIZANIDINE 2 MG/1
2 TABLET ORAL
Qty: 40 TABLET | Refills: 2 | Status: SHIPPED | OUTPATIENT
Start: 2022-12-27

## 2022-12-29 ENCOUNTER — OFFICE VISIT (OUTPATIENT)
Dept: FAMILY MEDICINE CLINIC | Age: 52
End: 2022-12-29
Payer: COMMERCIAL

## 2022-12-29 VITALS
HEIGHT: 62 IN | OXYGEN SATURATION: 97 % | HEART RATE: 67 BPM | SYSTOLIC BLOOD PRESSURE: 71 MMHG | BODY MASS INDEX: 29 KG/M2 | WEIGHT: 157.6 LBS | DIASTOLIC BLOOD PRESSURE: 41 MMHG

## 2022-12-29 DIAGNOSIS — K74.3 PRIMARY BILIARY CIRRHOSIS (HCC): ICD-10-CM

## 2022-12-29 DIAGNOSIS — K74.69 OTHER CIRRHOSIS OF LIVER (HCC): ICD-10-CM

## 2022-12-29 DIAGNOSIS — F11.99 OPIOID USE, UNSPECIFIED WITH UNSPECIFIED OPIOID-INDUCED DISORDER (HCC): ICD-10-CM

## 2022-12-29 DIAGNOSIS — M54.6 THORACIC SPINE PAIN: Primary | ICD-10-CM

## 2022-12-29 DIAGNOSIS — M79.7 FIBROMYALGIA: ICD-10-CM

## 2022-12-29 PROCEDURE — 99213 OFFICE O/P EST LOW 20 MIN: CPT | Performed by: FAMILY MEDICINE

## 2022-12-29 RX ORDER — OXYCODONE AND ACETAMINOPHEN 5; 325 MG/1; MG/1
1 TABLET ORAL
Qty: 40 TABLET | Refills: 0 | Status: SHIPPED | OUTPATIENT
Start: 2022-12-29 | End: 2023-01-28

## 2022-12-29 RX ORDER — NADOLOL 40 MG/1
20 TABLET ORAL DAILY
Qty: 90 TABLET | Refills: 3
Start: 2022-12-29

## 2022-12-29 NOTE — PROGRESS NOTES
Chief Complaint   Patient presents with    Back Pain     Ongoing back pain     1. \"Have you been to the ER, urgent care clinic since your last visit? Hospitalized since your last visit? \" No    2. \"Have you seen or consulted any other health care providers outside of the [address] [place] [address] since your last visit? \"  Dr. Sinan Brown      3. For patients aged 39-70: Has the patient had a colonoscopy / FIT/ Cologuard? Yes - no Care Gap present      If the patient is female:    4. For patients aged 41-77: Has the patient had a mammogram within the past 2 years? No      5. For patients aged 21-65: Has the patient had a pap smear?  No

## 2022-12-29 NOTE — PROGRESS NOTES
HISTORY OF PRESENT ILLNESS  Cayetano Barnhart is a 47 y.o. female. wosening recurrent mid back pain for past week. Has been gradually improving. Pain felt to be Fibro,managed by Rheum. Stable PBC/cirrhosis,DM2  Back Pain   The history is provided by the Patient. This is a chronic problem. The problem occurs daily. The pain is present in the upper back. The quality of the pain is described as aching. The pain does not radiate. The pain is at a severity of 5/10. The pain is moderate. Pertinent negatives include no chest pain, no fever, no abdominal pain and no dysuria. Diabetes  The history is provided by the Patient. The problem occurs daily. The problem has been gradually improving. Pertinent negatives include no chest pain and no abdominal pain. Abnormal Lab Results  The history is provided by the Patient. This is a chronic problem. The problem occurs daily. The problem has not changed since onset. Pertinent negatives include no chest pain and no abdominal pain. Review of Systems   Constitutional:  Negative for fever. Respiratory:  Negative for cough. Cardiovascular:  Negative for chest pain and palpitations. Gastrointestinal:  Negative for abdominal pain and diarrhea. Genitourinary:  Negative for dysuria. Musculoskeletal:  Positive for back pain. Skin:  Negative for rash. Physical Exam  Constitutional:       Appearance: Normal appearance. She is obese. She is ill-appearing. HENT:      Head: Normocephalic and atraumatic. Cardiovascular:      Rate and Rhythm: Normal rate and regular rhythm. Pulses: Normal pulses. Heart sounds: Normal heart sounds. Pulmonary:      Effort: Pulmonary effort is normal.      Breath sounds: Normal breath sounds. Abdominal:      General: There is no distension. Palpations: Abdomen is soft. There is no mass. Musculoskeletal:      Thoracic back: Spasms and tenderness present. Decreased range of motion.         Back:    Neurological:      Mental Status: She is alert. ASSESSMENT and PLAN  Diagnoses and all orders for this visit:    1. Thoracic spine pain  -     nadoloL (CORGARD) 40 mg tablet; Take 0.5 Tablets by mouth daily. Indications: prevent bleeding varicose vein in the esophagus  -     oxyCODONE-acetaminophen (PERCOCET) 5-325 mg per tablet; Take 1 Tablet by mouth every four (4) hours as needed for Pain for up to 30 days. Max Daily Amount: 6 Tablets. 2. Fibromyalgia    3.  Primary biliary cirrhosis (HCC)    4. Other cirrhosis of liver (HCC)    5. Opioid use, unspecified with unspecified opioid-induced disorder

## 2022-12-29 NOTE — LETTER
NOTIFICATION OF RETURN TO WORK / SCHOOL    12/29/2022 12:38 PM    Ms. Tomasa Lerma  [address] [facility] [address] [id number]  . To Whom It May Concern:    Tomasa Lerma was under the care of [facility] from 12/29/22. She will be able to return to work/school on 12/30/22 with no restrictions. If there are questions or concerns please have the patient contact our office.     Sincerely,      Adia Botello MD

## 2023-01-03 DIAGNOSIS — G47.00 INSOMNIA, UNSPECIFIED TYPE: ICD-10-CM

## 2023-01-04 RX ORDER — ZOLPIDEM TARTRATE 5 MG/1
5 TABLET ORAL
Qty: 30 TABLET | Refills: 0 | Status: SHIPPED | OUTPATIENT
Start: 2023-01-04

## 2023-02-02 DIAGNOSIS — G47.00 INSOMNIA, UNSPECIFIED TYPE: ICD-10-CM

## 2023-02-03 RX ORDER — ZOLPIDEM TARTRATE 5 MG/1
5 TABLET ORAL
Qty: 30 TABLET | Refills: 0 | Status: SHIPPED | OUTPATIENT
Start: 2023-02-03

## 2023-02-13 DIAGNOSIS — M54.6 THORACIC SPINE PAIN: ICD-10-CM

## 2023-02-13 RX ORDER — TIZANIDINE 2 MG/1
2 TABLET ORAL
Qty: 40 TABLET | Refills: 2 | Status: SHIPPED | OUTPATIENT
Start: 2023-02-13

## 2023-02-14 ENCOUNTER — DOCUMENTATION ONLY (OUTPATIENT)
Dept: FAMILY MEDICINE CLINIC | Age: 53
End: 2023-02-14

## 2023-02-15 DIAGNOSIS — M54.6 THORACIC SPINE PAIN: ICD-10-CM

## 2023-02-15 RX ORDER — SEMAGLUTIDE 1.34 MG/ML
0.5 INJECTION, SOLUTION SUBCUTANEOUS
Qty: 12 PEN | Refills: 3 | Status: SHIPPED | OUTPATIENT
Start: 2023-02-15

## 2023-02-15 RX ORDER — NADOLOL 40 MG/1
20 TABLET ORAL DAILY
Qty: 45 TABLET | Refills: 3 | Status: SHIPPED | OUTPATIENT
Start: 2023-02-15

## 2023-02-15 NOTE — TELEPHONE ENCOUNTER
Last Visit: 22 with MD Edilson Montoya  Next Appointment: none  Previous Refill Encounter(s): 22 #3ml with 3 refills    Requested Prescriptions     Pending Prescriptions Disp Refills    semaglutide (Ozempic) 0.25 mg or 0.5 mg/dose (2 mg/1.5 ml) subq pen 12 Pen 3     Si.5 mg by SubCUTAneous route every seven (7) days. For Pharmacy Admin Tracking Only    Program: Medication Refill  CPA in place:   Recommendation Provided To:    Intervention Detail: New Rx: 1, reason: Patient Preference  Intervention Accepted By:   Alden Cardoza Closed?:   Time Spent (min): 5

## 2023-02-15 NOTE — TELEPHONE ENCOUNTER
Chief Complaint   Patient presents with   • Sore Throat   • Cough   • Congestion     head and congestion       History of Present Illness:   Arabella Lin is a 22 year old female who presents to the clinic with sore throat for the past 1-2 weeks. Patient states that she has been experiencing a sore throat over the past 1-2 weeks, which is gradually worsening. Patient states that she has also been experiencing nasal congestion but denies sinus pressure and ear pain. She explains that she has been experiencing a productive cough of yellow sputum, which tends to be worse at night. She notes that she has also been experiencing increased shortness-of-breath, intermittent wheezing, and chest tightness throughout the night but denies chest pain. Patient states that she has been taking over-the-counter Shy-Artesia Wells and applying Vicks VapoRub to her feet without complications, which provides mild relief. Patient denies fever, chills, nausea, vomiting, and diarrhea. She was evaluated by Dr. Galeas on 01/05/2018 and was diagnosed with left eyelid cellulitis. She was prescribed Duricef 500 mg two times per day for seven days. She explains that she took medication as prescribed without complications, which provided significant relief, but did not change symptoms discussed at this office visit.     Current Outpatient Prescriptions   Medication Sig Dispense Refill   • albuterol 108 (90 Base) MCG/ACT inhaler Inhale 2 puffs into the lungs every 4 hours as needed for Shortness of Breath or Wheezing. 1 Inhaler 0   • cyanocobalamin (VITAMIN B-12) 100 MCG tablet Take 50 mcg by mouth daily.     • Calcium Carb-Cholecalciferol (CALCIUM 1000 + D PO)      • metroNIDAZOLE (METROGEL) 1 % gel Apply topically at bedtime. 60 g 0   • amoxicillin-clavulanate (AUGMENTIN) 875-125 MG per tablet Take 1 tablet by mouth every 12 hours. 20 tablet 0     No current facility-administered medications for this visit.        ALLERGIES:  No Known  Last Visit: 12/29/22 with MD Josue Ness  Next Appointment: none  Previous Refill Encounter(s): 10/19/21 #90 with 3 refills    Requested Prescriptions     Pending Prescriptions Disp Refills    nadoloL (CORGARD) 40 mg tablet 45 Tablet 3     Sig: Take 0.5 Tablets by mouth daily. Indications: prevent bleeding varicose vein in the esophagus         For Pharmacy Admin Tracking Only    Program: Medication Refill  CPA in place:   Recommendation Provided To:    Intervention Detail: New Rx: 1, reason: Patient Preference  Intervention Accepted By:   Daysi Roa Closed?:   Time Spent (min): 5 Allergies    No past medical history on file.    Past Surgical History:   Procedure Laterality Date   • TONSILLECTOMY         No family history on file.    Social History     Social History   • Marital status: Single     Spouse name: N/A   • Number of children: N/A   • Years of education: N/A     Occupational History   • Not on file.     Social History Main Topics   • Smoking status: Former Smoker   • Smokeless tobacco: Never Used   • Alcohol use Yes      Comment: social drinker   • Drug use: No   • Sexual activity: Not on file     Other Topics Concern   • Not on file     Social History Narrative   • No narrative on file       Personal Fact: Patient states that she is currently in [employer] school and will be graduating in about four months.    Review of Systems:   General: Negative for acute distress.   Constitutional: Negative for fever and chills.  HEENT: Positive for sore throat and nasal congestion. Negative for ear pain and sinus pressure.   Cardiac: Positive for chest tightness. Negative for chest pain.   Respiratory: Positive for cough, increased shortness-of-breath, and intermittent wheezing.  Abdominal: Negative for nausea, vomiting, and diarrhea.  Psychiatry: Negative for anxiety and depression.    Physical Examination:   Visit Vitals  /70   Pulse 98   Temp 98.3 °F (36.8 °C) (Tympanic)   Resp 16   Ht 5' 1\" (1.549 m)   Wt 59 kg   BMI 24.56 kg/m²      Constitutional: Patient is oriented to person, place, and time and well-developed, well-nourished, and in no distress.   HEENT: Mildly erythematous posterior oropharynx without exudates. Mild tenderness upon palpation of frontal and maxillary sinuses. Pearly-white tympanic membranes bilaterally.    Neck: Normal range of motion. Neck supple. No tracheal deviation present. No thyromegaly present. No cervical or supraclavicular lymphadenopathy.  Cardiac: Regular rate and rhythm with no murmurs noted.   Pulmonary/Chest: Effort normal and lungs clear to  auscultation bilaterally.  Neurological: Patient is alert and oriented to person, place, and time. No cranial nerve deficit.   Skin: Skin is warm and dry. Patient is not diaphoretic.   Psychiatric: Mood, memory, affect, and judgment normal.    Assessment and Plan:   Acute Sinusitis  1. Rapid strep test ordered and result is negative.   2. Patient instructed to take Augmentin 875-125 mg two times per day for ten days.  3. Patient instructed to use Albuterol inhaler two puffs every four hours as needed for shortness-of-breath or wheezing.  4. Patient verbalized understanding of treatment plan and will call with any questions or concerns.    Supervising Physician: Dr. Ponce Galeas

## 2023-03-01 DIAGNOSIS — G47.00 INSOMNIA, UNSPECIFIED TYPE: ICD-10-CM

## 2023-03-01 RX ORDER — ZOLPIDEM TARTRATE 5 MG/1
5 TABLET ORAL
Qty: 30 TABLET | Refills: 0 | Status: SHIPPED | OUTPATIENT
Start: 2023-03-01

## 2023-03-01 NOTE — TELEPHONE ENCOUNTER
Last Visit: 12/29/22 with MD Mary Jane Meza  Next Appointment: none  Previous Refill Encounter(s): 2/3/23 #30    Requested Prescriptions     Pending Prescriptions Disp Refills    zolpidem (AMBIEN) 5 mg tablet 30 Tablet 0     Sig: Take 1 Tablet by mouth nightly as needed for Sleep. Max Daily Amount: 5 mg. For Pharmacy Admin Tracking Only    Program: Medication Refill  CPA in place:   Recommendation Provided To:    Intervention Detail: New Rx: 1, reason: Patient Preference  Intervention Accepted By:   Cordelia Fagan Closed?:   Time Spent (min): 5

## 2023-03-02 DIAGNOSIS — E11.9 TYPE 2 DIABETES MELLITUS WITHOUT COMPLICATION, WITHOUT LONG-TERM CURRENT USE OF INSULIN (HCC): ICD-10-CM

## 2023-03-03 RX ORDER — IBUPROFEN 200 MG
CAPSULE ORAL
Qty: 50 STRIP | Refills: 5 | Status: SHIPPED | OUTPATIENT
Start: 2023-03-03

## 2023-03-13 DIAGNOSIS — M54.6 THORACIC SPINE PAIN: ICD-10-CM

## 2023-03-13 RX ORDER — TIZANIDINE 2 MG/1
2 TABLET ORAL
Qty: 40 TABLET | Refills: 2 | Status: SHIPPED | OUTPATIENT
Start: 2023-03-13

## 2023-03-13 NOTE — TELEPHONE ENCOUNTER
Last Visit: 12/29/22 with MD Kika Gold  Next Appointment: none  Previous Refill Encounter(s): 2/13/23 #40 with 2 refills    Requested Prescriptions     Pending Prescriptions Disp Refills    tiZANidine (ZANAFLEX) 2 mg tablet 40 Tablet 2     Sig: Take 1 Tablet by mouth four (4) times daily as needed for Muscle Spasm(s). For Pharmacy Admin Tracking Only    Program: Medication Refill  CPA in place: Recommendation Provided To:    Intervention Detail: New Rx: 1, reason: Patient Preference  Intervention Accepted By:   Ivonne Sanders Closed?:   Time Spent (min): 5

## 2023-03-16 ENCOUNTER — ANESTHESIA (OUTPATIENT)
Dept: ENDOSCOPY | Age: 53
End: 2023-03-16
Payer: COMMERCIAL

## 2023-03-16 ENCOUNTER — HOSPITAL ENCOUNTER (OUTPATIENT)
Age: 53
Setting detail: OUTPATIENT SURGERY
Discharge: HOME | End: 2023-03-16
Attending: INTERNAL MEDICINE | Admitting: INTERNAL MEDICINE
Payer: COMMERCIAL

## 2023-03-16 ENCOUNTER — ANESTHESIA EVENT (OUTPATIENT)
Dept: ENDOSCOPY | Age: 53
End: 2023-03-16
Payer: COMMERCIAL

## 2023-03-16 VITALS
DIASTOLIC BLOOD PRESSURE: 77 MMHG | BODY MASS INDEX: 27.6 KG/M2 | TEMPERATURE: 99.1 F | HEIGHT: 62 IN | RESPIRATION RATE: 18 BRPM | HEART RATE: 68 BPM | SYSTOLIC BLOOD PRESSURE: 178 MMHG | OXYGEN SATURATION: 97 % | WEIGHT: 150 LBS

## 2023-03-16 DIAGNOSIS — K31.89 PORTAL HYPERTENSIVE GASTROPATHY (HCC): ICD-10-CM

## 2023-03-16 DIAGNOSIS — K76.6 PORTAL HYPERTENSIVE GASTROPATHY (HCC): ICD-10-CM

## 2023-03-16 DIAGNOSIS — I85.10 SECONDARY ESOPHAGEAL VARICES WITHOUT BLEEDING (HCC): Primary | ICD-10-CM

## 2023-03-16 LAB — HCG UR QL: NEGATIVE

## 2023-03-16 PROCEDURE — 2709999900 HC NON-CHARGEABLE SUPPLY: Performed by: INTERNAL MEDICINE

## 2023-03-16 PROCEDURE — 77030014243 HC BND LIG VRCES BSC -D: Performed by: INTERNAL MEDICINE

## 2023-03-16 PROCEDURE — 81025 URINE PREGNANCY TEST: CPT

## 2023-03-16 PROCEDURE — 74011000250 HC RX REV CODE- 250: Performed by: NURSE ANESTHETIST, CERTIFIED REGISTERED

## 2023-03-16 PROCEDURE — 74011250636 HC RX REV CODE- 250/636: Performed by: NURSE ANESTHETIST, CERTIFIED REGISTERED

## 2023-03-16 PROCEDURE — 43244 EGD VARICES LIGATION: CPT | Performed by: INTERNAL MEDICINE

## 2023-03-16 PROCEDURE — 76060000031 HC ANESTHESIA FIRST 0.5 HR: Performed by: INTERNAL MEDICINE

## 2023-03-16 PROCEDURE — 76040000019: Performed by: INTERNAL MEDICINE

## 2023-03-16 RX ORDER — NALOXONE HYDROCHLORIDE 0.4 MG/ML
0.4 INJECTION, SOLUTION INTRAMUSCULAR; INTRAVENOUS; SUBCUTANEOUS
Status: DISCONTINUED | OUTPATIENT
Start: 2023-03-16 | End: 2023-03-16 | Stop reason: HOSPADM

## 2023-03-16 RX ORDER — SODIUM CHLORIDE 0.9 % (FLUSH) 0.9 %
5-40 SYRINGE (ML) INJECTION EVERY 8 HOURS
Status: DISCONTINUED | OUTPATIENT
Start: 2023-03-16 | End: 2023-03-16 | Stop reason: HOSPADM

## 2023-03-16 RX ORDER — LIDOCAINE HYDROCHLORIDE 20 MG/ML
INJECTION, SOLUTION EPIDURAL; INFILTRATION; INTRACAUDAL; PERINEURAL AS NEEDED
Status: DISCONTINUED | OUTPATIENT
Start: 2023-03-16 | End: 2023-03-16 | Stop reason: HOSPADM

## 2023-03-16 RX ORDER — FLUMAZENIL 0.1 MG/ML
0.2 INJECTION INTRAVENOUS
Status: DISCONTINUED | OUTPATIENT
Start: 2023-03-16 | End: 2023-03-16 | Stop reason: HOSPADM

## 2023-03-16 RX ORDER — ATROPINE SULFATE 0.1 MG/ML
0.5 INJECTION INTRAVENOUS
Status: DISCONTINUED | OUTPATIENT
Start: 2023-03-16 | End: 2023-03-16 | Stop reason: HOSPADM

## 2023-03-16 RX ORDER — MIDAZOLAM HYDROCHLORIDE 1 MG/ML
5-10 INJECTION, SOLUTION INTRAMUSCULAR; INTRAVENOUS
Status: DISCONTINUED | OUTPATIENT
Start: 2023-03-16 | End: 2023-03-16 | Stop reason: HOSPADM

## 2023-03-16 RX ORDER — DEXTROMETHORPHAN/PSEUDOEPHED 2.5-7.5/.8
1.2 DROPS ORAL
Status: DISCONTINUED | OUTPATIENT
Start: 2023-03-16 | End: 2023-03-16 | Stop reason: HOSPADM

## 2023-03-16 RX ORDER — FOLIC ACID 1 MG/1
1 TABLET ORAL DAILY
COMMUNITY
Start: 2022-09-29 | End: 2023-09-29

## 2023-03-16 RX ORDER — FENTANYL CITRATE 50 UG/ML
50-200 INJECTION, SOLUTION INTRAMUSCULAR; INTRAVENOUS
Status: DISCONTINUED | OUTPATIENT
Start: 2023-03-16 | End: 2023-03-16 | Stop reason: HOSPADM

## 2023-03-16 RX ORDER — SODIUM CHLORIDE 9 MG/ML
INJECTION, SOLUTION INTRAVENOUS
Status: DISCONTINUED | OUTPATIENT
Start: 2023-03-16 | End: 2023-03-16 | Stop reason: HOSPADM

## 2023-03-16 RX ORDER — SODIUM CHLORIDE 9 MG/ML
50 INJECTION, SOLUTION INTRAVENOUS CONTINUOUS
Status: DISCONTINUED | OUTPATIENT
Start: 2023-03-16 | End: 2023-03-16 | Stop reason: HOSPADM

## 2023-03-16 RX ORDER — SODIUM CHLORIDE 0.9 % (FLUSH) 0.9 %
5-40 SYRINGE (ML) INJECTION AS NEEDED
Status: DISCONTINUED | OUTPATIENT
Start: 2023-03-16 | End: 2023-03-16 | Stop reason: HOSPADM

## 2023-03-16 RX ORDER — EPINEPHRINE 0.1 MG/ML
1 INJECTION INTRACARDIAC; INTRAVENOUS
Status: DISCONTINUED | OUTPATIENT
Start: 2023-03-16 | End: 2023-03-16 | Stop reason: HOSPADM

## 2023-03-16 RX ORDER — PROPOFOL 10 MG/ML
INJECTION, EMULSION INTRAVENOUS AS NEEDED
Status: DISCONTINUED | OUTPATIENT
Start: 2023-03-16 | End: 2023-03-16 | Stop reason: HOSPADM

## 2023-03-16 RX ADMIN — LIDOCAINE HYDROCHLORIDE 50 MG: 20 INJECTION, SOLUTION EPIDURAL; INFILTRATION; INTRACAUDAL; PERINEURAL at 15:37

## 2023-03-16 RX ADMIN — PROPOFOL 100 MG: 10 INJECTION, EMULSION INTRAVENOUS at 15:37

## 2023-03-16 RX ADMIN — SODIUM CHLORIDE: 900 INJECTION, SOLUTION INTRAVENOUS at 15:37

## 2023-03-16 RX ADMIN — PROPOFOL 30 MG: 10 INJECTION, EMULSION INTRAVENOUS at 15:44

## 2023-03-16 RX ADMIN — PROPOFOL 30 MG: 10 INJECTION, EMULSION INTRAVENOUS at 15:40

## 2023-03-16 RX ADMIN — PROPOFOL 40 MG: 10 INJECTION, EMULSION INTRAVENOUS at 15:43

## 2023-03-16 RX ADMIN — PROPOFOL 30 MG: 10 INJECTION, EMULSION INTRAVENOUS at 15:38

## 2023-03-16 NOTE — ANESTHESIA POSTPROCEDURE EVALUATION
Procedure(s):  ESOPHAGOGASTRODUODENOSCOPY (EGD)  ENDOSCOPIC BANDING OR LIGATION. MAC    Anesthesia Post Evaluation      Multimodal analgesia: multimodal analgesia used between 6 hours prior to anesthesia start to PACU discharge  Patient location during evaluation: PACU  Level of consciousness: awake  Pain management: adequate  Airway patency: patent  Anesthetic complications: no  Cardiovascular status: acceptable  Respiratory status: acceptable  Hydration status: acceptable  Post anesthesia nausea and vomiting:  none  Final Post Anesthesia Temperature Assessment:  Normothermia (36.0-37.5 degrees C)      INITIAL Post-op Vital signs:   Vitals Value Taken Time   /90 03/16/23 1555   Temp 37.3 °C (99.1 °F) 03/16/23 1552   Pulse 80 03/16/23 1556   Resp 13 03/16/23 1556   SpO2 99 % 03/16/23 1556   Vitals shown include unvalidated device data.

## 2023-03-16 NOTE — DISCHARGE INSTRUCTIONS
[address] [facility] [place] [facility], MD, FACP, [address] [place], [place]      Chadd [place], CARLEY Jewell, [facility]   Mendez Marcus, [facility]   Wilner Brandon, FREDDY Mathews, FNJASON-C Leilani Cabot, PCNP-BC      [address]   at [address], [facility] Anna Sandoval  22.   [phone]   FAX: [phone]   at [address], [address] [place] [address], [address]   [phone]   FAX: [phone]         ENDOSCOPY WITH BANDING DISCHARGE INSTRUCTIONS    Driss Barnhart  1970  Date: 3/16/2023    DISCOMFORT:  Use lozenges or warm salt water gargle for sore thoat  Apply warm compress to IV site if red. If redness or soreness persists call the office. You may experience gas and bloating. Walking and belching will help relieve this. You may experience chest pain or discomfort or feel as though food is \"sticking\" in your food pipe for a few days after the procedure. This is a normal feeling after banding of esophageal varices. CHEST PRESSURE:  Banding of dilated veins (varices) in the food tube (esophagus) can be painful in some persons. The pain is caused by squeezing the varices and lining of the esophagus by the bands used to seal the varices. You can take liquid pain medication either acetaminophen or alternative. The best treatment for this is to drink a an \"Icee\" or \"Slurpee\" since the ice crystals will freeze the nerve endings in the food tube and relieve the pain. DIET:  Regular food may dislodge the bands placed on the varices. For this reason you should only have liquid for the rest of today. Eat only soft food that does not need to be chewed all day tomorrow. You may advance to your regular diet in 2 days. ACTIVITY:  Spend the remainder of the day resting.   Avoid any strenuous activity. You may not operate a vehicle for 12 hours. You may not engage in an occupation involving machinery or appliances for rest of today. Avoid making any critical decisions for at least 24 hour. Call the [facility] [facility] if you have any of the following:  Increasing chest or abdominal pain, nausea, vomiting, vomiting blood, abdominal distension or swelling, fever or chills, bloody discharge from nose or mouth or shortness of breath. Follow-up Instructions:  Call Dr. Delon Sanders for any questions or problems at the phone number listed above. If a biopsy was performed, you will be contacted by the office staff or Dr Delon Sanders within 1 week. If you have not heard from us by then you may call the office at the phone number listed above to inquire about the results. ENDOSCOPY FINDINGS:  A few varices were found in the esophagus (food tube). 4 bands were placed to seal the varices and reduce the risk of bleeding. Will repeat EGD to evaluate for additional varices and need for more banding in 3 months. Keep follow-up appointment with April on 3/24/2023. DISCHARGE SUMMARY from the Nurse:   The following personal items collected during your admission are returned to you:   Dental Appliance:    Vision: Visual Aid: Contacts  Hearing Aid:    Jewelry:    Clothing:    Other Valuables:    Valuables sent to safe:

## 2023-03-16 NOTE — PROCEDURES
3340 [facility] R[facility], PA-C April S Ashworth, PCNP-BC   Nini Manley, [facility]   FREDDY Sandy FNP-C Ahmad Fend, QUIQUE-BC      [address]   at [facility]   [address] [facility], [facility] Anna Levine  22.   [phone]   FAX: [phone]  [facility] [place] [facility]   at [address], [address] [place] [address] - [address]   [phone]   FAX: [phone]         UPPER ENDOSCOPY PROCEDURE NOTE    Jeffery Hinkle  1970    INDICATION: Cirrhosis. Screening for esophageal varices with variceal ligation if indicated. : Arnie Heller MD    SURGICAL ASSISTANT:  None    PROSTHETIC DEVISES, TISSUE GRAFTS, ORGAN TRANSPLANTS:  Not applicable     ANESTHESIA/SEDATION: Propofol was administered by anesthesia      PROCEDURE DESCRIPTION:  Informed consent was obtained from the patient for the procedure. All risks and benefits of the procedure explained. The procedure was performed in the endoscopy suite. The patient was laying on a stretcher and moved to the left lateral decubitus position prior to administration of sedation. Sedation was administered by anesthesiology. See their note for details. The endoscope was inserted into the mouth and advanced under direct vision to the second portion of the duodenum. Careful inspection of upper gastrointestinal tract was made as the endoscope was inserted and withdrawn. Retroflexion of the endoscope to view of the cardia of the stomach was performed. After withdrawing the endoscope the banding devise was placed on the tip of the endoscope. The scope was then reinserted under direct inspection and advanced to the esophagus. Banding of esophageal varices was performed as described below.   The scope was then removed. FINDINGS:  Esophagus:    A few Large varices were present. There was signs of impending or recent bleeding which included red whale markings,  Banding of esophageal varices was performed. Good hemostsis with no active bleeding was observed at the end of the procedure. Scars of previous banding. Stomach:   Mild portal hypertensive gastropathy of the body of the stomach  No gastric varices identified. Duodenum:   Normal bulb and second portion    SPECIMENS COLLECTED:   None    INTERVENTIONS:   4 bands placed were placed on esophageal varices. COMPLICATIONS: None. The patient tolerated the procedure well. EBL: Negligible. RECOMMENDATIONS:  Observe until discharge parameters are achieved. Liquid diet today. Soft food tomorrow. Resume general diet thereafter. Repeat endoscopy to reassess varices and need for additional banding in 3 months. Follow-up Liver [facility] office as scheduled.       Francisco Barahona MD  [address]  Brandie Irwin [address] [place], [place] [address].  [phone]  FAX:  [address]

## 2023-03-16 NOTE — ANESTHESIA PREPROCEDURE EVALUATION
Relevant Problems   No relevant active problems       Anesthetic History   No history of anesthetic complications            Review of Systems / Medical History  Patient summary reviewed, nursing notes reviewed and pertinent labs reviewed    Pulmonary  Within defined limits                 Neuro/Psych   Within defined limits           Cardiovascular  Within defined limits                     GI/Hepatic/Renal           Liver disease     Endo/Other    Diabetes    Arthritis     Other Findings   Comments: Primary biliary cholangitis            Physical Exam    Airway  Mallampati: II  TM Distance: > 6 cm  Neck ROM: normal range of motion   Mouth opening: Normal     Cardiovascular  Regular rate and rhythm,  S1 and S2 normal,  no murmur, click, rub, or gallop             Dental  No notable dental hx       Pulmonary  Breath sounds clear to auscultation               Abdominal  GI exam deferred       Other Findings            Anesthetic Plan    ASA: 3  Anesthesia type: MAC            Anesthetic plan and risks discussed with: Patient

## 2023-03-16 NOTE — H&P
[facility], MD, FACP, [place] [place], [place]      CARLEY Flores, [facility]   Neo Tom, [facility]   Willian Sauceda, FREDDY Finch FNP-KODAK Le, [facility]NP-[facility]      [address]   at [address], [address]  22.   [phone]   FAX: [phone]   at [address], [address] [place] [address], [address]   [phone]   FAX: [phone]         PRE-PROCEDURE NOTE - EGD    H and P from last office visit reviewed. Allergies reviewed. Out-patient medicaton list reviewed. Patient Active Problem List   Diagnosis Code    Tobacco use disorder F17.200    Renal calculus or stone N20.0    BMI 35.0-35.9,adult Z68.35    Encounter for long-term (current) use of other medications Z79.899    Allergic rhinitis, cause unspecified J30.9    Insomnia G47.00    Symptomatic anemia D64.9    Type 2 diabetes mellitus without complication (HCC) K50.1    Severe obesity (HCC) E66.01    Right-sided thoracic back pain M54.6    Elevated liver enzymes R74.8    Other cirrhosis of liver (HCC) K74.69    Primary biliary cholangitis (HCC) K74.3    Opioid use, unspecified with unspecified opioid-induced disorder F11.99       No Known Allergies    No current facility-administered medications on file prior to encounter. Current Outpatient Medications on File Prior to Encounter   Medication Sig Dispense Refill    ursodioL (MANE Forte) 500 mg tablet Take 1 Tablet by mouth two (2) times a day. 60 Tablet 5    metFORMIN ER (GLUCOPHAGE XR) 750 mg tablet TAKE ONE TABLET BY MOUTH ONE TIME DAILY 30 Tablet 5    glimepiride (AMARYL) 2 mg tablet TAKE ONE TABLET BY MOUTH EVERY DAY IN THE MORNING. 30 Tablet 5       For EGD to assess for esophageal and gastric varices.   Plan to perform banding if indicated based upon variceal size and appearance. The risks of the procedure were discussed with the patient. These included reaction to anesthesia, pain, perforation and bleeding. All questions were answered. The patient wishes to proceed with the procedure. PHYSICAL EXAMINATION:  Visit Vitals  LMP 02/20/2023       General: No acute distress. Eyes: Sclera anicteric. ENT: No oral lesions. Thyroid normal.  Nodes: No adenopathy. Skin: No spider angiomata. No jaundice. No palmar erythema. Respiratory: Lungs clear to auscultation. Cardiovascular: Regular heart rate. No murmurs. No JVD. Abdomen: Soft non-tender, liver size normal to percussion/palpation. Spleen not palpable. No obvious ascites. Extremities: No edema. No muscle wasting. No gross arthritic changes. Neurologic: Alert and oriented. Cranial nerves grossly intact. No asterixis. MOST RECENT LABORATORY STUDIES:  Lab Results   Component Value Date/Time    WBC 6.8 11/22/2022 01:35 PM    HGB 13.8 11/22/2022 01:35 PM    HCT 39.6 11/22/2022 01:35 PM    PLATELET 199 (L) 28/51/0981 01:35 PM     (H) 11/22/2022 01:35 PM     Lab Results   Component Value Date/Time    INR 1.4 (H) 11/22/2022 01:35 PM    INR 1.2 04/18/2022 02:22 PM    INR 1.2 01/18/2022 02:05 PM    Prothrombin time 14.2 (H) 11/22/2022 01:35 PM    Prothrombin time 12.3 (H) 04/18/2022 02:22 PM    Prothrombin time 12.2 (H) 01/18/2022 02:05 PM       ASSESSMENT AND PLAN:  EGD to assess for esophageal and/or gastric varices.   Sedation per anesthesiology      MD Natasha [address] Brandie [place]   Kellen Meléndezjospriya  22. [phone]  FAX:  [address]

## 2023-03-24 ENCOUNTER — OFFICE VISIT (OUTPATIENT)
Dept: HEMATOLOGY | Age: 53
End: 2023-03-24

## 2023-03-24 VITALS
BODY MASS INDEX: 28.34 KG/M2 | HEART RATE: 76 BPM | OXYGEN SATURATION: 99 % | SYSTOLIC BLOOD PRESSURE: 109 MMHG | TEMPERATURE: 97 F | WEIGHT: 154 LBS | DIASTOLIC BLOOD PRESSURE: 66 MMHG | HEIGHT: 62 IN

## 2023-03-24 DIAGNOSIS — K74.3 PRIMARY BILIARY CHOLANGITIS (HCC): ICD-10-CM

## 2023-03-24 DIAGNOSIS — K74.69 OTHER CIRRHOSIS OF LIVER (HCC): Primary | ICD-10-CM

## 2023-03-24 DIAGNOSIS — I85.10 SECONDARY ESOPHAGEAL VARICES WITHOUT BLEEDING (HCC): ICD-10-CM

## 2023-03-24 LAB
ALBUMIN SERPL-MCNC: 3.5 G/DL (ref 3.5–5)
ALBUMIN/GLOB SERPL: 0.9 (ref 1.1–2.2)
ALP SERPL-CCNC: 126 U/L (ref 45–117)
ALT SERPL-CCNC: 28 U/L (ref 12–78)
ANION GAP SERPL CALC-SCNC: 2 MMOL/L (ref 5–15)
AST SERPL-CCNC: 51 U/L (ref 15–37)
BASOPHILS # BLD: 0.1 K/UL (ref 0–0.1)
BASOPHILS NFR BLD: 1 % (ref 0–1)
BILIRUB DIRECT SERPL-MCNC: 1.1 MG/DL (ref 0–0.2)
BILIRUB SERPL-MCNC: 2 MG/DL (ref 0.2–1)
BUN SERPL-MCNC: 9 MG/DL (ref 6–20)
BUN/CREAT SERPL: 13 (ref 12–20)
CALCIUM SERPL-MCNC: 10.5 MG/DL (ref 8.5–10.1)
CHLORIDE SERPL-SCNC: 107 MMOL/L (ref 97–108)
CO2 SERPL-SCNC: 28 MMOL/L (ref 21–32)
CREAT SERPL-MCNC: 0.71 MG/DL (ref 0.55–1.02)
DIFFERENTIAL METHOD BLD: ABNORMAL
EOSINOPHIL # BLD: 0.2 K/UL (ref 0–0.4)
EOSINOPHIL NFR BLD: 3 % (ref 0–7)
ERYTHROCYTE [DISTWIDTH] IN BLOOD BY AUTOMATED COUNT: 13.8 % (ref 11.5–14.5)
FERRITIN SERPL-MCNC: 140 NG/ML (ref 26–388)
GLOBULIN SER CALC-MCNC: 4.1 G/DL (ref 2–4)
GLUCOSE SERPL-MCNC: 164 MG/DL (ref 65–100)
HCT VFR BLD AUTO: 37.6 % (ref 35–47)
HGB BLD-MCNC: 12.6 G/DL (ref 11.5–16)
IMM GRANULOCYTES # BLD AUTO: 0 K/UL (ref 0–0.04)
IMM GRANULOCYTES NFR BLD AUTO: 0 % (ref 0–0.5)
INR PPP: 1.3 (ref 0.9–1.1)
IRON SATN MFR SERPL: 45 % (ref 20–50)
IRON SERPL-MCNC: 144 UG/DL (ref 35–150)
LYMPHOCYTES # BLD: 0.7 K/UL (ref 0.8–3.5)
LYMPHOCYTES NFR BLD: 12 % (ref 12–49)
MCH RBC QN AUTO: 34.8 PG (ref 26–34)
MCHC RBC AUTO-ENTMCNC: 33.5 G/DL (ref 30–36.5)
MCV RBC AUTO: 103.9 FL (ref 80–99)
MONOCYTES # BLD: 0.3 K/UL (ref 0–1)
MONOCYTES NFR BLD: 6 % (ref 5–13)
NEUTS SEG # BLD: 4.5 K/UL (ref 1.8–8)
NEUTS SEG NFR BLD: 78 % (ref 32–75)
NRBC # BLD: 0 K/UL (ref 0–0.01)
NRBC BLD-RTO: 0 PER 100 WBC
PLATELET # BLD AUTO: 110 K/UL (ref 150–400)
PMV BLD AUTO: 10.4 FL (ref 8.9–12.9)
POTASSIUM SERPL-SCNC: 4.4 MMOL/L (ref 3.5–5.1)
PROT SERPL-MCNC: 7.6 G/DL (ref 6.4–8.2)
PROTHROMBIN TIME: 13 SEC (ref 9–11.1)
RBC # BLD AUTO: 3.62 M/UL (ref 3.8–5.2)
RBC MORPH BLD: ABNORMAL
SODIUM SERPL-SCNC: 137 MMOL/L (ref 136–145)
TIBC SERPL-MCNC: 321 UG/DL (ref 250–450)
WBC # BLD AUTO: 5.8 K/UL (ref 3.6–11)

## 2023-03-24 NOTE — PROGRESS NOTES
[facility], MD, FACP, Judy Ezekiel Triyung, [place]      Miguel Manuel, CARLEY Jewell, AGPCNP-BC   Rosemary Donnelly, ACNPC-AG   Geneva Ordoñez, FNJASON-KODAK Tejeda, FNP-C   Manohar Sanchez, AGPCNP-BC      [address]   at [address], [address]   [phone]   FAX: [address]   at [address], [address] [place] [address], [address]   [phone]   FAX: [phone]       Patient Care Team:  Carlos Camacho MD as PCP - [facility], Vignesh Mendenhall MD as PCP - [facility] Provider      Problem List  Date Reviewed: 3/26/2023            Codes Class Noted    Secondary esophageal varices without bleeding [facility]) ICD-10-CM: I85.10  ICD-9-CM: 456.21  3/26/2023        Opioid use, unspecified with unspecified opioid-induced disorder ICD-10-CM: F11.99  ICD-9-CM: 292.9, 305.50  12/29/2022        Primary biliary cholangitis ([facility] ICD-10-CM: K74.3  ICD-9-CM: 571.6  8/16/2021        Other cirrhosis of liver ([facility] ICD-10-CM: K74.69  ICD-9-CM: 571.5  6/30/2021        Right-sided thoracic back pain ICD-10-CM: M54.6  ICD-9-CM: 724.1  6/29/2021        Elevated liver enzymes ICD-10-CM: R74.8  ICD-9-CM: 790.5  6/29/2021        Severe obesity ([facility] ICD-10-CM: E66.01  ICD-9-CM: 278.01  12/30/2018        Type 2 diabetes mellitus without complication ([facility] GNJ-45-SE: E11.9  ICD-9-CM: 250.00  7/1/2016        Symptomatic anemia ICD-10-CM: D64.9  ICD-9-CM: 285.9  5/8/2015        Insomnia ICD-10-CM: G47.00  ICD-9-CM: 780.52  10/18/2013        Allergic rhinitis, cause unspecified (Chronic) ICD-10-CM: J30.9  ICD-9-CM: 477.9  4/23/2013        Encounter for long-term (current) use of other medications ICD-10-CM: Z79.899  ICD-9-CM: V58.69  7/8/2012        Tobacco use disorder (Chronic) ICD-10-CM: R90.030  ICD-9-CM: 305.1  4/27/2010        Renal calculus or stone ICD-10-CM: N20.0  ICD-9-CM: 592.0  4/27/2010        BMI 35.0-35.9,adult (Chronic) ICD-10-CM: P70.23  ICD-9-CM: V85.35 Chronic 4/27/2010    Overview Signed 3/10/2015  2:30 AM by Nancy GOMEZ     Anthropometric:   Weight Loss Metrics 3/9/2015 11/6/2013 10/28/2013 10/18/2013 9/10/2013 4/23/2013 1/29/2013   Today's Wt 192 lb 163 lb 12.8 oz 164 lb 14.5 oz 161 lb 161 lb 184 lb 9.6 oz 188 lb 6.4 oz   BMI 35.11 kg/m2 29.95 kg/m2 30.15 kg/m2 28.07 kg/m2 28.07 kg/m2 32.18 kg/m2 34.45 kg/m2                   Lizandroe Patient is being seen at [facility] for management 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 The active problem list, all pertinent past medical history, medications,   radiologic findings and laboratory findings related to the liver disorder were reviewed and discussed with the patient. The patient is a 47 y.o.  female who was found to have chronic liver disease and cirrhosis   in 5/2021 when a CT scan was done for evaluation of an abdominal hernia. She has a history of past heavy alcohol use up to 1 pint per day for 3 years. This was decreased to weekend use only 2 years ago. She has now remained abstinent since 6/01/2021. Assessment of liver fibrosis was performed with Fibroscan 8/2021. The result was 75.0 kPa which correlates with cirrhosis. The CAP score of 350 suggests hepatic steatosis. Serologic evaluation for markers of chronic liver disease was positive for AMA. Treatment for PBC was initiated with MANE 1000 mg daily 7/2021. She is tolerating the medication well with no reported side effects. The patient has developed the following complications of cirrhosis: Variceal bleed 9/2021, treated at [facility]. EGD performed by Dr. Mila Quintana 11/2021 varices, 7 bands and again 1/2022 3 bands.      The patient developed acute shortness of breath 9/2022 which prompted an ER visit. Oxygen saturation decreased to 40% which ended up in being placed on a ventilator. She was transferred to [facility] and recovered with extubation. Radiology imaging was consistent with possible pneumonia. ECHO 9/2022 demonstrated a positive bubble study. Since the last office visit the patient had and EGD by Dr. Valentina Parrish 3/2023. Banding x 4 was performed. She was recently started on Ozempic for an elevated HGB A1C. The patient does not have any symptoms which could be attributed to the liver disorder. The patient is not experiencing the following symptoms which are commonly seen in this liver disorder: fatigue, pain in the right side over the liver, yellowing of the eyes or skin, itching, or swelling of the abdomen. The patient completes all daily activities without any functional limitations. ASSESSMENT AND PLAN:  Cirrhosis  The diagnosis of cirrhosis is based upon imaging, Fibroscan and laboratory studies. Cirrhosis is secondary to PBC and alcohol. She has remained abstinent since 6/2021. Assessment of liver fibrosis was performed with Fibroscan 8/2021. The result was 75.0 kPa which correlates with cirrhosis. The CAP score of 350 suggests hepatic steatosis. Have performed laboratory testing to monitor liver function and degree of liver injury. This included BMP, hepatic panel, CBC with platelet count and INR. Laboratory testing from 11/22/2022 reviewed in detail. Follow-up testing ordered today. The AST is elevated. The ALT is normal. The ALP is elevated. The liver function is depressed . The platelet count is depressed. The patient has developed the following complications of liver disease: Variceal bleed 9/2021. Bubble ECHO positive 9/2022. Advised she scheduled a follow-up with cardiology. The CTP is 5. Child class A. The MELD score is 12. The patient does not require a liver transplant at this time due to low MELD.      Primary Biliary Cholangitis  The diagnosis is based upon serology and an elevation in ALP. A liver biopsy has not been performed. Assessment of liver fibrosis was performed with Fibroscan 8/2021. The result was 75.0 kPa which correlates with cirrhosis. The CAP score of 350 suggests hepatic steatosis. The AMA is strongly positive and the ALP is significantly elevated. The patient meets criteria for PBC and histologic confirmation is not necessary at this time. The patient is being treated with MANE 1000 mg every day. Will continue MANE at 1000 mg daily. The side effects of MANE including a 2% risk of itching and a 2% risk of diarrhea were discussed. Hypercholesterolemia   This is common in patients with PBC. Controlled clinical trials demonstrate that women with PBC do not have an increased risk of CAD depsite the elevated total cholesterol. The cholesterol is typically HDL and does not always require treatment. Stains are not contraindicated if felt to be clinically warranted. Vitamin malabsorption  Patients with PBC do not efficiently absorb fat soluble vitamins A,D,E, K. It has been recommended that the patient take multivitamins with excess fat soluble vitamins. Breast cancer screeing   Women with PBC have an increased risk of breast cancer and should undergo regular mammography screenings. Screening for Esophageal varices   EGD was performed in 09/27/2021 at [facility]. There was esophageal varices and 4 bands were placed  EGD performed by Dr. Chace Cifuentes 11/2021 with 7 bands placed. EGD performed by Dr. Chace Cifuentes 1/2022 with 3 bands placed. Repeat in 6 months. Continue nadolol 40 mg daily for secondary esophageal variceal hemorrhage prophylaxis. EGD 3/2023. Banding x 4. Repeat in 3 months. This was scheduled today. The patient was instructed to discontinue Diclofenac as this can increase risk for bleeding and ulcers. Hepatic encephalopathy   Overt HE has not developed to date.     There is no reason for treatment with lactulose of xifaxan  There is no need to restrict dietary protein at this time. Screening for Hepatocellular Carcinoma  [facility] screening was last performed with ultrasound 9/2022 and negative. AFP normal 11/2022. Will order imaging and AFP today. Treatment of other medical problems in patients with chronic liver disease  There are no contraindications for the patient to take most medications that are necessary for treatment of other medical issues. The patient can take any medications utilized for treatment of DM. Statins to treat hypercholesterolemia. Normal doses of acetaminophen, as recommended on the label of the bottle, are not hepatotoxic except in the setting of daily alcohol use, even in patients with cirrhosis and can be utilized for pain. Counseling for alcohol in patients with chronic liver disease  The patient was counseled regarding alcohol consumption and the effect of alcohol on chronic liver disease. The patient has not consumed alcohol since 5/2021. There was prior heavy use. Osteoporosis  The risk of osteoporosis is increased in patients with cirrhosis. This should be ordered by the patients primary care physician. Vaccinations   Vaccination for viral hepatitis A is recommended since the patient has no serologic evidence of previous exposure or vaccination with immunity. Routine vaccinations against other bacterial and viral agents can be performed as indicated. Annual flu vaccination should be administered if indicated. ALLERGIES  No Known Allergies    MEDICATIONS  Current Outpatient Medications   Medication Sig    folic acid (FOLVITE) 1 mg tablet Take 1 mg by mouth daily. tiZANidine (ZANAFLEX) 2 mg tablet Take 1 Tablet by mouth four (4) times daily as needed for Muscle Spasm(s).     pantoprazole (PROTONIX) 40 mg tablet Take 1 tablet by mouth daily    glucose blood VI test strips (blood glucose test) strip by Does Not Apply route Daily (before breakfast). zolpidem (AMBIEN) 5 mg tablet Take 1 Tablet by mouth nightly as needed for Sleep. Max Daily Amount: 5 mg.    semaglutide (Ozempic) 0.25 mg or 0.5 mg/dose (2 mg/1.5 ml) subq pen 0.5 mg by SubCUTAneous route every seven (7) days. nadoloL (CORGARD) 40 mg tablet Take 0.5 Tablets by mouth daily. Indications: prevent bleeding varicose vein in the esophagus    ursodioL (MANE Forte) 500 mg tablet Take 1 Tablet by mouth two (2) times a day. metFORMIN ER (GLUCOPHAGE XR) 750 mg tablet TAKE ONE TABLET BY MOUTH ONE TIME DAILY     No current facility-administered medications for this visit. SYSTEM REVIEW NOT RELATED TO LIVER DISEASE OR REVIEWED ABOVE:  Constitution systems: Negative for fever, chills, weight gain, weight loss. Eyes: Negative for visual changes. ENT: Negative for sore throat, painful swallowing. Respiratory: Negative for cough, hemoptysis, SOB. Cardiology: Negative for chest pain, palpitations. GI:  Negative for constipation or diarrhea. : Negative for urinary frequency, dysuria, hematuria, nocturia. Skin: Negative for rash. Hematology: Negative for easy bruising, blood clots. Musculo-skeletal: Negative for back pain, muscle pain, weakness. Neurologic: Negative for headaches, dizziness, vertigo, memory problems not related to HE. Psychology: Negative for anxiety, depression. FAMILY HISTORY:  The father  of lung cancer   The mother has/had the following chronic disease(s): Colon cancer, COPD, Osteoporosis. There is no family history of liver disease. There is no family history of immune disorders. SOCIAL HISTORY:  The patient is . The patient has 3 children, 1 passed of SIDS. The patient currently smokes 1/2 pack of tobacco daily. The patient drank up to 1 pint per day for 3 years. She decreased to weekends only 2 years ago. All alcohol was discontinued 2021.    The patient currently works full-time as Customer Service Representative. PHYSICAL EXAMINATION:  Visit Vitals  /66 (BP 1 Location: Left upper arm, BP Patient Position: Sitting, BP Cuff Size: Adult)   Pulse 76   Temp 97 °F (36.1 °C) (Temporal)   Ht 5' 2\" (1.575 m)   Wt 154 lb (69.9 kg)   SpO2 99%   BMI 28.17 kg/m²       General: No acute distress. Eyes: Sclera anicteric. ENT: No oral lesions. Thyroid normal.  Nodes: No adenopathy. Skin: No spider angiomata. No jaundice. No palmar erythema. Respiratory: Lungs clear to auscultation. Cardiovascular: Regular heart rate. No murmurs. No JVD. Abdomen: Soft non-tender, liver size normal to percussion/palpation. Spleen not palpable. No obvious ascites. Extremities: No edema. No muscle wasting. No gross arthritic changes. Neurologic: Alert and oriented. Cranial nerves grossly intact. No asterixis. LABORATORY STUDIES:  Liver [place] of [address] Units 3/24/2023 11/22/2022   WBC 3.6 - 11.0 K/uL 5.8 6.8   ANC 1.8 - 8.0 K/UL 4.5 5.1   HGB 11.5 - 16.0 g/dL 12.6 13.8    - 400 K/uL 110 (L) 130 (L)   INR 0.9 - 1.1   1.3 (H) 1.4 (H)   AST 15 - 37 U/L 51 (H) 53 (H)   ALT 12 - 78 U/L 28 24   Alk Phos 45 - 117 U/L 126 (H) 163 (H)   Bili, Total 0.2 - 1.0 MG/DL 2.0 (H) 2.3 (H)   Bili, Direct 0.0 - 0.2 MG/DL 1.1 (H) 1.10 (H)   Albumin 3.5 - 5.0 g/dL 3.5 4.0   BUN 6 - 20 MG/DL 9 10   Creat 0.55 - 1.02 MG/DL 0.71 0.58   Na 136 - 145 mmol/L 137 140   K 3.5 - 5.1 mmol/L 4.4 4.9   Cl 97 - 108 mmol/L 107 101   CO2 21 - 32 mmol/L 28 27   Glucose 65 - 100 mg/dL 164 (H) 117 (H)     Cancer Screening Latest Ref Rng & Units 11/22/2022 1/18/2022 8/16/2021   AFP, Serum 0.0 - 9.2 ng/mL 4.7 3.7 3.8   AFP-L3% 0.0 - 9.9 % 8.7 Comment 9.4     Laboratory testing from 11/22/2022 reviewed in detail. Additional testing included to evaluate progression or regression of disease. Laboratory testing results from today will be communicated by My Chart.      SEROLOGIES:  Serologies Latest Ref Rng & Units 6/29/2021 Hep A Ab, Total Negative Negative   Hep B Surface Ag Negative Negative   Hep B Core Ab, Total Negative Negative   Hep B Surface AB QL  Non Reactive   Ferritin 15 - 150 ng/mL 284 (H)   Iron % Saturation 15 - 55 % 12 (L)   LIMA, IFA  Negative   C-ANCA Neg:<1:20 titer <1:20   P-ANCA Neg:<1:20 titer <1:20   ANCA Neg:<1:20 titer <1:20   ASMCA 0 - 19 Units 14   M2 Ab 0.0 - 20.0 Units 41.7 (H)   Ceruloplasmin 19.0 - 39.0 mg/dL 30.2   Alpha-1 antitrypsin level 101 - 187 mg/dL 187     LIVER HISTOLOGY:  8/2021. [facility]. EkPa was 75.0. IQR/med 0%. . The results suggested a fibrosis level of F4. The CAP score suggests there is hepatic steatosis. ENDOSCOPIC PROCEDURES:  09/2021: EGD performed at [address] [place] [address] showed esophageal varices. Four bands was placed  11/2021. EGD by Dr. Alice Nichols. Esophageal varices with 7 bands placed. 1/2022. EGD by Dr. Alice Nichols. Esophageal varices with 3 bands placed. Repeat in 6 months. RADIOLOGY:  5/2021. CT of Abdomen. Cirrhotic appearance of the liver. Cholelithiasis. No ductal dilatation. Right renal calculi. Splenomegaly. No ascites. 3/2022. Ultrasound of liver. Echogenic consistent with cirrhosis. No liver mass lesions. No dilated bile ducts. No ascites. 9/2022. Ultrasound of liver. Echogenic consistent with cirrhosis. No liver mass lesions. No dilated bile ducts. No ascites. OTHER TESTING:  Not available or performed    FOLLOW-UP:  All of the issues listed above in the Assessment and Plan were discussed with the patient. All questions were answered. The patient expressed a clear understanding of the above. [facility] in 3 months for ongoing monitoring and treatment. Sania Jewell, [facility] of [phone] Juan Daniel Raya, [facility], [facility].  [facility]

## 2023-03-24 NOTE — PROGRESS NOTES
Identified pt with two pt identifiers(name and ). Reviewed record in preparation for visit and have obtained necessary documentation. Chief Complaint   Patient presents with    Follow-up      Vitals:    23 0951   BP: 109/66   Pulse: 76   Temp: 97 °F (36.1 °C)   TempSrc: Temporal   SpO2: 99%   Weight: 154 lb (69.9 kg)   Height: 5' 2\" (1.575 m)   PainSc:   0 - No pain       Health Maintenance Review: Patient reminded of \"due or due soon\" health maintenance. I have asked the patient to contact his/her primary care provider (PCP) for follow-up on his/her health maintenance. Coordination of Care Questionnaire:  :   1) Have you been to an emergency room, urgent care, or hospitalized since your last visit? If yes, where when, and reason for visit? no       2. Have seen or consulted any other health care provider since your last visit? If yes, where when, and reason for visit? NO      Patient is accompanied by self I have received verbal consent from Katie Marcus to discuss any/all medical information while they are present in the room.

## 2023-03-26 PROBLEM — I85.10 SECONDARY ESOPHAGEAL VARICES WITHOUT BLEEDING (HCC): Status: ACTIVE | Noted: 2023-03-26

## 2023-03-27 LAB
AFP L3 MFR SERPL: 9 % (ref 0–9.9)
AFP SERPL-MCNC: 5.8 NG/ML (ref 0–9.2)

## 2023-03-30 DIAGNOSIS — G47.00 INSOMNIA, UNSPECIFIED TYPE: ICD-10-CM

## 2023-03-31 RX ORDER — ZOLPIDEM TARTRATE 5 MG/1
5 TABLET ORAL
Qty: 30 TABLET | Refills: 0 | Status: SHIPPED | OUTPATIENT
Start: 2023-03-31

## 2023-03-31 NOTE — TELEPHONE ENCOUNTER
Last Visit: 12/29/22 with MD Jeannine Baron  Next Appointment: none  Previous Refill Encounter(s): 3/1/23 #30    Requested Prescriptions     Pending Prescriptions Disp Refills    zolpidem (AMBIEN) 5 mg tablet 30 Tablet 0     Sig: Take 1 Tablet by mouth nightly as needed for Sleep. Max Daily Amount: 5 mg. For Pharmacy Admin Tracking Only    Program: Medication Refill  CPA in place:   Recommendation Provided To:    Intervention Detail: New Rx: 1, reason: Patient Preference  Intervention Accepted By:   Elodia Henry Closed?:   Time Spent (min): 5

## 2023-04-28 ENCOUNTER — TELEPHONE (OUTPATIENT)
Dept: FAMILY MEDICINE CLINIC | Age: 53
End: 2023-04-28

## 2023-04-28 DIAGNOSIS — G47.00 INSOMNIA, UNSPECIFIED TYPE: ICD-10-CM

## 2023-04-28 RX ORDER — ZOLPIDEM TARTRATE 5 MG/1
5 TABLET ORAL
Qty: 30 TABLET | Refills: 0 | Status: SHIPPED | OUTPATIENT
Start: 2023-04-28

## 2023-04-29 ENCOUNTER — HOSPITAL ENCOUNTER (OUTPATIENT)
Dept: ULTRASOUND IMAGING | Age: 53
End: 2023-04-29
Attending: NURSE PRACTITIONER
Payer: COMMERCIAL

## 2023-04-29 DIAGNOSIS — K74.69 OTHER CIRRHOSIS OF LIVER (HCC): ICD-10-CM

## 2023-04-29 DIAGNOSIS — K74.3 PRIMARY BILIARY CHOLANGITIS (HCC): ICD-10-CM

## 2023-04-29 PROCEDURE — 76700 US EXAM ABDOM COMPLETE: CPT

## 2023-05-01 NOTE — PROGRESS NOTES
Letter sent via my chart regarding the ultrasound which shows no concerning mass or lesion. There is mild ascites. Bilateral renal calculi and cholelithiasis.

## 2023-05-05 ENCOUNTER — TELEPHONE (OUTPATIENT)
Dept: FAMILY MEDICINE CLINIC | Age: 53
End: 2023-05-05

## 2023-05-08 ENCOUNTER — PATIENT MESSAGE (OUTPATIENT)
Age: 53
End: 2023-05-08

## 2023-05-08 ENCOUNTER — TELEPHONE (OUTPATIENT)
Age: 53
End: 2023-05-08

## 2023-05-08 DIAGNOSIS — M79.7 FIBROMYALGIA: Primary | ICD-10-CM

## 2023-05-08 RX ORDER — METHOCARBAMOL 500 MG/1
500 TABLET, FILM COATED ORAL 4 TIMES DAILY
Qty: 40 TABLET | Refills: 0 | Status: SHIPPED | OUTPATIENT
Start: 2023-05-08 | End: 2023-05-18

## 2023-05-08 NOTE — TELEPHONE ENCOUNTER
----- Message from CHEYENNEΤΡΟΒΟΛΟCHEYENNE Cheema sent at 5/8/2023  7:49 AM EDT -----  Regarding: My back  Contact: [phone]  Pt having problems with her back, please give her a call.

## 2023-05-30 NOTE — TELEPHONE ENCOUNTER
From: SUSU CALVERT  To: Cristina Hoffman  Sent: 5/8/2023 1:40 PM EDT  Subject: Letter    Win Barboza,   Your letter is at the  ready for .   Jamie Ferrara

## 2023-05-31 RX ORDER — ZOLPIDEM TARTRATE 5 MG/1
5 TABLET ORAL NIGHTLY PRN
Qty: 30 TABLET | Refills: 0 | Status: SHIPPED | OUTPATIENT
Start: 2023-05-31 | End: 2023-06-30

## 2023-06-22 ENCOUNTER — OFFICE VISIT (OUTPATIENT)
Age: 53
End: 2023-06-22
Payer: COMMERCIAL

## 2023-06-22 VITALS
TEMPERATURE: 98.7 F | HEART RATE: 81 BPM | OXYGEN SATURATION: 96 % | SYSTOLIC BLOOD PRESSURE: 111 MMHG | DIASTOLIC BLOOD PRESSURE: 74 MMHG | RESPIRATION RATE: 18 BRPM | HEIGHT: 62 IN | WEIGHT: 174.8 LBS | BODY MASS INDEX: 32.17 KG/M2

## 2023-06-22 DIAGNOSIS — K74.3 PRIMARY BILIARY CIRRHOSIS (HCC): ICD-10-CM

## 2023-06-22 DIAGNOSIS — E78.2 MIXED HYPERLIPIDEMIA: ICD-10-CM

## 2023-06-22 DIAGNOSIS — M54.6 PAIN IN THORACIC SPINE: ICD-10-CM

## 2023-06-22 DIAGNOSIS — Z00.00 ANNUAL PHYSICAL EXAM: Primary | ICD-10-CM

## 2023-06-22 DIAGNOSIS — Z11.4 SCREENING FOR HIV (HUMAN IMMUNODEFICIENCY VIRUS): ICD-10-CM

## 2023-06-22 DIAGNOSIS — E11.9 TYPE 2 DIABETES MELLITUS WITHOUT COMPLICATION, WITHOUT LONG-TERM CURRENT USE OF INSULIN (HCC): ICD-10-CM

## 2023-06-22 DIAGNOSIS — M79.7 FIBROMYALGIA: ICD-10-CM

## 2023-06-22 DIAGNOSIS — H16.002 CORNEA ULCER, LEFT: ICD-10-CM

## 2023-06-22 DIAGNOSIS — Z12.31 ENCOUNTER FOR SCREENING MAMMOGRAM FOR BREAST CANCER: ICD-10-CM

## 2023-06-22 DIAGNOSIS — Z11.59 ENCOUNTER FOR HEPATITIS C SCREENING TEST FOR LOW RISK PATIENT: ICD-10-CM

## 2023-06-22 LAB — HBA1C MFR BLD: 8.1 %

## 2023-06-22 PROCEDURE — 83036 HEMOGLOBIN GLYCOSYLATED A1C: CPT | Performed by: FAMILY MEDICINE

## 2023-06-22 PROCEDURE — 99396 PREV VISIT EST AGE 40-64: CPT | Performed by: FAMILY MEDICINE

## 2023-06-22 RX ORDER — L. ACIDOPHILUS/BIFIDO. LONGUM 15 MG
CAPSULE,DELAYED RELEASE (ENTERIC COATED) ORAL
COMMUNITY
Start: 2023-03-03

## 2023-06-22 RX ORDER — SEMAGLUTIDE 1.34 MG/ML
0.5 INJECTION, SOLUTION SUBCUTANEOUS
Qty: 1 ADJUSTABLE DOSE PRE-FILLED PEN SYRINGE | Refills: 5 | Status: SHIPPED | OUTPATIENT
Start: 2023-06-22

## 2023-06-22 RX ORDER — OXYCODONE HYDROCHLORIDE AND ACETAMINOPHEN 5; 325 MG/1; MG/1
1 TABLET ORAL EVERY 4 HOURS PRN
COMMUNITY
Start: 2021-08-20 | End: 2023-06-22 | Stop reason: SDUPTHER

## 2023-06-22 RX ORDER — OXYCODONE HYDROCHLORIDE AND ACETAMINOPHEN 5; 325 MG/1; MG/1
1 TABLET ORAL EVERY 4 HOURS PRN
Qty: 20 TABLET | Refills: 0 | Status: SHIPPED | OUTPATIENT
Start: 2023-06-22 | End: 2023-07-22

## 2023-06-22 RX ORDER — DULOXETIN HYDROCHLORIDE 30 MG/1
30 CAPSULE, DELAYED RELEASE ORAL NIGHTLY
COMMUNITY
Start: 2022-08-30 | End: 2023-06-22 | Stop reason: CLARIF

## 2023-06-22 RX ORDER — PREDNISOLONE ACETATE 10 MG/ML
SUSPENSION/ DROPS OPHTHALMIC
COMMUNITY
Start: 2023-05-16 | End: 2023-06-22 | Stop reason: CLARIF

## 2023-06-22 RX ORDER — NYSTATIN AND TRIAMCINOLONE ACETONIDE 100000; 1 [USP'U]/G; MG/G
OINTMENT TOPICAL
COMMUNITY

## 2023-06-22 RX ORDER — VENLAFAXINE HYDROCHLORIDE 37.5 MG/1
37.5 CAPSULE, EXTENDED RELEASE ORAL DAILY
Qty: 30 CAPSULE | Refills: 3 | Status: SHIPPED | OUTPATIENT
Start: 2023-06-22

## 2023-06-22 RX ORDER — FERROUS SULFATE 325(65) MG
TABLET ORAL
COMMUNITY
Start: 2016-06-06 | End: 2023-06-22 | Stop reason: CLARIF

## 2023-06-22 SDOH — ECONOMIC STABILITY: FOOD INSECURITY: WITHIN THE PAST 12 MONTHS, YOU WORRIED THAT YOUR FOOD WOULD RUN OUT BEFORE YOU GOT MONEY TO BUY MORE.: NEVER TRUE

## 2023-06-22 SDOH — ECONOMIC STABILITY: INCOME INSECURITY: HOW HARD IS IT FOR YOU TO PAY FOR THE VERY BASICS LIKE FOOD, HOUSING, MEDICAL CARE, AND HEATING?: NOT VERY HARD

## 2023-06-22 SDOH — ECONOMIC STABILITY: HOUSING INSECURITY
IN THE LAST 12 MONTHS, WAS THERE A TIME WHEN YOU DID NOT HAVE A STEADY PLACE TO SLEEP OR SLEPT IN A SHELTER (INCLUDING NOW)?: NO

## 2023-06-22 SDOH — ECONOMIC STABILITY: FOOD INSECURITY: WITHIN THE PAST 12 MONTHS, THE FOOD YOU BOUGHT JUST DIDN'T LAST AND YOU DIDN'T HAVE MONEY TO GET MORE.: NEVER TRUE

## 2023-06-22 ASSESSMENT — PATIENT HEALTH QUESTIONNAIRE - PHQ9
SUM OF ALL RESPONSES TO PHQ QUESTIONS 1-9: 0
SUM OF ALL RESPONSES TO PHQ QUESTIONS 1-9: 0
SUM OF ALL RESPONSES TO PHQ9 QUESTIONS 1 & 2: 0
SUM OF ALL RESPONSES TO PHQ QUESTIONS 1-9: 0
1. LITTLE INTEREST OR PLEASURE IN DOING THINGS: 0
2. FEELING DOWN, DEPRESSED OR HOPELESS: 0
SUM OF ALL RESPONSES TO PHQ QUESTIONS 1-9: 0

## 2023-06-22 NOTE — PROGRESS NOTES
History and Physical      Aleena Bell  YOB: 1970    Date of Service:  6/22/2023    Chief Complaint:   Aleena Bell is a 47 y.o. female who presents for complete physical examination.     HPI check up    Wt Readings from Last 3 Encounters:   06/22/23 174 lb 12.8 oz (79.3 kg)   03/24/23 154 lb (69.9 kg)   12/29/22 157 lb 9.6 oz (71.5 kg)     BP Readings from Last 3 Encounters:   06/22/23 111/74   03/24/23 109/66   12/29/22 (!) 71/41       Patient Active Problem List   Diagnosis    Tobacco use disorder    BMI 35.0-35.9,adult    Type 2 diabetes mellitus without complication ([facility]    Primary biliary cholangitis (HCC)    Renal calculus or stone    Elevated liver enzymes    Right-sided thoracic back pain    Symptomatic anemia    Severe obesity (HCC)    Insomnia    Encounter for long-term (current) use of other medications    Other cirrhosis of liver ([facility]    Opioid use, unspecified with unspecified opioid-induced disorder ([facility]    Secondary esophageal varices without bleeding ([facility]       Preventive Care:  Health Maintenance   Topic Date Due    Hepatitis A vaccine (1 of 2 - Risk 2-dose series) Never done    HIV screen  Never done    Diabetic retinal exam  Never done    Hepatitis C screen  Never done    Hepatitis B vaccine (1 of 3 - Risk 3-dose series) Never done    Diabetic foot exam  07/01/2017    Breast cancer screen  Never done    Shingles vaccine (1 of 2) Never done    COVID-19 Vaccine (3 - Booster for Moderna series) 06/22/2021    Diabetic Alb to Cr ratio (uACR) test  05/13/2022    Lipids  04/18/2023    Flu vaccine (Season Ended) 08/01/2023    Colorectal Cancer Screen  08/03/2023    Depression Screen  03/24/2024    GFR test (Diabetes, CKD 3-4, OR last GFR 15-59)  03/24/2024    A1C test (Diabetic or Prediabetic)  06/22/2024    Cervical cancer screen  06/19/2025    DTaP/Tdap/Td vaccine (2 - Td or Tdap) 09/23/2028    Pneumococcal 0-64 years Vaccine  Completed    Hib vaccine  Aged Out

## 2023-06-22 NOTE — PROGRESS NOTES
Chief Complaint   Patient presents with    Annual Exam     Patient is here today for her annual physical. Eye infection currently going on and back pain. 1. Have you been to the ER, urgent care clinic since your last visit? Hospitalized since your last visit? 5/14/23 [facility] for eye problem    2. Have you seen or consulted any other health care providers outside of the [address] [place] [address] since your last visit? Include any pap smears or colon screening.  No

## 2023-06-26 RX ORDER — TIZANIDINE 2 MG/1
TABLET ORAL
Qty: 40 TABLET | Refills: 2 | Status: SHIPPED | OUTPATIENT
Start: 2023-06-26

## 2023-06-28 DIAGNOSIS — M79.7 FIBROMYALGIA: ICD-10-CM

## 2023-06-29 RX ORDER — METFORMIN HYDROCHLORIDE 750 MG/1
750 TABLET, EXTENDED RELEASE ORAL DAILY
Qty: 90 TABLET | Refills: 3 | Status: SHIPPED | OUTPATIENT
Start: 2023-06-29

## 2023-06-29 RX ORDER — ZOLPIDEM TARTRATE 5 MG/1
5 TABLET ORAL NIGHTLY PRN
Qty: 90 TABLET | Refills: 0 | Status: SHIPPED | OUTPATIENT
Start: 2023-06-29 | End: 2023-09-27

## 2023-07-07 DIAGNOSIS — M54.6 PAIN IN THORACIC SPINE: ICD-10-CM

## 2023-07-08 RX ORDER — OXYCODONE HYDROCHLORIDE AND ACETAMINOPHEN 5; 325 MG/1; MG/1
1 TABLET ORAL EVERY 4 HOURS PRN
Qty: 20 TABLET | Refills: 0 | OUTPATIENT
Start: 2023-07-08 | End: 2023-08-07

## 2023-08-24 RX ORDER — TIZANIDINE 2 MG/1
TABLET ORAL
Qty: 40 TABLET | Refills: 2 | Status: SHIPPED | OUTPATIENT
Start: 2023-08-24

## 2023-08-24 NOTE — TELEPHONE ENCOUNTER
Last appointment: 6/22/23  Next appointment: Luis Part to follow-up 9/22/23  Previous refill encounter(s): 6/26/23 #40 with 2 refills    Requested Prescriptions     Pending Prescriptions Disp Refills    tiZANidine (ZANAFLEX) 2 MG tablet [Pharmacy Med Name: TIZANIDINE HCL 2MG TABS] 40 tablet 2     Sig: TAKE 1 TABLET BY MOUTH 4 TIMES DAILY AS NEEDED FOR MUSCLE SPASMS         For Pharmacy Admin Tracking Only    Program: Medication Refill  CPA in place:    Recommendation Provided To:    Intervention Detail: New Rx: 1, reason: Patient Preference  Intervention Accepted By:   Shahriar Benitez Closed?:    Time Spent (min): 5

## 2023-09-18 ENCOUNTER — TELEPHONE (OUTPATIENT)
Age: 53
End: 2023-09-18

## 2023-09-18 NOTE — TELEPHONE ENCOUNTER
Patient would like a return call regarding her feet being swollen and yellow looking.   Please give her a call @ [phone]

## 2023-09-19 ENCOUNTER — OFFICE VISIT (OUTPATIENT)
Age: 53
End: 2023-09-19
Payer: COMMERCIAL

## 2023-09-19 VITALS
HEART RATE: 97 BPM | OXYGEN SATURATION: 100 % | HEIGHT: 62 IN | WEIGHT: 168.4 LBS | SYSTOLIC BLOOD PRESSURE: 122 MMHG | RESPIRATION RATE: 18 BRPM | BODY MASS INDEX: 30.99 KG/M2 | DIASTOLIC BLOOD PRESSURE: 73 MMHG | TEMPERATURE: 98 F

## 2023-09-19 DIAGNOSIS — K74.3 PRIMARY BILIARY CIRRHOSIS (HCC): Primary | ICD-10-CM

## 2023-09-19 DIAGNOSIS — I85.10 SECONDARY ESOPHAGEAL VARICES WITHOUT BLEEDING (HCC): ICD-10-CM

## 2023-09-19 DIAGNOSIS — E11.9 TYPE 2 DIABETES MELLITUS WITHOUT COMPLICATION, WITHOUT LONG-TERM CURRENT USE OF INSULIN (HCC): ICD-10-CM

## 2023-09-19 DIAGNOSIS — R17 JAUNDICE: ICD-10-CM

## 2023-09-19 DIAGNOSIS — K74.69 OTHER CIRRHOSIS OF LIVER (HCC): ICD-10-CM

## 2023-09-19 DIAGNOSIS — G93.40 ENCEPHALOPATHY: ICD-10-CM

## 2023-09-19 LAB
BILIRUBIN, URINE, POC: NORMAL
BLOOD URINE, POC: NORMAL
GLUCOSE URINE, POC: NORMAL
HBA1C MFR BLD: 5.1 %
KETONES, URINE, POC: NORMAL
LEUKOCYTE ESTERASE, URINE, POC: NORMAL
NITRITE, URINE, POC: POSITIVE
PH, URINE, POC: 5.5 (ref 4.6–8)
PROTEIN,URINE, POC: NORMAL
SPECIFIC GRAVITY, URINE, POC: 1.02 (ref 1–1.03)
URINALYSIS CLARITY, POC: NORMAL
URINALYSIS COLOR, POC: NORMAL
UROBILINOGEN, POC: NORMAL

## 2023-09-19 PROCEDURE — 81003 URINALYSIS AUTO W/O SCOPE: CPT | Performed by: FAMILY MEDICINE

## 2023-09-19 PROCEDURE — 99214 OFFICE O/P EST MOD 30 MIN: CPT | Performed by: FAMILY MEDICINE

## 2023-09-19 PROCEDURE — 83036 HEMOGLOBIN GLYCOSYLATED A1C: CPT | Performed by: FAMILY MEDICINE

## 2023-09-19 RX ORDER — PANTOPRAZOLE SODIUM 40 MG/1
40 TABLET, DELAYED RELEASE ORAL DAILY
Qty: 30 TABLET | Refills: 2 | Status: SHIPPED | OUTPATIENT
Start: 2023-09-19

## 2023-09-19 RX ORDER — LACTULOSE 10 G/15ML
10 SOLUTION ORAL 2 TIMES DAILY
Qty: 1000 ML | Refills: 2 | Status: ON HOLD
Start: 2023-09-19 | End: 2023-09-23 | Stop reason: HOSPADM

## 2023-09-19 RX ORDER — SEMAGLUTIDE 0.68 MG/ML
INJECTION, SOLUTION SUBCUTANEOUS
COMMUNITY
Start: 2023-08-22

## 2023-09-19 RX ORDER — FUROSEMIDE 20 MG/1
20 TABLET ORAL DAILY
Qty: 90 TABLET | Refills: 1 | Status: ON HOLD | OUTPATIENT
Start: 2023-09-19 | End: 2023-09-23 | Stop reason: SDUPTHER

## 2023-09-19 ASSESSMENT — ENCOUNTER SYMPTOMS
HEARTBURN: 1
GLOBUS SENSATION: 1
ABDOMINAL PAIN: 1
ABDOMINAL DISTENTION: 1
ANAL BLEEDING: 0
SHORTNESS OF BREATH: 0
BLOOD IN STOOL: 0
CONSTIPATION: 1
BELCHING: 1

## 2023-09-19 ASSESSMENT — PATIENT HEALTH QUESTIONNAIRE - PHQ9
SUM OF ALL RESPONSES TO PHQ QUESTIONS 1-9: 0
SUM OF ALL RESPONSES TO PHQ9 QUESTIONS 1 & 2: 0
2. FEELING DOWN, DEPRESSED OR HOPELESS: 0
SUM OF ALL RESPONSES TO PHQ QUESTIONS 1-9: 0
SUM OF ALL RESPONSES TO PHQ QUESTIONS 1-9: 0
1. LITTLE INTEREST OR PLEASURE IN DOING THINGS: 0
SUM OF ALL RESPONSES TO PHQ QUESTIONS 1-9: 0

## 2023-09-19 NOTE — PROGRESS NOTES
Chief Complaint   Patient presents with    Foot Swelling     Patient is here today for swollen yellow colored feet and some confusion. 1. Have you been to the ER, urgent care clinic since your last visit? Hospitalized since your last visit? No    2. Have you seen or consulted any other health care providers outside of the [address] [place] [address] Avenue since your last visit? Include any pap smears or colon screening.  No
decompensated cirrhosis with ascites,jaundice,encephalopathy. Start lactulose and lasix. Will likely need to see hepatology      Aroldo Eason MD

## 2023-09-20 ENCOUNTER — HOSPITAL ENCOUNTER (INPATIENT)
Facility: HOSPITAL | Age: 53
LOS: 3 days | Discharge: HOME | DRG: 432 | End: 2023-09-23
Attending: STUDENT IN AN ORGANIZED HEALTH CARE EDUCATION/TRAINING PROGRAM | Admitting: FAMILY MEDICINE
Payer: COMMERCIAL

## 2023-09-20 ENCOUNTER — APPOINTMENT (OUTPATIENT)
Facility: HOSPITAL | Age: 53
DRG: 432 | End: 2023-09-20
Payer: COMMERCIAL

## 2023-09-20 DIAGNOSIS — M54.6 CHRONIC RIGHT-SIDED THORACIC BACK PAIN: ICD-10-CM

## 2023-09-20 DIAGNOSIS — K76.9 PLEURAL EFFUSION ASSOCIATED WITH HEPATIC DISORDER: ICD-10-CM

## 2023-09-20 DIAGNOSIS — R60.9 EDEMA, UNSPECIFIED TYPE: ICD-10-CM

## 2023-09-20 DIAGNOSIS — K70.31 ALCOHOLIC CIRRHOSIS OF LIVER WITH ASCITES (HCC): ICD-10-CM

## 2023-09-20 DIAGNOSIS — K74.3 PRIMARY BILIARY CIRRHOSIS (HCC): ICD-10-CM

## 2023-09-20 DIAGNOSIS — K74.3 HEPATIC CIRRHOSIS DUE TO PRIMARY BILIARY CHOLANGITIS (HCC): ICD-10-CM

## 2023-09-20 DIAGNOSIS — G89.29 CHRONIC RIGHT-SIDED THORACIC BACK PAIN: ICD-10-CM

## 2023-09-20 DIAGNOSIS — J91.8 PLEURAL EFFUSION ASSOCIATED WITH HEPATIC DISORDER: ICD-10-CM

## 2023-09-20 DIAGNOSIS — E83.42 HYPOMAGNESEMIA: ICD-10-CM

## 2023-09-20 DIAGNOSIS — E87.6 HYPOKALEMIA: Primary | ICD-10-CM

## 2023-09-20 PROBLEM — K74.60 CIRRHOSIS (HCC): Status: ACTIVE | Noted: 2023-09-20

## 2023-09-20 LAB
ALBUMIN SERPL-MCNC: 2.6 G/DL (ref 3.5–5)
ALBUMIN SERPL-MCNC: 2.6 G/DL (ref 3.5–5)
ALBUMIN/GLOB SERPL: 0.5 (ref 1.1–2.2)
ALBUMIN/GLOB SERPL: 0.5 (ref 1.1–2.2)
ALP SERPL-CCNC: 114 U/L (ref 45–117)
ALP SERPL-CCNC: 136 U/L (ref 45–117)
ALT SERPL-CCNC: 32 U/L (ref 12–78)
ALT SERPL-CCNC: 33 U/L (ref 12–78)
AMMONIA PLAS-SCNC: 76 UMOL/L
ANION GAP SERPL CALC-SCNC: 10 MMOL/L (ref 5–15)
ANION GAP SERPL CALC-SCNC: 7 MMOL/L (ref 5–15)
APPEARANCE UR: CLEAR
AST SERPL-CCNC: 60 U/L (ref 15–37)
AST SERPL-CCNC: 61 U/L (ref 15–37)
BACTERIA URNS QL MICRO: ABNORMAL /HPF
BASOPHILS # BLD: 0 K/UL (ref 0–0.1)
BASOPHILS # BLD: 0.1 K/UL (ref 0–0.1)
BASOPHILS NFR BLD: 0 % (ref 0–1)
BASOPHILS NFR BLD: 1 % (ref 0–1)
BILIRUB DIRECT SERPL-MCNC: 4.4 MG/DL (ref 0–0.2)
BILIRUB INDIRECT SERPL-MCNC: 2.1 MG/DL (ref 0–1.1)
BILIRUB SERPL-MCNC: 6.4 MG/DL (ref 0.2–1)
BILIRUB SERPL-MCNC: 6.5 MG/DL (ref 0.2–1)
BILIRUB SERPL-MCNC: 7.5 MG/DL (ref 0.2–1)
BILIRUB UR QL: NEGATIVE
BUN SERPL-MCNC: 13 MG/DL (ref 6–20)
BUN SERPL-MCNC: 13 MG/DL (ref 6–20)
BUN/CREAT SERPL: 6 (ref 12–20)
BUN/CREAT SERPL: 9 (ref 12–20)
CALCIUM SERPL-MCNC: 9.6 MG/DL (ref 8.5–10.1)
CALCIUM SERPL-MCNC: 9.7 MG/DL (ref 8.5–10.1)
CHLORIDE SERPL-SCNC: 96 MMOL/L (ref 97–108)
CHLORIDE SERPL-SCNC: 99 MMOL/L (ref 97–108)
CO2 SERPL-SCNC: 30 MMOL/L (ref 21–32)
CO2 SERPL-SCNC: 33 MMOL/L (ref 21–32)
COLOR UR: ABNORMAL
COMMENT:: NORMAL
CREAT SERPL-MCNC: 1.38 MG/DL (ref 0.55–1.02)
CREAT SERPL-MCNC: 2.02 MG/DL (ref 0.55–1.02)
DIFFERENTIAL METHOD BLD: ABNORMAL
DIFFERENTIAL METHOD BLD: ABNORMAL
EOSINOPHIL # BLD: 0.2 K/UL (ref 0–0.4)
EOSINOPHIL # BLD: 0.2 K/UL (ref 0–0.4)
EOSINOPHIL NFR BLD: 2 % (ref 0–7)
EOSINOPHIL NFR BLD: 3 % (ref 0–7)
EPITH CASTS URNS QL MICRO: ABNORMAL /LPF
ERYTHROCYTE [DISTWIDTH] IN BLOOD BY AUTOMATED COUNT: 17.7 % (ref 11.5–14.5)
ERYTHROCYTE [DISTWIDTH] IN BLOOD BY AUTOMATED COUNT: 17.8 % (ref 11.5–14.5)
ETHANOL SERPL-MCNC: <10 MG/DL (ref 0–0.08)
GLOBULIN SER CALC-MCNC: 5.4 G/DL (ref 2–4)
GLOBULIN SER CALC-MCNC: 5.5 G/DL (ref 2–4)
GLUCOSE SERPL-MCNC: 145 MG/DL (ref 65–100)
GLUCOSE SERPL-MCNC: 157 MG/DL (ref 65–100)
GLUCOSE UR STRIP.AUTO-MCNC: NEGATIVE MG/DL
HCT VFR BLD AUTO: 30.6 % (ref 35–47)
HCT VFR BLD AUTO: 33.2 % (ref 35–47)
HGB BLD-MCNC: 10.2 G/DL (ref 11.5–16)
HGB BLD-MCNC: 10.7 G/DL (ref 11.5–16)
HGB UR QL STRIP: NEGATIVE
HYALINE CASTS URNS QL MICRO: ABNORMAL /LPF (ref 0–5)
IMM GRANULOCYTES # BLD AUTO: 0 K/UL (ref 0–0.04)
IMM GRANULOCYTES # BLD AUTO: 0.1 K/UL (ref 0–0.04)
IMM GRANULOCYTES NFR BLD AUTO: 0 % (ref 0–0.5)
IMM GRANULOCYTES NFR BLD AUTO: 1 % (ref 0–0.5)
INR PPP: 2 (ref 0.9–1.1)
KETONES UR QL STRIP.AUTO: NEGATIVE MG/DL
LACTATE SERPL-SCNC: 3.3 MMOL/L (ref 0.4–2)
LACTATE SERPL-SCNC: 3.3 MMOL/L (ref 0.4–2)
LEUKOCYTE ESTERASE UR QL STRIP.AUTO: NEGATIVE
LIPASE SERPL-CCNC: 138 U/L (ref 73–393)
LYMPHOCYTES # BLD: 0.6 K/UL (ref 0.8–3.5)
LYMPHOCYTES # BLD: 0.7 K/UL (ref 0.8–3.5)
LYMPHOCYTES NFR BLD: 8 % (ref 12–49)
LYMPHOCYTES NFR BLD: 8 % (ref 12–49)
MAGNESIUM SERPL-MCNC: 0.9 MG/DL (ref 1.6–2.4)
MCH RBC QN AUTO: 38.8 PG (ref 26–34)
MCH RBC QN AUTO: 39.5 PG (ref 26–34)
MCHC RBC AUTO-ENTMCNC: 32.2 G/DL (ref 30–36.5)
MCHC RBC AUTO-ENTMCNC: 33.3 G/DL (ref 30–36.5)
MCV RBC AUTO: 116.3 FL (ref 80–99)
MCV RBC AUTO: 122.5 FL (ref 80–99)
MONOCYTES # BLD: 0.5 K/UL (ref 0–1)
MONOCYTES # BLD: 1 K/UL (ref 0–1)
MONOCYTES NFR BLD: 12 % (ref 5–13)
MONOCYTES NFR BLD: 6 % (ref 5–13)
NEUTS SEG # BLD: 6.1 K/UL (ref 1.8–8)
NEUTS SEG # BLD: 6.6 K/UL (ref 1.8–8)
NEUTS SEG NFR BLD: 77 % (ref 32–75)
NEUTS SEG NFR BLD: 82 % (ref 32–75)
NITRITE UR QL STRIP.AUTO: NEGATIVE
NRBC # BLD: 0 K/UL (ref 0–0.01)
NRBC # BLD: 0 K/UL (ref 0–0.01)
NRBC BLD-RTO: 0 PER 100 WBC
NRBC BLD-RTO: 0 PER 100 WBC
PH UR STRIP: 6 (ref 5–8)
PLATELET # BLD AUTO: 127 K/UL (ref 150–400)
PLATELET # BLD AUTO: 128 K/UL (ref 150–400)
PMV BLD AUTO: 10.1 FL (ref 8.9–12.9)
PMV BLD AUTO: 9.8 FL (ref 8.9–12.9)
POTASSIUM SERPL-SCNC: 2.3 MMOL/L (ref 3.5–5.1)
POTASSIUM SERPL-SCNC: 2.5 MMOL/L (ref 3.5–5.1)
PROT SERPL-MCNC: 8 G/DL (ref 6.4–8.2)
PROT SERPL-MCNC: 8.1 G/DL (ref 6.4–8.2)
PROT UR STRIP-MCNC: NEGATIVE MG/DL
PROTHROMBIN TIME: 20.2 SEC (ref 9–11.1)
RBC # BLD AUTO: 2.63 M/UL (ref 3.8–5.2)
RBC # BLD AUTO: 2.71 M/UL (ref 3.8–5.2)
RBC #/AREA URNS HPF: ABNORMAL /HPF (ref 0–5)
RBC MORPH BLD: ABNORMAL
SODIUM SERPL-SCNC: 136 MMOL/L (ref 136–145)
SODIUM SERPL-SCNC: 139 MMOL/L (ref 136–145)
SP GR UR REFRACTOMETRY: 1.01 (ref 1–1.03)
SPECIMEN HOLD: NORMAL
UROBILINOGEN UR QL STRIP.AUTO: 1 EU/DL (ref 0.2–1)
WBC # BLD AUTO: 7.5 K/UL (ref 3.6–11)
WBC # BLD AUTO: 8.6 K/UL (ref 3.6–11)
WBC URNS QL MICRO: ABNORMAL /HPF (ref 0–4)

## 2023-09-20 PROCEDURE — 36415 COLL VENOUS BLD VENIPUNCTURE: CPT

## 2023-09-20 PROCEDURE — 6370000000 HC RX 637 (ALT 250 FOR IP): Performed by: EMERGENCY MEDICINE

## 2023-09-20 PROCEDURE — 6360000002 HC RX W HCPCS: Performed by: EMERGENCY MEDICINE

## 2023-09-20 PROCEDURE — 76705 ECHO EXAM OF ABDOMEN: CPT

## 2023-09-20 PROCEDURE — 2060000000 HC ICU INTERMEDIATE R&B

## 2023-09-20 PROCEDURE — 96374 THER/PROPH/DIAG INJ IV PUSH: CPT

## 2023-09-20 PROCEDURE — A9579 GAD-BASE MR CONTRAST NOS,1ML: HCPCS

## 2023-09-20 PROCEDURE — 83605 ASSAY OF LACTIC ACID: CPT

## 2023-09-20 PROCEDURE — 2580000003 HC RX 258: Performed by: EMERGENCY MEDICINE

## 2023-09-20 PROCEDURE — 83690 ASSAY OF LIPASE: CPT

## 2023-09-20 PROCEDURE — 6370000000 HC RX 637 (ALT 250 FOR IP): Performed by: FAMILY MEDICINE

## 2023-09-20 PROCEDURE — 80053 COMPREHEN METABOLIC PANEL: CPT

## 2023-09-20 PROCEDURE — 82077 ASSAY SPEC XCP UR&BREATH IA: CPT

## 2023-09-20 PROCEDURE — 2580000003 HC RX 258: Performed by: FAMILY MEDICINE

## 2023-09-20 PROCEDURE — 83735 ASSAY OF MAGNESIUM: CPT

## 2023-09-20 PROCEDURE — 6370000000 HC RX 637 (ALT 250 FOR IP): Performed by: NURSE PRACTITIONER

## 2023-09-20 PROCEDURE — 81001 URINALYSIS AUTO W/SCOPE: CPT

## 2023-09-20 PROCEDURE — 74176 CT ABD & PELVIS W/O CONTRAST: CPT

## 2023-09-20 PROCEDURE — 6360000004 HC RX CONTRAST MEDICATION

## 2023-09-20 PROCEDURE — 99284 EMERGENCY DEPT VISIT MOD MDM: CPT

## 2023-09-20 PROCEDURE — 85025 COMPLETE CBC W/AUTO DIFF WBC: CPT

## 2023-09-20 PROCEDURE — 2709999900 MRI ABDOMEN W WO CONTRAST MRCP

## 2023-09-20 RX ORDER — POLYETHYLENE GLYCOL 3350 17 G/17G
17 POWDER, FOR SOLUTION ORAL DAILY PRN
Status: DISCONTINUED | OUTPATIENT
Start: 2023-09-20 | End: 2023-09-23 | Stop reason: HOSPADM

## 2023-09-20 RX ORDER — SODIUM CHLORIDE 0.9 % (FLUSH) 0.9 %
5-40 SYRINGE (ML) INJECTION PRN
Status: DISCONTINUED | OUTPATIENT
Start: 2023-09-20 | End: 2023-09-23 | Stop reason: HOSPADM

## 2023-09-20 RX ORDER — MAGNESIUM SULFATE IN WATER 40 MG/ML
2000 INJECTION, SOLUTION INTRAVENOUS
Status: COMPLETED | OUTPATIENT
Start: 2023-09-20 | End: 2023-09-20

## 2023-09-20 RX ORDER — 0.9 % SODIUM CHLORIDE 0.9 %
1000 INTRAVENOUS SOLUTION INTRAVENOUS ONCE
Status: DISCONTINUED | OUTPATIENT
Start: 2023-09-20 | End: 2023-09-23 | Stop reason: HOSPADM

## 2023-09-20 RX ORDER — NICOTINE 21 MG/24HR
1 PATCH, TRANSDERMAL 24 HOURS TRANSDERMAL DAILY
Status: DISCONTINUED | OUTPATIENT
Start: 2023-09-20 | End: 2023-09-23 | Stop reason: HOSPADM

## 2023-09-20 RX ORDER — POTASSIUM CHLORIDE 750 MG/1
40 TABLET, FILM COATED, EXTENDED RELEASE ORAL ONCE
Status: COMPLETED | OUTPATIENT
Start: 2023-09-20 | End: 2023-09-20

## 2023-09-20 RX ORDER — LACTULOSE 10 G/15ML
10 SOLUTION ORAL 2 TIMES DAILY
Status: DISCONTINUED | OUTPATIENT
Start: 2023-09-20 | End: 2023-09-21

## 2023-09-20 RX ORDER — SODIUM CHLORIDE 9 MG/ML
INJECTION, SOLUTION INTRAVENOUS CONTINUOUS
Status: DISCONTINUED | OUTPATIENT
Start: 2023-09-20 | End: 2023-09-22

## 2023-09-20 RX ORDER — SODIUM CHLORIDE 0.9 % (FLUSH) 0.9 %
5-40 SYRINGE (ML) INJECTION EVERY 12 HOURS SCHEDULED
Status: DISCONTINUED | OUTPATIENT
Start: 2023-09-20 | End: 2023-09-23 | Stop reason: HOSPADM

## 2023-09-20 RX ORDER — 0.9 % SODIUM CHLORIDE 0.9 %
100 INTRAVENOUS SOLUTION INTRAVENOUS ONCE
Status: DISCONTINUED | OUTPATIENT
Start: 2023-09-20 | End: 2023-09-23 | Stop reason: HOSPADM

## 2023-09-20 RX ORDER — ONDANSETRON 2 MG/ML
4 INJECTION INTRAMUSCULAR; INTRAVENOUS EVERY 6 HOURS PRN
Status: DISCONTINUED | OUTPATIENT
Start: 2023-09-20 | End: 2023-09-23 | Stop reason: HOSPADM

## 2023-09-20 RX ORDER — ONDANSETRON 4 MG/1
4 TABLET, ORALLY DISINTEGRATING ORAL EVERY 8 HOURS PRN
Status: DISCONTINUED | OUTPATIENT
Start: 2023-09-20 | End: 2023-09-23 | Stop reason: HOSPADM

## 2023-09-20 RX ORDER — POTASSIUM CHLORIDE 14.9 MG/ML
10 INJECTION INTRAVENOUS ONCE
Status: COMPLETED | OUTPATIENT
Start: 2023-09-20 | End: 2023-09-20

## 2023-09-20 RX ORDER — ACETAMINOPHEN 325 MG/1
650 TABLET ORAL EVERY 6 HOURS PRN
Status: DISCONTINUED | OUTPATIENT
Start: 2023-09-20 | End: 2023-09-23 | Stop reason: HOSPADM

## 2023-09-20 RX ORDER — SODIUM CHLORIDE 9 MG/ML
INJECTION, SOLUTION INTRAVENOUS PRN
Status: DISCONTINUED | OUTPATIENT
Start: 2023-09-20 | End: 2023-09-23 | Stop reason: HOSPADM

## 2023-09-20 RX ORDER — PANTOPRAZOLE SODIUM 40 MG/1
40 TABLET, DELAYED RELEASE ORAL DAILY
Status: DISCONTINUED | OUTPATIENT
Start: 2023-09-20 | End: 2023-09-23 | Stop reason: HOSPADM

## 2023-09-20 RX ORDER — ACETAMINOPHEN 650 MG/1
650 SUPPOSITORY RECTAL EVERY 6 HOURS PRN
Status: DISCONTINUED | OUTPATIENT
Start: 2023-09-20 | End: 2023-09-23 | Stop reason: HOSPADM

## 2023-09-20 RX ADMIN — SODIUM CHLORIDE 100 ML: 900 INJECTION, SOLUTION INTRAVENOUS at 21:48

## 2023-09-20 RX ADMIN — POTASSIUM CHLORIDE 40 MEQ: 750 TABLET, FILM COATED, EXTENDED RELEASE ORAL at 14:56

## 2023-09-20 RX ADMIN — LACTULOSE 10 G: 20 SOLUTION ORAL at 22:53

## 2023-09-20 RX ADMIN — POTASSIUM CHLORIDE 10 MEQ: 14.9 INJECTION, SOLUTION INTRAVENOUS at 14:55

## 2023-09-20 RX ADMIN — MAGNESIUM SULFATE HEPTAHYDRATE 2000 MG: 40 INJECTION, SOLUTION INTRAVENOUS at 14:55

## 2023-09-20 RX ADMIN — GADOTERIDOL 15 ML: 279.3 INJECTION, SOLUTION INTRAVENOUS at 21:47

## 2023-09-20 RX ADMIN — PANTOPRAZOLE SODIUM 40 MG: 40 TABLET, DELAYED RELEASE ORAL at 18:49

## 2023-09-20 RX ADMIN — SODIUM CHLORIDE: 9 INJECTION, SOLUTION INTRAVENOUS at 14:55

## 2023-09-20 RX ADMIN — SODIUM CHLORIDE, PRESERVATIVE FREE 10 ML: 5 INJECTION INTRAVENOUS at 21:47

## 2023-09-20 ASSESSMENT — ENCOUNTER SYMPTOMS
SHORTNESS OF BREATH: 0
COUGH: 0
VOMITING: 0
CONSTIPATION: 0
NAUSEA: 0
BACK PAIN: 0
TROUBLE SWALLOWING: 0
ABDOMINAL DISTENTION: 1
DIARRHEA: 0
COLOR CHANGE: 1

## 2023-09-20 ASSESSMENT — PAIN - FUNCTIONAL ASSESSMENT: PAIN_FUNCTIONAL_ASSESSMENT: NONE - DENIES PAIN

## 2023-09-20 NOTE — ED NOTES
TRANSFER - OUT REPORT:    Verbal report given to Yessy Manuel RN on Saskia Seymour  being transferred to [facility] for routine progression of patient care       Report consisted of patient's Situation, Background, Assessment and   Recommendations(SBAR). Information from the following report(s) Nurse Handoff Report, ED SBAR, Intake/Output, and Neuro Assessment was reviewed with the receiving nurse. [place] Fall Assessment:    Presents to emergency department  because of falls (Syncope, seizure, or loss of consciousness): No  Age > 70: No  Altered Mental Status, Intoxication with alcohol or substance confusion (Disorientation, impaired judgment, poor safety awaremess, or inability to follow instructions): No  Impaired Mobility: Ambulates or transfers with assistive devices or assistance; Unable to ambulate or transer.: No  Nursing Judgement: No          Lines:   Peripheral IV 09/20/23 Left Antecubital (Active)   Site Assessment Clean, dry & intact 09/20/23 1329   Line Status Blood return noted; Flushed 09/20/23 1329   Phlebitis Assessment No symptoms 09/20/23 1329   Infiltration Assessment 0 09/20/23 1329       Peripheral IV 09/20/23 Right Antecubital (Active)   Site Assessment Clean, dry & intact 09/20/23 1329   Line Status Blood return noted; Flushed 09/20/23 1329   Phlebitis Assessment No symptoms 09/20/23 1329   Infiltration Assessment 0 09/20/23 1329        Opportunity for questions and clarification was provided.       Patient transported with:  Registered Nurse           Magen Foss RN  09/20/23 [id number]

## 2023-09-20 NOTE — H&P
History and Physical    Date of Service:  9/20/2023  Primary Care Provider: Josh Aguero MD  Source of information: The patient and Chart review    Chief Complaint: Swelling and Jaundice      History of Presenting Illness:   Bettina Canales is a 46 y.o. female who was sent to the ER by his PCP on account of jaundice and abnormal labs. 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 She was seen by his PCP yesterday was found to have significant lower extremity edema as well as jaundice. Labs done during the visit shows that patient with significant hypokalemia as well as hyperbilirubinemia with bilirubin level of 6.4. Patient was also noted to have elevated ammonia level. Patient was instructed to come to the ER and presented today. Repeat blood work shows that patient's bilirubin level is 7.5. Also noted significant hypokalemia with potassium level of 2.3 and magnesium level of 0.9. CT abdomen and pelvis done in the ER shows moderate volume ascites, small left and trace right pleural effusion and cirrhotic morphology of the liver. Also noted a stone in the neck of the gallbladder on the right upper quadrant ultrasound is pending at this time. In the ER, IV fluids were started, potassium and magnesium replacement were given and hospitalist service requested admit the patient for further evaluation management. The patient denies any headache, blurry vision, sore throat, trouble swallowing, trouble with speech, chest pain, SOB, cough, fever, chills, N/V/D, abd pain, urinary symptoms, constipation, recent travels, sick contacts, focal or generalized neurological symptoms, falls, injuries, rashes, contact with COVID-19 diagnosed patients, hematemesis, melena, hemoptysis, hematuria, rashes, denies starting any new medications and denies any other concerns or problems besides as mentioned above.          REVIEW OF SYSTEMS:  A comprehensive review of systems was

## 2023-09-20 NOTE — CONSULTS
Chief Complaint:  Elevated liver enzymes with possible choledocholithiasis    HPI:  47 yo woman with hx cirrhosis from primary biliary disease, esophageal varices, DM consulted for jaundice with possible cholecystitis. Pt has know liver disease and is being followed by Dr. Jadon Huerta. She reported noticing mild jaundice for several weeks which became more pronounce last week. She went to see her PCP who ordered lab work and found her severely jaundice so she was instructed to come to ER. Pt reported right lower abdominal pain. She also reported anorexia but no nausea or vomiting. Pt also report constipation and lower extremity edema. RUQ US and CT scan showed cholelithiasis without cholecystitis and she does not have biliary colic symptoms. No fever or chills. Past Medical History:   Diagnosis Date    Anemia     Depression     Encounter for long-term (current) use of other medications 2012    LBP (low back pain) 2010    Liver disease     cirrhosis    Mixed hyperlipidemia 2012    OA (osteoarthritis) 2010    Obesity 2010    Renal calculus or stone 2010    Tobacco use disorder 2010    Type II or unspecified type diabetes mellitus without mention of complication, not stated as uncontrolled 3/9/2015    Diagnosed 3/9/2015 with A1c of 8.8%; treatment initiated with dietary changes, exercise 5 days/wk and started on Metformin XR 500mg daily x 2 weeks, then increase to 500mg AC breakfast and dinner. Past Surgical History:   Procedure Laterality Date    ANKLE FRACTURE SURGERY      BREAST SURGERY      CYST REMOVAL      GYN       X 3    TONSILLECTOMY      UPPER GASTROINTESTINAL ENDOSCOPY         Family History   Problem Relation Age of Onset    Osteoarthritis Mother     Cancer Mother     Cancer Father        No current facility-administered medications on file prior to encounter.      Current Outpatient Medications on File Prior to Encounter   Medication Sig Dispense lobe. There is extensive hepatic surface nodularity compatible with cirrhosis. There is splenomegaly. The pancreas appears grossly normal in configuration with no adjacent inflammatory changes. The gallbladder appears distended. There is a stone in the neck. There is no significant biliary dilatation. Bilateral adrenal glands appear normal.  There is no hydronephrosis. There are stones in the bilateral kidneys. There are no stones in the bilateral ureters. No stones in the bladder. No bladder wall thickening. The uterus is grossly unremarkable. There is a moderate volume of ascites. There is no free air. There are no abscesses. No significant lymphadenopathy. No evidence of intestinal obstruction. The abdominal aorta is normal in caliber. There are mild atherosclerotic calcifications. No acute bony abnormalities. No aggressive appearing bony lesions. 1.  Moderate volume of ascites. 2.  Small left and trace right pleural effusions. 3.  Cirrhotic morphology of the liver is again noted. 4.  The gallbladder appears distended. There is a stone in the neck. Consider right upper quadrant. 5.   Splenomegaly. AP:  47 yo woman consulted for jaundice with possible choledocholithiasis    - Jaundice:  Unclear if there is bile duct stone causing biliary obstruction or extrinsic compression from mass.   No emergent indication for cholecystectomy  - Recommend MRCP   - Lactulose  - NPO with sips of clears  - Labs in am      FACE TO FACE time including review of any indicated imaging, discussion with patient, and other providers, exam and discussion with patient: 30 minutes

## 2023-09-20 NOTE — ED TRIAGE NOTES
Patient arrives to ED complaining of lower extremity edema for 2 weeks. Patient sent from PCP due to jaundice and abnormal labs.   Patient has a history of liver disease

## 2023-09-20 NOTE — RESULT ENCOUNTER NOTE
Results reviewed with patient.   Advised to come to [facility] for further evaluation

## 2023-09-21 LAB
ALBUMIN SERPL-MCNC: 2.1 G/DL (ref 3.5–5)
ALBUMIN/GLOB SERPL: 0.5 (ref 1.1–2.2)
ALP SERPL-CCNC: 80 U/L (ref 45–117)
ALT SERPL-CCNC: 25 U/L (ref 12–78)
AMMONIA PLAS-SCNC: 65 UMOL/L
ANION GAP SERPL CALC-SCNC: 5 MMOL/L (ref 5–15)
AST SERPL-CCNC: 53 U/L (ref 15–37)
BASOPHILS # BLD: 0 K/UL (ref 0–0.1)
BASOPHILS NFR BLD: 0 % (ref 0–1)
BILIRUB SERPL-MCNC: 7 MG/DL (ref 0.2–1)
BUN SERPL-MCNC: 13 MG/DL (ref 6–20)
BUN/CREAT SERPL: 13 (ref 12–20)
CALCIUM SERPL-MCNC: 8.7 MG/DL (ref 8.5–10.1)
CEA SERPL-MCNC: 6.9 NG/ML
CHLORIDE SERPL-SCNC: 98 MMOL/L (ref 97–108)
CO2 SERPL-SCNC: 33 MMOL/L (ref 21–32)
CREAT SERPL-MCNC: 0.97 MG/DL (ref 0.55–1.02)
DIFFERENTIAL METHOD BLD: ABNORMAL
EOSINOPHIL # BLD: 0.1 K/UL (ref 0–0.4)
EOSINOPHIL NFR BLD: 2 % (ref 0–7)
ERYTHROCYTE [DISTWIDTH] IN BLOOD BY AUTOMATED COUNT: 17.5 % (ref 11.5–14.5)
EST. AVERAGE GLUCOSE BLD GHB EST-MCNC: 91 MG/DL
GLOBULIN SER CALC-MCNC: 4.4 G/DL (ref 2–4)
GLUCOSE BLD STRIP.AUTO-MCNC: 140 MG/DL (ref 65–117)
GLUCOSE BLD STRIP.AUTO-MCNC: 166 MG/DL (ref 65–117)
GLUCOSE BLD STRIP.AUTO-MCNC: 186 MG/DL (ref 65–117)
GLUCOSE SERPL-MCNC: 169 MG/DL (ref 65–100)
HBA1C MFR BLD: 4.8 % (ref 4–5.6)
HCT VFR BLD AUTO: 23.8 % (ref 35–47)
HCT VFR BLD AUTO: 25.8 % (ref 35–47)
HGB BLD-MCNC: 7.9 G/DL (ref 11.5–16)
HGB BLD-MCNC: 8.7 G/DL (ref 11.5–16)
IMM GRANULOCYTES # BLD AUTO: 0.1 K/UL (ref 0–0.04)
IMM GRANULOCYTES NFR BLD AUTO: 1 % (ref 0–0.5)
LYMPHOCYTES # BLD: 0.7 K/UL (ref 0.8–3.5)
LYMPHOCYTES NFR BLD: 9 % (ref 12–49)
MAGNESIUM SERPL-MCNC: 1.2 MG/DL (ref 1.6–2.4)
MCH RBC QN AUTO: 38.7 PG (ref 26–34)
MCHC RBC AUTO-ENTMCNC: 33.2 G/DL (ref 30–36.5)
MCV RBC AUTO: 116.7 FL (ref 80–99)
MONOCYTES # BLD: 1 K/UL (ref 0–1)
MONOCYTES NFR BLD: 14 % (ref 5–13)
NEUTS SEG # BLD: 5.5 K/UL (ref 1.8–8)
NEUTS SEG NFR BLD: 74 % (ref 32–75)
NRBC # BLD: 0 K/UL (ref 0–0.01)
NRBC BLD-RTO: 0 PER 100 WBC
PLATELET # BLD AUTO: 105 K/UL (ref 150–400)
PMV BLD AUTO: 9.5 FL (ref 8.9–12.9)
POTASSIUM SERPL-SCNC: 2.4 MMOL/L (ref 3.5–5.1)
PROT SERPL-MCNC: 6.5 G/DL (ref 6.4–8.2)
RBC # BLD AUTO: 2.04 M/UL (ref 3.8–5.2)
RBC MORPH BLD: ABNORMAL
SERVICE CMNT-IMP: ABNORMAL
SODIUM SERPL-SCNC: 136 MMOL/L (ref 136–145)
WBC # BLD AUTO: 7.4 K/UL (ref 3.6–11)

## 2023-09-21 PROCEDURE — 36415 COLL VENOUS BLD VENIPUNCTURE: CPT

## 2023-09-21 PROCEDURE — 82272 OCCULT BLD FECES 1-3 TESTS: CPT

## 2023-09-21 PROCEDURE — 85014 HEMATOCRIT: CPT

## 2023-09-21 PROCEDURE — 6370000000 HC RX 637 (ALT 250 FOR IP): Performed by: FAMILY MEDICINE

## 2023-09-21 PROCEDURE — 2060000000 HC ICU INTERMEDIATE R&B

## 2023-09-21 PROCEDURE — 6360000002 HC RX W HCPCS: Performed by: HOSPITALIST

## 2023-09-21 PROCEDURE — 6370000000 HC RX 637 (ALT 250 FOR IP): Performed by: HOSPITALIST

## 2023-09-21 PROCEDURE — 6370000000 HC RX 637 (ALT 250 FOR IP): Performed by: NURSE PRACTITIONER

## 2023-09-21 PROCEDURE — 2580000003 HC RX 258: Performed by: FAMILY MEDICINE

## 2023-09-21 PROCEDURE — 85025 COMPLETE CBC W/AUTO DIFF WBC: CPT

## 2023-09-21 PROCEDURE — 83735 ASSAY OF MAGNESIUM: CPT

## 2023-09-21 PROCEDURE — 99223 1ST HOSP IP/OBS HIGH 75: CPT | Performed by: INTERNAL MEDICINE

## 2023-09-21 PROCEDURE — 80053 COMPREHEN METABOLIC PANEL: CPT

## 2023-09-21 PROCEDURE — 6360000002 HC RX W HCPCS: Performed by: FAMILY MEDICINE

## 2023-09-21 PROCEDURE — 82378 CARCINOEMBRYONIC ANTIGEN: CPT

## 2023-09-21 PROCEDURE — 6370000000 HC RX 637 (ALT 250 FOR IP): Performed by: SURGERY

## 2023-09-21 PROCEDURE — 83036 HEMOGLOBIN GLYCOSYLATED A1C: CPT

## 2023-09-21 PROCEDURE — 82962 GLUCOSE BLOOD TEST: CPT

## 2023-09-21 PROCEDURE — 82140 ASSAY OF AMMONIA: CPT

## 2023-09-21 PROCEDURE — 86301 IMMUNOASSAY TUMOR CA 19-9: CPT

## 2023-09-21 PROCEDURE — 85018 HEMOGLOBIN: CPT

## 2023-09-21 RX ORDER — INSULIN LISPRO 100 [IU]/ML
0-4 INJECTION, SOLUTION INTRAVENOUS; SUBCUTANEOUS
Status: DISCONTINUED | OUTPATIENT
Start: 2023-09-21 | End: 2023-09-23 | Stop reason: HOSPADM

## 2023-09-21 RX ORDER — POTASSIUM CHLORIDE 750 MG/1
40 TABLET, FILM COATED, EXTENDED RELEASE ORAL 2 TIMES DAILY
Status: DISCONTINUED | OUTPATIENT
Start: 2023-09-21 | End: 2023-09-23 | Stop reason: HOSPADM

## 2023-09-21 RX ORDER — DOCUSATE SODIUM 100 MG/1
100 CAPSULE, LIQUID FILLED ORAL 2 TIMES DAILY
Status: DISCONTINUED | OUTPATIENT
Start: 2023-09-21 | End: 2023-09-23

## 2023-09-21 RX ORDER — MAGNESIUM SULFATE IN WATER 40 MG/ML
2000 INJECTION, SOLUTION INTRAVENOUS
Status: COMPLETED | OUTPATIENT
Start: 2023-09-21 | End: 2023-09-21

## 2023-09-21 RX ORDER — DEXTROSE MONOHYDRATE 100 MG/ML
INJECTION, SOLUTION INTRAVENOUS CONTINUOUS PRN
Status: DISCONTINUED | OUTPATIENT
Start: 2023-09-21 | End: 2023-09-23 | Stop reason: HOSPADM

## 2023-09-21 RX ORDER — MORPHINE SULFATE 2 MG/ML
2 INJECTION, SOLUTION INTRAMUSCULAR; INTRAVENOUS EVERY 4 HOURS PRN
Status: DISCONTINUED | OUTPATIENT
Start: 2023-09-21 | End: 2023-09-23

## 2023-09-21 RX ORDER — POTASSIUM CHLORIDE 7.45 MG/ML
10 INJECTION INTRAVENOUS
Status: COMPLETED | OUTPATIENT
Start: 2023-09-21 | End: 2023-09-21

## 2023-09-21 RX ORDER — MORPHINE SULFATE 2 MG/ML
1 INJECTION, SOLUTION INTRAMUSCULAR; INTRAVENOUS EVERY 4 HOURS PRN
Status: DISCONTINUED | OUTPATIENT
Start: 2023-09-21 | End: 2023-09-21

## 2023-09-21 RX ORDER — LACTULOSE 10 G/15ML
10 SOLUTION ORAL
Status: DISCONTINUED | OUTPATIENT
Start: 2023-09-21 | End: 2023-09-21

## 2023-09-21 RX ORDER — INSULIN LISPRO 100 [IU]/ML
0-4 INJECTION, SOLUTION INTRAVENOUS; SUBCUTANEOUS NIGHTLY
Status: DISCONTINUED | OUTPATIENT
Start: 2023-09-21 | End: 2023-09-23 | Stop reason: HOSPADM

## 2023-09-21 RX ORDER — LACTULOSE 10 G/15ML
30 SOLUTION ORAL
Status: DISCONTINUED | OUTPATIENT
Start: 2023-09-21 | End: 2023-09-22

## 2023-09-21 RX ADMIN — MORPHINE SULFATE 2 MG: 2 INJECTION, SOLUTION INTRAMUSCULAR; INTRAVENOUS at 19:17

## 2023-09-21 RX ADMIN — POTASSIUM CHLORIDE 10 MEQ: 7.46 INJECTION, SOLUTION INTRAVENOUS at 08:47

## 2023-09-21 RX ADMIN — MAGNESIUM SULFATE HEPTAHYDRATE 2000 MG: 40 INJECTION, SOLUTION INTRAVENOUS at 08:47

## 2023-09-21 RX ADMIN — POTASSIUM CHLORIDE 10 MEQ: 7.46 INJECTION, SOLUTION INTRAVENOUS at 11:33

## 2023-09-21 RX ADMIN — SODIUM CHLORIDE, PRESERVATIVE FREE 10 ML: 5 INJECTION INTRAVENOUS at 08:47

## 2023-09-21 RX ADMIN — POTASSIUM CHLORIDE 40 MEQ: 750 TABLET, EXTENDED RELEASE ORAL at 08:46

## 2023-09-21 RX ADMIN — POTASSIUM CHLORIDE 10 MEQ: 7.46 INJECTION, SOLUTION INTRAVENOUS at 10:21

## 2023-09-21 RX ADMIN — LACTULOSE 10 G: 20 SOLUTION ORAL at 08:46

## 2023-09-21 RX ADMIN — POTASSIUM CHLORIDE 40 MEQ: 750 TABLET, EXTENDED RELEASE ORAL at 20:55

## 2023-09-21 RX ADMIN — ONDANSETRON 4 MG: 2 INJECTION INTRAMUSCULAR; INTRAVENOUS at 19:24

## 2023-09-21 RX ADMIN — DOCUSATE SODIUM 100 MG: 100 CAPSULE, LIQUID FILLED ORAL at 12:22

## 2023-09-21 RX ADMIN — PANTOPRAZOLE SODIUM 40 MG: 40 TABLET, DELAYED RELEASE ORAL at 08:46

## 2023-09-21 RX ADMIN — MORPHINE SULFATE 1 MG: 2 INJECTION, SOLUTION INTRAMUSCULAR; INTRAVENOUS at 15:52

## 2023-09-21 RX ADMIN — POTASSIUM CHLORIDE 10 MEQ: 7.46 INJECTION, SOLUTION INTRAVENOUS at 12:22

## 2023-09-21 RX ADMIN — LACTULOSE 30 G: 20 SOLUTION ORAL at 11:20

## 2023-09-21 RX ADMIN — DOCUSATE SODIUM 100 MG: 100 CAPSULE, LIQUID FILLED ORAL at 20:55

## 2023-09-21 RX ADMIN — MORPHINE SULFATE 1 MG: 2 INJECTION, SOLUTION INTRAMUSCULAR; INTRAVENOUS at 11:21

## 2023-09-21 RX ADMIN — SODIUM CHLORIDE, PRESERVATIVE FREE 10 ML: 5 INJECTION INTRAVENOUS at 20:55

## 2023-09-21 RX ADMIN — LACTULOSE 30 G: 20 SOLUTION ORAL at 16:58

## 2023-09-21 RX ADMIN — LACTULOSE 30 G: 20 SOLUTION ORAL at 20:55

## 2023-09-21 RX ADMIN — SODIUM CHLORIDE, PRESERVATIVE FREE 10 ML: 5 INJECTION INTRAVENOUS at 20:56

## 2023-09-21 ASSESSMENT — PAIN SCALES - GENERAL
PAINLEVEL_OUTOF10: 9
PAINLEVEL_OUTOF10: 2
PAINLEVEL_OUTOF10: 8
PAINLEVEL_OUTOF10: 8
PAINLEVEL_OUTOF10: 7
PAINLEVEL_OUTOF10: 9
PAINLEVEL_OUTOF10: 9

## 2023-09-21 ASSESSMENT — PAIN DESCRIPTION - LOCATION
LOCATION: BACK

## 2023-09-21 ASSESSMENT — PAIN DESCRIPTION - DESCRIPTORS: DESCRIPTORS: ACHING

## 2023-09-21 ASSESSMENT — PAIN DESCRIPTION - ORIENTATION: ORIENTATION: MID

## 2023-09-21 ASSESSMENT — PAIN DESCRIPTION - PAIN TYPE: TYPE: CHRONIC PAIN

## 2023-09-21 NOTE — CONSULTS
MD Destiny, FACP, [place], [place]      Yanira Hobbs, VANESSA Briscoe, [facility]   Laron Terrazas, [facility]-   Kalyani Saenz, FNPEE Bonner, P-BRAVO Mcgregor, [facility]   Charlette Tapia, [facility]      [address]   at [facility]   [address] [facility], [address] [facility], [address]   [phone]   FAX: [phone]  Liver [place] of [facility]   at [facility], [address] [facility], [address]   [phone]   FAX: [phone]       HEPATOLOGY CONSULT NOTE  I was asked to see this patient in consultation evaluation and management of cirrhosis [facility] and superimposed alcohol induced liver disease. I have reviewed the Emergency room note, Hospital admission note, Notes by all other physicians who have seen the patient during this hospitalization to date. I have reviewed the problem list, all past medical history and the reason for this hospitalization. I have reviewed the allergies and the medications the patient was taking at home prior to this hospitalization. HISTORY:  The patient is well known to me and regularly cared for at [address] [place] [address],[address]. She is a 46 y.o. female who was found to have cirrhosis in 5/2021 when she had a CT scan to evaluate umbilical hernia. She has a history of consuming 10 drinks per day for several years but has been abstinent from alcohol since 6/2021 when she found out she had cirrhosis. The patient has not developed any of the major complications of cirrhosis to date. The patient has developed the following complications of cirrhosis: esophageal varices, variceal bleeding, possible hepatopulmonary syndrome,    She was sent to the ED by her PCP when she developed jaundice, ascites and abdominal pain.     In the ED Laboratory studies were significant for: rash.  Hematology: Negative for easy bruising, blood clots. Musculo-skelatal: Negative for back pain, muscle pain, weakness. Neurologic: Negative for headaches, dizziness, vertigo, memory problems not related to HE. Psychology: Negative for anxiety, depression. FAMILY HISTORY:  The father  of lung cancer   The mother has/had the following chronic disease(s): Colon cancer, COPD, Osteoporosis. There is no family history of liver disease. There is no family history of immune disorders. SOCIAL HISTORY:  The patient is . The patient has 3 children, 1 passed of SIDS. The patient currently smokes 1/2 pack of tobacco daily. The patient drank up to 1 pint per day for 3 years. She decreased to weekends only 2 years ago. All alcohol was discontinued 2021. The patient currently works full-time as . PHYSICAL EXAMINATION:  VS: per nursing note  General:  No acute distress. Eyes:  Sclera icteric  ENT:  No oral lesions. Thyroid normal.  Nodes:  No adenopathy. Skin:  Spider angiomata. Jaundice. Respiratory:  Lungs clear to auscultation. Cardiovascular:  Regular heart rate. Abdomen:  Distended with obvious ascites. Extremities:  No lower extremity edema. No muscle wasting. Neurologic:  Alert and oriented. Cranial nerves grossly intact. No asterixis.     LABORATORY:   Latest Reference Range & Units 23 13:14 23 13:40 23 05:31   Sodium 136 - 145 mmol/L 139 136 136   Potassium 3.5 - 5.1 mmol/L 2.5 (LL) 2.3 (LL) 2.4 (LL)   Chloride 97 - 108 mmol/L 99 96 (L) 98   CO2 21 - 32 mmol/L 30 33 (H) 33 (H)   BUN,BUNPL 6 - 20 MG/DL 13 13 13   Creatinine 0.55 - 1.02 MG/DL 2.02 (H) 1.38 (H) 0.97   Albumin 3.5 - 5.0 g/dL 2.6 (L) 2.6 (L) 2.1 (L)   Alk Phos 45 - 117 U/L 136 (H) 114 80   ALT 12 - 78 U/L 32 33 25   AST 15 - 37 U/L 61 (H) 60 (H) 53 (H)   BILIRUBIN TOTAL 0.2 - 1.0 MG/DL 6.4 (H)  6.5 (H) 7.5 (H) 7.0 (H)   WBC 3.6 - 11.0 K/uL

## 2023-09-21 NOTE — CARE COORDINATION
Care Management Initial Assessment       RUR:13%  Readmission? No  1st IM letter given? No  1st  letter given: No    CM met with pt. She is alert and oriented x4. Demographics and insurance confirmed. She lives with her  in a one level home. No DME used. She is normally independent with ADL's, although this past week has needed help into the tub to shower and with general housekeeping. Her  is able to assist with any needs. No history of home health services or rehab. Plan is to discharge home pending medical progression. CM following.        09/21/23 0842   Service Assessment   Patient Orientation Alert and Oriented;Person;Place;Situation;Self   Cognition Alert   History Provided By Patient   Primary Caregiver Self   Accompanied By/Relationship spouse   Support Systems Spouse/Significant Other;Family Members   PCP Verified by CM Yes   Last Visit to PCP Within last 3 months   Prior Functional Level Independent in ADLs/IADLs   Current Functional Level Independent in ADLs/IADLs; Bathing;Housework   Can patient return to prior living arrangement Yes   Ability to make needs known: Good   Family able to assist with home care needs: Yes   Would you like for me to discuss the discharge plan with any other family members/significant others, and if so, who?  No   Community Resources None   Social/Functional History   Lives With Spouse   Type of [address] [place] [address] One level   Home Access Stairs to enter with rails   Entrance Stairs - Number of Steps 3   Entrance Stairs - Rails Both   Bathroom Shower/Tub Tub/Shower unit   Bathroom Toilet Standard   Bathroom Equipment None   Bathroom Accessibility Accessible   Home Equipment None   ADL Assistance Needs assistance   Bath Minimal assistance   Ambulation Assistance Independent   Transfer Assistance Independent   Active  No   Occupation Unemployed   Discharge Planning   Type of [address] Spouse/Significant Other   Current Services Prior To Admission None   DME Ordered? No   Potential Assistance Purchasing Medications No   Type of Home Care Services None   Patient expects to be discharged to: [place] [facility]   Transition of Care Consult (CM Consult) Discharge [address] Provided? No   Mode of Transport at Discharge Other (see comment)  (family)   Confirm Follow Up Transport Family   Condition of Participation: Discharge Planning   The Plan for Transition of Care is related to the following treatment goals: home   The Patient and/or Patient Representative was provided with a Choice of Provider? Patient   The Patient and/Or Patient Representative agree with the Discharge Plan? Yes   Freedom of Choice list was provided with basic dialogue that supports the patient's individualized plan of care/goals, treatment preferences, and shares the quality data associated with the providers?   Yes     Neena Looney RN BSN  Care Manager

## 2023-09-22 ENCOUNTER — APPOINTMENT (OUTPATIENT)
Facility: HOSPITAL | Age: 53
DRG: 432 | End: 2023-09-22
Payer: COMMERCIAL

## 2023-09-22 ENCOUNTER — ANESTHESIA EVENT (OUTPATIENT)
Facility: HOSPITAL | Age: 53
End: 2023-09-22
Payer: COMMERCIAL

## 2023-09-22 ENCOUNTER — ANESTHESIA (OUTPATIENT)
Facility: HOSPITAL | Age: 53
End: 2023-09-22
Payer: COMMERCIAL

## 2023-09-22 ENCOUNTER — APPOINTMENT (OUTPATIENT)
Facility: HOSPITAL | Age: 53
DRG: 432 | End: 2023-09-22
Attending: INTERNAL MEDICINE
Payer: COMMERCIAL

## 2023-09-22 LAB
ALBUMIN FLD-MCNC: 0.3 G/DL
ALBUMIN SERPL-MCNC: 2.4 G/DL (ref 3.5–5)
ALBUMIN/GLOB SERPL: 0.4 (ref 1.1–2.2)
ALP SERPL-CCNC: 110 U/L (ref 45–117)
ALT SERPL-CCNC: 32 U/L (ref 12–78)
ANION GAP SERPL CALC-SCNC: 7 MMOL/L (ref 5–15)
APPEARANCE FLD: ABNORMAL
AST SERPL-CCNC: 55 U/L (ref 15–37)
BASOPHILS # BLD: 0.1 K/UL (ref 0–0.1)
BASOPHILS NFR BLD: 1 % (ref 0–1)
BILIRUB SERPL-MCNC: 9.1 MG/DL (ref 0.2–1)
BUN SERPL-MCNC: 9 MG/DL (ref 6–20)
BUN/CREAT SERPL: 9 (ref 12–20)
CALCIUM SERPL-MCNC: 8.9 MG/DL (ref 8.5–10.1)
CANCER AG19-9 SERPL-ACNC: 31 U/ML (ref 0–35)
CHLORIDE SERPL-SCNC: 102 MMOL/L (ref 97–108)
CO2 SERPL-SCNC: 27 MMOL/L (ref 21–32)
COLOR FLD: ABNORMAL
CREAT SERPL-MCNC: 1 MG/DL (ref 0.55–1.02)
DIFFERENTIAL METHOD BLD: ABNORMAL
ECHO AO ROOT DIAM: 2.8 CM
ECHO AO ROOT INDEX: 1.53 CM/M2
ECHO BSA: 1.88 M2
ECHO EST RA PRESSURE: 3 MMHG
ECHO LA VOL 2C: 61 ML (ref 22–52)
ECHO LA VOL 2C: 63 ML (ref 22–52)
ECHO LA VOL 4C: 67 ML (ref 22–52)
ECHO LA VOL 4C: 74 ML (ref 22–52)
ECHO LA VOLUME AREA LENGTH: 70 ML
ECHO LA VOLUME INDEX AREA LENGTH: 38 ML/M2 (ref 16–34)
ECHO LV E' LATERAL VELOCITY: 13 CM/S
ECHO LV E' SEPTAL VELOCITY: 8 CM/S
ECHO LV EJECTION FRACTION A2C: 66 %
ECHO LV EJECTION FRACTION A4C: 66 %
ECHO LV FRACTIONAL SHORTENING: 28 % (ref 28–44)
ECHO LV INTERNAL DIMENSION DIASTOLE INDEX: 2.13 CM/M2
ECHO LV INTERNAL DIMENSION DIASTOLIC: 3.9 CM (ref 3.9–5.3)
ECHO LV INTERNAL DIMENSION SYSTOLIC INDEX: 1.53 CM/M2
ECHO LV INTERNAL DIMENSION SYSTOLIC: 2.8 CM
ECHO LV IVSD: 1.1 CM (ref 0.6–0.9)
ECHO LV MASS 2D: 122.1 G (ref 67–162)
ECHO LV MASS INDEX 2D: 66.7 G/M2 (ref 43–95)
ECHO LV POSTERIOR WALL DIASTOLIC: 0.9 CM (ref 0.6–0.9)
ECHO LV RELATIVE WALL THICKNESS RATIO: 0.46
ECHO LVOT AREA: 3.5 CM2
ECHO LVOT DIAM: 2.1 CM
ECHO LVOT PEAK GRADIENT: 13 MMHG
ECHO LVOT PEAK VELOCITY: 1.8 M/S
ECHO MV A VELOCITY: 1.17 M/S
ECHO MV AREA PHT: 5.3 CM2
ECHO MV E DECELERATION TIME (DT): 142.9 MS
ECHO MV E VELOCITY: 1 M/S
ECHO MV E/A RATIO: 0.85
ECHO MV E/E' LATERAL: 7.69
ECHO MV E/E' RATIO (AVERAGED): 10.1
ECHO MV E/E' SEPTAL: 12.5
ECHO MV PRESSURE HALF TIME (PHT): 41.4 MS
ECHO PV ACCELERATION TIME (AT): 114.2 MS
ECHO PV MAX VELOCITY: 1.8 M/S
ECHO PV PEAK GRADIENT: 12 MMHG
ECHO RIGHT VENTRICULAR SYSTOLIC PRESSURE (RVSP): 23 MMHG
ECHO RV TAPSE: 2.9 CM (ref 1.7–?)
ECHO TV REGURGITANT MAX VELOCITY: 2.21 M/S
ECHO TV REGURGITANT PEAK GRADIENT: 20 MMHG
EOSINOPHIL # BLD: 0.2 K/UL (ref 0–0.4)
EOSINOPHIL NFR BLD: 2 % (ref 0–7)
ERYTHROCYTE [DISTWIDTH] IN BLOOD BY AUTOMATED COUNT: 17.3 % (ref 11.5–14.5)
GLOBULIN SER CALC-MCNC: 5.5 G/DL (ref 2–4)
GLUCOSE BLD STRIP.AUTO-MCNC: 187 MG/DL (ref 65–117)
GLUCOSE BLD STRIP.AUTO-MCNC: 190 MG/DL (ref 65–117)
GLUCOSE BLD STRIP.AUTO-MCNC: 205 MG/DL (ref 65–117)
GLUCOSE SERPL-MCNC: 190 MG/DL (ref 65–100)
HCT VFR BLD AUTO: 30.2 % (ref 35–47)
HEMOCCULT STL QL: NEGATIVE
HGB BLD-MCNC: 9.9 G/DL (ref 11.5–16)
IMM GRANULOCYTES # BLD AUTO: 0.1 K/UL (ref 0–0.04)
IMM GRANULOCYTES NFR BLD AUTO: 1 % (ref 0–0.5)
LYMPHOCYTES # BLD: 0.7 K/UL (ref 0.8–3.5)
LYMPHOCYTES NFR BLD: 7 % (ref 12–49)
LYMPHOCYTES NFR FLD: 14 %
MAGNESIUM SERPL-MCNC: 1.4 MG/DL (ref 1.6–2.4)
MCH RBC QN AUTO: 39.3 PG (ref 26–34)
MCHC RBC AUTO-ENTMCNC: 32.8 G/DL (ref 30–36.5)
MCV RBC AUTO: 119.8 FL (ref 80–99)
MONOCYTES # BLD: 1.5 K/UL (ref 0–1)
MONOCYTES NFR BLD: 14 % (ref 5–13)
MONOS+MACROS NFR FLD: 28 %
NEUTROPHILS NFR FLD: 58 %
NEUTS SEG # BLD: 7.9 K/UL (ref 1.8–8)
NEUTS SEG NFR BLD: 75 % (ref 32–75)
NRBC # BLD: 0.02 K/UL (ref 0–0.01)
NRBC BLD-RTO: 0.2 PER 100 WBC
NUC CELL # FLD: 671 /CU MM
PLATELET # BLD AUTO: 119 K/UL (ref 150–400)
PMV BLD AUTO: 9.6 FL (ref 8.9–12.9)
POTASSIUM SERPL-SCNC: 3 MMOL/L (ref 3.5–5.1)
PROT SERPL-MCNC: 7.9 G/DL (ref 6.4–8.2)
RBC # BLD AUTO: 2.52 M/UL (ref 3.8–5.2)
RBC # FLD: >100 /CU MM
RBC MORPH BLD: ABNORMAL
RBC MORPH BLD: ABNORMAL
SERVICE CMNT-IMP: ABNORMAL
SODIUM SERPL-SCNC: 136 MMOL/L (ref 136–145)
SPECIMEN SOURCE FLD: ABNORMAL
SPECIMEN SOURCE FLD: NORMAL
WBC # BLD AUTO: 10.5 K/UL (ref 3.6–11)

## 2023-09-22 PROCEDURE — 6370000000 HC RX 637 (ALT 250 FOR IP): Performed by: HOSPITALIST

## 2023-09-22 PROCEDURE — 82107 ALPHA-FETOPROTEIN L3: CPT

## 2023-09-22 PROCEDURE — 86645 CMV ANTIBODY IGM: CPT

## 2023-09-22 PROCEDURE — 87389 HIV-1 AG W/HIV-1&-2 AB AG IA: CPT

## 2023-09-22 PROCEDURE — 86644 CMV ANTIBODY: CPT

## 2023-09-22 PROCEDURE — 82962 GLUCOSE BLOOD TEST: CPT

## 2023-09-22 PROCEDURE — 2709999900 HC NON-CHARGEABLE SUPPLY: Performed by: INTERNAL MEDICINE

## 2023-09-22 PROCEDURE — 85025 COMPLETE CBC W/AUTO DIFF WBC: CPT

## 2023-09-22 PROCEDURE — 93306 TTE W/DOPPLER COMPLETE: CPT | Performed by: SPECIALIST

## 2023-09-22 PROCEDURE — 43244 EGD VARICES LIGATION: CPT | Performed by: INTERNAL MEDICINE

## 2023-09-22 PROCEDURE — 2580000003 HC RX 258: Performed by: INTERNAL MEDICINE

## 2023-09-22 PROCEDURE — 84443 ASSAY THYROID STIM HORMONE: CPT

## 2023-09-22 PROCEDURE — G0480 DRUG TEST DEF 1-7 CLASSES: HCPCS

## 2023-09-22 PROCEDURE — 49083 ABD PARACENTESIS W/IMAGING: CPT

## 2023-09-22 PROCEDURE — 86900 BLOOD TYPING SEROLOGIC ABO: CPT

## 2023-09-22 PROCEDURE — 36415 COLL VENOUS BLD VENIPUNCTURE: CPT

## 2023-09-22 PROCEDURE — 0W9G30Z DRAINAGE OF PERITONEAL CAVITY WITH DRAINAGE DEVICE, PERCUTANEOUS APPROACH: ICD-10-PCS | Performed by: STUDENT IN AN ORGANIZED HEALTH CARE EDUCATION/TRAINING PROGRAM

## 2023-09-22 PROCEDURE — 6360000002 HC RX W HCPCS: Performed by: NURSE ANESTHETIST, CERTIFIED REGISTERED

## 2023-09-22 PROCEDURE — 2580000003 HC RX 258: Performed by: FAMILY MEDICINE

## 2023-09-22 PROCEDURE — 2500000003 HC RX 250 WO HCPCS: Performed by: NURSE ANESTHETIST, CERTIFIED REGISTERED

## 2023-09-22 PROCEDURE — 6370000000 HC RX 637 (ALT 250 FOR IP): Performed by: FAMILY MEDICINE

## 2023-09-22 PROCEDURE — 86780 TREPONEMA PALLIDUM: CPT

## 2023-09-22 PROCEDURE — 6360000002 HC RX W HCPCS: Performed by: HOSPITALIST

## 2023-09-22 PROCEDURE — 86480 TB TEST CELL IMMUN MEASURE: CPT

## 2023-09-22 PROCEDURE — 7100000011 HC PHASE II RECOVERY - ADDTL 15 MIN: Performed by: INTERNAL MEDICINE

## 2023-09-22 PROCEDURE — 83735 ASSAY OF MAGNESIUM: CPT

## 2023-09-22 PROCEDURE — 82140 ASSAY OF AMMONIA: CPT

## 2023-09-22 PROCEDURE — 80061 LIPID PANEL: CPT

## 2023-09-22 PROCEDURE — 89050 BODY FLUID CELL COUNT: CPT

## 2023-09-22 PROCEDURE — 06L38CZ OCCLUSION OF ESOPHAGEAL VEIN WITH EXTRALUMINAL DEVICE, VIA NATURAL OR ARTIFICIAL OPENING ENDOSCOPIC: ICD-10-PCS | Performed by: INTERNAL MEDICINE

## 2023-09-22 PROCEDURE — 86696 HERPES SIMPLEX TYPE 2 TEST: CPT

## 2023-09-22 PROCEDURE — 82042 OTHER SOURCE ALBUMIN QUAN EA: CPT

## 2023-09-22 PROCEDURE — 99233 SBSQ HOSP IP/OBS HIGH 50: CPT | Performed by: INTERNAL MEDICINE

## 2023-09-22 PROCEDURE — 3600007512: Performed by: INTERNAL MEDICINE

## 2023-09-22 PROCEDURE — 80053 COMPREHEN METABOLIC PANEL: CPT

## 2023-09-22 PROCEDURE — 80307 DRUG TEST PRSMV CHEM ANLYZR: CPT

## 2023-09-22 PROCEDURE — 2060000000 HC ICU INTERMEDIATE R&B

## 2023-09-22 PROCEDURE — 6360000002 HC RX W HCPCS: Performed by: INTERNAL MEDICINE

## 2023-09-22 PROCEDURE — 86665 EPSTEIN-BARR CAPSID VCA: CPT

## 2023-09-22 PROCEDURE — 93306 TTE W/DOPPLER COMPLETE: CPT

## 2023-09-22 PROCEDURE — 86695 HERPES SIMPLEX TYPE 1 TEST: CPT

## 2023-09-22 PROCEDURE — 86664 EPSTEIN-BARR NUCLEAR ANTIGEN: CPT

## 2023-09-22 PROCEDURE — 3600007502: Performed by: INTERNAL MEDICINE

## 2023-09-22 PROCEDURE — 86787 VARICELLA-ZOSTER ANTIBODY: CPT

## 2023-09-22 PROCEDURE — 3700000001 HC ADD 15 MINUTES (ANESTHESIA): Performed by: INTERNAL MEDICINE

## 2023-09-22 PROCEDURE — 2720000010 HC SURG SUPPLY STERILE: Performed by: INTERNAL MEDICINE

## 2023-09-22 PROCEDURE — 6370000000 HC RX 637 (ALT 250 FOR IP): Performed by: NURSE PRACTITIONER

## 2023-09-22 PROCEDURE — 6370000000 HC RX 637 (ALT 250 FOR IP): Performed by: INTERNAL MEDICINE

## 2023-09-22 PROCEDURE — 86901 BLOOD TYPING SEROLOGIC RH(D): CPT

## 2023-09-22 PROCEDURE — 3700000000 HC ANESTHESIA ATTENDED CARE: Performed by: INTERNAL MEDICINE

## 2023-09-22 PROCEDURE — 7100000010 HC PHASE II RECOVERY - FIRST 15 MIN: Performed by: INTERNAL MEDICINE

## 2023-09-22 PROCEDURE — 6370000000 HC RX 637 (ALT 250 FOR IP): Performed by: SURGERY

## 2023-09-22 RX ORDER — LIDOCAINE HYDROCHLORIDE 20 MG/ML
INJECTION, SOLUTION EPIDURAL; INFILTRATION; INTRACAUDAL; PERINEURAL PRN
Status: DISCONTINUED | OUTPATIENT
Start: 2023-09-22 | End: 2023-09-22 | Stop reason: SDUPTHER

## 2023-09-22 RX ORDER — SPIRONOLACTONE 100 MG/1
100 TABLET, FILM COATED ORAL DAILY
Status: DISCONTINUED | OUTPATIENT
Start: 2023-09-22 | End: 2023-09-23 | Stop reason: HOSPADM

## 2023-09-22 RX ORDER — GLYCOPYRROLATE 0.2 MG/ML
INJECTION, SOLUTION INTRAMUSCULAR; INTRAVENOUS
Status: DISPENSED
Start: 2023-09-22 | End: 2023-09-22

## 2023-09-22 RX ORDER — PROPOFOL 10 MG/ML
INJECTION, EMULSION INTRAVENOUS
Status: COMPLETED
Start: 2023-09-22 | End: 2023-09-22

## 2023-09-22 RX ORDER — FUROSEMIDE 40 MG/1
40 TABLET ORAL DAILY
Status: DISCONTINUED | OUTPATIENT
Start: 2023-09-22 | End: 2023-09-23 | Stop reason: HOSPADM

## 2023-09-22 RX ORDER — SODIUM CHLORIDE 9 MG/ML
INJECTION, SOLUTION INTRAVENOUS PRN
Status: DISCONTINUED | OUTPATIENT
Start: 2023-09-22 | End: 2023-09-22 | Stop reason: HOSPADM

## 2023-09-22 RX ORDER — POTASSIUM CHLORIDE 7.45 MG/ML
10 INJECTION INTRAVENOUS
Status: DISPENSED | OUTPATIENT
Start: 2023-09-22 | End: 2023-09-22

## 2023-09-22 RX ORDER — LACTULOSE 10 G/15ML
30 SOLUTION ORAL 2 TIMES DAILY
Status: DISCONTINUED | OUTPATIENT
Start: 2023-09-22 | End: 2023-09-23

## 2023-09-22 RX ORDER — LIDOCAINE HYDROCHLORIDE 20 MG/ML
INJECTION, SOLUTION EPIDURAL; INFILTRATION; INTRACAUDAL; PERINEURAL
Status: COMPLETED
Start: 2023-09-22 | End: 2023-09-22

## 2023-09-22 RX ORDER — ONDANSETRON 2 MG/ML
INJECTION INTRAMUSCULAR; INTRAVENOUS PRN
Status: DISCONTINUED | OUTPATIENT
Start: 2023-09-22 | End: 2023-09-22 | Stop reason: SDUPTHER

## 2023-09-22 RX ORDER — SODIUM CHLORIDE 0.9 % (FLUSH) 0.9 %
5-40 SYRINGE (ML) INJECTION EVERY 12 HOURS SCHEDULED
Status: DISCONTINUED | OUTPATIENT
Start: 2023-09-22 | End: 2023-09-22 | Stop reason: HOSPADM

## 2023-09-22 RX ORDER — SODIUM CHLORIDE 0.9 % (FLUSH) 0.9 %
5-40 SYRINGE (ML) INJECTION PRN
Status: DISCONTINUED | OUTPATIENT
Start: 2023-09-22 | End: 2023-09-22 | Stop reason: HOSPADM

## 2023-09-22 RX ORDER — ROCURONIUM BROMIDE 10 MG/ML
INJECTION, SOLUTION INTRAVENOUS PRN
Status: DISCONTINUED | OUTPATIENT
Start: 2023-09-22 | End: 2023-09-22 | Stop reason: SDUPTHER

## 2023-09-22 RX ORDER — SUCCINYLCHOLINE CHLORIDE 20 MG/ML
INJECTION INTRAMUSCULAR; INTRAVENOUS PRN
Status: DISCONTINUED | OUTPATIENT
Start: 2023-09-22 | End: 2023-09-22 | Stop reason: SDUPTHER

## 2023-09-22 RX ORDER — ALBUMIN (HUMAN) 12.5 G/50ML
25 SOLUTION INTRAVENOUS EVERY 6 HOURS
Status: DISCONTINUED | OUTPATIENT
Start: 2023-09-22 | End: 2023-09-22 | Stop reason: HOSPADM

## 2023-09-22 RX ORDER — SODIUM CHLORIDE 0.9 % (FLUSH) 0.9 %
5-40 SYRINGE (ML) INJECTION 2 TIMES DAILY
Status: DISCONTINUED | OUTPATIENT
Start: 2023-09-22 | End: 2023-09-22 | Stop reason: HOSPADM

## 2023-09-22 RX ADMIN — ROCURONIUM BROMIDE 10 MG: 10 SOLUTION INTRAVENOUS at 10:16

## 2023-09-22 RX ADMIN — MORPHINE SULFATE 2 MG: 2 INJECTION, SOLUTION INTRAMUSCULAR; INTRAVENOUS at 13:34

## 2023-09-22 RX ADMIN — ONDANSETRON HYDROCHLORIDE 4 MG: 2 INJECTION, SOLUTION INTRAMUSCULAR; INTRAVENOUS at 10:21

## 2023-09-22 RX ADMIN — FUROSEMIDE 40 MG: 40 TABLET ORAL at 12:57

## 2023-09-22 RX ADMIN — SODIUM CHLORIDE, PRESERVATIVE FREE 10 ML: 5 INJECTION INTRAVENOUS at 21:03

## 2023-09-22 RX ADMIN — SODIUM CHLORIDE, PRESERVATIVE FREE 10 ML: 5 INJECTION INTRAVENOUS at 12:26

## 2023-09-22 RX ADMIN — PANTOPRAZOLE SODIUM 40 MG: 40 TABLET, DELAYED RELEASE ORAL at 12:58

## 2023-09-22 RX ADMIN — MORPHINE SULFATE 2 MG: 2 INJECTION, SOLUTION INTRAMUSCULAR; INTRAVENOUS at 00:02

## 2023-09-22 RX ADMIN — PROPOFOL 150 MG: 10 INJECTION, EMULSION INTRAVENOUS at 10:16

## 2023-09-22 RX ADMIN — PHYTONADIONE 10 MG: 10 INJECTION, EMULSION INTRAMUSCULAR; INTRAVENOUS; SUBCUTANEOUS at 09:08

## 2023-09-22 RX ADMIN — POTASSIUM CHLORIDE 10 MEQ: 10 INJECTION, SOLUTION INTRAVENOUS at 14:00

## 2023-09-22 RX ADMIN — SODIUM CHLORIDE: 900 INJECTION, SOLUTION INTRAVENOUS at 07:45

## 2023-09-22 RX ADMIN — LACTULOSE 30 G: 20 SOLUTION ORAL at 00:02

## 2023-09-22 RX ADMIN — POTASSIUM CHLORIDE 10 MEQ: 10 INJECTION, SOLUTION INTRAVENOUS at 17:33

## 2023-09-22 RX ADMIN — SPIRONOLACTONE 100 MG: 100 TABLET ORAL at 12:56

## 2023-09-22 RX ADMIN — ONDANSETRON HYDROCHLORIDE 4 MG: 2 INJECTION, SOLUTION INTRAMUSCULAR; INTRAVENOUS at 10:29

## 2023-09-22 RX ADMIN — MORPHINE SULFATE 2 MG: 2 INJECTION, SOLUTION INTRAMUSCULAR; INTRAVENOUS at 04:25

## 2023-09-22 RX ADMIN — POTASSIUM CHLORIDE 40 MEQ: 750 TABLET, EXTENDED RELEASE ORAL at 21:02

## 2023-09-22 RX ADMIN — MORPHINE SULFATE 2 MG: 2 INJECTION, SOLUTION INTRAMUSCULAR; INTRAVENOUS at 17:40

## 2023-09-22 RX ADMIN — POTASSIUM CHLORIDE 10 MEQ: 10 INJECTION, SOLUTION INTRAVENOUS at 12:25

## 2023-09-22 RX ADMIN — MORPHINE SULFATE 2 MG: 2 INJECTION, SOLUTION INTRAMUSCULAR; INTRAVENOUS at 08:21

## 2023-09-22 RX ADMIN — LIDOCAINE HYDROCHLORIDE 40 MG: 20 INJECTION, SOLUTION EPIDURAL; INFILTRATION; INTRACAUDAL; PERINEURAL at 10:16

## 2023-09-22 RX ADMIN — LACTULOSE 30 G: 20 SOLUTION ORAL at 04:25

## 2023-09-22 RX ADMIN — SUCCINYLCHOLINE CHLORIDE 160 MG: 20 INJECTION, SOLUTION INTRAMUSCULAR; INTRAVENOUS at 10:16

## 2023-09-22 ASSESSMENT — PAIN SCALES - GENERAL
PAINLEVEL_OUTOF10: 0
PAINLEVEL_OUTOF10: 8
PAINLEVEL_OUTOF10: 0
PAINLEVEL_OUTOF10: 0

## 2023-09-22 ASSESSMENT — PAIN DESCRIPTION - LOCATION: LOCATION: ABDOMEN

## 2023-09-22 NOTE — PROGRESS NOTES
Nuclear stress test order received-- too late in the day to get the medication for the test. Messaged ordering MD to make them aware.   Test can be completed Monday morning if patient is still in the hospital.     No caffeine after 7pm Sunday 9/24/23   NPO starting at midnight 9/25/23

## 2023-09-22 NOTE — OP NOTE
MD Destiny, FACP, [place], [place]      Brenda Claire, VANESSA Briscoe, [facility]   Abeby Verma, [facility]   Edenilson Alvarado, CAMMIE Ross FNGURPREET-BRAVO Harris, [facility]   Cristo Rose, [facility]      [address]   at [facility]   [address] [facility], [address] [facility], [facility]   [phone]   FAX: [phone]   at [facility], [address] [facility], [address]   [phone]   FAX: [phone]          UPPER ENDOSCOPY WITH BANDING OF ESOPHAGEAL VARICES PROCEDURE NOTE    NAME:  Neeraj Arredondo  :  1970  MRN:  [id number]      INDICATION: Cirrhosis. Screening for esophageal varices with variceal ligation if indicated. : Sathish Dent MD    SURGICAL ASSISTANT:  None    PROSTHETIC DEVISES, TISSUE GRAFTS, ORGAN TRANSPLANTS:  Not applicable     ANESTHESIA/SEDATION: General anesthesia by the CRNA      PROCEDURE DESCRIPTION:  Informed consent was obtained from the patient for the procedure. All risks and benefits of the procedure explained. The procedure was performed in the endoscopy suite. The patient was laying on a stretcher and moved to the left lateral decubitus position prior to administration of sedation. Sedation was administered by anesthesiology. See their note for details. The endoscope was inserted into the mouth and advanced under direct vision to the second portion of the duodenum. Careful inspection of upper gastrointestinal tract was made as the endoscope was inserted and withdrawn. Retroflexion of the endoscope to view of the cardia of the stomach was performed. After withdrawing the endoscope the banding devise was placed on the tip of the endoscope.   The scope was then reinserted under direct inspection and advanced to the

## 2023-09-22 NOTE — PROGRESS NOTES
MD Destiny, FACP, [place], [place]      VANESSA Garcia, [facility]   Wendyelvira Kvng, [facility]   Roxann Hoyos, CAMMIE William, GURPREET-BRAVO [place], [facility]-BC   Daisy Lyon, [facility]      [address]   at [facility]   [address] [facility], [address]   [phone]   FAX: [phone]  Liver [place] of [facility]   at [facility], [address] [place] [address], [address] [facility]   [phone]   FAX: [phone]       HEPATOLOGY PROGRESS NOTE  The patient is well known to me and regularly cared for at [address] [place] [address],[address]. She is a 46 y.o. female who was found to have cirrhosis in 5/2021 when she had a CT scan to evaluate umbilical hernia. She has a history of consuming 10 drinks per day for several years but has been abstinent from alcohol since 6/2021 when she found out she had cirrhosis. The patient has not developed any of the major complications of cirrhosis to date. The patient has developed the following complications of cirrhosis: esophageal varices, variceal bleeding, possible hepatopulmonary syndrome,    She was sent to the ED by her PCP when she developed jaundice, ascites and abdominal pain. In the ED Laboratory studies were significant for:    Sna 139, Scr 2.02 mg, AST 61, ALT 32, , TBILI 6.5 mg, ALB 2.6 gm, WBC 7.5, HB 10.7 gms, , INR 2.0,     Imaging of the liver with Ultrasound, CT scan, MRI demonstrated changes to liver consistent with cirrhosis, partial right PV thrombosis, recannalized umbilical vein, moderate ascites. [facility] Cl[facility] Hospital Course to date: She was evaluated by Surgery for possible cholelithiasis  She underwent paracentesis and about 5 L out so far. FIONA has resolved with IV fluids.     She admits to consuming alcohol but could/would not quantify FE.      Thrombocytopenia   This is secondary to cirrhosis. There is no evidence of overt bleeding. No treatment is required. The platelet count is adequate for the patient to undergo procedures without the need for platelet transfusion or platelet growth factors. Coagulopathy  This is secondary to cirrhosis,   Will treat with 3 doses of Vitamin K over 3 days. There is no evidence of bleeding. No need for FFP at this time. INR has dropped from 2.0 to 1.7    Screening for Hepatocellular Carcinoma  HCC screening was last performed with ultrasound, CT and MRI in 9/2023 and is negative for [address]       PHYSICAL EXAMINATION:  VS: per nursing note  General:  No acute distress. Eyes:  Sclera icteric  ENT:  No oral lesions. Thyroid normal.  Nodes:  No adenopathy. Skin:  Spider angiomata. Jaundice. Respiratory:  Lungs clear to auscultation. Cardiovascular:  Regular heart rate. Abdomen:  Distended with obvious ascites. Extremities:  No lower extremity edema. No muscle wasting. Neurologic:  Alert and oriented. Cranial nerves grossly intact. No asterixis.     LABORATORY:   Latest Reference Range & Units 09/21/23 05:31 09/22/23 06:26 09/22/23 23:27   Sodium 136 - 145 mmol/L 136 136 138   Potassium 3.5 - 5.1 mmol/L 2.4 (LL) 3.0 (L) 3.2 (L)   Chloride 97 - 108 mmol/L 98 102 104   CO2 21 - 32 mmol/L 33 (H) 27 29   BUN,BUNPL 6 - 20 MG/DL 13 9 7   Creatinine 0.55 - 1.02 MG/DL 0.97 1.00 0.92   Albumin 3.5 - 5.0 g/dL 2.1 (L) 2.4 (L) 2.2 (L)   Alk Phos 45 - 117 U/L 80 110 98   ALT 12 - 78 U/L 25 32 25   AST 15 - 37 U/L 53 (H) 55 (H) 49 (H)   BILIRUBIN TOTAL 0.2 - 1.0 MG/DL 7.0 (H) 9.1 (H) 7.5 (H)   WBC 3.6 - 11.0 K/uL 7.4 10.5 9.3   Hemoglobin Quant 11.5 - 16.0 g/dL 7.9 (L) 9.9 (L) 9.6 (L)   Platelet Count 857 - 400 K/uL 105 (L) 119 (L) 121 (L)   INR 0.9 - 1.1     1.8 (H)   (LL): Data is critically low  (L): Data is abnormally low  (H): Data is abnormally high    BILLING LEVEL JUSTIFICATION BY TIME   50 minutes was

## 2023-09-22 NOTE — PROGRESS NOTES
Abnormal; Notable for the following components:    RBC 2.04 (*)     Hemoglobin 7.9 (*)     Hematocrit 23.8 (*)     .7 (*)     MCH 38.7 (*)     RDW 17.5 (*)     Platelets 403 (*)     Lymphocytes % 9 (*)     Monocytes % 14 (*)     Immature Granulocytes 1 (*)     Lymphocytes Absolute 0.7 (*)     Absolute Immature Granulocyte 0.1 (*)     All other components within normal limits   MAGNESIUM - Abnormal; Notable for the following components:    Magnesium 1.2 (*)     All other components within normal limits   AMMONIA - Abnormal; Notable for the following components:    Ammonia 65 (*)     All other components within normal limits   HEMOGLOBIN AND HEMATOCRIT - Abnormal; Notable for the following components:    Hemoglobin 8.7 (*)     Hematocrit 25.8 (*)     All other components within normal limits   CBC WITH AUTO DIFFERENTIAL - Abnormal; Notable for the following components:    RBC 2.52 (*)     Hemoglobin 9.9 (*)     Hematocrit 30.2 (*)     .8 (*)     MCH 39.3 (*)     RDW 17.3 (*)     Platelets 247 (*)     Nucleated RBCs 0.2 (*)     nRBC 0.02 (*)     Lymphocytes % 7 (*)     Monocytes % 14 (*)     Immature Granulocytes 1 (*)     Lymphocytes Absolute 0.7 (*)     Monocytes Absolute 1.5 (*)     Absolute Immature Granulocyte 0.1 (*)     All other components within normal limits   COMPREHENSIVE METABOLIC PANEL W/ REFLEX TO MG FOR LOW K - Abnormal; Notable for the following components:    Potassium 3.0 (*)     Glucose 190 (*)     Bun/Cre Ratio 9 (*)     Total Bilirubin 9.1 (*)     AST 55 (*)     Albumin 2.4 (*)     Globulin 5.5 (*)     Albumin/Globulin Ratio 0.4 (*)     All other components within normal limits   POCT GLUCOSE - Abnormal; Notable for the following components:    POC Glucose 140 (*)     All other components within normal limits   POCT GLUCOSE - Abnormal; Notable for the following components:    POC Glucose 186 (*)     All other components within normal limits   POCT GLUCOSE - Abnormal; Notable for the Please disregard these errors. Please excuse any errors that have escaped final proofreading.

## 2023-09-22 NOTE — PROGRESS NOTES
TRANSFER - IN REPORT:    Verbal report received from [address] [facility] on [employer]  being received from 6S for ordered procedure      Report consisted of patient's Situation, Background, Assessment and   Recommendations(SBAR). Information from the following report(s) Nurse Handoff Report was reviewed with the receiving nurse. Opportunity for questions and clarification was provided. Assessment completed upon patient's arrival to unit and care assumed. Pt reportedly took ozempic sq Sunday, per anesthesia pt to receive general anesthesia. Verified patient name and date of birth, scheduled procedure, and informed consent. Assessed patient. Awake, alert, and oriented per baseline. Vital signs stable (see vital sign flowsheet). Respiratory status within defined limits, abdomen soft and non tender. Skin within defined limits. Initial RN admission and assessment performed and documented in Endoscopy navigator. Patient evaluated by anesthesia in pre-procedure holding. All procedural vital signs, airway assessment, and level of consciousness information monitored and recorded by anesthesia staff on the anesthesia record. Report received from CRNA post procedure. Patient transported to recovery area by RN. Endoscope was pre-cleaned at bedside immediately following procedure by Ramy Lombardo. Recovery room report given to [address] - OUT REPORT:    Verbal report given to Suyapa Abbasi RN on [employer]  being transferred to 6S for routine progression of patient care       Report consisted of patient's Situation, Background, Assessment and   Recommendations(SBAR). Information from the following report(s) Nurse Handoff Report was reviewed with the receiving nurse.            Lines:   Peripheral IV 09/20/23 Left Antecubital (Active)   Site Assessment Clean, dry & intact 09/21/23 5043   Line Status Capped 09/21/23 [address] [facility] Connections checked and tightened;Cap changed

## 2023-09-22 NOTE — ANESTHESIA POSTPROCEDURE EVALUATION
Department of Anesthesiology  Postprocedure Note    Patient: Shay Golden  MRN: [id number]  YOB: 1970  Date of evaluation: 9/22/2023      Procedure Summary     Date: 09/22/23 Room / Location: [address] ENDO 03 / [address] ENDOSCOPY    Anesthesia Start: 1191 Anesthesia Stop: 8599    Procedure: EGD ESOPHAGOGASTRODUODENOSCOPY Diagnosis:       Bleeding esophageal varices, unspecified esophageal varices type ([address])      (Bleeding esophageal varices, unspecified esophageal varices type ([address]) [I85.01])    Surgeons: Sourav Boogie MD Responsible Provider: Arie Velazquez DO    Anesthesia Type: general ASA Status: 3          Anesthesia Type: No value filed.     Janes Phase I: Janes Score: 10    Janes Phase II:        Anesthesia Post Evaluation    Patient location during evaluation: PACU  Level of consciousness: awake  Airway patency: patent  Nausea & Vomiting: no nausea  Complications: no  Cardiovascular status: hemodynamically stable  Respiratory status: acceptable  Hydration status: stable  Multimodal analgesia pain management approach  Pain management: adequate

## 2023-09-23 VITALS
TEMPERATURE: 98.9 F | HEIGHT: 63 IN | DIASTOLIC BLOOD PRESSURE: 52 MMHG | OXYGEN SATURATION: 95 % | RESPIRATION RATE: 13 BRPM | BODY MASS INDEX: 28.95 KG/M2 | WEIGHT: 163.36 LBS | SYSTOLIC BLOOD PRESSURE: 108 MMHG | HEART RATE: 120 BPM

## 2023-09-23 PROBLEM — F10.20 ALCOHOL USE DISORDER, SEVERE, DEPENDENCE (HCC): Status: ACTIVE | Noted: 2023-09-23

## 2023-09-23 PROBLEM — K31.89 PORTAL HYPERTENSIVE GASTROPATHY (HCC): Status: ACTIVE | Noted: 2023-09-23

## 2023-09-23 PROBLEM — K76.6 PORTAL HYPERTENSIVE GASTROPATHY (HCC): Status: ACTIVE | Noted: 2023-09-23

## 2023-09-23 PROBLEM — D69.6 THROMBOCYTOPENIA (HCC): Status: ACTIVE | Noted: 2023-09-23

## 2023-09-23 PROBLEM — K70.9 ALCOHOL INDUCED LIVER DISORDER (HCC): Status: ACTIVE | Noted: 2023-09-23

## 2023-09-23 PROBLEM — D50.0 IRON DEFICIENCY ANEMIA DUE TO CHRONIC BLOOD LOSS: Status: ACTIVE | Noted: 2023-09-23

## 2023-09-23 PROBLEM — K70.31 ASCITES DUE TO ALCOHOLIC CIRRHOSIS (HCC): Status: ACTIVE | Noted: 2023-09-23

## 2023-09-23 LAB
ABO + RH BLD: NORMAL
ALBUMIN SERPL-MCNC: 1.9 G/DL (ref 3.5–5)
ALBUMIN SERPL-MCNC: 2.2 G/DL (ref 3.5–5)
ALBUMIN/GLOB SERPL: 0.4 (ref 1.1–2.2)
ALBUMIN/GLOB SERPL: 0.5 (ref 1.1–2.2)
ALP SERPL-CCNC: 94 U/L (ref 45–117)
ALP SERPL-CCNC: 98 U/L (ref 45–117)
ALT SERPL-CCNC: 24 U/L (ref 12–78)
ALT SERPL-CCNC: 25 U/L (ref 12–78)
AMMONIA PLAS-SCNC: 30 UMOL/L
ANION GAP SERPL CALC-SCNC: 4 MMOL/L (ref 5–15)
ANION GAP SERPL CALC-SCNC: 5 MMOL/L (ref 5–15)
APPEARANCE FLD: ABNORMAL
AST SERPL-CCNC: 49 U/L (ref 15–37)
AST SERPL-CCNC: 68 U/L (ref 15–37)
BASOPHILS # BLD: 0.1 K/UL (ref 0–0.1)
BASOPHILS NFR BLD: 1 % (ref 0–1)
BILIRUB SERPL-MCNC: 7.1 MG/DL (ref 0.2–1)
BILIRUB SERPL-MCNC: 7.5 MG/DL (ref 0.2–1)
BUN SERPL-MCNC: 5 MG/DL (ref 6–20)
BUN SERPL-MCNC: 7 MG/DL (ref 6–20)
BUN/CREAT SERPL: 6 (ref 12–20)
BUN/CREAT SERPL: 8 (ref 12–20)
CALCIUM SERPL-MCNC: 8.1 MG/DL (ref 8.5–10.1)
CALCIUM SERPL-MCNC: 8.6 MG/DL (ref 8.5–10.1)
CHLORIDE SERPL-SCNC: 104 MMOL/L (ref 97–108)
CHLORIDE SERPL-SCNC: 106 MMOL/L (ref 97–108)
CHOLEST SERPL-MCNC: 124 MG/DL
CO2 SERPL-SCNC: 25 MMOL/L (ref 21–32)
CO2 SERPL-SCNC: 29 MMOL/L (ref 21–32)
COLOR FLD: YELLOW
COMMENT:: NORMAL
CREAT SERPL-MCNC: 0.81 MG/DL (ref 0.55–1.02)
CREAT SERPL-MCNC: 0.92 MG/DL (ref 0.55–1.02)
DIFFERENTIAL METHOD BLD: ABNORMAL
EOSINOPHIL # BLD: 0.3 K/UL (ref 0–0.4)
EOSINOPHIL NFR BLD: 3 % (ref 0–7)
ERYTHROCYTE [DISTWIDTH] IN BLOOD BY AUTOMATED COUNT: 17.2 % (ref 11.5–14.5)
GLOBULIN SER CALC-MCNC: 4.5 G/DL (ref 2–4)
GLOBULIN SER CALC-MCNC: 4.6 G/DL (ref 2–4)
GLUCOSE BLD STRIP.AUTO-MCNC: 178 MG/DL (ref 65–117)
GLUCOSE BLD STRIP.AUTO-MCNC: 218 MG/DL (ref 65–117)
GLUCOSE BLD STRIP.AUTO-MCNC: 274 MG/DL (ref 65–117)
GLUCOSE SERPL-MCNC: 227 MG/DL (ref 65–100)
GLUCOSE SERPL-MCNC: 245 MG/DL (ref 65–100)
HCT VFR BLD AUTO: 30.2 % (ref 35–47)
HDLC SERPL-MCNC: 21 MG/DL
HDLC SERPL: 5.9 (ref 0–5)
HGB BLD-MCNC: 9.6 G/DL (ref 11.5–16)
HIV 1+2 AB+HIV1 P24 AG SERPL QL IA: NONREACTIVE
HIV 1/2 RESULT COMMENT: NORMAL
IMM GRANULOCYTES # BLD AUTO: 0.1 K/UL (ref 0–0.04)
IMM GRANULOCYTES NFR BLD AUTO: 1 % (ref 0–0.5)
INR PPP: 1.8 (ref 0.9–1.1)
LDLC SERPL CALC-MCNC: 77.2 MG/DL (ref 0–100)
LYMPHOCYTES # BLD: 0.7 K/UL (ref 0.8–3.5)
LYMPHOCYTES NFR BLD: 7 % (ref 12–49)
LYMPHOCYTES NFR FLD: 19 %
MAGNESIUM SERPL-MCNC: 1.3 MG/DL (ref 1.6–2.4)
MCH RBC QN AUTO: 38.4 PG (ref 26–34)
MCHC RBC AUTO-ENTMCNC: 31.8 G/DL (ref 30–36.5)
MCV RBC AUTO: 120.8 FL (ref 80–99)
MESOTHL CELL NFR FLD: 3 %
MONOCYTES # BLD: 1 K/UL (ref 0–1)
MONOCYTES NFR BLD: 11 % (ref 5–13)
MONOS+MACROS NFR FLD: 29 %
NEUTROPHILS NFR FLD: 49 %
NEUTS SEG # BLD: 7.1 K/UL (ref 1.8–8)
NEUTS SEG NFR BLD: 77 % (ref 32–75)
NRBC # BLD: 0 K/UL (ref 0–0.01)
NRBC BLD-RTO: 0 PER 100 WBC
NUC CELL # FLD: 935 /CU MM
PLATELET # BLD AUTO: 121 K/UL (ref 150–400)
PMV BLD AUTO: 9.7 FL (ref 8.9–12.9)
POTASSIUM SERPL-SCNC: 3.2 MMOL/L (ref 3.5–5.1)
POTASSIUM SERPL-SCNC: 4.1 MMOL/L (ref 3.5–5.1)
PROT SERPL-MCNC: 6.4 G/DL (ref 6.4–8.2)
PROT SERPL-MCNC: 6.8 G/DL (ref 6.4–8.2)
PROTHROMBIN TIME: 17.7 SEC (ref 9–11.1)
RBC # BLD AUTO: 2.5 M/UL (ref 3.8–5.2)
RBC # FLD: >100 /CU MM
RBC MORPH BLD: ABNORMAL
SERVICE CMNT-IMP: ABNORMAL
SODIUM SERPL-SCNC: 135 MMOL/L (ref 136–145)
SODIUM SERPL-SCNC: 138 MMOL/L (ref 136–145)
SPECIMEN HOLD: NORMAL
SPECIMEN SOURCE FLD: ABNORMAL
TRIGL SERPL-MCNC: 129 MG/DL
TSH SERPL DL<=0.05 MIU/L-ACNC: 1.68 UIU/ML (ref 0.36–3.74)
VLDLC SERPL CALC-MCNC: 25.8 MG/DL
WBC # BLD AUTO: 9.3 K/UL (ref 3.6–11)

## 2023-09-23 PROCEDURE — 85610 PROTHROMBIN TIME: CPT

## 2023-09-23 PROCEDURE — 6360000002 HC RX W HCPCS: Performed by: HOSPITALIST

## 2023-09-23 PROCEDURE — 82962 GLUCOSE BLOOD TEST: CPT

## 2023-09-23 PROCEDURE — 6360000002 HC RX W HCPCS: Performed by: INTERNAL MEDICINE

## 2023-09-23 PROCEDURE — 6370000000 HC RX 637 (ALT 250 FOR IP): Performed by: INTERNAL MEDICINE

## 2023-09-23 PROCEDURE — 2580000003 HC RX 258: Performed by: FAMILY MEDICINE

## 2023-09-23 PROCEDURE — 80053 COMPREHEN METABOLIC PANEL: CPT

## 2023-09-23 PROCEDURE — 6370000000 HC RX 637 (ALT 250 FOR IP): Performed by: NURSE PRACTITIONER

## 2023-09-23 PROCEDURE — 89050 BODY FLUID CELL COUNT: CPT

## 2023-09-23 PROCEDURE — 6370000000 HC RX 637 (ALT 250 FOR IP): Performed by: FAMILY MEDICINE

## 2023-09-23 PROCEDURE — 2580000003 HC RX 258: Performed by: INTERNAL MEDICINE

## 2023-09-23 PROCEDURE — 6370000000 HC RX 637 (ALT 250 FOR IP): Performed by: HOSPITALIST

## 2023-09-23 PROCEDURE — 99232 SBSQ HOSP IP/OBS MODERATE 35: CPT | Performed by: INTERNAL MEDICINE

## 2023-09-23 RX ORDER — LACTULOSE 10 G/15ML
30 SOLUTION ORAL DAILY
Status: DISCONTINUED | OUTPATIENT
Start: 2023-09-23 | End: 2023-09-23 | Stop reason: HOSPADM

## 2023-09-23 RX ORDER — OXYCODONE HYDROCHLORIDE 5 MG/1
5 TABLET ORAL EVERY 6 HOURS PRN
Qty: 12 TABLET | Refills: 0 | Status: SHIPPED | OUTPATIENT
Start: 2023-09-23 | End: 2023-09-26

## 2023-09-23 RX ORDER — FUROSEMIDE 40 MG/1
40 TABLET ORAL DAILY
Qty: 30 TABLET | Refills: 0 | Status: SHIPPED | OUTPATIENT
Start: 2023-09-23 | End: 2023-10-23

## 2023-09-23 RX ORDER — SPIRONOLACTONE 100 MG/1
100 TABLET, FILM COATED ORAL DAILY
Qty: 30 TABLET | Refills: 0 | Status: SHIPPED | OUTPATIENT
Start: 2023-09-24

## 2023-09-23 RX ORDER — OXYCODONE HYDROCHLORIDE 5 MG/1
5 TABLET ORAL EVERY 4 HOURS PRN
Status: DISCONTINUED | OUTPATIENT
Start: 2023-09-23 | End: 2023-09-23 | Stop reason: HOSPADM

## 2023-09-23 RX ADMIN — SODIUM CHLORIDE, PRESERVATIVE FREE 5 ML: 5 INJECTION INTRAVENOUS at 09:19

## 2023-09-23 RX ADMIN — SPIRONOLACTONE 100 MG: 100 TABLET ORAL at 09:11

## 2023-09-23 RX ADMIN — PANTOPRAZOLE SODIUM 40 MG: 40 TABLET, DELAYED RELEASE ORAL at 09:11

## 2023-09-23 RX ADMIN — OXYCODONE HYDROCHLORIDE 5 MG: 5 TABLET ORAL at 14:56

## 2023-09-23 RX ADMIN — LACTULOSE 30 G: 20 SOLUTION ORAL at 09:14

## 2023-09-23 RX ADMIN — PHYTONADIONE 10 MG: 10 INJECTION, EMULSION INTRAMUSCULAR; INTRAVENOUS; SUBCUTANEOUS at 09:09

## 2023-09-23 RX ADMIN — POTASSIUM CHLORIDE 40 MEQ: 750 TABLET, EXTENDED RELEASE ORAL at 09:15

## 2023-09-23 RX ADMIN — MORPHINE SULFATE 2 MG: 2 INJECTION, SOLUTION INTRAMUSCULAR; INTRAVENOUS at 09:13

## 2023-09-23 RX ADMIN — FUROSEMIDE 40 MG: 40 TABLET ORAL at 09:11

## 2023-09-23 RX ADMIN — Medication 2 UNITS: at 13:22

## 2023-09-23 RX ADMIN — IRON SUCROSE 300 MG: 20 INJECTION, SOLUTION INTRAVENOUS at 09:15

## 2023-09-23 NOTE — DISCHARGE INSTRUCTIONS
Discharge Instructions       PATIENT ID: Shailesh Millan  MRN: [id number]   YOB: 1970    DATE OF ADMISSION: 9/20/2023   DATE OF DISCHARGE: 9/23/2023    PRIMARY CARE PROVIDER: Boom Nagy     ATTENDING PHYSICIAN: Carola Gibbs MD   DISCHARGING PROVIDER: Trixie Bradshaw MD    To contact this individual call [phone] and ask the  to page. If unavailable ask to be transferred the Adult Hospitalist Department. DISCHARGE DIAGNOSES   Liver cirrhosis  Worsening jaundice/hyperbilirubinemia, improved  Acute kidney failure, improved  Ascites, you have undergone drainage/paracentesis where more than 5 L of fluid was removed. .  - Continue diuretics frusemide 40 mg daily and spironolactone 100 mg daily  -You have undergone EGD and found to have 2 esophageal varices which were treated with banding.  -You need to completely avoid alcohol consumption.  -Follow-up with Dr. Jaimie Owens for further management and evaluation. Chronic pain/back pain  -A short course prescription for oxycodone, an opioid, is provided to you per your request.  Do not use the muscle relaxant medicine, Flexeril along with the oxycodone as the combination of the two would potentially cause sedation, drowsiness, depressed respiration, increased fall risk etc.  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 CONSULTATIONS: You were seen and followed by your liver doctor, Dr. Jaimie Owens, general surgery. PROCEDURES/SURGERIES: Procedure(s):  EGD ESOPHAGOGASTRODUODENOSCOPY    PENDING TEST RESULTS:   At the time of discharge the following test results are still pending: none    FOLLOW UP APPOINTMENTS:   PCP in 1 week  Hepatology in 2 weeks. ADDITIONAL CARE RECOMMENDATIONS:     DIET: regular diet    ACTIVITY: activity as tolerated        It is important that you take the medication exactly as they are prescribed.    Keep your medication in the bottles provided by the pharmacist and keep a list of the medication names, dosages, and times to be taken in your wallet. Do not take other medications without consulting your doctor. NOTIFY YOUR PHYSICIAN FOR ANY OF THE FOLLOWING:   Fever over 101 degrees for 24 hours. Chest pain, shortness of breath, fever, chills, nausea, vomiting, diarrhea, change in mentation, falling, weakness, bleeding. Severe pain or pain not relieved by medications. Or, any other signs or symptoms that you may have questions about. DISPOSITION: Home      Signed:    Nubia Sebastian MD  9/23/2023  4:32 PM

## 2023-09-23 NOTE — PROGRESS NOTES
MD Destiny, FACP, [place], [place]      VANESSA Swift, [facility]-BC   Roxanne Horta, [facility]-   Ivonne Mcmillan, FNP-BRAVO Bradshaw, FNP-C   Aroldo Lugo, [facility]   [facility]      [address]   at [place]   [address] [facility], [address]   [phone]   FAX: [phone]  Liver [place] [facility]   at [facility], [address] [place] [address], [address]   [phone]   FAX: [phone]       HEPATOLOGY PROGRESS NOTE  The patient is well known to me and regularly cared for at [address] [place] [address]. She is a 46 y.o. female who was found to have cirrhosis in 5/2021 when she had a CT scan to evaluate umbilical hernia. She has a history of consuming 10 drinks per day for several years but has been abstinent from alcohol since 6/2021 when she found out she had cirrhosis. The patient has not developed any of the major complications of cirrhosis to date. The patient has developed the following complications of cirrhosis: esophageal varices, variceal bleeding, possible hepatopulmonary syndrome,    She was sent to the ED by her PCP when she developed jaundice, ascites and abdominal pain. In the ED Laboratory studies were significant for:    Sna 139, Scr 2.02 mg, AST 61, ALT 32, , TBILI 6.5 mg, ALB 2.6 gm, WBC 7.5, HB 10.7 gms, , INR 2.0,     Imaging of the liver with Ultrasound, CT scan, MRI demonstrated changes to liver consistent with cirrhosis, partial right PV thrombosis, recannalized umbilical vein, moderate ascites. [facility] Hospital Course to date: She was evaluated by Surgery for possible cholelithiasis  She underwent paracentesis and about 5 L out so far. FIONA has resolved with IV fluids.     She admits to consuming alcohol but could/would not quantify Fibroscan performed in 8/2021 demonstrated  75 kPa and  suggesting steaototic liver disease (SLD) and cirrhosis. She now admits to resuming alcohol use again after being abstinent since 6/2021 when she was told she had cirrhosis. She could/wound not quantify the amount of alcohol she was consuming. Alcohol use disorder  The patient had been consuming alcohol prior to knowing she had cirrhosis. She stopped consuming alcohol in 6/2021 when she was told she had cirrhosis. She started consuming alcohol at some point. The patient has an alcohol use disorder that is severe. The patient has developed an alcohol related illness and/or complications of cirrhosis. The patient was told alcohol was causing a problem and should stop consuming alcohol. The patient continues to consume alcohol. If the patient cannot stop drinking alcohol liver transplant cannot be considered. The patient will need to attend alcohol counceling prior to being accepted as a liver transplant candidate. Ascites   Ascites developed for the first time in 9/2023. Ascites has been treated with paracentesis. Started on step 1 diuretics    Screening for Esophageal varices   EGD was performed in 9/27/2021 at [facility]. Esophageal varices with banding   EGD performed by Dr. Nir Gill 11/2021 with 7 bands placed. EGD performed by Dr. Nir Gill 1/2022 with 3 bands placed. EGD 3/2023. Banding x 4. EGD in 9/2023 esophageal varices which were banded. Hepatic encephalopathy   Overt HE has not developed to date. It is unclear why she was started on laculose Q4H. Her mental status is fine. I have reduced this to QD. Acute kidney injury  The patient has developed an elevation in serum creatinine to 2.02 mg  FIONA has already resolved with IV fluids.     Anemia   This is due to multifactorial causes including portal hypertension with chronic GI blood loss, cirrhosis and poor FE absorption    The most recent HB is

## 2023-09-23 NOTE — PROGRESS NOTES
I went in room to get patient labs, she was standing up at bedside trying to get to the bathroom, when she did that she stepped on her paracentesis drain and ripped it out. Catheter was intact, and there was no active bleeding at the site. Also had another nurse Sravan Cline RN come in the room to assess.

## 2023-09-23 NOTE — DISCHARGE SUMMARY
Discharge Summary       PATIENT ID: Devan Murcia  MRN: [id number]   YOB: 1970    DATE OF ADMISSION: 9/20/2023 12:44 PM    DATE OF DISCHARGE: 09/23/23     PRIMARY CARE PROVIDER: Riccardo Barry MD     ATTENDING PHYSICIAN: Casi Lion MD    DISCHARGING PROVIDER: Casi Lion MD    To contact this individual call [phone] and ask the  to page. If unavailable ask to be transferred the Adult Hospitalist Department. CONSULTATIONS: IP CONSULT TO GENERAL SURGERY  IP CONSULT TO HOSPITALIST  IP CONSULT TO HEPATOLOGY  IP CONSULT TO HEPATOLOGY    PROCEDURES/SURGERIES: Procedure(s):  EGD ESOPHAGOGASTRODUODENOSCOPY     ADMITTING DIAGNOSES & HOSPITAL COURSE:   Devan Murcia is a 46 y.o. female who was sent to the ER by  PCP on account of jaundice and abnormal labs. 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 She was seen by her PCP and  was found to have significant lower extremity edema as well as jaundice. Labs done during the visit shows that patient with significant hypokalemia as well as hyperbilirubinemia with bilirubin level of 6.4. Patient was also noted to have elevated ammonia level. Patient was instructed to come to the ER and presented today. Repeat blood work shows that patient's bilirubin level is 7.5. Also noted significant hypokalemia with potassium level of 2.3 and magnesium level of 0.9. CT abdomen and pelvis done in the ER shows moderate volume ascites, small left and trace right pleural effusion and cirrhotic morphology of the liver. DISCHARGE DIAGNOSES / PLAN:    Liver cirrhosis complicated by ascites, coagulopathy, thrombocytopenia, splenomegaly, varices, age-indeterminate portal vein thrombosis. Worsening jaundice/hyperbilirubinemia: Initial diagnostic considerations where recent passage of stone versus intrinsic liver disease versus portal vein thrombosis. She was evaluated by general surgery.   CT abdomen

## 2023-09-23 NOTE — PROGRESS NOTES
ANP ordered ammonia level to be drawn now, patient has multiple other labs ordered, pt requested all labs to be drawn now and does not want to be stuck multiple times.  Drawing ammonia lab along with all other 6am labs due to pt request.

## 2023-09-24 LAB
EBV INTERPRETATION: ABNORMAL
EBV NA IGG SER-ACNC: >600 U/ML (ref 0–17.9)
EBV VCA IGG SER-ACNC: >600 U/ML (ref 0–17.9)
EBV VCA IGM SER-ACNC: <36 U/ML (ref 0–35.9)
T PALLIDUM AB SER QL IA: NON REACTIVE

## 2023-09-25 LAB
CMV IGG SERPL IA-ACNC: >10 U/ML (ref 0–0.59)
CMV IGM SERPL IA-ACNC: <30 AU/ML (ref 0–29.9)
HSV1 IGG SER IA-ACNC: 7.65 INDEX (ref 0–0.9)
HSV2 IGG SER IA-ACNC: <0.91 INDEX (ref 0–0.9)
VZV IGG SER IA-ACNC: 2723 INDEX

## 2023-09-26 LAB
AFP L3 MFR SERPL: 10.7 % (ref 0–9.9)
AFP SERPL-MCNC: 5.5 NG/ML (ref 0–9.2)

## 2023-09-27 ENCOUNTER — TELEPHONE (OUTPATIENT)
Age: 53
End: 2023-09-27

## 2023-09-27 NOTE — TELEPHONE ENCOUNTER
Patient  Jaky Liz would like a call from [address] [place] [address] he has questions pertaining to his wife he can be reached @ [phone] 16-Feb-2018 13:03

## 2023-09-28 LAB
ALBUMIN SERPL-MCNC: 1.9 G/DL (ref 3.5–5)
ALBUMIN/GLOB SERPL: 0.4 (ref 1.1–2.2)
ALP SERPL-CCNC: 94 U/L (ref 45–117)
ALT SERPL-CCNC: 24 U/L (ref 12–78)
ANION GAP SERPL CALC-SCNC: 4 MMOL/L (ref 5–15)
AST SERPL-CCNC: 68 U/L (ref 15–37)
BILIRUB SERPL-MCNC: 7.1 MG/DL (ref 0.2–1)
BUN SERPL-MCNC: 5 MG/DL (ref 6–20)
BUN/CREAT SERPL: 6 (ref 12–20)
CALCIUM SERPL-MCNC: 8.1 MG/DL (ref 8.5–10.1)
CHLORIDE SERPL-SCNC: 106 MMOL/L (ref 97–108)
CO2 SERPL-SCNC: 25 MMOL/L (ref 21–32)
CREAT SERPL-MCNC: 0.81 MG/DL (ref 0.55–1.02)
GLOBULIN SER CALC-MCNC: 4.5 G/DL (ref 2–4)
GLUCOSE SERPL-MCNC: 245 MG/DL (ref 65–100)
M TB IFN-G BLD-IMP: NEGATIVE
M TB IFN-G CD4+ T-CELLS BLD-ACNC: 0.03 IU/ML
M TBIFN-G CD4+ CD8+T-CELLS BLD-ACNC: 0.04 IU/ML
POTASSIUM SERPL-SCNC: 4.1 MMOL/L (ref 3.5–5.1)
PROT SERPL-MCNC: 6.4 G/DL (ref 6.4–8.2)
QUANTIFERON CRITERIA: NORMAL
QUANTIFERON MITOGEN VALUE: 1.4 IU/ML
QUANTIFERON NIL VALUE: 0.02 IU/ML
SODIUM SERPL-SCNC: 135 MMOL/L (ref 136–145)

## 2023-09-29 ENCOUNTER — TELEPHONE (OUTPATIENT)
Age: 53
End: 2023-09-29

## 2023-09-29 NOTE — TELEPHONE ENCOUNTER
Pt colonoscopy from 8/3/2020 was faxed to [facility] as requested. Attention to Kev Ramírez at [phone].

## 2023-10-04 ENCOUNTER — TELEPHONE (OUTPATIENT)
Age: 53
End: 2023-10-04

## 2023-10-04 DIAGNOSIS — K76.82 HEPATIC ENCEPHALOPATHY (HCC): Primary | ICD-10-CM

## 2023-10-04 DIAGNOSIS — K74.69 OTHER CIRRHOSIS OF LIVER (HCC): ICD-10-CM

## 2023-10-04 LAB
AFP L3 MFR SERPL: 10.7 % (ref 0–9.9)
AFP SERPL-MCNC: 5.5 NG/ML (ref 0–9.2)
CMV IGG SERPL IA-ACNC: >10 U/ML (ref 0–0.59)
CMV IGM SERPL IA-ACNC: <30 AU/ML (ref 0–29.9)
EBV INTERPRETATION: ABNORMAL
EBV NA IGG SER-ACNC: >600 U/ML (ref 0–17.9)
EBV VCA IGG SER-ACNC: >600 U/ML (ref 0–17.9)
EBV VCA IGM SER-ACNC: <36 U/ML (ref 0–35.9)
HSV1 IGG SER IA-ACNC: 7.65 INDEX (ref 0–0.9)
HSV2 IGG SER IA-ACNC: <0.91 INDEX (ref 0–0.9)
PETH BLD QL SCN: POSITIVE
PETH BLD-MCNC: 49 NG/ML
T PALLIDUM AB SER QL IA: NON REACTIVE
VZV IGG SER IA-ACNC: 2723 INDEX

## 2023-10-04 RX ORDER — LACTULOSE 10 G/15ML
20 SOLUTION ORAL 3 TIMES DAILY
COMMUNITY

## 2023-10-04 NOTE — PROGRESS NOTES
Called patient regarding treatment plan. Will send Xifaxan to [facility]. Advised to increase lactulose for goal of 2-3 BM daily. Standing order for paracentesis ordered. The sodium was 132 at [facility] on 10/02 therefore will not increase diuretics at this time. Advised she minimize sodium intake. Will see me next week.

## 2023-10-04 NOTE — TELEPHONE ENCOUNTER
Patient and  came in with forms for FMLA, requests to have them filled out for 's place of work. Patient was last seen by April in March 2023, but was seen in hospital with MLS 2 weeks ago.       is Elder Carmela #[phone]

## 2023-10-05 ENCOUNTER — OFFICE VISIT (OUTPATIENT)
Age: 53
End: 2023-10-05
Payer: COMMERCIAL

## 2023-10-05 VITALS
RESPIRATION RATE: 16 BRPM | DIASTOLIC BLOOD PRESSURE: 54 MMHG | HEART RATE: 88 BPM | SYSTOLIC BLOOD PRESSURE: 93 MMHG | OXYGEN SATURATION: 91 % | TEMPERATURE: 97.9 F | WEIGHT: 153.4 LBS | BODY MASS INDEX: 27.59 KG/M2

## 2023-10-05 DIAGNOSIS — K74.69 OTHER CIRRHOSIS OF LIVER (HCC): ICD-10-CM

## 2023-10-05 DIAGNOSIS — K70.31 ASCITES DUE TO ALCOHOLIC CIRRHOSIS (HCC): ICD-10-CM

## 2023-10-05 DIAGNOSIS — E11.9 TYPE 2 DIABETES MELLITUS WITHOUT COMPLICATION, WITHOUT LONG-TERM CURRENT USE OF INSULIN (HCC): ICD-10-CM

## 2023-10-05 DIAGNOSIS — K74.3 PRIMARY BILIARY CIRRHOSIS (HCC): ICD-10-CM

## 2023-10-05 DIAGNOSIS — G93.40 ENCEPHALOPATHY: Primary | ICD-10-CM

## 2023-10-05 PROCEDURE — 3044F HG A1C LEVEL LT 7.0%: CPT | Performed by: FAMILY MEDICINE

## 2023-10-05 PROCEDURE — 99214 OFFICE O/P EST MOD 30 MIN: CPT | Performed by: FAMILY MEDICINE

## 2023-10-05 ASSESSMENT — ENCOUNTER SYMPTOMS
ABDOMINAL PAIN: 0
COUGH: 0
ABDOMINAL DISTENTION: 1
DIARRHEA: 1
SHORTNESS OF BREATH: 0

## 2023-10-05 ASSESSMENT — PATIENT HEALTH QUESTIONNAIRE - PHQ9
1. LITTLE INTEREST OR PLEASURE IN DOING THINGS: 0
SUM OF ALL RESPONSES TO PHQ QUESTIONS 1-9: 0
2. FEELING DOWN, DEPRESSED OR HOPELESS: 0
SUM OF ALL RESPONSES TO PHQ QUESTIONS 1-9: 0
SUM OF ALL RESPONSES TO PHQ QUESTIONS 1-9: 0
SUM OF ALL RESPONSES TO PHQ9 QUESTIONS 1 & 2: 0
SUM OF ALL RESPONSES TO PHQ QUESTIONS 1-9: 0

## 2023-10-05 NOTE — PROGRESS NOTES
Subjective:      Patient ID: Deangelo Chavez is a 46 y.o. female. f/u [facility] hospitalization for cirrhosis with encephalopathy,discharged 1 week ago on usual meds plus lactulose and xiphaxan ( not obtained due to cost)Was at [facility] due to [address] diversion. Had 2 large volume paracentesis procedures and continue to leak ascitic fluid though her paracentesis site. Appetite is fair,havig 3-4 loose BMs daily. No nausea,vomiting fever or chills. Remains quite weak and needs assistance with all ADLsHas f/u at Hepatology next week    Fatigue  This is a chronic problem. The problem occurs daily. The problem has been gradually improving. Associated symptoms include fatigue. Pertinent negatives include no abdominal pain, chest pain, congestion or coughing. Bloated  This is a chronic problem. The problem occurs daily. The problem has been unchanged. Associated symptoms include fatigue. Pertinent negatives include no abdominal pain, chest pain, congestion or coughing. Abnormal Lab  This is a chronic problem. The problem occurs daily. The problem has been unchanged. Associated symptoms include fatigue. Pertinent negatives include no abdominal pain, chest pain, congestion or coughing. Review of Systems   Constitutional:  Positive for fatigue. Negative for activity change. HENT:  Negative for congestion. Respiratory:  Negative for cough and shortness of breath. Cardiovascular:  Negative for chest pain, palpitations and leg swelling. Gastrointestinal:  Positive for abdominal distention and diarrhea. Negative for abdominal pain. Genitourinary:  Negative for difficulty urinating. Psychiatric/Behavioral:  Positive for decreased concentration. Negative for behavioral problems. Objective:   Physical Exam  Constitutional:       Appearance: She is ill-appearing. HENT:      Head: Normocephalic and atraumatic.       Right Ear: Tympanic membrane normal.      Left Ear: Tympanic membrane normal.      Nose: Nose normal.

## 2023-10-05 NOTE — PROGRESS NOTES
Chief Complaint   Patient presents with    Follow-Up from Hospital     Admitted to [facility] on 23  with c/o confusion, dx with hepatic encephalopathy, discharged on 10/3/23       1. Have you been to the ER, urgent care clinic since your last visit? Hospitalized since your last visit? Yes When: 23 Where: [facility] Reason for visit: hepatic encephalopathy    2. Have you seen or consulted any other health care providers outside of the [address] [place] [address] Avenue since your last visit? Include any pap smears or colon screening.  No       Health Maintenance Due   Topic Date Due    Hepatitis B vaccine (1 of 3 - 3-dose series) Never done    Diabetic retinal exam  Never done    Hepatitis C screen  Never done    Hepatitis A vaccine (1 of 2 - Risk 2-dose series) Never done    Diabetic foot exam  2017    Breast cancer screen  Never done    Shingles vaccine (1 of 2) Never done    COVID-19 Vaccine (3 - Moderna series) 2021    Pneumococcal 0-64 years Vaccine (2 - PCV) 2021    Diabetic Alb to Cr ratio (uACR) test  2022    Flu vaccine (1) 2023    Colorectal Cancer Screen  2023      The patient, David Morgan's, identity was verified by name and  ,  present

## 2023-10-09 ENCOUNTER — TELEPHONE (OUTPATIENT)
Age: 53
End: 2023-10-09

## 2023-10-09 NOTE — TELEPHONE ENCOUNTER
Patient's  would like to speak with the Dr. Anita Haney following up questions about his wife's condition. Please assist with this matter. [phone].  VLT - PSR   [place] - [id number]

## 2023-10-09 NOTE — TELEPHONE ENCOUNTER
Patient's  would like to speak with the Dr. Malik Medina following up questions about his wife's condition. Please assist with this matter. [phone].

## 2023-10-11 ENCOUNTER — CLINICAL DOCUMENTATION (OUTPATIENT)
Age: 53
End: 2023-10-11

## 2023-10-11 ENCOUNTER — OFFICE VISIT (OUTPATIENT)
Age: 53
End: 2023-10-11

## 2023-10-11 VITALS
HEART RATE: 79 BPM | OXYGEN SATURATION: 97 % | HEIGHT: 63 IN | WEIGHT: 144.8 LBS | SYSTOLIC BLOOD PRESSURE: 108 MMHG | TEMPERATURE: 97.8 F | BODY MASS INDEX: 25.66 KG/M2 | RESPIRATION RATE: 16 BRPM | DIASTOLIC BLOOD PRESSURE: 70 MMHG

## 2023-10-11 DIAGNOSIS — I85.10 SECONDARY ESOPHAGEAL VARICES WITHOUT BLEEDING (HCC): ICD-10-CM

## 2023-10-11 DIAGNOSIS — K70.30 ALCOHOLIC CIRRHOSIS OF LIVER WITHOUT ASCITES (HCC): Primary | ICD-10-CM

## 2023-10-11 DIAGNOSIS — K74.3 PRIMARY BILIARY CHOLANGITIS (HCC): ICD-10-CM

## 2023-10-11 DIAGNOSIS — K76.82 HEPATIC ENCEPHALOPATHY (HCC): ICD-10-CM

## 2023-10-11 PROCEDURE — 99215 OFFICE O/P EST HI 40 MIN: CPT | Performed by: NURSE PRACTITIONER

## 2023-10-11 RX ORDER — LACTULOSE 10 G/15ML
20 SOLUTION ORAL 3 TIMES DAILY
Qty: 2700 ML | Refills: 5 | Status: SHIPPED | OUTPATIENT
Start: 2023-10-11

## 2023-10-11 RX ORDER — VENLAFAXINE HYDROCHLORIDE 37.5 MG/1
37.5 CAPSULE, EXTENDED RELEASE ORAL DAILY
COMMUNITY

## 2023-10-11 ASSESSMENT — PATIENT HEALTH QUESTIONNAIRE - PHQ9
SUM OF ALL RESPONSES TO PHQ9 QUESTIONS 1 & 2: 0
2. FEELING DOWN, DEPRESSED OR HOPELESS: 0
SUM OF ALL RESPONSES TO PHQ QUESTIONS 1-9: 0
SUM OF ALL RESPONSES TO PHQ QUESTIONS 1-9: 0
1. LITTLE INTEREST OR PLEASURE IN DOING THINGS: 0
SUM OF ALL RESPONSES TO PHQ QUESTIONS 1-9: 0
SUM OF ALL RESPONSES TO PHQ QUESTIONS 1-9: 0

## 2023-10-12 ENCOUNTER — TELEPHONE (OUTPATIENT)
Age: 53
End: 2023-10-12

## 2023-10-12 PROBLEM — K76.82 HEPATIC ENCEPHALOPATHY (HCC): Status: ACTIVE | Noted: 2023-10-12

## 2023-10-12 LAB
AMMONIA PLAS-MCNC: 127 UG/DL (ref 34–178)
BASOPHILS # BLD AUTO: 0.1 X10E3/UL (ref 0–0.2)
BASOPHILS NFR BLD AUTO: 1 %
BUN SERPL-MCNC: 8 MG/DL (ref 6–24)
BUN/CREAT SERPL: 12 (ref 9–23)
CALCIUM SERPL-MCNC: 11.1 MG/DL (ref 8.7–10.2)
CHLORIDE SERPL-SCNC: 96 MMOL/L (ref 96–106)
CO2 SERPL-SCNC: 28 MMOL/L (ref 20–29)
CREAT SERPL-MCNC: 0.68 MG/DL (ref 0.57–1)
EGFRCR SERPLBLD CKD-EPI 2021: 103 ML/MIN/1.73
EOSINOPHIL # BLD AUTO: 0.4 X10E3/UL (ref 0–0.4)
EOSINOPHIL NFR BLD AUTO: 5 %
ERYTHROCYTE [DISTWIDTH] IN BLOOD BY AUTOMATED COUNT: 12.9 % (ref 11.7–15.4)
GLUCOSE SERPL-MCNC: 194 MG/DL (ref 70–99)
HCT VFR BLD AUTO: 30.1 % (ref 34–46.6)
HGB BLD-MCNC: 10.8 G/DL (ref 11.1–15.9)
IMM GRANULOCYTES # BLD AUTO: 0.1 X10E3/UL (ref 0–0.1)
IMM GRANULOCYTES NFR BLD AUTO: 1 %
INR PPP: 1.3 (ref 0.9–1.2)
LYMPHOCYTES # BLD AUTO: 0.9 X10E3/UL (ref 0.7–3.1)
LYMPHOCYTES NFR BLD AUTO: 11 %
MCH RBC QN AUTO: 37.5 PG (ref 26.6–33)
MCHC RBC AUTO-ENTMCNC: 35.9 G/DL (ref 31.5–35.7)
MCV RBC AUTO: 105 FL (ref 79–97)
MONOCYTES # BLD AUTO: 0.7 X10E3/UL (ref 0.1–0.9)
MONOCYTES NFR BLD AUTO: 9 %
MORPHOLOGY BLD-IMP: ABNORMAL
NEUTROPHILS # BLD AUTO: 6.6 X10E3/UL (ref 1.4–7)
NEUTROPHILS NFR BLD AUTO: 73 %
PLATELET # BLD AUTO: 93 X10E3/UL (ref 150–450)
POTASSIUM SERPL-SCNC: 3.9 MMOL/L (ref 3.5–5.2)
PROTHROMBIN TIME: 14.1 SEC (ref 9.1–12)
RBC # BLD AUTO: 2.88 X10E6/UL (ref 3.77–5.28)
SODIUM SERPL-SCNC: 136 MMOL/L (ref 134–144)
WBC # BLD AUTO: 8.8 X10E3/UL (ref 3.4–10.8)

## 2023-10-12 NOTE — TELEPHONE ENCOUNTER
----- Message from Arthur Kristin sent at 10/12/2023  9:27 AM EDT -----  Subject: Message to Provider    QUESTIONS  Information for Provider? This message is for Dr. Chava Hurt. Tiffanie went   yesterday to see the specialist and was told to call back to let Dr. Chava Hurt   about what they said. Please have him call Rosalino back as he will let him   know what was said and has some questions for Dr. Chava Hurt. [phone]  ---------------------------------------------------------------------------  --------------  [employer]  [phone]; OK to leave message on voicemail  ---------------------------------------------------------------------------  --------------  SCRIPT ANSWERS  Relationship to Patient? Spouse/Partner  Representative Name? Jaky Liz  Is the representative on the Communication Release of Information (MARLY)   form in Epic?  Yes

## 2023-10-14 LAB
ALBUMIN SERPL-MCNC: 3.3 G/DL (ref 3.8–4.9)
ALP SERPL-CCNC: 124 IU/L (ref 44–121)
ALT SERPL-CCNC: 39 IU/L (ref 0–32)
AMPHETAMINES BLD QL SCN: NEGATIVE NG/ML
AST SERPL-CCNC: 109 IU/L (ref 0–40)
BARBITURATES SERPLBLD QL: NEGATIVE UG/ML
BILIRUB DIRECT SERPL-MCNC: 2.49 MG/DL (ref 0–0.4)
BILIRUB SERPL-MCNC: 7.2 MG/DL (ref 0–1.2)
CANNABINOIDS BLD QL SCN: NEGATIVE NG/ML
COCAINE+BZE SERPLBLD QL SCN: NEGATIVE NG/ML
OPIATES BLD QL SCN: NEGATIVE NG/ML
OXYCODONE+OXYMORPHONE SERPLBLD QL SCN: NEGATIVE NG/ML
PCP BLD QL SCN: NEGATIVE NG/ML
PROT SERPL-MCNC: 7.5 G/DL (ref 6–8.5)

## 2023-10-18 LAB
PETH BLD QL SCN: NEGATIVE
PETH BLD-MCNC: NEGATIVE NG/ML

## 2023-10-24 ENCOUNTER — HOSPITAL ENCOUNTER (OUTPATIENT)
Facility: HOSPITAL | Age: 53
Discharge: HOME | End: 2023-10-27
Payer: COMMERCIAL

## 2023-10-24 ENCOUNTER — HOSPITAL ENCOUNTER (OUTPATIENT)
Facility: HOSPITAL | Age: 53
Discharge: HOME | End: 2023-10-26
Payer: COMMERCIAL

## 2023-10-24 VITALS
DIASTOLIC BLOOD PRESSURE: 72 MMHG | WEIGHT: 132 LBS | BODY MASS INDEX: 23.39 KG/M2 | HEIGHT: 63 IN | SYSTOLIC BLOOD PRESSURE: 123 MMHG | HEART RATE: 88 BPM

## 2023-10-24 DIAGNOSIS — K70.30 ALCOHOLIC CIRRHOSIS OF LIVER WITHOUT ASCITES (HCC): ICD-10-CM

## 2023-10-24 LAB
ARTERIAL PATENCY WRIST A: POSITIVE
ARTERIAL PATENCY WRIST A: POSITIVE
BASE EXCESS BLD CALC-SCNC: 0.4 MMOL/L
BASE EXCESS BLD CALC-SCNC: 0.7 MMOL/L
BDY SITE: ABNORMAL
BDY SITE: NORMAL
ECHO BSA: 1.63 M2
EKG DIAGNOSIS: NORMAL
GAS FLOW.O2 O2 DELIVERY SYS: ABNORMAL
GAS FLOW.O2 O2 DELIVERY SYS: NORMAL
HCO3 BLD-SCNC: 24.6 MMOL/L (ref 22–26)
HCO3 BLD-SCNC: 24.7 MMOL/L (ref 22–26)
O2/TOTAL GAS SETTING VFR VENT: 21 %
O2/TOTAL GAS SETTING VFR VENT: 21 %
PCO2 BLD: 36.2 MMHG (ref 35–45)
PCO2 BLD: 37.5 MMHG (ref 35–45)
PH BLD: 7.43 (ref 7.35–7.45)
PH BLD: 7.44 (ref 7.35–7.45)
PO2 BLD: 85 MMHG (ref 80–100)
PO2 BLD: 94 MMHG (ref 80–100)
SAO2 % BLD: 96.9 % (ref 92–97)
SAO2 % BLD: 97.5 % (ref 92–97)
SPECIMEN TYPE: ABNORMAL
SPECIMEN TYPE: NORMAL
STRESS BASELINE ST DEPRESSION: 0 MM
STRESS ESTIMATED WORKLOAD: 1 METS
STRESS PEAK DIAS BP: 74 MMHG
STRESS PEAK SYS BP: 124 MMHG
STRESS PERCENT HR ACHIEVED: 62 %
STRESS POST PEAK HR: 103 BPM
STRESS RATE PRESSURE PRODUCT: NORMAL BPM*MMHG
STRESS TARGET HR: 166 BPM

## 2023-10-24 PROCEDURE — 78452 HT MUSCLE IMAGE SPECT MULT: CPT

## 2023-10-24 PROCEDURE — A9500 TC99M SESTAMIBI: HCPCS | Performed by: NURSE PRACTITIONER

## 2023-10-24 PROCEDURE — 93016 CV STRESS TEST SUPVJ ONLY: CPT | Performed by: SPECIALIST

## 2023-10-24 PROCEDURE — 82803 BLOOD GASES ANY COMBINATION: CPT

## 2023-10-24 PROCEDURE — 93018 CV STRESS TEST I&R ONLY: CPT | Performed by: SPECIALIST

## 2023-10-24 PROCEDURE — 3430000000 HC RX DIAGNOSTIC RADIOPHARMACEUTICAL: Performed by: NURSE PRACTITIONER

## 2023-10-24 PROCEDURE — 6360000002 HC RX W HCPCS: Performed by: NURSE PRACTITIONER

## 2023-10-24 PROCEDURE — 36600 WITHDRAWAL OF ARTERIAL BLOOD: CPT

## 2023-10-24 PROCEDURE — 94010 BREATHING CAPACITY TEST: CPT

## 2023-10-24 PROCEDURE — 93017 CV STRESS TEST TRACING ONLY: CPT

## 2023-10-24 PROCEDURE — 71046 X-RAY EXAM CHEST 2 VIEWS: CPT

## 2023-10-24 RX ORDER — REGADENOSON 0.08 MG/ML
0.4 INJECTION, SOLUTION INTRAVENOUS
Status: COMPLETED | OUTPATIENT
Start: 2023-10-24 | End: 2023-10-24

## 2023-10-24 RX ORDER — TETRAKIS(2-METHOXYISOBUTYLISOCYANIDE)COPPER(I) TETRAFLUOROBORATE 1 MG/ML
10.9 INJECTION, POWDER, LYOPHILIZED, FOR SOLUTION INTRAVENOUS ONCE
Status: COMPLETED | OUTPATIENT
Start: 2023-10-24 | End: 2023-10-24

## 2023-10-24 RX ORDER — TETRAKIS(2-METHOXYISOBUTYLISOCYANIDE)COPPER(I) TETRAFLUOROBORATE 1 MG/ML
32.1 INJECTION, POWDER, LYOPHILIZED, FOR SOLUTION INTRAVENOUS
Status: COMPLETED | OUTPATIENT
Start: 2023-10-24 | End: 2023-10-24

## 2023-10-24 RX ADMIN — TETRAKIS(2-METHOXYISOBUTYLISOCYANIDE)COPPER(I) TETRAFLUOROBORATE 32.1 MILLICURIE: 1 INJECTION, POWDER, LYOPHILIZED, FOR SOLUTION INTRAVENOUS at 10:15

## 2023-10-24 RX ADMIN — REGADENOSON 0.4 MG: 0.08 INJECTION, SOLUTION INTRAVENOUS at 10:17

## 2023-10-24 RX ADMIN — TETRAKIS(2-METHOXYISOBUTYLISOCYANIDE)COPPER(I) TETRAFLUOROBORATE 10.9 MILLICURIE: 1 INJECTION, POWDER, LYOPHILIZED, FOR SOLUTION INTRAVENOUS at 08:30

## 2023-10-25 ENCOUNTER — TELEPHONE (OUTPATIENT)
Age: 53
End: 2023-10-25

## 2023-10-25 ENCOUNTER — OFFICE VISIT (OUTPATIENT)
Age: 53
End: 2023-10-25
Payer: COMMERCIAL

## 2023-10-25 VITALS
OXYGEN SATURATION: 99 % | HEIGHT: 63 IN | HEART RATE: 86 BPM | BODY MASS INDEX: 24.41 KG/M2 | DIASTOLIC BLOOD PRESSURE: 60 MMHG | TEMPERATURE: 97.8 F | SYSTOLIC BLOOD PRESSURE: 106 MMHG | WEIGHT: 137.8 LBS

## 2023-10-25 DIAGNOSIS — K74.3 PRIMARY BILIARY CHOLANGITIS (HCC): ICD-10-CM

## 2023-10-25 DIAGNOSIS — E11.9 TYPE 2 DIABETES MELLITUS WITHOUT COMPLICATION, WITHOUT LONG-TERM CURRENT USE OF INSULIN (HCC): ICD-10-CM

## 2023-10-25 DIAGNOSIS — E87.1 HYPONATREMIA: ICD-10-CM

## 2023-10-25 DIAGNOSIS — K21.9 GASTROESOPHAGEAL REFLUX DISEASE WITHOUT ESOPHAGITIS: ICD-10-CM

## 2023-10-25 DIAGNOSIS — N17.9 ACUTE KIDNEY INJURY (HCC): ICD-10-CM

## 2023-10-25 DIAGNOSIS — K74.69 OTHER CIRRHOSIS OF LIVER (HCC): Primary | ICD-10-CM

## 2023-10-25 PROCEDURE — 99215 OFFICE O/P EST HI 40 MIN: CPT | Performed by: NURSE PRACTITIONER

## 2023-10-25 PROCEDURE — 3044F HG A1C LEVEL LT 7.0%: CPT | Performed by: NURSE PRACTITIONER

## 2023-10-25 RX ORDER — PANTOPRAZOLE SODIUM 40 MG/1
40 TABLET, DELAYED RELEASE ORAL DAILY
Qty: 30 TABLET | Refills: 2 | Status: SHIPPED | OUTPATIENT
Start: 2023-10-25

## 2023-10-25 ASSESSMENT — PATIENT HEALTH QUESTIONNAIRE - PHQ9
SUM OF ALL RESPONSES TO PHQ QUESTIONS 1-9: 0
1. LITTLE INTEREST OR PLEASURE IN DOING THINGS: 0
SUM OF ALL RESPONSES TO PHQ QUESTIONS 1-9: 0
SUM OF ALL RESPONSES TO PHQ9 QUESTIONS 1 & 2: 0
2. FEELING DOWN, DEPRESSED OR HOPELESS: 0

## 2023-10-25 NOTE — TELEPHONE ENCOUNTER
----- Message from Jean Kotzebuebella Morgan sent at 10/25/2023 10:33 AM EDT -----  Regarding: Medical records  Contact: [phone]  They are going to ask you for my medical records. Also do I need to continue my diabetes?   Also can you put in a request for blood type for Sakshi Concepcion and Marilynn Anderson to see if they are match for to me

## 2023-10-26 LAB
ALBUMIN SERPL-MCNC: 3 G/DL (ref 3.5–5)
ALBUMIN/GLOB SERPL: 0.6 (ref 1.1–2.2)
ALP SERPL-CCNC: 238 U/L (ref 45–117)
ALT SERPL-CCNC: 63 U/L (ref 12–78)
ANION GAP SERPL CALC-SCNC: 5 MMOL/L (ref 5–15)
AST SERPL-CCNC: 88 U/L (ref 15–37)
BASOPHILS # BLD: 0.1 K/UL (ref 0–0.1)
BASOPHILS NFR BLD: 1 % (ref 0–1)
BILIRUB DIRECT SERPL-MCNC: 2.3 MG/DL (ref 0–0.2)
BILIRUB SERPL-MCNC: 4.2 MG/DL (ref 0.2–1)
BUN SERPL-MCNC: 28 MG/DL (ref 6–20)
BUN/CREAT SERPL: 18 (ref 12–20)
CALCIUM SERPL-MCNC: 10.5 MG/DL (ref 8.5–10.1)
CHLORIDE SERPL-SCNC: 95 MMOL/L (ref 97–108)
CO2 SERPL-SCNC: 26 MMOL/L (ref 21–32)
CREAT SERPL-MCNC: 1.58 MG/DL (ref 0.55–1.02)
DIFFERENTIAL METHOD BLD: ABNORMAL
EOSINOPHIL # BLD: 0.3 K/UL (ref 0–0.4)
EOSINOPHIL NFR BLD: 4 % (ref 0–7)
ERYTHROCYTE [DISTWIDTH] IN BLOOD BY AUTOMATED COUNT: 14.4 % (ref 11.5–14.5)
GLOBULIN SER CALC-MCNC: 4.8 G/DL (ref 2–4)
GLUCOSE SERPL-MCNC: 368 MG/DL (ref 65–100)
HCT VFR BLD AUTO: 29.7 % (ref 35–47)
HGB BLD-MCNC: 9.8 G/DL (ref 11.5–16)
IMM GRANULOCYTES # BLD AUTO: 0.1 K/UL (ref 0–0.04)
IMM GRANULOCYTES NFR BLD AUTO: 1 % (ref 0–0.5)
INR PPP: 1.4 (ref 0.9–1.1)
LYMPHOCYTES # BLD: 0.6 K/UL (ref 0.8–3.5)
LYMPHOCYTES NFR BLD: 8 % (ref 12–49)
MAMMOGRAPHY, EXTERNAL: NORMAL
MCH RBC QN AUTO: 37.3 PG (ref 26–34)
MCHC RBC AUTO-ENTMCNC: 33 G/DL (ref 30–36.5)
MCV RBC AUTO: 112.9 FL (ref 80–99)
MONOCYTES # BLD: 0.7 K/UL (ref 0–1)
MONOCYTES NFR BLD: 9 % (ref 5–13)
NEUTS SEG # BLD: 5.7 K/UL (ref 1.8–8)
NEUTS SEG NFR BLD: 77 % (ref 32–75)
NRBC # BLD: 0 K/UL (ref 0–0.01)
NRBC BLD-RTO: 0 PER 100 WBC
PLATELET # BLD AUTO: 103 K/UL (ref 150–400)
PMV BLD AUTO: 10.9 FL (ref 8.9–12.9)
POTASSIUM SERPL-SCNC: 5.5 MMOL/L (ref 3.5–5.1)
PROT SERPL-MCNC: 7.8 G/DL (ref 6.4–8.2)
PROTHROMBIN TIME: 14.3 SEC (ref 9–11.1)
RBC # BLD AUTO: 2.63 M/UL (ref 3.8–5.2)
RBC MORPH BLD: ABNORMAL
RBC MORPH BLD: ABNORMAL
SODIUM SERPL-SCNC: 126 MMOL/L (ref 136–145)
WBC # BLD AUTO: 7.5 K/UL (ref 3.6–11)

## 2023-10-27 ENCOUNTER — TELEPHONE (OUTPATIENT)
Age: 53
End: 2023-10-27

## 2023-10-27 NOTE — TELEPHONE ENCOUNTER
----- Message from Jean Angoonbella Morgan sent at 10/27/2023 11:40 AM EDT -----  Regarding: Medical records  Contact: [phone]  Sorry I missed you call.  I was at  hospital taking test

## 2023-10-28 DIAGNOSIS — K70.31 ALCOHOLIC CIRRHOSIS OF LIVER WITH ASCITES (HCC): ICD-10-CM

## 2023-10-28 DIAGNOSIS — K74.69 OTHER CIRRHOSIS OF LIVER (HCC): Primary | ICD-10-CM

## 2023-10-30 DIAGNOSIS — K70.31 ALCOHOLIC CIRRHOSIS OF LIVER WITH ASCITES (HCC): ICD-10-CM

## 2023-10-30 LAB
ALBUMIN SERPL-MCNC: 3.1 G/DL (ref 3.5–5)
ALBUMIN/GLOB SERPL: 0.6 (ref 1.1–2.2)
ALP SERPL-CCNC: 125 U/L (ref 45–117)
ALT SERPL-CCNC: 75 U/L (ref 12–78)
ANION GAP SERPL CALC-SCNC: 7 MMOL/L (ref 5–15)
AST SERPL-CCNC: 93 U/L (ref 15–37)
BASOPHILS # BLD: 0.1 K/UL (ref 0–0.1)
BASOPHILS NFR BLD: 1 % (ref 0–1)
BILIRUB DIRECT SERPL-MCNC: 2.5 MG/DL (ref 0–0.2)
BILIRUB SERPL-MCNC: 5.6 MG/DL (ref 0.2–1)
BUN SERPL-MCNC: 14 MG/DL (ref 6–20)
BUN/CREAT SERPL: 13 (ref 12–20)
CALCIUM SERPL-MCNC: 11.3 MG/DL (ref 8.5–10.1)
CHLORIDE SERPL-SCNC: 92 MMOL/L (ref 97–108)
CO2 SERPL-SCNC: 29 MMOL/L (ref 21–32)
CREAT SERPL-MCNC: 1.06 MG/DL (ref 0.55–1.02)
DIFFERENTIAL METHOD BLD: ABNORMAL
EOSINOPHIL # BLD: 0.3 K/UL (ref 0–0.4)
EOSINOPHIL NFR BLD: 4 % (ref 0–7)
ERYTHROCYTE [DISTWIDTH] IN BLOOD BY AUTOMATED COUNT: 13.8 % (ref 11.5–14.5)
GLOBULIN SER CALC-MCNC: 5.5 G/DL (ref 2–4)
GLUCOSE SERPL-MCNC: 251 MG/DL (ref 65–100)
HCT VFR BLD AUTO: 33.7 % (ref 35–47)
HGB BLD-MCNC: 10.8 G/DL (ref 11.5–16)
IMM GRANULOCYTES # BLD AUTO: 0.1 K/UL (ref 0–0.04)
IMM GRANULOCYTES NFR BLD AUTO: 1 % (ref 0–0.5)
INR PPP: 1.4 (ref 0.9–1.1)
LYMPHOCYTES # BLD: 0.6 K/UL (ref 0.8–3.5)
LYMPHOCYTES NFR BLD: 7 % (ref 12–49)
MCH RBC QN AUTO: 36.4 PG (ref 26–34)
MCHC RBC AUTO-ENTMCNC: 32 G/DL (ref 30–36.5)
MCV RBC AUTO: 113.5 FL (ref 80–99)
MONOCYTES # BLD: 0.6 K/UL (ref 0–1)
MONOCYTES NFR BLD: 7 % (ref 5–13)
NEUTS SEG # BLD: 6.2 K/UL (ref 1.8–8)
NEUTS SEG NFR BLD: 80 % (ref 32–75)
NRBC # BLD: 0 K/UL (ref 0–0.01)
NRBC BLD-RTO: 0 PER 100 WBC
PLATELET # BLD AUTO: 113 K/UL (ref 150–400)
PMV BLD AUTO: 10.8 FL (ref 8.9–12.9)
POTASSIUM SERPL-SCNC: 5 MMOL/L (ref 3.5–5.1)
PROT SERPL-MCNC: 8.6 G/DL (ref 6.4–8.2)
PROTHROMBIN TIME: 14.7 SEC (ref 9–11.1)
RBC # BLD AUTO: 2.97 M/UL (ref 3.8–5.2)
RBC MORPH BLD: ABNORMAL
SODIUM SERPL-SCNC: 128 MMOL/L (ref 136–145)
WBC # BLD AUTO: 7.9 K/UL (ref 3.6–11)

## 2023-10-31 ENCOUNTER — CLINICAL DOCUMENTATION (OUTPATIENT)
Age: 53
End: 2023-10-31

## 2023-10-31 DIAGNOSIS — K74.69 OTHER CIRRHOSIS OF LIVER (HCC): Primary | ICD-10-CM

## 2023-11-01 ENCOUNTER — TELEPHONE (OUTPATIENT)
Age: 53
End: 2023-11-01

## 2023-11-01 NOTE — TELEPHONE ENCOUNTER
----- Message from [facility] vegaemily Morgan sent at 10/31/2023 12:31 PM EDT -----  Regarding: Medical records  Contact: [phone]  Can yo still have him call me when he gets back  -------------------------------------------------------------------------------------------------    Pt requesting a call back from Dr. Christiane Sterling.

## 2023-11-03 ENCOUNTER — TELEPHONE (OUTPATIENT)
Age: 53
End: 2023-11-03

## 2023-11-03 NOTE — TELEPHONE ENCOUNTER
Patient called and wanted a blood type test for her children ECC transferred this call to the practice.  Patient best call back number is [phone]

## 2023-11-06 DIAGNOSIS — K74.69 OTHER CIRRHOSIS OF LIVER (HCC): ICD-10-CM

## 2023-11-06 LAB
ALBUMIN SERPL-MCNC: 3.1 G/DL (ref 3.5–5)
ALBUMIN/GLOB SERPL: 0.6 (ref 1.1–2.2)
ALP SERPL-CCNC: 141 U/L (ref 45–117)
ALT SERPL-CCNC: 71 U/L (ref 12–78)
ANION GAP SERPL CALC-SCNC: 9 MMOL/L (ref 5–15)
AST SERPL-CCNC: 78 U/L (ref 15–37)
BILIRUB DIRECT SERPL-MCNC: 2.3 MG/DL (ref 0–0.2)
BILIRUB SERPL-MCNC: 5.3 MG/DL (ref 0.2–1)
BUN SERPL-MCNC: 18 MG/DL (ref 6–20)
BUN/CREAT SERPL: 16 (ref 12–20)
CALCIUM SERPL-MCNC: 12.6 MG/DL (ref 8.5–10.1)
CHLORIDE SERPL-SCNC: 97 MMOL/L (ref 97–108)
CO2 SERPL-SCNC: 24 MMOL/L (ref 21–32)
CREAT SERPL-MCNC: 1.1 MG/DL (ref 0.55–1.02)
GLOBULIN SER CALC-MCNC: 5.5 G/DL (ref 2–4)
GLUCOSE SERPL-MCNC: 200 MG/DL (ref 65–100)
INR PPP: 1.4 (ref 0.9–1.1)
POTASSIUM SERPL-SCNC: 5.7 MMOL/L (ref 3.5–5.1)
PROT SERPL-MCNC: 8.6 G/DL (ref 6.4–8.2)
PROTHROMBIN TIME: 14 SEC (ref 9–11.1)
SODIUM SERPL-SCNC: 130 MMOL/L (ref 136–145)

## 2023-11-07 ENCOUNTER — HOSPITAL ENCOUNTER (OUTPATIENT)
Facility: HOSPITAL | Age: 53
Discharge: HOME | End: 2023-11-10
Payer: COMMERCIAL

## 2023-11-07 ENCOUNTER — OFFICE VISIT (OUTPATIENT)
Age: 53
End: 2023-11-07
Payer: COMMERCIAL

## 2023-11-07 VITALS
DIASTOLIC BLOOD PRESSURE: 63 MMHG | TEMPERATURE: 97.3 F | OXYGEN SATURATION: 98 % | WEIGHT: 128 LBS | HEIGHT: 63 IN | SYSTOLIC BLOOD PRESSURE: 109 MMHG | BODY MASS INDEX: 22.68 KG/M2 | HEART RATE: 91 BPM

## 2023-11-07 DIAGNOSIS — K76.82 HEPATIC ENCEPHALOPATHY (HCC): ICD-10-CM

## 2023-11-07 DIAGNOSIS — K70.30 ALCOHOLIC CIRRHOSIS OF LIVER WITHOUT ASCITES (HCC): ICD-10-CM

## 2023-11-07 DIAGNOSIS — E11.9 TYPE 2 DIABETES MELLITUS WITHOUT COMPLICATION, WITHOUT LONG-TERM CURRENT USE OF INSULIN (HCC): ICD-10-CM

## 2023-11-07 DIAGNOSIS — K70.30 ALCOHOLIC CIRRHOSIS OF LIVER WITHOUT ASCITES (HCC): Primary | ICD-10-CM

## 2023-11-07 LAB
EKG ATRIAL RATE: 89 BPM
EKG DIAGNOSIS: NORMAL
EKG P AXIS: 61 DEGREES
EKG P-R INTERVAL: 170 MS
EKG Q-T INTERVAL: 356 MS
EKG QRS DURATION: 114 MS
EKG QTC CALCULATION (BAZETT): 433 MS
EKG R AXIS: -36 DEGREES
EKG T AXIS: 36 DEGREES
EKG VENTRICULAR RATE: 89 BPM

## 2023-11-07 PROCEDURE — 99215 OFFICE O/P EST HI 40 MIN: CPT | Performed by: NURSE PRACTITIONER

## 2023-11-07 PROCEDURE — 3044F HG A1C LEVEL LT 7.0%: CPT | Performed by: NURSE PRACTITIONER

## 2023-11-07 PROCEDURE — 93005 ELECTROCARDIOGRAM TRACING: CPT

## 2023-11-07 PROCEDURE — 93010 ELECTROCARDIOGRAM REPORT: CPT | Performed by: INTERNAL MEDICINE

## 2023-11-07 RX ORDER — METFORMIN HYDROCHLORIDE 750 MG/1
750 TABLET, EXTENDED RELEASE ORAL 2 TIMES DAILY
COMMUNITY

## 2023-11-07 RX ORDER — LACTULOSE 10 G/15ML
30 SOLUTION ORAL 3 TIMES DAILY
Qty: 4050 ML | Refills: 5 | Status: SHIPPED | OUTPATIENT
Start: 2023-11-07

## 2023-11-07 NOTE — PROGRESS NOTES
Identified pt with two pt identifiers(name and ). Reviewed record in preparation for visit and have obtained necessary documentation. Chief Complaint   Patient presents with    Follow-up     There were no vitals taken for this visit. 1. \"Have you been to the ER, urgent care clinic since your last visit? Hospitalized since your last visit? \" No    2. \"Have you seen or consulted any other health care providers outside of the [address] [place] [address] since your last visit? \"  Yes per pt for diabetes check up last thursday    Patient is accompanied by self and    I have received verbal consent from Marino Lanier to discuss any/all medical information while they are present in the room.
cirrhosis   in 5/2021 when a CT scan was done for evaluation of an abdominal hernia. She has a history of past heavy alcohol use up to 1 pint per day for 3 years. This was decreased to weekend use only in 2019. She had a period of abstinence 8722-4209  then relapsed to heavy use until she developed complications of cirrhosis. All alcohol was discontinued 8/2023. Assessment of liver fibrosis was performed with Fibroscan 8/2021. The result was 75.0 kPa which correlates with cirrhosis. The CAP score of 350 suggests hepatic steatosis. Serologic evaluation for markers of chronic liver disease was positive for AMA. Recent repeat testing done at [facility] 9/2023 AMA was normal.     Treatment for PBC was initiated with MAGDALENE 1000 mg daily 7/2021. She was taken off the medication by [facility] when she was hospitalized. The ALP has now increased to the 200 range. Will restart MAGDALENE 900 mg daily. The patient has developed the following complications of cirrhosis: Variceal bleed 9/2021, ascites and encephalopathy. The patient developed acute shortness of breath 9/2022 which prompted an ER visit. Oxygen saturation decreased to 40% which required being placed on a ventilator. Radiology imaging was consistent with possible pneumonia. ECHO 9/2022 demonstrated a positive bubble study but could not fully exclude an interatrial shunt. The patient was admitted to [facility] 9/20/2023-9/23/2023 with jaundice, ascites, lower extremity edema and encephalopathy. The MELD increased to the 23-27 range. EGD was performed during this hospitalization by Dr. Nir Gill which demonstrated varices x 2 that were banded. Transplant evaluation was initiated, Mylene Jim was 51 which was repeated 10/2023 and negative. A paracentesis was performed and she was started on furosemide 40 mg, spironolactone 100 mg. Rifaximin and lactulose were started for encephalopathy. She was also treated for a UTI.  After discharge she developed AMS due to encephalopathy

## 2023-11-08 ENCOUNTER — TELEPHONE (OUTPATIENT)
Age: 53
End: 2023-11-08

## 2023-11-08 PROBLEM — K70.9 ALCOHOL INDUCED LIVER DISORDER (HCC): Status: RESOLVED | Noted: 2023-09-23 | Resolved: 2023-11-08

## 2023-11-08 PROBLEM — K70.30 ALCOHOLIC CIRRHOSIS OF LIVER WITHOUT ASCITES (HCC): Status: ACTIVE | Noted: 2023-09-20

## 2023-11-08 PROBLEM — F10.21 ALCOHOL DEPENDENCE IN REMISSION (HCC): Status: ACTIVE | Noted: 2023-09-23

## 2023-11-08 PROBLEM — F11.99 OPIOID USE, UNSPECIFIED WITH UNSPECIFIED OPIOID-INDUCED DISORDER (HCC): Status: RESOLVED | Noted: 2022-12-29 | Resolved: 2023-11-08

## 2023-11-08 PROBLEM — K74.69 OTHER CIRRHOSIS OF LIVER (HCC): Status: RESOLVED | Noted: 2021-06-30 | Resolved: 2023-11-08

## 2023-11-08 PROBLEM — R74.8 ELEVATED LIVER ENZYMES: Status: RESOLVED | Noted: 2021-06-29 | Resolved: 2023-11-08

## 2023-11-08 NOTE — TELEPHONE ENCOUNTER
Gina Camarillo with transplant team at [facility].  [phone] is call back number. Wants to discuss patient's condition/status per your visit with patient yesterday as she has not seen patient since they were inpatient at their facility. [email or web address] 11/22/23.  (CORRINE)

## 2023-11-22 ENCOUNTER — OFFICE VISIT (OUTPATIENT)
Age: 53
End: 2023-11-22
Payer: COMMERCIAL

## 2023-11-22 VITALS
HEIGHT: 63 IN | BODY MASS INDEX: 23.92 KG/M2 | DIASTOLIC BLOOD PRESSURE: 59 MMHG | WEIGHT: 135 LBS | SYSTOLIC BLOOD PRESSURE: 105 MMHG | TEMPERATURE: 96.6 F | OXYGEN SATURATION: 99 % | HEART RATE: 91 BPM

## 2023-11-22 DIAGNOSIS — K70.30 ALCOHOLIC CIRRHOSIS OF LIVER WITHOUT ASCITES (HCC): Primary | ICD-10-CM

## 2023-11-22 DIAGNOSIS — K76.82 HEPATIC ENCEPHALOPATHY (HCC): ICD-10-CM

## 2023-11-22 DIAGNOSIS — F10.21 ALCOHOL DEPENDENCE IN REMISSION (HCC): ICD-10-CM

## 2023-11-22 DIAGNOSIS — E11.9 TYPE 2 DIABETES MELLITUS WITHOUT COMPLICATION, WITHOUT LONG-TERM CURRENT USE OF INSULIN (HCC): ICD-10-CM

## 2023-11-22 PROCEDURE — 99214 OFFICE O/P EST MOD 30 MIN: CPT | Performed by: NURSE PRACTITIONER

## 2023-11-22 PROCEDURE — 3044F HG A1C LEVEL LT 7.0%: CPT | Performed by: NURSE PRACTITIONER

## 2023-11-22 NOTE — PROGRESS NOTES
[address] [facility] [address] MD Aleja, FACP, [facility]      Alexandria Huerta PA-C    April S Rachna, [facility]-BC   Chetna Hsieh, [facility]   [place], [facility]  Mason Coughlin, [facility]-   Dawn Arreaga, [facility]   Ty Palacios, FN-BC      [address] [facility]   at [facility]   [facility], [address] [facility], [address]   [phone]   FAX: [phone]   at [facility], [address], [address]   [phone]   FAX: [phone]     Patient Care Team:  Glenys Rodríguez MD as PCP - Katherin Molina, Moris Sotelo MD as PCP - Empaneled Provider  Shaheed Meza RN as Nurse Navigator (Hepatology)  Judi Briscoe, LEENA - NP (Hepatology)  Judi Briscoe APRN - NP (Hepatology)    Patient Active Problem List   Diagnosis    Tobacco use disorder    BMI 35.0-35.9,adult    Type 2 diabetes mellitus without complication ([address])    Primary biliary cholangitis ([address])    Renal calculus or stone    Right-sided thoracic back pain    Symptomatic anemia    Severe obesity ([address])    Insomnia    Secondary esophageal varices without bleeding ([address])    Alcoholic cirrhosis of liver without ascites ([address])    Portal hypertensive gastropathy ([address])    Ascites due to alcoholic cirrhosis ([address])    Alcohol dependence in remission ([address])    Iron deficiency anemia due to chronic blood loss    Thrombocytopenia ([address])    Hepatic encephalopathy ([address])       [facility] for management 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 The active problem list, all pertinent past medical history, medications, radiologic findings and laboratory findings related to the liver disorder were reviewed and discussed with the patient. The patient is a 46 y.o.   female who was found

## 2023-11-23 LAB
BUN SERPL-MCNC: 12 MG/DL (ref 6–24)
BUN/CREAT SERPL: 21 (ref 9–23)
CALCIUM SERPL-MCNC: 11.2 MG/DL (ref 8.7–10.2)
CHLORIDE SERPL-SCNC: 99 MMOL/L (ref 96–106)
CO2 SERPL-SCNC: 21 MMOL/L (ref 20–29)
CREAT SERPL-MCNC: 0.57 MG/DL (ref 0.57–1)
EGFRCR SERPLBLD CKD-EPI 2021: 108 ML/MIN/1.73
GLUCOSE SERPL-MCNC: 157 MG/DL (ref 70–99)
INR PPP: 1.4 (ref 0.9–1.2)
POTASSIUM SERPL-SCNC: 4.4 MMOL/L (ref 3.5–5.2)
PROTHROMBIN TIME: 15 SEC (ref 9.1–12)
SODIUM SERPL-SCNC: 134 MMOL/L (ref 134–144)

## 2023-11-24 LAB
BASOPHILS # BLD AUTO: 0.1 X10E3/UL (ref 0–0.2)
BASOPHILS NFR BLD AUTO: 1 %
EOSINOPHIL # BLD AUTO: 0.2 X10E3/UL (ref 0–0.4)
EOSINOPHIL NFR BLD AUTO: 3 %
ERYTHROCYTE [DISTWIDTH] IN BLOOD BY AUTOMATED COUNT: 12.6 % (ref 11.7–15.4)
HCT VFR BLD AUTO: 30.2 % (ref 34–46.6)
HGB BLD-MCNC: 11 G/DL (ref 11.1–15.9)
IMM GRANULOCYTES # BLD AUTO: 0 X10E3/UL (ref 0–0.1)
IMM GRANULOCYTES NFR BLD AUTO: 1 %
LYMPHOCYTES # BLD AUTO: 0.5 X10E3/UL (ref 0.7–3.1)
LYMPHOCYTES NFR BLD AUTO: 9 %
MCH RBC QN AUTO: 36.5 PG (ref 26.6–33)
MCHC RBC AUTO-ENTMCNC: 36.4 G/DL (ref 31.5–35.7)
MCV RBC AUTO: 100 FL (ref 79–97)
MONOCYTES # BLD AUTO: 0.6 X10E3/UL (ref 0.1–0.9)
MONOCYTES NFR BLD AUTO: 9 %
MORPHOLOGY BLD-IMP: ABNORMAL
NEUTROPHILS # BLD AUTO: 4.9 X10E3/UL (ref 1.4–7)
NEUTROPHILS NFR BLD AUTO: 77 %
PLATELET # BLD AUTO: 91 X10E3/UL (ref 150–450)
RBC # BLD AUTO: 3.01 X10E6/UL (ref 3.77–5.28)
WBC # BLD AUTO: 6.3 X10E3/UL (ref 3.4–10.8)

## 2023-11-25 LAB
ALBUMIN SERPL-MCNC: 3.6 G/DL (ref 3.8–4.9)
ALP SERPL-CCNC: 115 IU/L (ref 44–121)
ALT SERPL-CCNC: 62 IU/L (ref 0–32)
AST SERPL-CCNC: 76 IU/L (ref 0–40)
BILIRUB DIRECT SERPL-MCNC: 1.6 MG/DL (ref 0–0.4)
BILIRUB SERPL-MCNC: 2.6 MG/DL (ref 0–1.2)
PROT SERPL-MCNC: 7.7 G/DL (ref 6–8.5)

## 2023-11-27 LAB
AFP L3 MFR SERPL: 10.4 % (ref 0–9.9)
AFP SERPL-MCNC: 8 NG/ML (ref 0–9.2)

## 2023-11-30 ENCOUNTER — TELEPHONE (OUTPATIENT)
Age: 53
End: 2023-11-30

## 2023-11-30 NOTE — TELEPHONE ENCOUNTER
Referred patient to [facility] for liver transplant evaluation. [facility], the [facility] coordinator. Called patient and let her know she's been referred to [facility]. I told her the referral coordinator will get insurance authorization for the visit, then call her to schedule. Asked patient to let me know if she hasn't heard from [facility] by the end of next week. Answered patient's questions and she verbalized understanding.

## 2023-12-04 ENCOUNTER — TELEPHONE (OUTPATIENT)
Age: 53
End: 2023-12-04

## 2023-12-04 NOTE — TELEPHONE ENCOUNTER
Has gained about 8 lbs since last office visit, had Thanksgiving dinner and then went back on a low sodium diet, but feels bloated. She is asking if she can try her fluid pills for a few days to see if this will help. It is ok to take her fluid pills for a few days, if this does not help she will reach back out to our office.

## 2023-12-04 NOTE — TELEPHONE ENCOUNTER
----- Message from Louann Bentley RN sent at 12/4/2023 12:43 PM EST -----  Regarding: FW: Weight gain  Contact: [phone]    ----- Message -----  From: Janis Mckeon  Sent: 12/4/2023  12:22 PM EST  To: #  Subject: Weight gain                                      Hello    I have gained weight and blotted. Should I take the fluid pills for a few days?   Thanks   Marlee

## 2023-12-12 ENCOUNTER — HOSPITAL ENCOUNTER (OUTPATIENT)
Facility: HOSPITAL | Age: 53
Discharge: HOME | End: 2023-12-15
Payer: COMMERCIAL

## 2023-12-12 VITALS
WEIGHT: 135 LBS | TEMPERATURE: 98.4 F | RESPIRATION RATE: 16 BRPM | HEART RATE: 92 BPM | BODY MASS INDEX: 23.92 KG/M2 | OXYGEN SATURATION: 99 % | SYSTOLIC BLOOD PRESSURE: 110 MMHG | HEIGHT: 63 IN | DIASTOLIC BLOOD PRESSURE: 60 MMHG

## 2023-12-12 DIAGNOSIS — K74.69 OTHER CIRRHOSIS OF LIVER (HCC): ICD-10-CM

## 2023-12-12 LAB
COMMENT:: NORMAL
SPECIMEN HOLD: NORMAL

## 2023-12-12 PROCEDURE — 49083 ABD PARACENTESIS W/IMAGING: CPT

## 2023-12-12 ASSESSMENT — PAIN - FUNCTIONAL ASSESSMENT: PAIN_FUNCTIONAL_ASSESSMENT: NONE - DENIES PAIN

## 2023-12-12 NOTE — PROGRESS NOTES
1345: Patient arrived ambulatory to [facility] recovery area. Patient is A&OX4 on RA in NAD at this time. Code status and allergies reviewed with patient prior to procedure. Name of Procedure: Paracentesis      Vital Signs:  VSS throughout     Fluids Removed: 3600 ml clear yellow fluid      Samples sent to lab: yes     Any complications related to procedure: none identified at this time     Patient is A&Ox4, on RA, and is in NAD at this time. Discharge instructions reviewed with patient. Patient verbalized understanding.

## 2023-12-13 ENCOUNTER — CLINICAL DOCUMENTATION (OUTPATIENT)
Age: 53
End: 2023-12-13

## 2023-12-13 NOTE — PROGRESS NOTES
Records faxed to [facility] Department for Aging and Rehabilitative Services/ Disability Determination Services, attention to JOSE ANGEL Bailey. Faxed today to [phone].

## 2023-12-14 ENCOUNTER — TELEPHONE (OUTPATIENT)
Age: 53
End: 2023-12-14

## 2023-12-14 ENCOUNTER — CLINICAL DOCUMENTATION (OUTPATIENT)
Age: 53
End: 2023-12-14

## 2023-12-14 DIAGNOSIS — M54.50 CHRONIC LOW BACK PAIN WITHOUT SCIATICA, UNSPECIFIED BACK PAIN LATERALITY: Primary | ICD-10-CM

## 2023-12-14 DIAGNOSIS — G89.29 CHRONIC LOW BACK PAIN WITHOUT SCIATICA, UNSPECIFIED BACK PAIN LATERALITY: Primary | ICD-10-CM

## 2023-12-14 RX ORDER — BACLOFEN 10 MG/1
10 TABLET ORAL 2 TIMES DAILY
Qty: 30 TABLET | Refills: 1 | Status: ON HOLD | OUTPATIENT
Start: 2023-12-14

## 2023-12-14 NOTE — PROGRESS NOTES
Called the [address] to see if they received the medical records requested by JOSE ANGEL Fernandez. No answer on her phone but spoke to the  and she stated they received the documents on yesterday.

## 2023-12-26 ENCOUNTER — OFFICE VISIT (OUTPATIENT)
Age: 53
End: 2023-12-26
Payer: COMMERCIAL

## 2023-12-26 VITALS
HEART RATE: 92 BPM | HEIGHT: 62 IN | DIASTOLIC BLOOD PRESSURE: 61 MMHG | WEIGHT: 151 LBS | SYSTOLIC BLOOD PRESSURE: 103 MMHG | BODY MASS INDEX: 27.79 KG/M2 | OXYGEN SATURATION: 98 % | TEMPERATURE: 97.9 F

## 2023-12-26 DIAGNOSIS — M54.50 CHRONIC BILATERAL LOW BACK PAIN, UNSPECIFIED WHETHER SCIATICA PRESENT: ICD-10-CM

## 2023-12-26 DIAGNOSIS — F10.21 ALCOHOL DEPENDENCE IN REMISSION (HCC): ICD-10-CM

## 2023-12-26 DIAGNOSIS — K70.31 ALCOHOLIC CIRRHOSIS OF LIVER WITH ASCITES (HCC): ICD-10-CM

## 2023-12-26 DIAGNOSIS — K76.82 HEPATIC ENCEPHALOPATHY (HCC): ICD-10-CM

## 2023-12-26 DIAGNOSIS — K70.30 ALCOHOLIC CIRRHOSIS OF LIVER WITHOUT ASCITES (HCC): Primary | ICD-10-CM

## 2023-12-26 DIAGNOSIS — G89.29 CHRONIC BILATERAL LOW BACK PAIN, UNSPECIFIED WHETHER SCIATICA PRESENT: ICD-10-CM

## 2023-12-26 PROCEDURE — 99215 OFFICE O/P EST HI 40 MIN: CPT | Performed by: NURSE PRACTITIONER

## 2023-12-27 ENCOUNTER — TELEPHONE (OUTPATIENT)
Age: 53
End: 2023-12-27

## 2023-12-27 DIAGNOSIS — K70.31 ALCOHOLIC CIRRHOSIS OF LIVER WITH ASCITES (HCC): Primary | ICD-10-CM

## 2023-12-27 LAB
BASOPHILS # BLD AUTO: 0.1 X10E3/UL (ref 0–0.2)
BASOPHILS NFR BLD AUTO: 1 %
BUN SERPL-MCNC: 6 MG/DL (ref 6–24)
BUN/CREAT SERPL: 9 (ref 9–23)
CALCIUM SERPL-MCNC: 10 MG/DL (ref 8.7–10.2)
CHLORIDE SERPL-SCNC: 95 MMOL/L (ref 96–106)
CO2 SERPL-SCNC: 23 MMOL/L (ref 20–29)
CREAT SERPL-MCNC: 0.67 MG/DL (ref 0.57–1)
EGFRCR SERPLBLD CKD-EPI 2021: 104 ML/MIN/1.73
EOSINOPHIL # BLD AUTO: 0.3 X10E3/UL (ref 0–0.4)
EOSINOPHIL NFR BLD AUTO: 4 %
ERYTHROCYTE [DISTWIDTH] IN BLOOD BY AUTOMATED COUNT: 13.4 % (ref 11.7–15.4)
GLUCOSE SERPL-MCNC: 298 MG/DL (ref 70–99)
HCT VFR BLD AUTO: 29.6 % (ref 34–46.6)
HGB BLD-MCNC: 10.6 G/DL (ref 11.1–15.9)
IMM GRANULOCYTES # BLD AUTO: 0.1 X10E3/UL (ref 0–0.1)
IMM GRANULOCYTES NFR BLD AUTO: 1 %
INR PPP: 1.5 (ref 0.9–1.2)
LYMPHOCYTES # BLD AUTO: 0.7 X10E3/UL (ref 0.7–3.1)
LYMPHOCYTES NFR BLD AUTO: 10 %
MCH RBC QN AUTO: 35.2 PG (ref 26.6–33)
MCHC RBC AUTO-ENTMCNC: 35.8 G/DL (ref 31.5–35.7)
MCV RBC AUTO: 98 FL (ref 79–97)
MONOCYTES # BLD AUTO: 0.6 X10E3/UL (ref 0.1–0.9)
MONOCYTES NFR BLD AUTO: 8 %
MORPHOLOGY BLD-IMP: ABNORMAL
NEUTROPHILS # BLD AUTO: 5.7 X10E3/UL (ref 1.4–7)
NEUTROPHILS NFR BLD AUTO: 76 %
PLATELET # BLD AUTO: 88 X10E3/UL (ref 150–450)
POTASSIUM SERPL-SCNC: 3.8 MMOL/L (ref 3.5–5.2)
PROTHROMBIN TIME: 15.6 SEC (ref 9.1–12)
RBC # BLD AUTO: 3.01 X10E6/UL (ref 3.77–5.28)
SODIUM SERPL-SCNC: 134 MMOL/L (ref 134–144)
WBC # BLD AUTO: 7.6 X10E3/UL (ref 3.4–10.8)

## 2023-12-27 RX ORDER — FUROSEMIDE 40 MG/1
80 TABLET ORAL DAILY
Qty: 60 TABLET | Refills: 5 | Status: SHIPPED | OUTPATIENT
Start: 2023-12-27

## 2023-12-27 RX ORDER — SPIRONOLACTONE 100 MG/1
200 TABLET, FILM COATED ORAL DAILY
Qty: 60 TABLET | Refills: 5 | Status: SHIPPED | OUTPATIENT
Start: 2023-12-27

## 2023-12-28 ENCOUNTER — CLINICAL DOCUMENTATION (OUTPATIENT)
Age: 53
End: 2023-12-28

## 2023-12-28 DIAGNOSIS — K70.31 ALCOHOLIC CIRRHOSIS OF LIVER WITH ASCITES (HCC): Primary | ICD-10-CM

## 2023-12-28 LAB
AFP L3 MFR SERPL: 10.2 % (ref 0–9.9)
AFP SERPL-MCNC: 6 NG/ML (ref 0–9.2)
ALBUMIN SERPL-MCNC: 3.4 G/DL (ref 3.8–4.9)
ALP SERPL-CCNC: 196 IU/L (ref 44–121)
ALT SERPL-CCNC: 49 IU/L (ref 0–32)
AST SERPL-CCNC: 69 IU/L (ref 0–40)
BILIRUB DIRECT SERPL-MCNC: 2.25 MG/DL (ref 0–0.4)
BILIRUB SERPL-MCNC: 4.8 MG/DL (ref 0–1.2)
PROT SERPL-MCNC: 8.1 G/DL (ref 6–8.5)

## 2024-01-04 ENCOUNTER — TELEPHONE (OUTPATIENT)
Age: 54
End: 2024-01-04

## 2024-01-04 NOTE — TELEPHONE ENCOUNTER
Patient says she has a living donor and would like directive on whether to bring the person with her to her upcoming meeting(s). She also want to report her new insurance to ensure that 1/18/24 is covered.

## 2024-01-05 ENCOUNTER — HOSPITAL ENCOUNTER (OUTPATIENT)
Facility: HOSPITAL | Age: 54
Discharge: HOME | End: 2024-01-08

## 2024-01-05 ENCOUNTER — HOSPITAL ENCOUNTER (OUTPATIENT)
Facility: HOSPITAL | Age: 54
Discharge: HOME | End: 2024-01-05
Payer: COMMERCIAL

## 2024-01-05 VITALS — SYSTOLIC BLOOD PRESSURE: 109 MMHG | HEART RATE: 94 BPM | DIASTOLIC BLOOD PRESSURE: 63 MMHG | OXYGEN SATURATION: 97 %

## 2024-01-05 DIAGNOSIS — K70.31 ALCOHOLIC CIRRHOSIS OF LIVER WITH ASCITES (HCC): ICD-10-CM

## 2024-01-05 LAB
APPEARANCE FLD: ABNORMAL
COLOR FLD: YELLOW
LYMPHOCYTES NFR FLD: 40 %
MONOS+MACROS NFR FLD: 49 %
NEUTROPHILS NFR FLD: 11 %
NUC CELL # FLD: 347 /CU MM
RBC # FLD: >100 /CU MM
SPECIMEN SOURCE FLD: ABNORMAL

## 2024-01-05 PROCEDURE — 89050 BODY FLUID CELL COUNT: CPT

## 2024-01-05 PROCEDURE — 49083 ABD PARACENTESIS W/IMAGING: CPT

## 2024-01-05 PROCEDURE — 2500000003 HC RX 250 WO HCPCS: Performed by: NURSE PRACTITIONER

## 2024-01-05 RX ORDER — GADOTERATE MEGLUMINE 376.9 MG/ML
12 INJECTION INTRAVENOUS
Status: DISCONTINUED | OUTPATIENT
Start: 2024-01-05 | End: 2024-01-05

## 2024-01-05 RX ORDER — LIDOCAINE HYDROCHLORIDE 10 MG/ML
10 INJECTION, SOLUTION EPIDURAL; INFILTRATION; INTRACAUDAL; PERINEURAL ONCE
Status: COMPLETED | OUTPATIENT
Start: 2024-01-05 | End: 2024-01-05

## 2024-01-05 RX ADMIN — LIDOCAINE HYDROCHLORIDE 15 ML: 10 INJECTION, SOLUTION EPIDURAL; INFILTRATION; INTRACAUDAL; PERINEURAL at 13:48

## 2024-01-05 ASSESSMENT — PAIN DESCRIPTION - LOCATION: LOCATION: BACK

## 2024-01-05 ASSESSMENT — PAIN DESCRIPTION - ORIENTATION: ORIENTATION: LOWER

## 2024-01-05 ASSESSMENT — PAIN SCALES - GENERAL: PAINLEVEL_OUTOF10: 8

## 2024-01-05 ASSESSMENT — PAIN DESCRIPTION - PAIN TYPE: TYPE: CHRONIC PAIN

## 2024-01-05 NOTE — PROGRESS NOTES
Patient arrives in ultrasound today for an ultrasound guided paracentesis.    ID verified, patient is alert and oriented x 4    Assessment is as follows: Skin color and sclera is jaundiced.    Dr Monique Schofield MD at bedside for consent at 13:00 PM, all questions answered    Time out completed at 13:15    Procedure start time 13:15    Right upper/mid abdominal wall prepped under sterile technique, percutaneously accessed with a 6 Macedonian 12 cm pigtail, with medium clear estelle fluid return.    Connected to Goldsmith drainage canister    Total drainage out was 1,850 ml    Procedure end time 1530 PM    Dressing applied to right abdomen by [employer] tech Michelle, dressing is clean, dry, and intact    Patient tolerated overall procedure well. Given pre printed paracentesis home care instruction sheet, voiced good understanding.     Ghazal Thacker RN

## 2024-01-08 ENCOUNTER — OFFICE VISIT (OUTPATIENT)
Age: 54
End: 2024-01-08
Payer: COMMERCIAL

## 2024-01-08 VITALS
RESPIRATION RATE: 15 BRPM | TEMPERATURE: 97.6 F | HEART RATE: 91 BPM | BODY MASS INDEX: 24.55 KG/M2 | HEIGHT: 62 IN | WEIGHT: 133.4 LBS | DIASTOLIC BLOOD PRESSURE: 67 MMHG | SYSTOLIC BLOOD PRESSURE: 103 MMHG | OXYGEN SATURATION: 97 %

## 2024-01-08 DIAGNOSIS — K70.31 ALCOHOLIC CIRRHOSIS OF LIVER WITH ASCITES (HCC): ICD-10-CM

## 2024-01-08 DIAGNOSIS — G89.29 CHRONIC BILATERAL LOW BACK PAIN, UNSPECIFIED WHETHER SCIATICA PRESENT: ICD-10-CM

## 2024-01-08 DIAGNOSIS — K70.31 ALCOHOLIC CIRRHOSIS OF LIVER WITH ASCITES (HCC): Primary | ICD-10-CM

## 2024-01-08 DIAGNOSIS — E11.9 TYPE 2 DIABETES MELLITUS WITHOUT COMPLICATION, WITHOUT LONG-TERM CURRENT USE OF INSULIN (HCC): ICD-10-CM

## 2024-01-08 DIAGNOSIS — K70.31 ASCITES DUE TO ALCOHOLIC CIRRHOSIS (HCC): ICD-10-CM

## 2024-01-08 DIAGNOSIS — F10.21 ALCOHOL DEPENDENCE IN REMISSION (HCC): ICD-10-CM

## 2024-01-08 DIAGNOSIS — M54.50 CHRONIC BILATERAL LOW BACK PAIN, UNSPECIFIED WHETHER SCIATICA PRESENT: ICD-10-CM

## 2024-01-08 LAB
ALBUMIN SERPL-MCNC: 2.9 G/DL (ref 3.5–5)
ALBUMIN/GLOB SERPL: 0.5 (ref 1.1–2.2)
ALP SERPL-CCNC: 167 U/L (ref 45–117)
ALT SERPL-CCNC: 50 U/L (ref 12–78)
ANION GAP SERPL CALC-SCNC: 5 MMOL/L (ref 5–15)
AST SERPL-CCNC: 69 U/L (ref 15–37)
BASOPHILS # BLD: 0.1 K/UL (ref 0–0.1)
BASOPHILS NFR BLD: 1 % (ref 0–1)
BILIRUB DIRECT SERPL-MCNC: 3.4 MG/DL (ref 0–0.2)
BILIRUB SERPL-MCNC: 9.2 MG/DL (ref 0.2–1)
BUN SERPL-MCNC: 10 MG/DL (ref 6–20)
BUN/CREAT SERPL: 9 (ref 12–20)
CALCIUM SERPL-MCNC: 10.6 MG/DL (ref 8.5–10.1)
CHLORIDE SERPL-SCNC: 90 MMOL/L (ref 97–108)
CO2 SERPL-SCNC: 31 MMOL/L (ref 21–32)
CREAT SERPL-MCNC: 1.14 MG/DL (ref 0.55–1.02)
DIFFERENTIAL METHOD BLD: ABNORMAL
EOSINOPHIL # BLD: 0.2 K/UL (ref 0–0.4)
EOSINOPHIL NFR BLD: 3 % (ref 0–7)
ERYTHROCYTE [DISTWIDTH] IN BLOOD BY AUTOMATED COUNT: 14.6 % (ref 11.5–14.5)
GLOBULIN SER CALC-MCNC: 5.9 G/DL (ref 2–4)
GLUCOSE SERPL-MCNC: 386 MG/DL (ref 65–100)
HCT VFR BLD AUTO: 34.6 % (ref 35–47)
HGB BLD-MCNC: 11.3 G/DL (ref 11.5–16)
IMM GRANULOCYTES # BLD AUTO: 0.1 K/UL (ref 0–0.04)
IMM GRANULOCYTES NFR BLD AUTO: 1 % (ref 0–0.5)
INR PPP: 1.5 (ref 0.9–1.1)
LYMPHOCYTES # BLD: 0.4 K/UL (ref 0.8–3.5)
LYMPHOCYTES NFR BLD: 6 % (ref 12–49)
MCH RBC QN AUTO: 34.8 PG (ref 26–34)
MCHC RBC AUTO-ENTMCNC: 32.7 G/DL (ref 30–36.5)
MCV RBC AUTO: 106.5 FL (ref 80–99)
MONOCYTES # BLD: 0.6 K/UL (ref 0–1)
MONOCYTES NFR BLD: 8 % (ref 5–13)
NEUTS SEG # BLD: 5.9 K/UL (ref 1.8–8)
NEUTS SEG NFR BLD: 81 % (ref 32–75)
NRBC # BLD: 0 K/UL (ref 0–0.01)
NRBC BLD-RTO: 0 PER 100 WBC
PLATELET # BLD AUTO: 85 K/UL (ref 150–400)
PMV BLD AUTO: 10.6 FL (ref 8.9–12.9)
POTASSIUM SERPL-SCNC: 4.4 MMOL/L (ref 3.5–5.1)
PROT SERPL-MCNC: 8.8 G/DL (ref 6.4–8.2)
PROTHROMBIN TIME: 15 SEC (ref 9–11.1)
RBC # BLD AUTO: 3.25 M/UL (ref 3.8–5.2)
RBC MORPH BLD: ABNORMAL
RBC MORPH BLD: ABNORMAL
SODIUM SERPL-SCNC: 126 MMOL/L (ref 136–145)
WBC # BLD AUTO: 7.3 K/UL (ref 3.6–11)

## 2024-01-08 PROCEDURE — 99215 OFFICE O/P EST HI 40 MIN: CPT | Performed by: NURSE PRACTITIONER

## 2024-01-08 NOTE — PROGRESS NOTES
Identified pt with two pt identifiers(name and ). Reviewed record in preparation for visit and have obtained necessary documentation.  Vitals:    24 1032   BP: 103/67   Site: Right Upper Arm   Position: Sitting   Cuff Size: Medium Adult   Pulse: 91   Resp: 15   Temp: 97.6 °F (36.4 °C)   TempSrc: Temporal   SpO2: 97%   Weight: 60.5 kg (133 lb 6.4 oz)   Height: 1.575 m (5' 2\")        Health Maintenance Review: Patient reminded of \"due or due soon\" health maintenance. I have asked the patient to contact his/her primary care provider (PCP) for follow-up on his/her health maintenance.    Coordination of Care Questionnaire:  :   1) Have you been to an emergency room, urgent care, or hospitalized since your last visit?  If yes, where when, and reason for visit? no       2. Have seen or consulted any other health care provider since your last visit?   If yes, where when, and reason for visit?  no      Patient is accompanied by  I have received verbal consent from Tiffanie Morgan to discuss any/all medical information while they are present in the room.    
Esophageal varices with 3 bands placed. Repeat in 6 months.  3/2023. EGD by Dr. Kuo. Esophageal varices. Banding x 4.  9/2023. EGD by Dr. Kuo. Esophageal varices. Banding x 2.     RADIOLOGY:  5/2021. CT of Abdomen. Cirrhotic appearance of the liver. Cholelithiasis. No ductal dilatation. Right renal calculi. Splenomegaly. No ascites.   3/2022.  Ultrasound of liver.   Echogenic consistent with cirrhosis.  No liver mass lesions.  No dilated bile ducts.  No ascites.  9/2022.  Ultrasound of liver.   Echogenic consistent with cirrhosis.  No liver mass lesions.  No dilated bile ducts.  No ascites.  4/2023.  Ultrasound of liver.   Echogenic consistent with cirrhosis.  No liver mass lesions.  No dilated bile ducts.  Mild ascites.    9/2023. CT of Abdomen. Moderate ascites. Cirrhosis. Gallbladder distended with stone in the neck. Splenomegaly.  9/2023. MRI of liver. 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 No mass or lesion. No ductal dilatation. 7 mm stone. Heterogeneous enhancement in the left lobe measuring 1.8 cm with no washout or capsule. Will order repeat MRI.     OTHER TESTING:  Not available or performed    FOLLOW-UP:  All of the issues listed above in the Assessment and Plan were discussed with the patient.  All questions were answered.  The patient expressed a clear understanding of the above.    Follow-up [facility] in 8 weeks for ongoing monitoring and treatment.     Judi Briscoe AGPCNP-BC  [address], [address]  [place]  [address] [phone]  [facility]

## 2024-01-09 ENCOUNTER — CLINICAL DOCUMENTATION (OUTPATIENT)
Age: 54
End: 2024-01-09

## 2024-01-09 DIAGNOSIS — K70.31 ALCOHOLIC CIRRHOSIS OF LIVER WITH ASCITES (HCC): Primary | ICD-10-CM

## 2024-01-11 DIAGNOSIS — E11.9 TYPE 2 DIABETES MELLITUS WITHOUT COMPLICATION, WITHOUT LONG-TERM CURRENT USE OF INSULIN (HCC): Primary | ICD-10-CM

## 2024-01-11 NOTE — TELEPHONE ENCOUNTER
----- Message from Tiffanie Morgan sent at 1/11/2024  1:44 PM EST -----  Regarding: Nan  Contact: [phone]  Hi  I am out and need a refill

## 2024-01-12 RX ORDER — SEMAGLUTIDE 0.68 MG/ML
INJECTION, SOLUTION SUBCUTANEOUS
Qty: 6 ML | Refills: 3 | Status: SHIPPED | OUTPATIENT
Start: 2024-01-12

## 2024-01-15 ENCOUNTER — HOSPITAL ENCOUNTER (OUTPATIENT)
Facility: HOSPITAL | Age: 54
Discharge: HOME | End: 2024-01-18
Payer: COMMERCIAL

## 2024-01-15 VITALS — BODY MASS INDEX: 24.33 KG/M2 | WEIGHT: 133 LBS

## 2024-01-15 PROCEDURE — A9579 GAD-BASE MR CONTRAST NOS,1ML: HCPCS | Performed by: RADIOLOGY

## 2024-01-15 PROCEDURE — 6360000004 HC RX CONTRAST MEDICATION: Performed by: RADIOLOGY

## 2024-01-15 PROCEDURE — 74183 MRI ABD W/O CNTR FLWD CNTR: CPT

## 2024-01-15 RX ADMIN — GADOTERIDOL 12 ML: 279.3 INJECTION, SOLUTION INTRAVENOUS at 13:52

## 2024-01-16 ENCOUNTER — CLINICAL DOCUMENTATION (OUTPATIENT)
Age: 54
End: 2024-01-16

## 2024-01-16 ENCOUNTER — TELEPHONE (OUTPATIENT)
Age: 54
End: 2024-01-16

## 2024-01-16 DIAGNOSIS — E11.9 TYPE 2 DIABETES MELLITUS WITHOUT COMPLICATION, WITHOUT LONG-TERM CURRENT USE OF INSULIN (HCC): Primary | ICD-10-CM

## 2024-01-16 DIAGNOSIS — E11.9 TYPE 2 DIABETES MELLITUS WITHOUT COMPLICATION, WITHOUT LONG-TERM CURRENT USE OF INSULIN (HCC): ICD-10-CM

## 2024-01-16 RX ORDER — GLIMEPIRIDE 4 MG/1
4 TABLET ORAL EVERY MORNING
Qty: 30 TABLET | Refills: 3 | Status: SHIPPED | OUTPATIENT
Start: 2024-01-16 | End: 2024-01-16 | Stop reason: SDUPTHER

## 2024-01-16 RX ORDER — GLIMEPIRIDE 4 MG/1
4 TABLET ORAL EVERY MORNING
Qty: 30 TABLET | Refills: 3 | Status: ON HOLD | OUTPATIENT
Start: 2024-01-16

## 2024-01-16 NOTE — TELEPHONE ENCOUNTER
----- Message from Tiffanie Morgan sent at 1/16/2024  1:33 PM EST -----  Regarding: High  blood sugar   Contact: [phone]  I am still out ozemic but my sugar is 407  What should I do   I leave for [place] tomorrow is

## 2024-01-16 NOTE — PROGRESS NOTES
Letter and Office Notes faxed back to the [facility] Department of Aging and Rehabilitative Services today per Dr. Hoyos at [phone].

## 2024-01-19 ENCOUNTER — TELEPHONE (OUTPATIENT)
Age: 54
End: 2024-01-19

## 2024-01-19 NOTE — TELEPHONE ENCOUNTER
----- Message from Tiffanie Morgan sent at 1/19/2024  9:04 AM EST -----  Regarding: High  blood sugar   Contact: [phone]  The new medication is working.  The insurance sent you a verification form for you to fill out.  [facility] said could it is ok to take tramodol for back pain.   Dr. Madden was the Dr that told me that.

## 2024-01-20 ENCOUNTER — APPOINTMENT (OUTPATIENT)
Facility: HOSPITAL | Age: 54
DRG: 433 | End: 2024-01-20
Payer: COMMERCIAL

## 2024-01-20 ENCOUNTER — HOSPITAL ENCOUNTER (INPATIENT)
Facility: HOSPITAL | Age: 54
LOS: 4 days | Discharge: HOME | DRG: 433 | End: 2024-01-24
Attending: EMERGENCY MEDICINE | Admitting: STUDENT IN AN ORGANIZED HEALTH CARE EDUCATION/TRAINING PROGRAM
Payer: COMMERCIAL

## 2024-01-20 DIAGNOSIS — N17.9 AKI (ACUTE KIDNEY INJURY) (HCC): ICD-10-CM

## 2024-01-20 DIAGNOSIS — K76.82 HEPATIC ENCEPHALOPATHY (HCC): Primary | ICD-10-CM

## 2024-01-20 LAB
ALBUMIN SERPL-MCNC: 3 G/DL (ref 3.5–5)
ALBUMIN/GLOB SERPL: 0.5 (ref 1.1–2.2)
ALP SERPL-CCNC: 149 U/L (ref 45–117)
ALT SERPL-CCNC: 60 U/L (ref 12–78)
AMMONIA PLAS-SCNC: 128 UMOL/L
ANION GAP SERPL CALC-SCNC: 12 MMOL/L (ref 5–15)
APPEARANCE UR: ABNORMAL
AST SERPL-CCNC: 65 U/L (ref 15–37)
BACTERIA URNS QL MICRO: NEGATIVE /HPF
BASOPHILS # BLD: 0.1 K/UL (ref 0–0.1)
BASOPHILS NFR BLD: 1 % (ref 0–1)
BILIRUB SERPL-MCNC: 7.8 MG/DL (ref 0.2–1)
BILIRUB UR QL: NEGATIVE
BUN SERPL-MCNC: 46 MG/DL (ref 6–20)
BUN/CREAT SERPL: 20 (ref 12–20)
CALCIUM SERPL-MCNC: 13 MG/DL (ref 8.5–10.1)
CHLORIDE SERPL-SCNC: 91 MMOL/L (ref 97–108)
CO2 SERPL-SCNC: 23 MMOL/L (ref 21–32)
COLOR UR: ABNORMAL
COMMENT:: NORMAL
CREAT SERPL-MCNC: 2.29 MG/DL (ref 0.55–1.02)
DIFFERENTIAL METHOD BLD: ABNORMAL
EOSINOPHIL # BLD: 0.1 K/UL (ref 0–0.4)
EOSINOPHIL NFR BLD: 2 % (ref 0–7)
EPITH CASTS URNS QL MICRO: ABNORMAL /LPF
ERYTHROCYTE [DISTWIDTH] IN BLOOD BY AUTOMATED COUNT: 16 % (ref 11.5–14.5)
GLOBULIN SER CALC-MCNC: 6.3 G/DL (ref 2–4)
GLUCOSE SERPL-MCNC: 278 MG/DL (ref 65–100)
GLUCOSE UR STRIP.AUTO-MCNC: NEGATIVE MG/DL
HCT VFR BLD AUTO: 33.1 % (ref 35–47)
HGB BLD-MCNC: 11.6 G/DL (ref 11.5–16)
HGB UR QL STRIP: ABNORMAL
HYALINE CASTS URNS QL MICRO: ABNORMAL /LPF (ref 0–5)
IMM GRANULOCYTES # BLD AUTO: 0.1 K/UL (ref 0–0.04)
IMM GRANULOCYTES NFR BLD AUTO: 1 % (ref 0–0.5)
INR PPP: 1.3 (ref 0.9–1.1)
KETONES UR QL STRIP.AUTO: NEGATIVE MG/DL
LEUKOCYTE ESTERASE UR QL STRIP.AUTO: ABNORMAL
LYMPHOCYTES # BLD: 0.6 K/UL (ref 0.8–3.5)
LYMPHOCYTES NFR BLD: 10 % (ref 12–49)
MCH RBC QN AUTO: 36.8 PG (ref 26–34)
MCHC RBC AUTO-ENTMCNC: 35 G/DL (ref 30–36.5)
MCV RBC AUTO: 105.1 FL (ref 80–99)
MONOCYTES # BLD: 0.6 K/UL (ref 0–1)
MONOCYTES NFR BLD: 10 % (ref 5–13)
NEUTS SEG # BLD: 4.9 K/UL (ref 1.8–8)
NEUTS SEG NFR BLD: 76 % (ref 32–75)
NITRITE UR QL STRIP.AUTO: NEGATIVE
NRBC # BLD: 0 K/UL (ref 0–0.01)
NRBC BLD-RTO: 0 PER 100 WBC
PH UR STRIP: 6 (ref 5–8)
PLATELET # BLD AUTO: 55 K/UL (ref 150–400)
PMV BLD AUTO: 11.1 FL (ref 8.9–12.9)
POTASSIUM SERPL-SCNC: 5.3 MMOL/L (ref 3.5–5.1)
PROT SERPL-MCNC: 9.3 G/DL (ref 6.4–8.2)
PROT UR STRIP-MCNC: ABNORMAL MG/DL
PROTHROMBIN TIME: 13.8 SEC (ref 9–11.1)
RBC # BLD AUTO: 3.15 M/UL (ref 3.8–5.2)
RBC #/AREA URNS HPF: >100 /HPF (ref 0–5)
RBC MORPH BLD: ABNORMAL
SODIUM SERPL-SCNC: 126 MMOL/L (ref 136–145)
SP GR UR REFRACTOMETRY: 1.02 (ref 1–1.03)
SPECIMEN HOLD: NORMAL
SPECIMEN HOLD: NORMAL
UROBILINOGEN UR QL STRIP.AUTO: 0.2 EU/DL (ref 0.2–1)
WBC # BLD AUTO: 6.4 K/UL (ref 3.6–11)
WBC URNS QL MICRO: ABNORMAL /HPF (ref 0–4)

## 2024-01-20 PROCEDURE — 83036 HEMOGLOBIN GLYCOSYLATED A1C: CPT

## 2024-01-20 PROCEDURE — 85025 COMPLETE CBC W/AUTO DIFF WBC: CPT

## 2024-01-20 PROCEDURE — 36415 COLL VENOUS BLD VENIPUNCTURE: CPT

## 2024-01-20 PROCEDURE — 2580000003 HC RX 258: Performed by: EMERGENCY MEDICINE

## 2024-01-20 PROCEDURE — 1100000000 HC RM PRIVATE

## 2024-01-20 PROCEDURE — 99285 EMERGENCY DEPT VISIT HI MDM: CPT

## 2024-01-20 PROCEDURE — 80053 COMPREHEN METABOLIC PANEL: CPT

## 2024-01-20 PROCEDURE — 82140 ASSAY OF AMMONIA: CPT

## 2024-01-20 PROCEDURE — 70450 CT HEAD/BRAIN W/O DYE: CPT

## 2024-01-20 PROCEDURE — 81001 URINALYSIS AUTO W/SCOPE: CPT

## 2024-01-20 PROCEDURE — 85610 PROTHROMBIN TIME: CPT

## 2024-01-20 RX ORDER — LACTULOSE 10 G/15ML
30 SOLUTION ORAL
Status: COMPLETED | OUTPATIENT
Start: 2024-01-20 | End: 2024-01-21

## 2024-01-20 RX ORDER — ALBUMIN, HUMAN INJ 5% 5 %
25 SOLUTION INTRAVENOUS ONCE
Status: COMPLETED | OUTPATIENT
Start: 2024-01-20 | End: 2024-01-21

## 2024-01-20 RX ORDER — 0.9 % SODIUM CHLORIDE 0.9 %
1000 INTRAVENOUS SOLUTION INTRAVENOUS ONCE
Status: COMPLETED | OUTPATIENT
Start: 2024-01-20 | End: 2024-01-21

## 2024-01-20 RX ADMIN — SODIUM CHLORIDE 1000 ML: 9 INJECTION, SOLUTION INTRAVENOUS at 23:59

## 2024-01-20 ASSESSMENT — PAIN SCALES - GENERAL: PAINLEVEL_OUTOF10: 0

## 2024-01-21 LAB
ALBUMIN SERPL-MCNC: 2.7 G/DL (ref 3.5–5)
ALBUMIN/GLOB SERPL: 0.6 (ref 1.1–2.2)
ALP SERPL-CCNC: 104 U/L (ref 45–117)
ALT SERPL-CCNC: 46 U/L (ref 12–78)
AMMONIA PLAS-SCNC: 192 UMOL/L
ANION GAP SERPL CALC-SCNC: 9 MMOL/L (ref 5–15)
AST SERPL-CCNC: 52 U/L (ref 15–37)
BILIRUB DIRECT SERPL-MCNC: 3 MG/DL (ref 0–0.2)
BILIRUB SERPL-MCNC: 7 MG/DL (ref 0.2–1)
BUN SERPL-MCNC: 34 MG/DL (ref 6–20)
BUN/CREAT SERPL: 20 (ref 12–20)
CALCIUM SERPL-MCNC: 11.4 MG/DL (ref 8.5–10.1)
CHLORIDE SERPL-SCNC: 107 MMOL/L (ref 97–108)
CO2 SERPL-SCNC: 21 MMOL/L (ref 21–32)
COMMENT:: NORMAL
CREAT SERPL-MCNC: 1.73 MG/DL (ref 0.55–1.02)
ERYTHROCYTE [DISTWIDTH] IN BLOOD BY AUTOMATED COUNT: 16 % (ref 11.5–14.5)
EST. AVERAGE GLUCOSE BLD GHB EST-MCNC: 166 MG/DL
GLOBULIN SER CALC-MCNC: 4.8 G/DL (ref 2–4)
GLUCOSE BLD STRIP.AUTO-MCNC: 106 MG/DL (ref 65–117)
GLUCOSE BLD STRIP.AUTO-MCNC: 122 MG/DL (ref 65–117)
GLUCOSE BLD STRIP.AUTO-MCNC: 200 MG/DL (ref 65–117)
GLUCOSE BLD STRIP.AUTO-MCNC: 237 MG/DL (ref 65–117)
GLUCOSE BLD STRIP.AUTO-MCNC: 264 MG/DL (ref 65–117)
GLUCOSE SERPL-MCNC: 141 MG/DL (ref 65–100)
HBA1C MFR BLD: 7.4 % (ref 4–5.6)
HCT VFR BLD AUTO: 25.5 % (ref 35–47)
HGB BLD-MCNC: 9 G/DL (ref 11.5–16)
MCH RBC QN AUTO: 37.2 PG (ref 26–34)
MCHC RBC AUTO-ENTMCNC: 35.3 G/DL (ref 30–36.5)
MCV RBC AUTO: 105.4 FL (ref 80–99)
NRBC # BLD: 0 K/UL (ref 0–0.01)
NRBC BLD-RTO: 0 PER 100 WBC
PLATELET # BLD AUTO: 49 K/UL (ref 150–400)
PMV BLD AUTO: 10.2 FL (ref 8.9–12.9)
POTASSIUM SERPL-SCNC: 4.6 MMOL/L (ref 3.5–5.1)
PROT SERPL-MCNC: 7.5 G/DL (ref 6.4–8.2)
RBC # BLD AUTO: 2.42 M/UL (ref 3.8–5.2)
SERVICE CMNT-IMP: ABNORMAL
SERVICE CMNT-IMP: NORMAL
SODIUM SERPL-SCNC: 137 MMOL/L (ref 136–145)
SPECIMEN HOLD: NORMAL
WBC # BLD AUTO: 4.4 K/UL (ref 3.6–11)

## 2024-01-21 PROCEDURE — 6360000002 HC RX W HCPCS: Performed by: INTERNAL MEDICINE

## 2024-01-21 PROCEDURE — 87086 URINE CULTURE/COLONY COUNT: CPT

## 2024-01-21 PROCEDURE — 82140 ASSAY OF AMMONIA: CPT

## 2024-01-21 PROCEDURE — 36415 COLL VENOUS BLD VENIPUNCTURE: CPT

## 2024-01-21 PROCEDURE — 6370000000 HC RX 637 (ALT 250 FOR IP): Performed by: EMERGENCY MEDICINE

## 2024-01-21 PROCEDURE — 87040 BLOOD CULTURE FOR BACTERIA: CPT

## 2024-01-21 PROCEDURE — 2580000003 HC RX 258: Performed by: STUDENT IN AN ORGANIZED HEALTH CARE EDUCATION/TRAINING PROGRAM

## 2024-01-21 PROCEDURE — P9045 ALBUMIN (HUMAN), 5%, 250 ML: HCPCS | Performed by: EMERGENCY MEDICINE

## 2024-01-21 PROCEDURE — 82962 GLUCOSE BLOOD TEST: CPT

## 2024-01-21 PROCEDURE — 80076 HEPATIC FUNCTION PANEL: CPT

## 2024-01-21 PROCEDURE — 6370000000 HC RX 637 (ALT 250 FOR IP): Performed by: STUDENT IN AN ORGANIZED HEALTH CARE EDUCATION/TRAINING PROGRAM

## 2024-01-21 PROCEDURE — P9047 ALBUMIN (HUMAN), 25%, 50ML: HCPCS | Performed by: INTERNAL MEDICINE

## 2024-01-21 PROCEDURE — 80048 BASIC METABOLIC PNL TOTAL CA: CPT

## 2024-01-21 PROCEDURE — 6360000002 HC RX W HCPCS: Performed by: EMERGENCY MEDICINE

## 2024-01-21 PROCEDURE — 85027 COMPLETE CBC AUTOMATED: CPT

## 2024-01-21 PROCEDURE — 99223 1ST HOSP IP/OBS HIGH 75: CPT | Performed by: INTERNAL MEDICINE

## 2024-01-21 PROCEDURE — 6370000000 HC RX 637 (ALT 250 FOR IP): Performed by: INTERNAL MEDICINE

## 2024-01-21 PROCEDURE — 1100000000 HC RM PRIVATE

## 2024-01-21 RX ORDER — SODIUM CHLORIDE 9 MG/ML
INJECTION, SOLUTION INTRAVENOUS CONTINUOUS
Status: DISPENSED | OUTPATIENT
Start: 2024-01-21 | End: 2024-01-23

## 2024-01-21 RX ORDER — ONDANSETRON 2 MG/ML
4 INJECTION INTRAMUSCULAR; INTRAVENOUS EVERY 6 HOURS PRN
Status: DISCONTINUED | OUTPATIENT
Start: 2024-01-21 | End: 2024-01-24 | Stop reason: HOSPADM

## 2024-01-21 RX ORDER — SODIUM CHLORIDE 0.9 % (FLUSH) 0.9 %
5-40 SYRINGE (ML) INJECTION EVERY 12 HOURS SCHEDULED
Status: DISCONTINUED | OUTPATIENT
Start: 2024-01-21 | End: 2024-01-24 | Stop reason: HOSPADM

## 2024-01-21 RX ORDER — SODIUM CHLORIDE 9 MG/ML
INJECTION, SOLUTION INTRAVENOUS CONTINUOUS
Status: DISCONTINUED | OUTPATIENT
Start: 2024-01-21 | End: 2024-01-21

## 2024-01-21 RX ORDER — POLYETHYLENE GLYCOL 3350 17 G/17G
17 POWDER, FOR SOLUTION ORAL DAILY PRN
Status: DISCONTINUED | OUTPATIENT
Start: 2024-01-21 | End: 2024-01-22 | Stop reason: SDUPTHER

## 2024-01-21 RX ORDER — ACETAMINOPHEN 650 MG/1
650 SUPPOSITORY RECTAL EVERY 6 HOURS PRN
Status: DISCONTINUED | OUTPATIENT
Start: 2024-01-21 | End: 2024-01-24 | Stop reason: HOSPADM

## 2024-01-21 RX ORDER — ONDANSETRON 4 MG/1
4 TABLET, ORALLY DISINTEGRATING ORAL EVERY 8 HOURS PRN
Status: DISCONTINUED | OUTPATIENT
Start: 2024-01-21 | End: 2024-01-22 | Stop reason: SDUPTHER

## 2024-01-21 RX ORDER — ONDANSETRON 2 MG/ML
4 INJECTION INTRAMUSCULAR; INTRAVENOUS EVERY 6 HOURS PRN
Status: DISCONTINUED | OUTPATIENT
Start: 2024-01-21 | End: 2024-01-22 | Stop reason: SDUPTHER

## 2024-01-21 RX ORDER — SODIUM CHLORIDE 9 MG/ML
INJECTION, SOLUTION INTRAVENOUS PRN
Status: DISCONTINUED | OUTPATIENT
Start: 2024-01-21 | End: 2024-01-24 | Stop reason: HOSPADM

## 2024-01-21 RX ORDER — ACETAMINOPHEN 325 MG/1
650 TABLET ORAL EVERY 6 HOURS PRN
Status: DISCONTINUED | OUTPATIENT
Start: 2024-01-21 | End: 2024-01-24 | Stop reason: HOSPADM

## 2024-01-21 RX ORDER — ACETAMINOPHEN 650 MG/1
650 SUPPOSITORY RECTAL EVERY 6 HOURS PRN
Status: DISCONTINUED | OUTPATIENT
Start: 2024-01-21 | End: 2024-01-22 | Stop reason: SDUPTHER

## 2024-01-21 RX ORDER — PANTOPRAZOLE SODIUM 40 MG/1
40 TABLET, DELAYED RELEASE ORAL
Status: DISCONTINUED | OUTPATIENT
Start: 2024-01-21 | End: 2024-01-24 | Stop reason: HOSPADM

## 2024-01-21 RX ORDER — INSULIN LISPRO 100 [IU]/ML
0-8 INJECTION, SOLUTION INTRAVENOUS; SUBCUTANEOUS
Status: DISCONTINUED | OUTPATIENT
Start: 2024-01-21 | End: 2024-01-24 | Stop reason: HOSPADM

## 2024-01-21 RX ORDER — SODIUM CHLORIDE 0.9 % (FLUSH) 0.9 %
5-40 SYRINGE (ML) INJECTION PRN
Status: DISCONTINUED | OUTPATIENT
Start: 2024-01-21 | End: 2024-01-24 | Stop reason: HOSPADM

## 2024-01-21 RX ORDER — ONDANSETRON 4 MG/1
4 TABLET, ORALLY DISINTEGRATING ORAL EVERY 8 HOURS PRN
Status: DISCONTINUED | OUTPATIENT
Start: 2024-01-21 | End: 2024-01-24 | Stop reason: HOSPADM

## 2024-01-21 RX ORDER — INSULIN LISPRO 100 [IU]/ML
0-4 INJECTION, SOLUTION INTRAVENOUS; SUBCUTANEOUS NIGHTLY
Status: DISCONTINUED | OUTPATIENT
Start: 2024-01-21 | End: 2024-01-24 | Stop reason: HOSPADM

## 2024-01-21 RX ORDER — ACETAMINOPHEN 325 MG/1
650 TABLET ORAL EVERY 6 HOURS PRN
Status: DISCONTINUED | OUTPATIENT
Start: 2024-01-21 | End: 2024-01-22 | Stop reason: SDUPTHER

## 2024-01-21 RX ORDER — POLYETHYLENE GLYCOL 3350 17 G/17G
17 POWDER, FOR SOLUTION ORAL DAILY PRN
Status: DISCONTINUED | OUTPATIENT
Start: 2024-01-21 | End: 2024-01-24 | Stop reason: HOSPADM

## 2024-01-21 RX ORDER — SODIUM CHLORIDE 0.9 % (FLUSH) 0.9 %
5-40 SYRINGE (ML) INJECTION PRN
Status: DISCONTINUED | OUTPATIENT
Start: 2024-01-21 | End: 2024-01-22 | Stop reason: SDUPTHER

## 2024-01-21 RX ORDER — DEXTROSE MONOHYDRATE 100 MG/ML
INJECTION, SOLUTION INTRAVENOUS CONTINUOUS PRN
Status: DISCONTINUED | OUTPATIENT
Start: 2024-01-21 | End: 2024-01-24 | Stop reason: HOSPADM

## 2024-01-21 RX ORDER — ALBUMIN (HUMAN) 12.5 G/50ML
25 SOLUTION INTRAVENOUS EVERY 6 HOURS
Status: DISCONTINUED | OUTPATIENT
Start: 2024-01-21 | End: 2024-01-24 | Stop reason: HOSPADM

## 2024-01-21 RX ORDER — LACTULOSE 10 G/15ML
30 SOLUTION ORAL 3 TIMES DAILY
Status: DISCONTINUED | OUTPATIENT
Start: 2024-01-21 | End: 2024-01-24 | Stop reason: HOSPADM

## 2024-01-21 RX ADMIN — Medication 1000 ML: at 16:10

## 2024-01-21 RX ADMIN — ALBUMIN (HUMAN) 25 G: 0.25 INJECTION, SOLUTION INTRAVENOUS at 14:26

## 2024-01-21 RX ADMIN — LACTULOSE 30 G: 20 SOLUTION ORAL at 00:09

## 2024-01-21 RX ADMIN — SODIUM CHLORIDE, PRESERVATIVE FREE 10 ML: 5 INJECTION INTRAVENOUS at 08:57

## 2024-01-21 RX ADMIN — ALBUMIN (HUMAN) 25 G: 12.5 INJECTION, SOLUTION INTRAVENOUS at 00:03

## 2024-01-21 RX ADMIN — SODIUM CHLORIDE, PRESERVATIVE FREE 10 ML: 5 INJECTION INTRAVENOUS at 23:25

## 2024-01-21 RX ADMIN — LACTULOSE 30 G: 20 SOLUTION ORAL at 08:55

## 2024-01-21 RX ADMIN — ALBUMIN (HUMAN) 25 G: 0.25 INJECTION, SOLUTION INTRAVENOUS at 18:57

## 2024-01-21 RX ADMIN — Medication 1000 ML: at 23:25

## 2024-01-21 RX ADMIN — SODIUM CHLORIDE: 9 INJECTION, SOLUTION INTRAVENOUS at 01:17

## 2024-01-21 RX ADMIN — INSULIN LISPRO 2 UNITS: 100 INJECTION, SOLUTION INTRAVENOUS; SUBCUTANEOUS at 06:47

## 2024-01-21 RX ADMIN — RIFAXIMIN 600 MG: 200 TABLET ORAL at 02:43

## 2024-01-21 RX ADMIN — INSULIN LISPRO 2 UNITS: 100 INJECTION, SOLUTION INTRAVENOUS; SUBCUTANEOUS at 12:06

## 2024-01-21 RX ADMIN — Medication 1000 ML: at 00:46

## 2024-01-21 RX ADMIN — Medication 1000 ML: at 18:57

## 2024-01-21 ASSESSMENT — PAIN DESCRIPTION - LOCATION
LOCATION: OTHER (COMMENT)
LOCATION: OTHER (COMMENT)

## 2024-01-21 ASSESSMENT — PAIN SCALES - GENERAL: PAINLEVEL_OUTOF10: 0

## 2024-01-21 NOTE — ED NOTES
8:25 PM  I have evaluated the patient as the Provider in Rapid Medical Evaluation (RME). I have reviewed her vital signs and the triage nurse assessment. I have talked with the patient and any available family and advised that I am the provider in triage and have ordered the appropriate study to initiate their work up based on the clinical presentation during my assessment. I have advised that the patient will be accommodated in the Main ED as soon as possible. I have also requested to contact the triage nurse or myself immediately if the patient experiences any changes in their condition during this brief waiting period.    Tiffanie Morgan is a 53 y.o. female with history of  has a past medical history of Anemia, Chronic bilateral low back pain (12/26/2023), Depression, Encounter for long-term (current) use of other medications (7/8/2012), LBP (low back pain) (4/27/2010), Liver disease, Mixed hyperlipidemia (7/8/2012), OA (osteoarthritis) (4/27/2010), Obesity (4/27/2010), Renal calculus or stone (4/27/2010), Tobacco use disorder (4/27/2010), and Type II or unspecified type diabetes mellitus without mention of complication, not stated as uncontrolled (3/9/2015). who presents from home to [facility] ED with cc of increased fatigue. Patient's  reports increased confusion- he reports she is having similar symptoms to previous episode of hepatic encephalopathy. Patient is on liver transplant list. No fever. Patient denies recent fall or head injury.  reports nose bleed today. Bleeding has stopped.  reports confusion progressively worsened beginning yesterday afternoon/ evening.     Sx onset > 24 hours ago- No code S            PCP: Homero Hoyos MD    There are no other complaints, changes or physical findings at this time.    VANESSA Crowell Tara E, PA-C  01/20/24 2030       Soha Woo PA-C  01/20/24 2033

## 2024-01-21 NOTE — ED NOTES
TRANSFER - IN REPORT:    Verbal report received from Mustapha SOL on Tiffanie TAY Morgan  being received from Cathie RN for routine progression of patient care      Report consisted of patient's Situation, Background, Assessment and   Recommendations(SBAR).     Information from the following report(s) Nurse Handoff Report, ED Encounter Summary, ED SBAR, Adult Overview, Intake/Output, MAR, and Recent Results was reviewed with the receiving nurse.    Opportunity for questions and clarification was provided.      Assessment completed upon patient's arrival to unit and care assumed.

## 2024-01-21 NOTE — ED PROVIDER NOTES
Not very hard   Food Insecurity: Patient Unable To Answer (12/16/2023)    Hunger Vital Sign     Worried About Running Out of Food in the Last Year: Patient unable to answer     Ran Out of Food in the Last Year: Patient unable to answer   Transportation Needs: Patient Unable To Answer (12/16/2023)    PRAPARE - Transportation     Lack of Transportation (Medical): Patient unable to answer     Lack of Transportation (Non-Medical): Patient unable to answer   Housing Stability: Patient Unable To Answer (12/16/2023)    Housing Stability Vital Sign     Unable to Pay for Housing in the Last Year: Patient unable to answer     Unstable Housing in the Last Year: Patient unable to answer         PHYSICAL EXAM       ED Triage Vitals [01/20/24 2028]   BP Temp Temp Source Pulse Respirations SpO2 Height Weight - Scale   129/68 98.2 °F (36.8 °C) Oral 99 20 100 % 1.575 m (5' 2\") 60.8 kg (134 lb 0.6 oz)       Body mass index is 24.52 kg/m².    Physical Exam  Constitutional:       Comments: Patient is ill-appearing but not acutely distressed   HENT:      Head:      Comments: No appreciable signs of trauma to the head or scalp  Eyes:      General: Scleral icterus present.      Extraocular Movements: Extraocular movements intact.      Comments: Pupils are 4 mm and reactive bilaterally   Neck:      Comments: Trachea midline  Cardiovascular:      Rate and Rhythm: Normal rate and regular rhythm.      Heart sounds: No murmur heard.  Pulmonary:      Effort: Pulmonary effort is normal. No respiratory distress.      Breath sounds: Normal breath sounds. No wheezing or rales.   Abdominal:      General: There is no distension.      Palpations: Abdomen is soft.      Tenderness: There is no abdominal tenderness.   Musculoskeletal:      Cervical back: Normal range of motion.      Comments: No gross deformities.  Normal range of motion   Skin:     General: Skin is warm and dry.      Comments: Diffusely jaundice   Neurological:      Comments: Awake.

## 2024-01-21 NOTE — CONSULTS
elevation in serum creatinine 2.29 mg  FIONA is secondary to the effects of cirrhosis and diuretics.    It is unlikely this is HRS.    Will stop diuretics.  Sommers tart IV albumin ay usual dose  Will check urine NA.    Hyponatremia  This is secondary to cirrhosis and diuretics  The current NA is 126    Will stop diuretics  Will give IV albumin 25 gm Q6H.    Screening for Esophageal varices   Thee patient has esophageal varices with prior bleeding.  EGD in 9/2021 at [facility]. Banding performed.    EGD by MLS.  In 11/2021.  Banding performed.   EGD by MLS in 1/2022.  Banding performed.    EGD by MLS in 3/2023.  Banding performed.    EGD by MLS in 9/203.  Banding perfomred.       Hepatic encephalopathy   HE is being treated with Lactulose TID and Xifaxan BID  HE was well controlled.    Laculose was being tolerted well with only mild intermittent diarrhea.     She was given baclofen for back spasms and pain.  She developed HE after taking 2 doses.  Baclofen was stopped     She now has stage 3 HE and cannot take oral lactulose  Will start lactulose enemas Q4H  Switch to PO when alert enough to take PO  Start xifaxan when alert enough to take PO     Portal vein thrombosis  The patient was found to have partial PV thrombosis by MRI at [facility] in 9/2023  Anti-coagulation is not indicated at this time.  MRI in 1/2024 did not demonstrate PVT.  A CT scan at [facility] earlier this week did not demonstrate PVT.     Anemia   This is due to multifactorial causes including portal hypertension with chronic GI blood loss, cirrhosis and poor FE absorption     The most recent HB is 11.6 gms.  This is due to dehydration and will drop with hydration      Thrombocytopenia   This is secondary to cirrhosis.  There is no evidence of overt bleeding.    No treatment is required.  The platelet count is adequate for the patient to undergo procedures without the need for platelet transfusion or platelet growth factors.     Screening for Hepatocellular

## 2024-01-22 LAB
ALBUMIN SERPL-MCNC: 3.3 G/DL (ref 3.5–5)
ALBUMIN/GLOB SERPL: 0.8 (ref 1.1–2.2)
ALP SERPL-CCNC: 82 U/L (ref 45–117)
ALT SERPL-CCNC: 42 U/L (ref 12–78)
AMMONIA PLAS-SCNC: 113 UMOL/L
ANION GAP SERPL CALC-SCNC: 8 MMOL/L (ref 5–15)
AST SERPL-CCNC: 56 U/L (ref 15–37)
BACTERIA SPEC CULT: NORMAL
BASOPHILS # BLD: 0 K/UL (ref 0–0.1)
BASOPHILS NFR BLD: 1 % (ref 0–1)
BILIRUB SERPL-MCNC: 10 MG/DL (ref 0.2–1)
BUN SERPL-MCNC: 26 MG/DL (ref 6–20)
BUN/CREAT SERPL: 21 (ref 12–20)
CALCIUM SERPL-MCNC: 11.2 MG/DL (ref 8.5–10.1)
CHLORIDE SERPL-SCNC: 113 MMOL/L (ref 97–108)
CO2 SERPL-SCNC: 22 MMOL/L (ref 21–32)
CREAT SERPL-MCNC: 1.22 MG/DL (ref 0.55–1.02)
DIFFERENTIAL METHOD BLD: ABNORMAL
EOSINOPHIL # BLD: 0.1 K/UL (ref 0–0.4)
EOSINOPHIL NFR BLD: 2 % (ref 0–7)
ERYTHROCYTE [DISTWIDTH] IN BLOOD BY AUTOMATED COUNT: 16 % (ref 11.5–14.5)
GLOBULIN SER CALC-MCNC: 4.4 G/DL (ref 2–4)
GLUCOSE BLD STRIP.AUTO-MCNC: 108 MG/DL (ref 65–117)
GLUCOSE BLD STRIP.AUTO-MCNC: 159 MG/DL (ref 65–117)
GLUCOSE BLD STRIP.AUTO-MCNC: 159 MG/DL (ref 65–117)
GLUCOSE BLD STRIP.AUTO-MCNC: 352 MG/DL (ref 65–117)
GLUCOSE BLD STRIP.AUTO-MCNC: 84 MG/DL (ref 65–117)
GLUCOSE SERPL-MCNC: 84 MG/DL (ref 65–100)
HCT VFR BLD AUTO: 26.2 % (ref 35–47)
HGB BLD-MCNC: 9 G/DL (ref 11.5–16)
IMM GRANULOCYTES # BLD AUTO: 0 K/UL (ref 0–0.04)
IMM GRANULOCYTES NFR BLD AUTO: 1 % (ref 0–0.5)
LYMPHOCYTES # BLD: 0.5 K/UL (ref 0.8–3.5)
LYMPHOCYTES NFR BLD: 14 % (ref 12–49)
MCH RBC QN AUTO: 36.6 PG (ref 26–34)
MCHC RBC AUTO-ENTMCNC: 34.4 G/DL (ref 30–36.5)
MCV RBC AUTO: 106.5 FL (ref 80–99)
MONOCYTES # BLD: 0.5 K/UL (ref 0–1)
MONOCYTES NFR BLD: 13 % (ref 5–13)
NEUTS SEG # BLD: 2.5 K/UL (ref 1.8–8)
NEUTS SEG NFR BLD: 69 % (ref 32–75)
NRBC # BLD: 0 K/UL (ref 0–0.01)
NRBC BLD-RTO: 0 PER 100 WBC
PLATELET # BLD AUTO: 46 K/UL (ref 150–400)
PMV BLD AUTO: 10.3 FL (ref 8.9–12.9)
POTASSIUM SERPL-SCNC: 3.9 MMOL/L (ref 3.5–5.1)
PROT SERPL-MCNC: 7.7 G/DL (ref 6.4–8.2)
RBC # BLD AUTO: 2.46 M/UL (ref 3.8–5.2)
RBC MORPH BLD: ABNORMAL
SERVICE CMNT-IMP: ABNORMAL
SERVICE CMNT-IMP: NORMAL
SODIUM SERPL-SCNC: 143 MMOL/L (ref 136–145)
WBC # BLD AUTO: 3.6 K/UL (ref 3.6–11)

## 2024-01-22 PROCEDURE — 6360000002 HC RX W HCPCS: Performed by: INTERNAL MEDICINE

## 2024-01-22 PROCEDURE — 6370000000 HC RX 637 (ALT 250 FOR IP): Performed by: INTERNAL MEDICINE

## 2024-01-22 PROCEDURE — 1100000000 HC RM PRIVATE

## 2024-01-22 PROCEDURE — 36415 COLL VENOUS BLD VENIPUNCTURE: CPT

## 2024-01-22 PROCEDURE — 82140 ASSAY OF AMMONIA: CPT

## 2024-01-22 PROCEDURE — 6370000000 HC RX 637 (ALT 250 FOR IP): Performed by: STUDENT IN AN ORGANIZED HEALTH CARE EDUCATION/TRAINING PROGRAM

## 2024-01-22 PROCEDURE — P9047 ALBUMIN (HUMAN), 25%, 50ML: HCPCS | Performed by: INTERNAL MEDICINE

## 2024-01-22 PROCEDURE — 2580000003 HC RX 258: Performed by: STUDENT IN AN ORGANIZED HEALTH CARE EDUCATION/TRAINING PROGRAM

## 2024-01-22 PROCEDURE — 82962 GLUCOSE BLOOD TEST: CPT

## 2024-01-22 PROCEDURE — 85025 COMPLETE CBC W/AUTO DIFF WBC: CPT

## 2024-01-22 PROCEDURE — 80053 COMPREHEN METABOLIC PANEL: CPT

## 2024-01-22 RX ADMIN — ALBUMIN (HUMAN) 25 G: 0.25 INJECTION, SOLUTION INTRAVENOUS at 02:21

## 2024-01-22 RX ADMIN — RIFAXIMIN 600 MG: 200 TABLET ORAL at 20:24

## 2024-01-22 RX ADMIN — ACETAMINOPHEN 650 MG: 325 TABLET ORAL at 17:04

## 2024-01-22 RX ADMIN — SODIUM CHLORIDE, PRESERVATIVE FREE 10 ML: 5 INJECTION INTRAVENOUS at 09:05

## 2024-01-22 RX ADMIN — INSULIN LISPRO 4 UNITS: 100 INJECTION, SOLUTION INTRAVENOUS; SUBCUTANEOUS at 21:51

## 2024-01-22 RX ADMIN — Medication 1000 ML: at 07:17

## 2024-01-22 RX ADMIN — ALBUMIN (HUMAN) 25 G: 0.25 INJECTION, SOLUTION INTRAVENOUS at 13:38

## 2024-01-22 RX ADMIN — Medication 1000 ML: at 02:21

## 2024-01-22 RX ADMIN — ALBUMIN (HUMAN) 25 G: 0.25 INJECTION, SOLUTION INTRAVENOUS at 07:16

## 2024-01-22 RX ADMIN — Medication 1000 ML: at 11:28

## 2024-01-22 RX ADMIN — PANTOPRAZOLE SODIUM 40 MG: 40 TABLET, DELAYED RELEASE ORAL at 17:05

## 2024-01-22 RX ADMIN — ALBUMIN (HUMAN) 25 G: 0.25 INJECTION, SOLUTION INTRAVENOUS at 18:48

## 2024-01-22 RX ADMIN — RIFAXIMIN 600 MG: 200 TABLET ORAL at 09:03

## 2024-01-22 RX ADMIN — LACTULOSE 30 G: 20 SOLUTION ORAL at 09:04

## 2024-01-22 RX ADMIN — LACTULOSE 30 G: 20 SOLUTION ORAL at 20:24

## 2024-01-22 NOTE — CARE COORDINATION
Care Management Initial Assessment       RUR: 20%  Readmission? No  1st IM letter given? No  1st  letter given: No   Patient has [employer] insurance    Admission  Hepatic Encephalopathy    FIONA     Confused  Hx liver cirrhosis, diabetes,  work up in progress at [facility] for liver transplant    Hepatology following     Disposition   Home with  and family support  Transportation     Home Health   No hx- will arrange if ordered  Medical follow up  PCP and specialist  Contact   Rosalino Morgan  [phone]    CM met with  in patient's room to introduce self and explain role. Patient was sleeping and did not participate.   confirmed demographics, PCP and insurance.  Secures medications at [facility] in [facility].    Patient lives with her  in one level home .  Prior to admission she was self care using no dme and needing supervision with bathing--  completes cooking, cleaning  laundry and grocery shopping.   Patient is usually home alone when  is working-- She moves slowly but dresses herself and takes her own medications.   Patient does not drive--  transports to appointments.     stated patient has had times of confusion in the past and he is able to assist as much as needed.    Patient is no longer working-- was a .  She has applied for [employer] Disability..  Has a  assisting.     Cm will follow and assist with any transition of care needs that arise.   No hx of HH, rehab or DME       01/22/24 1121   Service Assessment   Patient Orientation Unable to Assess  (patient confused)   Cognition Other (see comment)  (confusion)   History Provided By Spouse  (Rosalino Morgan  [phone])   Primary Caregiver Self  ( assists as neede)   Support Systems Spouse/Significant Other;Children;Parent;Family Members   Patient's Healthcare Decision Maker is: Legal Next of Kin   PCP Verified by CM Yes   Last Visit to PCP Within

## 2024-01-23 LAB
AMMONIA PLAS-SCNC: 103 UMOL/L
GLUCOSE BLD STRIP.AUTO-MCNC: 147 MG/DL (ref 65–117)
GLUCOSE BLD STRIP.AUTO-MCNC: 301 MG/DL (ref 65–117)
GLUCOSE BLD STRIP.AUTO-MCNC: 355 MG/DL (ref 65–117)
GLUCOSE BLD STRIP.AUTO-MCNC: 400 MG/DL (ref 65–117)
SERVICE CMNT-IMP: ABNORMAL

## 2024-01-23 PROCEDURE — 82962 GLUCOSE BLOOD TEST: CPT

## 2024-01-23 PROCEDURE — 1100000000 HC RM PRIVATE

## 2024-01-23 PROCEDURE — 99233 SBSQ HOSP IP/OBS HIGH 50: CPT | Performed by: INTERNAL MEDICINE

## 2024-01-23 PROCEDURE — P9047 ALBUMIN (HUMAN), 25%, 50ML: HCPCS | Performed by: INTERNAL MEDICINE

## 2024-01-23 PROCEDURE — 6360000002 HC RX W HCPCS: Performed by: INTERNAL MEDICINE

## 2024-01-23 PROCEDURE — 82140 ASSAY OF AMMONIA: CPT

## 2024-01-23 PROCEDURE — 2580000003 HC RX 258: Performed by: STUDENT IN AN ORGANIZED HEALTH CARE EDUCATION/TRAINING PROGRAM

## 2024-01-23 PROCEDURE — 36415 COLL VENOUS BLD VENIPUNCTURE: CPT

## 2024-01-23 PROCEDURE — 6370000000 HC RX 637 (ALT 250 FOR IP): Performed by: INTERNAL MEDICINE

## 2024-01-23 PROCEDURE — 6370000000 HC RX 637 (ALT 250 FOR IP): Performed by: STUDENT IN AN ORGANIZED HEALTH CARE EDUCATION/TRAINING PROGRAM

## 2024-01-23 RX ORDER — SPIRONOLACTONE 25 MG/1
50 TABLET ORAL DAILY
Status: DISCONTINUED | OUTPATIENT
Start: 2024-01-23 | End: 2024-01-23

## 2024-01-23 RX ORDER — FUROSEMIDE 20 MG/1
20 TABLET ORAL DAILY
Status: DISCONTINUED | OUTPATIENT
Start: 2024-01-23 | End: 2024-01-23

## 2024-01-23 RX ADMIN — SODIUM CHLORIDE, PRESERVATIVE FREE 10 ML: 5 INJECTION INTRAVENOUS at 10:02

## 2024-01-23 RX ADMIN — PANTOPRAZOLE SODIUM 40 MG: 40 TABLET, DELAYED RELEASE ORAL at 17:00

## 2024-01-23 RX ADMIN — LACTULOSE 30 G: 20 SOLUTION ORAL at 21:09

## 2024-01-23 RX ADMIN — SODIUM CHLORIDE, PRESERVATIVE FREE 10 ML: 5 INJECTION INTRAVENOUS at 21:11

## 2024-01-23 RX ADMIN — LACTULOSE 30 G: 20 SOLUTION ORAL at 13:26

## 2024-01-23 RX ADMIN — RIFAXIMIN 600 MG: 200 TABLET ORAL at 09:55

## 2024-01-23 RX ADMIN — PANTOPRAZOLE SODIUM 40 MG: 40 TABLET, DELAYED RELEASE ORAL at 07:05

## 2024-01-23 RX ADMIN — INSULIN LISPRO 4 UNITS: 100 INJECTION, SOLUTION INTRAVENOUS; SUBCUTANEOUS at 21:10

## 2024-01-23 RX ADMIN — INSULIN LISPRO 8 UNITS: 100 INJECTION, SOLUTION INTRAVENOUS; SUBCUTANEOUS at 11:55

## 2024-01-23 RX ADMIN — ACETAMINOPHEN 650 MG: 325 TABLET ORAL at 13:26

## 2024-01-23 RX ADMIN — ONDANSETRON 4 MG: 4 TABLET, ORALLY DISINTEGRATING ORAL at 13:26

## 2024-01-23 RX ADMIN — ALBUMIN (HUMAN) 25 G: 0.25 INJECTION, SOLUTION INTRAVENOUS at 09:55

## 2024-01-23 RX ADMIN — INSULIN LISPRO 6 UNITS: 100 INJECTION, SOLUTION INTRAVENOUS; SUBCUTANEOUS at 19:26

## 2024-01-23 RX ADMIN — ALBUMIN (HUMAN) 25 G: 0.25 INJECTION, SOLUTION INTRAVENOUS at 21:09

## 2024-01-23 RX ADMIN — LACTULOSE 30 G: 20 SOLUTION ORAL at 09:54

## 2024-01-23 RX ADMIN — SODIUM CHLORIDE, PRESERVATIVE FREE 10 ML: 5 INJECTION INTRAVENOUS at 21:10

## 2024-01-23 RX ADMIN — RIFAXIMIN 400 MG: 200 TABLET ORAL at 21:09

## 2024-01-23 RX ADMIN — RIFAXIMIN 400 MG: 200 TABLET ORAL at 13:30

## 2024-01-23 RX ADMIN — SODIUM CHLORIDE, PRESERVATIVE FREE 10 ML: 5 INJECTION INTRAVENOUS at 10:01

## 2024-01-23 RX ADMIN — ALBUMIN (HUMAN) 25 G: 0.25 INJECTION, SOLUTION INTRAVENOUS at 02:43

## 2024-01-23 ASSESSMENT — PAIN DESCRIPTION - DESCRIPTORS
DESCRIPTORS: DULL;SHARP
DESCRIPTORS: DULL;SHARP
DESCRIPTORS: DISCOMFORT

## 2024-01-23 ASSESSMENT — PAIN DESCRIPTION - ORIENTATION
ORIENTATION: MID;UPPER
ORIENTATION: MID;UPPER

## 2024-01-23 ASSESSMENT — PAIN DESCRIPTION - PAIN TYPE
TYPE: CHRONIC PAIN
TYPE: CHRONIC PAIN

## 2024-01-23 ASSESSMENT — PAIN SCALES - GENERAL
PAINLEVEL_OUTOF10: 6
PAINLEVEL_OUTOF10: 9
PAINLEVEL_OUTOF10: 8
PAINLEVEL_OUTOF10: 2

## 2024-01-23 ASSESSMENT — PAIN DESCRIPTION - LOCATION
LOCATION: BACK
LOCATION: BACK

## 2024-01-24 VITALS
WEIGHT: 134.04 LBS | SYSTOLIC BLOOD PRESSURE: 112 MMHG | TEMPERATURE: 97.9 F | OXYGEN SATURATION: 99 % | DIASTOLIC BLOOD PRESSURE: 54 MMHG | BODY MASS INDEX: 24.67 KG/M2 | HEIGHT: 62 IN | RESPIRATION RATE: 18 BRPM | HEART RATE: 85 BPM

## 2024-01-24 DIAGNOSIS — Z87.891 HX OF NICOTINE DEPENDENCE: ICD-10-CM

## 2024-01-24 DIAGNOSIS — Z01.818 ENCOUNTER FOR PRE-TRANSPLANT EVALUATION FOR LIVER TRANSPLANT: ICD-10-CM

## 2024-01-24 DIAGNOSIS — K74.60 CIRRHOSIS OF LIVER WITHOUT ASCITES, UNSPECIFIED HEPATIC CIRRHOSIS TYPE (HCC): Primary | ICD-10-CM

## 2024-01-24 LAB
ALBUMIN SERPL-MCNC: 4 G/DL (ref 3.5–5)
ALBUMIN/GLOB SERPL: 1 (ref 1.1–2.2)
ALP SERPL-CCNC: 73 U/L (ref 45–117)
ALT SERPL-CCNC: 45 U/L (ref 12–78)
ANION GAP SERPL CALC-SCNC: 9 MMOL/L (ref 5–15)
AST SERPL-CCNC: 60 U/L (ref 15–37)
BASOPHILS # BLD: 0.1 K/UL (ref 0–0.1)
BASOPHILS NFR BLD: 1 % (ref 0–1)
BILIRUB DIRECT SERPL-MCNC: 2.5 MG/DL (ref 0–0.2)
BILIRUB SERPL-MCNC: 11 MG/DL (ref 0.2–1)
BUN SERPL-MCNC: 11 MG/DL (ref 6–20)
BUN/CREAT SERPL: 9 (ref 12–20)
CALCIUM SERPL-MCNC: 11 MG/DL (ref 8.5–10.1)
CHLORIDE SERPL-SCNC: 108 MMOL/L (ref 97–108)
CO2 SERPL-SCNC: 18 MMOL/L (ref 21–32)
COMMENT:: NORMAL
CREAT SERPL-MCNC: 1.18 MG/DL (ref 0.55–1.02)
DIFFERENTIAL METHOD BLD: ABNORMAL
EOSINOPHIL # BLD: 0.2 K/UL (ref 0–0.4)
EOSINOPHIL NFR BLD: 3 % (ref 0–7)
ERYTHROCYTE [DISTWIDTH] IN BLOOD BY AUTOMATED COUNT: 15.7 % (ref 11.5–14.5)
FERRITIN SERPL-MCNC: 274 NG/ML (ref 8–252)
GLOBULIN SER CALC-MCNC: 4 G/DL (ref 2–4)
GLUCOSE BLD STRIP.AUTO-MCNC: 144 MG/DL (ref 65–117)
GLUCOSE BLD STRIP.AUTO-MCNC: 349 MG/DL (ref 65–117)
GLUCOSE SERPL-MCNC: 338 MG/DL (ref 65–100)
HCT VFR BLD AUTO: 26.5 % (ref 35–47)
HGB BLD-MCNC: 8.9 G/DL (ref 11.5–16)
IMM GRANULOCYTES # BLD AUTO: 0.1 K/UL (ref 0–0.04)
IMM GRANULOCYTES NFR BLD AUTO: 1 % (ref 0–0.5)
IRON SATN MFR SERPL: 43 % (ref 20–50)
IRON SERPL-MCNC: 85 UG/DL (ref 35–150)
LYMPHOCYTES # BLD: 0.4 K/UL (ref 0.8–3.5)
LYMPHOCYTES NFR BLD: 7 % (ref 12–49)
MCH RBC QN AUTO: 37.7 PG (ref 26–34)
MCHC RBC AUTO-ENTMCNC: 33.6 G/DL (ref 30–36.5)
MCV RBC AUTO: 112.3 FL (ref 80–99)
MONOCYTES # BLD: 0.6 K/UL (ref 0–1)
MONOCYTES NFR BLD: 11 % (ref 5–13)
NEUTS SEG # BLD: 4.5 K/UL (ref 1.8–8)
NEUTS SEG NFR BLD: 77 % (ref 32–75)
NRBC # BLD: 0 K/UL (ref 0–0.01)
NRBC BLD-RTO: 0 PER 100 WBC
PLATELET # BLD AUTO: 50 K/UL (ref 150–400)
POTASSIUM SERPL-SCNC: 3.7 MMOL/L (ref 3.5–5.1)
PROT SERPL-MCNC: 8 G/DL (ref 6.4–8.2)
RBC # BLD AUTO: 2.36 M/UL (ref 3.8–5.2)
RBC MORPH BLD: ABNORMAL
SERVICE CMNT-IMP: ABNORMAL
SERVICE CMNT-IMP: ABNORMAL
SODIUM SERPL-SCNC: 135 MMOL/L (ref 136–145)
SPECIMEN HOLD: NORMAL
TIBC SERPL-MCNC: 197 UG/DL (ref 250–450)
WBC # BLD AUTO: 5.9 K/UL (ref 3.6–11)

## 2024-01-24 PROCEDURE — 6360000002 HC RX W HCPCS: Performed by: INTERNAL MEDICINE

## 2024-01-24 PROCEDURE — 83540 ASSAY OF IRON: CPT

## 2024-01-24 PROCEDURE — 82728 ASSAY OF FERRITIN: CPT

## 2024-01-24 PROCEDURE — 6370000000 HC RX 637 (ALT 250 FOR IP): Performed by: INTERNAL MEDICINE

## 2024-01-24 PROCEDURE — 97161 PT EVAL LOW COMPLEX 20 MIN: CPT

## 2024-01-24 PROCEDURE — 97530 THERAPEUTIC ACTIVITIES: CPT

## 2024-01-24 PROCEDURE — 80053 COMPREHEN METABOLIC PANEL: CPT

## 2024-01-24 PROCEDURE — 83550 IRON BINDING TEST: CPT

## 2024-01-24 PROCEDURE — 94761 N-INVAS EAR/PLS OXIMETRY MLT: CPT

## 2024-01-24 PROCEDURE — 82962 GLUCOSE BLOOD TEST: CPT

## 2024-01-24 PROCEDURE — 6370000000 HC RX 637 (ALT 250 FOR IP): Performed by: STUDENT IN AN ORGANIZED HEALTH CARE EDUCATION/TRAINING PROGRAM

## 2024-01-24 PROCEDURE — 85025 COMPLETE CBC W/AUTO DIFF WBC: CPT

## 2024-01-24 PROCEDURE — 36415 COLL VENOUS BLD VENIPUNCTURE: CPT

## 2024-01-24 PROCEDURE — 2580000003 HC RX 258: Performed by: STUDENT IN AN ORGANIZED HEALTH CARE EDUCATION/TRAINING PROGRAM

## 2024-01-24 PROCEDURE — 97165 OT EVAL LOW COMPLEX 30 MIN: CPT

## 2024-01-24 PROCEDURE — P9047 ALBUMIN (HUMAN), 25%, 50ML: HCPCS | Performed by: INTERNAL MEDICINE

## 2024-01-24 PROCEDURE — 82248 BILIRUBIN DIRECT: CPT

## 2024-01-24 RX ADMIN — RIFAXIMIN 400 MG: 200 TABLET ORAL at 13:26

## 2024-01-24 RX ADMIN — LACTULOSE 30 G: 20 SOLUTION ORAL at 09:41

## 2024-01-24 RX ADMIN — LACTULOSE 30 G: 20 SOLUTION ORAL at 13:26

## 2024-01-24 RX ADMIN — ALBUMIN (HUMAN) 25 G: 0.25 INJECTION, SOLUTION INTRAVENOUS at 09:46

## 2024-01-24 RX ADMIN — INSULIN LISPRO 6 UNITS: 100 INJECTION, SOLUTION INTRAVENOUS; SUBCUTANEOUS at 11:37

## 2024-01-24 RX ADMIN — ALBUMIN (HUMAN) 25 G: 0.25 INJECTION, SOLUTION INTRAVENOUS at 13:26

## 2024-01-24 RX ADMIN — SODIUM CHLORIDE, PRESERVATIVE FREE 10 ML: 5 INJECTION INTRAVENOUS at 09:42

## 2024-01-24 RX ADMIN — ALBUMIN (HUMAN) 25 G: 0.25 INJECTION, SOLUTION INTRAVENOUS at 02:28

## 2024-01-24 RX ADMIN — ACETAMINOPHEN 650 MG: 325 TABLET ORAL at 10:06

## 2024-01-24 RX ADMIN — RIFAXIMIN 400 MG: 200 TABLET ORAL at 09:42

## 2024-01-24 RX ADMIN — PANTOPRAZOLE SODIUM 40 MG: 40 TABLET, DELAYED RELEASE ORAL at 06:49

## 2024-01-24 ASSESSMENT — PAIN DESCRIPTION - ORIENTATION: ORIENTATION: UPPER

## 2024-01-24 ASSESSMENT — PAIN SCALES - GENERAL: PAINLEVEL_OUTOF10: 5

## 2024-01-24 ASSESSMENT — PAIN DESCRIPTION - LOCATION: LOCATION: BACK

## 2024-01-24 ASSESSMENT — PAIN DESCRIPTION - DESCRIPTORS: DESCRIPTORS: ACHING

## 2024-01-24 NOTE — DISCHARGE SUMMARY
Discharge Summary       PATIENT ID: Tiffanie Morgan  MRN: [id number]   YOB: 1970    DATE OF ADMISSION: 1/20/2024 11:06 PM    DATE OF DISCHARGE: 1/24/24   PRIMARY CARE PROVIDER: Homero Hoyos MD     ATTENDING PHYSICIAN: PETER SANTANA  DISCHARGING PROVIDER: Peter Santana MD    To contact this individual call [phone] and ask the  to page.  If unavailable ask to be transferred the Adult Hospitalist Department.    CONSULTATIONS: IP CONSULT TO HEPATOLOGY  IP CONSULT TO CASE MANAGEMENT    PROCEDURES/SURGERIES: * No surgery found *    ADMITTING DIAGNOSES & HOSPITAL COURSE:  HE    Tiffanie Morgan is a 53 y.o. female with history of cirrhosis, dm ii, mdd, GERD, dyslipidemia who presents to the hospital accompanied by her  with concerns of confusion.  Patient was admitted just after midnight on 1/20/2024.  She stated that she feels tired.  She answers simple questions appropriately.   was at bedside states she has had progressive confusion over the last 2 days which prompted him to bring her to the hospital.  Of note, she is currently on transplant list at [facility].     Acute Hepatic encephalopathy.  -- RESOLVED  -- Continue to to take lactulose 3 times daily encephalopathy resolved     Acute kidney injury.  Baseline creatinine is approximately 0.7.    -- Discussed with hepatology currently patient does not look like volume overloaded creatinine 1.12 stop diuretics at discharge     Hypoalbuminemia with albumin level 2.7.  Due to liver disease.     Liver cirrhosis as a result of alcoholic liver disease plus primary biliary cirrhosis.  Patient is on the transplant list at [facility].     Diabetes mellitus with hyperglycemia.  Continue sliding scale insulin as needed.  Long-acting insulin not ordered as patient is not eating very much.     Thrombocytopenia.  Platelet count is 46,000.  Thrombocytopenia is likely due to secondary splenomegaly in the setting of

## 2024-01-24 NOTE — PROGRESS NOTES
Hospitalist Progress Note  Peter Santana MD  Answering service: [phone] OR [phone] from in house phone        Date of Service:  2024  NAME:  Tiffanie Morgan  :  1970  MRN:  [id number]      Admission Summary:   Tiffanie Morgan is a 53 y.o. female with history of cirrhosis, dm ii, mdd, GERD, dyslipidemia who presents to the hospital accompanied by her  with concerns of confusion.  Patient was admitted just after midnight on 2024.  She stated that she feels tired.  She answers simple questions appropriately.   was at bedside states she has had progressive confusion over the last 2 days which prompted him to bring her to the hospital.  Of note, she is currently on transplant list at [facility].       Interval history / Subjective:   F/u for HE , ammonia 112 reduced a little still encephalopathic     Assessment & Plan:     Acute Hepatic encephalopathy.    --Hepatic encephalopathy but cultures have been obtained to rule out infectious etiology as well.    --Appreciate consultation by Dr. Doron Kuo.  Currently, she is receiving lactulose enema every 4 hours, as patient is not alert enough to swallow lactulose solution. Resume p.o. rifaximin mean when patient is able to swallow p.o.    Acute kidney injury.  Baseline creatinine is approximately 0.7.    -- Per hepatology  patient may have hepatorenal syndrome.  Creatinine is improving from 2.29 --> 1.73--> 11.22.    -- Patient was found to be borderline hypotensive  Continue albumin 25%, 25 g IV every 6 hours.    Hypoalbuminemia with albumin level 2.7.  Due to liver disease.    Liver cirrhosis as a result of alcoholic liver disease plus primary biliary cirrhosis.  Patient is on the transplant list at [place].    Diabetes mellitus with hyperglycemia.  Continue sliding scale insulin as needed.  Long-acting insulin not ordered as 
  Physician Progress Note      PATIENT:               APRIL WARNER  CSN #:                  [id number]  :                       1970  ADMIT DATE:       2024 11:06 PM  DISCH DATE:  RESPONDING  PROVIDER #:        Peter Santana MD          QUERY TEXT:    Patient admitted with hepatic encephalopathy.   If possible, please document   in progress notes and discharge summary if you are evaluating and/or treating   any of the following:      The medical record reflects the following:  Risk Factors: alcoholic cirrhosis  Clinical Indicators:  H/P  history of cirrhosis, dm ii, mdd, GERD, dyslipidemia who presents to the   hospital accompanied by her  with concerns of confusion    Ammonia 128?High?    24 20:37  AST: 65 (H)  BILIRUBIN TOTAL: 7.8 (H)          Treatment: albumin, lactulose, xifaxan    Thank you  Juanita Remy RN, CDI. 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    Query created by: Juanita Remy on 2024 1:23 PM      Electronically signed by:  Peter Santana MD 2024 5:44 PM          
Critical platelet level of 46,000. LUCIA Cheek notified.   
Discharge instructions reviewed with patient. Time given for questions. Patient verbalized understanding.    
Hospital follow-up PCP transitional care appointment has been scheduled with Dr. Homero Hoyos for Thursday, February 22nd, 2024 at 11:00 a.m..  Pending patient discharge.  Alice Gallego, Care Management Assistant   
OCCUPATIONAL THERAPY EVALUATION/DISCHARGE  Patient: Tiffanie Morgan (53 y.o. female)  Date: 1/24/2024  Primary Diagnosis: Hepatic encephalopathy (HCC) [K76.82]  FIONA (acute kidney injury) (HCC) [N17.9]         Precautions: falls    ASSESSMENT :  Based on the objective data below, the patient presents with baseline ADL performance s/p admission for hepatic encephalopathy and FIONA. Patient IND at baseline, lives with  who works during the day. Today, patient BUE ROM, strength, coordination, sensation, vision and balance WFL. Patient able to simulate tailor sitting for lower body dressing and complete functional mobility with supervision for IV pole management only. Patient provided with HEP for BUEs to use with theraband she has at home. Patient left in chair with call bell in reach,  present, all needs met. Patient with no further acute OT needs, will sign off.     Functional Outcome Measure:  The patient scored 100/100 on the Barthel Index outcome measure which is indicative of no deficits in ADLs and mobility.      Further skilled acute occupational therapy is not indicated at this time.     PLAN :  Recommend with staff: Recommend with nursing, ADLs with supervision/setup, OOB to chair 3x/day and toileting via functional mobility to and from bathroom. Thank you for completing as able in order to maintain patient strength, endurance and independence.       Recommendation for discharge: (in order for the patient to meet his/her long term goals): No skilled occupational therapy    Other factors to consider for discharge: no additional factors    IF patient discharges home will need the following DME: none     SUBJECTIVE:   Patient stated, “I wanted to get up and moving as quickly as I could so I didn't get weaker.”    OBJECTIVE DATA SUMMARY:     Past Medical History:   Diagnosis Date    Anemia     Chronic bilateral low back pain 12/26/2023    Depression     Encounter for long-term (current) use of other 
MS.  Intern.  For spiritual care referrals, please call [phone] (PRADEVAN).    
      Recent Labs     01/20/24 2037 01/21/24  1330 01/22/24  0235   * 137 143   K 5.3* 4.6 3.9   CL 91* 107 113*   CO2 23 21 22   BUN 46* 34* 26*       Recent Labs     01/20/24 2037 01/21/24  1330 01/22/24  0235   ALT 60 46 42   GLOB 6.3* 4.8* 4.4*       Recent Labs     01/20/24 2037   INR 1.3*        No results for input(s): \"TIBC\", \"FERR\" in the last 72 hours.    Invalid input(s): \"FE\", \"PSAT\"   No results found for: \"FOL\", \"RBCF\"   No results for input(s): \"PH\", \"PCO2\", \"PO2\" in the last 72 hours.  No results for input(s): \"CPK\" in the last 72 hours.    Invalid input(s): \"CPKMB\", \"CKNDX\", \"TROIQ\"  Lab Results   Component Value Date/Time    CHOL 124 09/22/2023 11:27 PM    HDL 21 09/22/2023 11:27 PM     Lab Results   Component Value Date/Time    GLUCPOC 229 10/09/2020 08:39 AM           Medications Reviewed:     Current Facility-Administered Medications   Medication Dose Route Frequency    rifAXIMin (XIFAXAN) tablet 400 mg  400 mg Oral TID    lactulose (CHRONULAC) 10 GM/15ML solution 30 g  30 g Oral TID    glucose chewable tablet 16 g  4 tablet Oral PRN    dextrose bolus 10% 125 mL  125 mL IntraVENous PRN    Or    dextrose bolus 10% 250 mL  250 mL IntraVENous PRN    glucagon injection 1 mg  1 mg SubCUTAneous PRN    dextrose 10 % infusion   IntraVENous Continuous PRN    sodium chloride flush 0.9 % injection 5-40 mL  5-40 mL IntraVENous 2 times per day    0.9 % sodium chloride infusion   IntraVENous PRN    insulin lispro (HUMALOG) injection vial 0-8 Units  0-8 Units SubCUTAneous TID WC    insulin lispro (HUMALOG) injection vial 0-4 Units  0-4 Units SubCUTAneous Nightly    pantoprazole (PROTONIX) tablet 40 mg  40 mg Oral BID AC    sodium chloride flush 0.9 % injection 5-40 mL  5-40 mL IntraVENous 2 times per day    sodium chloride flush 0.9 % injection 5-40 mL  5-40 mL IntraVENous PRN    0.9 % sodium chloride infusion   IntraVENous PRN    ondansetron (ZOFRAN-ODT) disintegrating tablet 4 mg  4 mg Oral Q8H PRN 
ay usual dose  Will check urine NA.    Hyponatremia  This is secondary to cirrhosis and diuretics  The current NA is 126    Will stop diuretics  Will give IV albumin 25 gm Q6H.    Screening for Esophageal varices   Thee patient has esophageal varices with prior bleeding.  EGD in 9/2021 at [facility]. Banding performed.    EGD by MLS.  In 11/2021.  Banding performed.   EGD by MLS in 1/2022.  Banding performed.    EGD by MLS in 3/2023.  Banding performed.    EGD by MLS in 9/203.  Banding perfomred.       Hepatic encephalopathy   HE is being treated with Lactulose TID and Xifaxan BID  HE was well controlled.    Laculose was being tolerted well with only mild intermittent diarrhea.     She was given baclofen for back spasms and pain.  She developed HE after taking 2 doses.  Baclofen was stopped     She now has stage 3 HE and cannot take oral lactulose  Will start lactulose enemas Q4H  Switch to PO when alert enough to take PO  Start xifaxan when alert enough to take PO     Portal vein thrombosis  The patient was found to have partial PV thrombosis by MRI at [facility] in 9/2023  Anti-coagulation is not indicated at this time.  MRI in 1/2024 did not demonstrate PVT.  A CT scan at [facility] earlier this week did not demonstrate PVT.     Anemia   This is due to multifactorial causes including portal hypertension with chronic GI blood loss, cirrhosis and poor FE absorption     The most recent HB is 11.6 gms.  This is due to dehydration and will drop with hydration      Thrombocytopenia   This is secondary to cirrhosis.  There is no evidence of overt bleeding.    No treatment is required.  The platelet count is adequate for the patient to undergo procedures without the need for platelet transfusion or platelet growth factors.     Screening for Hepatocellular Carcinoma  HCC screening was last performed with MRI in 1/22024.    Will repeat US in 6 months.       SYSTEM REVIEW:  Unable to be obtained due to stage 3 HE       FAMILY 
Mobility Scores Predict Discharge Destination After Acute Care Hospitalization in Select Patient Groups: A Retrospective, Observational Study. Arch Rehabil Res Clin Transl. 2022;4(3):769376. doi: 10.1016/j.arrct..320553. PMID: 22517128; PMCID: VCE8902398.  4. Mesha Cha S, [place] W, Monica VÁZQUEZ. AM-PAC Short Forms Manual 4.0. Revised 2020.                                                                                                                                                                                                                              Pain Ratin/10   Activity Tolerance:   Good    After treatment:   Patient left in no apparent distress sitting up in chair, Call bell within reach, and Caregiver / family present      COMMUNICATION/EDUCATION:   The patient's plan of care was discussed with: occupational therapist and physician    Patient Education  Education Given To: Patient;Family  Education Provided: Role of Therapy;Plan of Care;Home Exercise Program  Education Method: Verbal  Barriers to Learning: None  Education Outcome: Verbalized understanding    Thank you for this referral.  SIENA ENRIQUE, PT  Minutes: 27      Physical Therapy Evaluation Charge Determination   History Examination Presentation Decision-Making   MEDIUM  Complexity : 1-2 comorbidities / personal factors will impact the outcome/ POC  LOW Complexity : 1-2 Standardized tests and measures addressing body structure, function, activity limitation and / or participation in recreation  LOW Complexity : Stable, uncomplicated  AM-PAC  LOW      Based on the above components, the patient evaluation is determined to be of the following complexity level: Low   
4.8 (H) 2.0 - 4.0 g/dL    Albumin/Globulin Ratio 0.6 (L) 1.1 - 2.2      Total Bilirubin 7.0 (H) 0.2 - 1.0 MG/DL    Bilirubin, Direct 3.0 (H) 0.0 - 0.2 MG/DL    Alk Phosphatase 104 45 - 117 U/L    AST 52 (H) 15 - 37 U/L    ALT 46 12 - 78 U/L   CBC    Collection Time: 01/21/24  1:30 PM   Result Value Ref Range    WBC 4.4 3.6 - 11.0 K/uL    RBC 2.42 (L) 3.80 - 5.20 M/uL    Hemoglobin 9.0 (L) 11.5 - 16.0 g/dL    Hematocrit 25.5 (L) 35.0 - 47.0 %    .4 (H) 80.0 - 99.0 FL    MCH 37.2 (H) 26.0 - 34.0 PG    MCHC 35.3 30.0 - 36.5 g/dL    RDW 16.0 (H) 11.5 - 14.5 %    Platelets 49 (LL) 150 - 400 K/uL    MPV 10.2 8.9 - 12.9 FL    Nucleated RBCs 0.0 0  WBC    nRBC 0.00 0.00 - 0.01 K/uL   Culture, Blood 1    Collection Time: 01/21/24  2:03 PM    Specimen: Blood   Result Value Ref Range    Special Requests NO SPECIAL REQUESTS  RIGHT  FOREARM        Culture NO GROWTH <24 HRS     POCT Glucose    Collection Time: 01/21/24  5:26 PM   Result Value Ref Range    POC Glucose 122 (H) 65 - 117 mg/dL    Performed by: RAFA Lucas RN    POCT Glucose    Collection Time: 01/21/24  9:12 PM   Result Value Ref Range    POC Glucose 106 65 - 117 mg/dL    Performed by: Galindo Webber        Encounter Date: 11/07/23   EKG 12 Lead   Result Value    Ventricular Rate 89    Atrial Rate 89    P-R Interval 170    QRS Duration 114    Q-T Interval 356    QTc Calculation (Bazett) 433    P Axis 61    R Axis -36    T Axis 36    Diagnosis      Normal sinus rhythm  Left axis deviation  Inferior-posterior infarct , age undetermined  Possible Anterolateral infarct , age undetermined  Abnormal ECG  When compared with ECG of 24-OCT-2023 10:15,  No significant change    Confirmed by Neeru Diaz ([id number]) on 11/7/2023 1:16:53 PM         IMAGING:    CT Head W/O Contrast  Narrative: HEAD CT WITHOUT CONTRAST: 1/20/2024 8:47 PM    INDICATION: Altered mental status.    COMPARISON: 12/15/2023.    PROCEDURE: Axial images of the head were obtained without

## 2024-01-24 NOTE — PLAN OF CARE
2000 Report received from EBENEZER Garcia. Patient alert and oriented, pt resting in bed. Bed alarm active, pt aware to call for assistance.    Shift summary.    No acute events noted. Pt resting in bed comfortably, call bell left within reach.    2822 Bedside shift change report given to EBENEZER Sanchez by EBENEZER Ma. Report included the following information SBAR, Kardex, MAR, Accordion, and Recent Results.    Problem: Pain  Goal: Verbalizes/displays adequate comfort level or baseline comfort level  Outcome: Progressing     Problem: Safety - Adult  Goal: Free from fall injury  Outcome: Progressing     Problem: Chronic Conditions and Co-morbidities  Goal: Patient's chronic conditions and co-morbidity symptoms are monitored and maintained or improved  Outcome: Progressing     Problem: Skin/Tissue Integrity  Goal: Absence of new skin breakdown  Description: 1.  Monitor for areas of redness and/or skin breakdown  2.  Assess vascular access sites hourly  3.  Every 4-6 hours minimum:  Change oxygen saturation probe site  4.  Every 4-6 hours:  If on nasal continuous positive airway pressure, respiratory therapy assess nares and determine need for appliance change or resting period.  Outcome: Progressing     Problem: Musculoskeletal - Adult  Goal: Return mobility to safest level of function  Outcome: Progressing     Problem: Gastrointestinal - Adult  Goal: Maintains or returns to baseline bowel function  Outcome: Progressing     Problem: Metabolic/Fluid and Electrolytes - Adult  Goal: Hemodynamic stability and optimal renal function maintained  Outcome: Progressing

## 2024-01-24 NOTE — DISCHARGE INSTRUCTIONS
Discharge Instructions       PATIENT ID: Tiffanie Morgan  MRN: [id number]   YOB: 1970    DATE OF ADMISSION: 1/20/2024   DATE OF DISCHARGE: 1/24/2024    PRIMARY CARE PROVIDER: Homero Hoyos     ATTENDING PHYSICIAN: Peter Santana MD   DISCHARGING PROVIDER: Peter Santana MD    To contact this individual call [phone] and ask the  to page.   If unavailable ask to be transferred the Adult Hospitalist Department.    DISCHARGE DIAGNOSES HE    CONSULTATIONS: @[facility]@    PROCEDURES/SURGERIES: * No surgery found *    PENDING TEST RESULTS:   At the time of discharge the following test results are still pending:     [facility] APPOINTMENTS:   [facility]     ADDITIONAL CARE RECOMMENDATIONS:     DIET: regular diet      ACTIVITY: activity as tolerated    WOUND CARE:     EQUIPMENT needed:       DISCHARGE MEDICATIONS:   See Medication Reconciliation Form    It is important that you take the medication exactly as they are prescribed.   Keep your medication in the bottles provided by the pharmacist and keep a list of the medication names, dosages, and times to be taken in your wallet.   Do not take other medications without consulting your doctor.       NOTIFY YOUR PHYSICIAN FOR ANY OF THE FOLLOWING:   Fever over 101 degrees for 24 hours.   Chest pain, shortness of breath, fever, chills, nausea, vomiting, diarrhea, change in mentation, falling, weakness, bleeding. Severe pain or pain not relieved by medications.  Or, any other signs or symptoms that you may have questions about.      DISPOSITION:   X Home With:   OT  PT  HH  RN       SNF/Inpatient Rehab/LTAC    Independent/assisted living    Hospice    Other:     CDMP Checked:   Yes X     PROBLEM LIST Updated:  Yes X       Signed:   Peter Santana MD  1/24/2024  12:42 PM   
-Likely driven by hypovolemia, dec po intake.   -will given 500 cc LR bolus slowly and reassess

## 2024-01-25 ENCOUNTER — TELEPHONE (OUTPATIENT)
Age: 54
End: 2024-01-25

## 2024-01-25 DIAGNOSIS — G93.40 ENCEPHALOPATHY: ICD-10-CM

## 2024-01-25 DIAGNOSIS — E11.9 TYPE 2 DIABETES MELLITUS WITHOUT COMPLICATION, WITHOUT LONG-TERM CURRENT USE OF INSULIN (HCC): ICD-10-CM

## 2024-01-25 DIAGNOSIS — K74.69 OTHER CIRRHOSIS OF LIVER (HCC): ICD-10-CM

## 2024-01-25 DIAGNOSIS — M54.6 PAIN IN THORACIC SPINE: Primary | ICD-10-CM

## 2024-01-25 RX ORDER — TRAMADOL HYDROCHLORIDE 50 MG/1
50 TABLET ORAL EVERY 8 HOURS PRN
Qty: 20 TABLET | Refills: 0 | Status: SHIPPED | OUTPATIENT
Start: 2024-01-25 | End: 2024-02-01

## 2024-01-25 NOTE — TELEPHONE ENCOUNTER
Patient's  requesting a call back to discuss patient's medical issues and patient was just discharged from the hospital.

## 2024-01-26 ENCOUNTER — TELEPHONE (OUTPATIENT)
Age: 54
End: 2024-01-26

## 2024-01-26 LAB
BACTERIA SPEC CULT: NORMAL
SERVICE CMNT-IMP: NORMAL

## 2024-01-26 NOTE — TELEPHONE ENCOUNTER
Spoke with patient. We discussed the transplant team's recommendations - testing is scheduled next week. Patient to follow up with Dr. Kuo post-hospital discharge on 2/6/2024.

## 2024-01-26 NOTE — TELEPHONE ENCOUNTER
Called patient to schedule follow up post hospital discharge. No answer, received VM. I left a message asking for a call back.

## 2024-01-31 ENCOUNTER — HOSPITAL ENCOUNTER (OUTPATIENT)
Facility: HOSPITAL | Age: 54
Discharge: HOME | End: 2024-02-03
Attending: INTERNAL MEDICINE
Payer: COMMERCIAL

## 2024-01-31 DIAGNOSIS — K74.60 CIRRHOSIS OF LIVER WITHOUT ASCITES, UNSPECIFIED HEPATIC CIRRHOSIS TYPE (HCC): ICD-10-CM

## 2024-01-31 DIAGNOSIS — Z01.818 ENCOUNTER FOR PRE-TRANSPLANT EVALUATION FOR LIVER TRANSPLANT: ICD-10-CM

## 2024-01-31 DIAGNOSIS — Z87.891 HX OF NICOTINE DEPENDENCE: ICD-10-CM

## 2024-01-31 PROCEDURE — 77080 DXA BONE DENSITY AXIAL: CPT

## 2024-01-31 PROCEDURE — 71250 CT THORAX DX C-: CPT

## 2024-02-01 DIAGNOSIS — M54.6 PAIN IN THORACIC SPINE: ICD-10-CM

## 2024-02-01 NOTE — TELEPHONE ENCOUNTER
Last appointment: 12/22/23  Next appointment: 2/22/24  Previous refill encounter(s): 1/25/24 #20    Requested Prescriptions     Pending Prescriptions Disp Refills    traMADol (ULTRAM) 50 MG tablet 20 tablet 0     Sig: Take 1 tablet by mouth every 8 hours as needed for Pain for up to 7 days. Intended supply: 7 days. Take lowest dose possible to manage pain Max Daily Amount: 150 mg         For Pharmacy Admin Tracking Only    Program: Medication Refill  CPA in place:    Recommendation Provided To:   Intervention Detail: New Rx: 1, reason: Patient Preference  Intervention Accepted By:   Gap Closed?:    Time Spent (min): 5

## 2024-02-02 RX ORDER — TRAMADOL HYDROCHLORIDE 50 MG/1
50 TABLET ORAL EVERY 8 HOURS PRN
Qty: 20 TABLET | Refills: 0 | Status: SHIPPED | OUTPATIENT
Start: 2024-02-02 | End: 2024-02-09

## 2024-02-02 RX ORDER — TRAMADOL HYDROCHLORIDE 50 MG/1
TABLET ORAL
Qty: 20 TABLET | Refills: 0 | OUTPATIENT
Start: 2024-02-02

## 2024-02-03 DIAGNOSIS — E11.9 TYPE 2 DIABETES MELLITUS WITHOUT COMPLICATION, WITHOUT LONG-TERM CURRENT USE OF INSULIN (HCC): ICD-10-CM

## 2024-02-05 RX ORDER — GLIMEPIRIDE 4 MG/1
4 TABLET ORAL EVERY MORNING
Qty: 30 TABLET | Refills: 3 | OUTPATIENT
Start: 2024-02-05

## 2024-02-05 NOTE — TELEPHONE ENCOUNTER
Amaryl 4mg/day was sent on 1/16/24 for #30 with 3 refills    For Pharmacy Admin Tracking Only    Program: Medication Refill  CPA in place:    Recommendation Provided To:   Intervention Detail: New Rx: 1, reason: Patient Preference  Intervention Accepted By:   Gap Closed?:    Time Spent (min): 5

## 2024-02-06 ENCOUNTER — OFFICE VISIT (OUTPATIENT)
Age: 54
End: 2024-02-06
Payer: COMMERCIAL

## 2024-02-06 VITALS
BODY MASS INDEX: 27.34 KG/M2 | HEART RATE: 77 BPM | WEIGHT: 148.6 LBS | DIASTOLIC BLOOD PRESSURE: 53 MMHG | OXYGEN SATURATION: 99 % | HEIGHT: 62 IN | SYSTOLIC BLOOD PRESSURE: 97 MMHG | TEMPERATURE: 97.9 F

## 2024-02-06 DIAGNOSIS — Z76.82 LIVER TRANSPLANT CANDIDATE: Primary | ICD-10-CM

## 2024-02-06 PROCEDURE — 99215 OFFICE O/P EST HI 40 MIN: CPT | Performed by: INTERNAL MEDICINE

## 2024-02-06 ASSESSMENT — PATIENT HEALTH QUESTIONNAIRE - PHQ9
SUM OF ALL RESPONSES TO PHQ QUESTIONS 1-9: 0
SUM OF ALL RESPONSES TO PHQ9 QUESTIONS 1 & 2: 0
2. FEELING DOWN, DEPRESSED OR HOPELESS: 0
1. LITTLE INTEREST OR PLEASURE IN DOING THINGS: 0
SUM OF ALL RESPONSES TO PHQ QUESTIONS 1-9: 0

## 2024-02-06 NOTE — PROGRESS NOTES
[facility]      Doron Kuo MD, FACP, FACG, FAASLD      VANESSA Sim, [facility]   Siobhan Santoyoelkin, [facility]   Gretel Thakkar, [facility]-  Grey Bettencourt, [facility]   Arlyn Mora, [facility]   Lisa Lee, [facility]   [address], [address]   [place]  [address] [phone]   FAX: [phone]  [facility]   [address], [address]   [place]  [address] [phone]   FAX: [phone]       Patient Care Team:  Homero Hoyos MD as PCP - General  Homero Hoyos MD as PCP - Empaneled Provider  Renetta West, RN as Nurse Navigator (Hepatology)  Judi Briscoe APRN - NP (Hepatology)  Judi Briscoe APRN - NP (Hepatology)      Patient Active Problem List   Diagnosis    Type 2 diabetes mellitus (HCC)    Primary biliary cholangitis (HCC)    Renal calculus or stone    Anemia    Insomnia    Esophageal varices (HCC)    Alcohol induced liver disorder (HCC)    Ascites    Alcohol dependence in remission (HCC)    Thrombocytopenia (HCC)    Hepatic encephalopathy (HCC)    Chronic bilateral low back pain    Cirrhosis (HCC)    Liver transplant candidate       Tiffanie MAGGI Morgan is being seen at [facility] for management of 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     The active problem list, all pertinent past medical history, medications, radiologic findings and laboratory findings related to the liver disorder were reviewed and discussed with the patient.      The patient is a 53 y.o.  female who was found to have chronic liver disease and cirrhosis in 5/2021 when a CT scan was performed for evaluation of an abdominal hernia.     The patient had been consuming 10 alcohol drinks every day for several

## 2024-02-06 NOTE — PROGRESS NOTES
Identified pt with two pt identifiers(name and ). Reviewed record in preparation for visit and have obtained necessary documentation.    Chief Complaint   Patient presents with    Cirrhosis     Hospital follow up.     BP (!) 97/53 (Site: Left Upper Arm, Position: Sitting, Cuff Size: Medium Adult)   Pulse 77   Temp 97.9 °F (36.6 °C)   Ht 1.575 m (5' 2\")   Wt 67.4 kg (148 lb 9.6 oz)   SpO2 99%   BMI 27.18 kg/m²       1. \"Have you been to the ER, urgent care clinic since your last visit?  Hospitalized since your last visit?\" Yes [facility] admission    2. \"Have you seen or consulted any other health care providers outside of the [facility] since your last visit?\" No     Patient is accompanied by spouse I have received verbal consent from Tiffanie Morgan to discuss any/all medical information while they are present in the room.

## 2024-02-08 DIAGNOSIS — M54.6 PAIN IN THORACIC SPINE: ICD-10-CM

## 2024-02-08 RX ORDER — TRAMADOL HYDROCHLORIDE 50 MG/1
50 TABLET ORAL EVERY 8 HOURS PRN
Qty: 20 TABLET | Refills: 0 | Status: SHIPPED | OUTPATIENT
Start: 2024-02-08 | End: 2024-02-15

## 2024-02-08 NOTE — TELEPHONE ENCOUNTER
Last appointment: 12/22/23  Next appointment: 2/22/24  Previous refill encounter(s): 2/2/24 #20    Requested Prescriptions     Pending Prescriptions Disp Refills    traMADol (ULTRAM) 50 MG tablet [Pharmacy Med Name: TRAMADOL HCL 50MG TABS] 20 tablet 0     Sig: TAKE ONE TABLET BY MOUTH EVERY 8 HOURS AS NEEDED FOR PAIN FOR UP TO 7 DAYS. TAKE LOWEST DOSE POSSIBLE. MAX 3 TABS/DAY         For Pharmacy Admin Tracking Only    Program: Medication Refill  CPA in place:    Recommendation Provided To:   Intervention Detail: New Rx: 1, reason: Patient Preference  Intervention Accepted By:   Gap Closed?:    Time Spent (min): 5

## 2024-02-12 DIAGNOSIS — E11.9 TYPE 2 DIABETES MELLITUS WITHOUT COMPLICATION, WITHOUT LONG-TERM CURRENT USE OF INSULIN (HCC): ICD-10-CM

## 2024-02-13 RX ORDER — GLIMEPIRIDE 4 MG/1
4 TABLET ORAL 2 TIMES DAILY
Qty: 60 TABLET | Refills: 3 | Status: ON HOLD | OUTPATIENT
Start: 2024-02-13

## 2024-02-13 RX ORDER — METFORMIN HYDROCHLORIDE 750 MG/1
750 TABLET, EXTENDED RELEASE ORAL 2 TIMES DAILY WITH MEALS
Qty: 60 TABLET | Refills: 3 | Status: ON HOLD | OUTPATIENT
Start: 2024-02-13

## 2024-02-15 ENCOUNTER — APPOINTMENT (OUTPATIENT)
Facility: HOSPITAL | Age: 54
DRG: 871 | End: 2024-02-15
Payer: COMMERCIAL

## 2024-02-15 ENCOUNTER — HOSPITAL ENCOUNTER (INPATIENT)
Facility: HOSPITAL | Age: 54
LOS: 8 days | Discharge: HOME HEALTH | DRG: 871 | End: 2024-02-23
Attending: EMERGENCY MEDICINE | Admitting: HOSPITALIST
Payer: COMMERCIAL

## 2024-02-15 DIAGNOSIS — N39.0 URINARY TRACT INFECTION WITHOUT HEMATURIA, SITE UNSPECIFIED: Primary | ICD-10-CM

## 2024-02-15 DIAGNOSIS — I95.89 OTHER SPECIFIED HYPOTENSION: ICD-10-CM

## 2024-02-15 PROBLEM — R65.20 SEVERE SEPSIS (HCC): Status: ACTIVE | Noted: 2024-02-15

## 2024-02-15 PROBLEM — A41.9 SEVERE SEPSIS (HCC): Status: ACTIVE | Noted: 2024-02-15

## 2024-02-15 LAB
ALBUMIN SERPL-MCNC: 2.8 G/DL (ref 3.5–5)
ALBUMIN/GLOB SERPL: 0.7 (ref 1.1–2.2)
ALP SERPL-CCNC: 103 U/L (ref 45–117)
ALT SERPL-CCNC: 34 U/L (ref 12–78)
AMMONIA PLAS-SCNC: <10 UMOL/L
ANION GAP SERPL CALC-SCNC: 7 MMOL/L (ref 5–15)
APPEARANCE UR: ABNORMAL
AST SERPL-CCNC: 77 U/L (ref 15–37)
BACTERIA URNS QL MICRO: ABNORMAL /HPF
BASOPHILS # BLD: 0 K/UL (ref 0–0.1)
BASOPHILS NFR BLD: 0 % (ref 0–1)
BILIRUB SERPL-MCNC: 11.2 MG/DL (ref 0.2–1)
BILIRUB UR QL CFM: POSITIVE
BUN SERPL-MCNC: 18 MG/DL (ref 6–20)
BUN/CREAT SERPL: 13 (ref 12–20)
CALCIUM SERPL-MCNC: 9.9 MG/DL (ref 8.5–10.1)
CHLORIDE SERPL-SCNC: 102 MMOL/L (ref 97–108)
CO2 SERPL-SCNC: 24 MMOL/L (ref 21–32)
COLOR UR: ABNORMAL
COMMENT:: NORMAL
COMMENT:: NORMAL
CREAT SERPL-MCNC: 1.39 MG/DL (ref 0.55–1.02)
DIFFERENTIAL METHOD BLD: ABNORMAL
EOSINOPHIL # BLD: 0 K/UL (ref 0–0.4)
EOSINOPHIL NFR BLD: 0 % (ref 0–7)
EPITH CASTS URNS QL MICRO: ABNORMAL /LPF
ERYTHROCYTE [DISTWIDTH] IN BLOOD BY AUTOMATED COUNT: 15.5 % (ref 11.5–14.5)
FLUAV AG NPH QL IA: NEGATIVE
FLUBV AG NOSE QL IA: NEGATIVE
GLOBULIN SER CALC-MCNC: 4.1 G/DL (ref 2–4)
GLUCOSE BLD STRIP.AUTO-MCNC: 163 MG/DL (ref 65–117)
GLUCOSE BLD STRIP.AUTO-MCNC: 227 MG/DL (ref 65–117)
GLUCOSE BLD STRIP.AUTO-MCNC: 234 MG/DL (ref 65–117)
GLUCOSE SERPL-MCNC: 186 MG/DL (ref 65–100)
GLUCOSE UR STRIP.AUTO-MCNC: NEGATIVE MG/DL
HCT VFR BLD AUTO: 26.8 % (ref 35–47)
HGB BLD-MCNC: 9 G/DL (ref 11.5–16)
HGB UR QL STRIP: ABNORMAL
IMM GRANULOCYTES # BLD AUTO: 0.1 K/UL (ref 0–0.04)
IMM GRANULOCYTES NFR BLD AUTO: 1 % (ref 0–0.5)
INR PPP: 2.5 (ref 0.9–1.1)
KETONES UR QL STRIP.AUTO: NEGATIVE MG/DL
LACTATE SERPL-SCNC: 4.6 MMOL/L (ref 0.4–2)
LACTATE SERPL-SCNC: 5.4 MMOL/L (ref 0.4–2)
LACTATE SERPL-SCNC: 5.7 MMOL/L (ref 0.4–2)
LEUKOCYTE ESTERASE UR QL STRIP.AUTO: ABNORMAL
LYMPHOCYTES # BLD: 0.1 K/UL (ref 0.8–3.5)
LYMPHOCYTES NFR BLD: 1 % (ref 12–49)
MCH RBC QN AUTO: 36.6 PG (ref 26–34)
MCHC RBC AUTO-ENTMCNC: 33.6 G/DL (ref 30–36.5)
MCV RBC AUTO: 108.9 FL (ref 80–99)
MONOCYTES # BLD: 0.9 K/UL (ref 0–1)
MONOCYTES NFR BLD: 9 % (ref 5–13)
NEUTS SEG # BLD: 9.2 K/UL (ref 1.8–8)
NEUTS SEG NFR BLD: 89 % (ref 32–75)
NITRITE UR QL STRIP.AUTO: POSITIVE
NRBC # BLD: 0 K/UL (ref 0–0.01)
NRBC BLD-RTO: 0 PER 100 WBC
PH UR STRIP: 5 (ref 5–8)
PLATELET # BLD AUTO: 61 K/UL (ref 150–400)
PMV BLD AUTO: 12.3 FL (ref 8.9–12.9)
POTASSIUM SERPL-SCNC: 4.6 MMOL/L (ref 3.5–5.1)
PROT SERPL-MCNC: 6.9 G/DL (ref 6.4–8.2)
PROT UR STRIP-MCNC: 100 MG/DL
PROTHROMBIN TIME: 24.5 SEC (ref 9–11.1)
RBC # BLD AUTO: 2.46 M/UL (ref 3.8–5.2)
RBC #/AREA URNS HPF: ABNORMAL /HPF (ref 0–5)
RBC MORPH BLD: ABNORMAL
SARS-COV-2 RDRP RESP QL NAA+PROBE: NOT DETECTED
SERVICE CMNT-IMP: ABNORMAL
SODIUM SERPL-SCNC: 133 MMOL/L (ref 136–145)
SOURCE: NORMAL
SP GR UR REFRACTOMETRY: 1.02 (ref 1–1.03)
SPECIMEN HOLD: NORMAL
UROBILINOGEN UR QL STRIP.AUTO: 0.2 EU/DL (ref 0.2–1)
WBC # BLD AUTO: 10.3 K/UL (ref 3.6–11)
WBC URNS QL MICRO: >100 /HPF (ref 0–4)

## 2024-02-15 PROCEDURE — 2000000000 HC ICU R&B

## 2024-02-15 PROCEDURE — 87635 SARS-COV-2 COVID-19 AMP PRB: CPT

## 2024-02-15 PROCEDURE — 99285 EMERGENCY DEPT VISIT HI MDM: CPT

## 2024-02-15 PROCEDURE — 51798 US URINE CAPACITY MEASURE: CPT

## 2024-02-15 PROCEDURE — 87077 CULTURE AEROBIC IDENTIFY: CPT

## 2024-02-15 PROCEDURE — 85025 COMPLETE CBC W/AUTO DIFF WBC: CPT

## 2024-02-15 PROCEDURE — 82140 ASSAY OF AMMONIA: CPT

## 2024-02-15 PROCEDURE — 2580000003 HC RX 258: Performed by: EMERGENCY MEDICINE

## 2024-02-15 PROCEDURE — 2500000003 HC RX 250 WO HCPCS: Performed by: INTERNAL MEDICINE

## 2024-02-15 PROCEDURE — 36415 COLL VENOUS BLD VENIPUNCTURE: CPT

## 2024-02-15 PROCEDURE — 6360000002 HC RX W HCPCS: Performed by: HOSPITALIST

## 2024-02-15 PROCEDURE — 6360000002 HC RX W HCPCS: Performed by: EMERGENCY MEDICINE

## 2024-02-15 PROCEDURE — 81001 URINALYSIS AUTO W/SCOPE: CPT

## 2024-02-15 PROCEDURE — P9047 ALBUMIN (HUMAN), 25%, 50ML: HCPCS | Performed by: HOSPITALIST

## 2024-02-15 PROCEDURE — 87086 URINE CULTURE/COLONY COUNT: CPT

## 2024-02-15 PROCEDURE — 76705 ECHO EXAM OF ABDOMEN: CPT

## 2024-02-15 PROCEDURE — 87154 CUL TYP ID BLD PTHGN 6+ TRGT: CPT

## 2024-02-15 PROCEDURE — 87205 SMEAR GRAM STAIN: CPT

## 2024-02-15 PROCEDURE — 87804 INFLUENZA ASSAY W/OPTIC: CPT

## 2024-02-15 PROCEDURE — 80053 COMPREHEN METABOLIC PANEL: CPT

## 2024-02-15 PROCEDURE — 6370000000 HC RX 637 (ALT 250 FOR IP): Performed by: INTERNAL MEDICINE

## 2024-02-15 PROCEDURE — 83605 ASSAY OF LACTIC ACID: CPT

## 2024-02-15 PROCEDURE — 6370000000 HC RX 637 (ALT 250 FOR IP): Performed by: HOSPITALIST

## 2024-02-15 PROCEDURE — 87040 BLOOD CULTURE FOR BACTERIA: CPT

## 2024-02-15 PROCEDURE — 87070 CULTURE OTHR SPECIMN AEROBIC: CPT

## 2024-02-15 PROCEDURE — 2580000003 HC RX 258: Performed by: HOSPITALIST

## 2024-02-15 PROCEDURE — 85610 PROTHROMBIN TIME: CPT

## 2024-02-15 PROCEDURE — 2580000003 HC RX 258: Performed by: INTERNAL MEDICINE

## 2024-02-15 PROCEDURE — 99223 1ST HOSP IP/OBS HIGH 75: CPT | Performed by: INTERNAL MEDICINE

## 2024-02-15 PROCEDURE — 87186 SC STD MICRODIL/AGAR DIL: CPT

## 2024-02-15 PROCEDURE — 82962 GLUCOSE BLOOD TEST: CPT

## 2024-02-15 PROCEDURE — 71045 X-RAY EXAM CHEST 1 VIEW: CPT

## 2024-02-15 RX ORDER — TRAMADOL HYDROCHLORIDE 50 MG/1
50 TABLET ORAL EVERY 6 HOURS PRN
Status: DISCONTINUED | OUTPATIENT
Start: 2024-02-15 | End: 2024-02-23 | Stop reason: HOSPADM

## 2024-02-15 RX ORDER — 0.9 % SODIUM CHLORIDE 0.9 %
1000 INTRAVENOUS SOLUTION INTRAVENOUS ONCE
Status: DISCONTINUED | OUTPATIENT
Start: 2024-02-15 | End: 2024-02-15

## 2024-02-15 RX ORDER — LACTULOSE 10 G/15ML
30 SOLUTION ORAL 3 TIMES DAILY
Status: DISCONTINUED | OUTPATIENT
Start: 2024-02-15 | End: 2024-02-23 | Stop reason: HOSPADM

## 2024-02-15 RX ORDER — 0.9 % SODIUM CHLORIDE 0.9 %
500 INTRAVENOUS SOLUTION INTRAVENOUS ONCE
Status: COMPLETED | OUTPATIENT
Start: 2024-02-15 | End: 2024-02-15

## 2024-02-15 RX ORDER — MIDODRINE HYDROCHLORIDE 5 MG/1
10 TABLET ORAL
Status: DISCONTINUED | OUTPATIENT
Start: 2024-02-15 | End: 2024-02-15

## 2024-02-15 RX ORDER — ALBUMIN (HUMAN) 12.5 G/50ML
25 SOLUTION INTRAVENOUS EVERY 6 HOURS
Status: COMPLETED | OUTPATIENT
Start: 2024-02-15 | End: 2024-02-16

## 2024-02-15 RX ORDER — SODIUM CHLORIDE, SODIUM LACTATE, POTASSIUM CHLORIDE, AND CALCIUM CHLORIDE .6; .31; .03; .02 G/100ML; G/100ML; G/100ML; G/100ML
500 INJECTION, SOLUTION INTRAVENOUS ONCE
Status: COMPLETED | OUTPATIENT
Start: 2024-02-15 | End: 2024-02-15

## 2024-02-15 RX ORDER — MIDODRINE HYDROCHLORIDE 5 MG/1
10 TABLET ORAL EVERY 6 HOURS
Status: DISCONTINUED | OUTPATIENT
Start: 2024-02-15 | End: 2024-02-23 | Stop reason: HOSPADM

## 2024-02-15 RX ORDER — INSULIN LISPRO 100 [IU]/ML
0-4 INJECTION, SOLUTION INTRAVENOUS; SUBCUTANEOUS NIGHTLY
Status: DISCONTINUED | OUTPATIENT
Start: 2024-02-15 | End: 2024-02-16 | Stop reason: DRUGHIGH

## 2024-02-15 RX ORDER — ACETAMINOPHEN 650 MG/1
650 SUPPOSITORY RECTAL EVERY 6 HOURS PRN
Status: DISCONTINUED | OUTPATIENT
Start: 2024-02-15 | End: 2024-02-15

## 2024-02-15 RX ORDER — DEXTROSE MONOHYDRATE 100 MG/ML
INJECTION, SOLUTION INTRAVENOUS CONTINUOUS PRN
Status: DISCONTINUED | OUTPATIENT
Start: 2024-02-15 | End: 2024-02-23 | Stop reason: HOSPADM

## 2024-02-15 RX ORDER — INSULIN LISPRO 100 [IU]/ML
0-4 INJECTION, SOLUTION INTRAVENOUS; SUBCUTANEOUS
Status: DISCONTINUED | OUTPATIENT
Start: 2024-02-15 | End: 2024-02-16 | Stop reason: DRUGHIGH

## 2024-02-15 RX ORDER — NOREPINEPHRINE BITARTRATE 0.06 MG/ML
.5-2 INJECTION, SOLUTION INTRAVENOUS CONTINUOUS
Status: DISCONTINUED | OUTPATIENT
Start: 2024-02-15 | End: 2024-02-16

## 2024-02-15 RX ORDER — ACETAMINOPHEN 325 MG/1
650 TABLET ORAL EVERY 8 HOURS PRN
Status: DISCONTINUED | OUTPATIENT
Start: 2024-02-15 | End: 2024-02-23 | Stop reason: HOSPADM

## 2024-02-15 RX ORDER — NOREPINEPHRINE BITARTRATE 0.06 MG/ML
INJECTION, SOLUTION INTRAVENOUS
Status: DISCONTINUED
Start: 2024-02-15 | End: 2024-02-16

## 2024-02-15 RX ORDER — ACETAMINOPHEN 325 MG/1
650 TABLET ORAL EVERY 6 HOURS PRN
Status: DISCONTINUED | OUTPATIENT
Start: 2024-02-15 | End: 2024-02-15

## 2024-02-15 RX ADMIN — ACETAMINOPHEN 650 MG: 325 TABLET ORAL at 14:17

## 2024-02-15 RX ADMIN — TRAMADOL HYDROCHLORIDE 50 MG: 50 TABLET ORAL at 14:25

## 2024-02-15 RX ADMIN — LACTULOSE 30 G: 20 SOLUTION ORAL at 22:42

## 2024-02-15 RX ADMIN — MIDODRINE HYDROCHLORIDE 10 MG: 5 TABLET ORAL at 22:42

## 2024-02-15 RX ADMIN — ALBUMIN (HUMAN) 25 G: 0.25 INJECTION, SOLUTION INTRAVENOUS at 15:15

## 2024-02-15 RX ADMIN — MIDODRINE HYDROCHLORIDE 10 MG: 5 TABLET ORAL at 16:48

## 2024-02-15 RX ADMIN — SODIUM CHLORIDE 4 MCG/MIN: 9 INJECTION, SOLUTION INTRAVENOUS at 21:20

## 2024-02-15 RX ADMIN — SODIUM CHLORIDE 500 ML: 9 INJECTION, SOLUTION INTRAVENOUS at 16:47

## 2024-02-15 RX ADMIN — SODIUM CHLORIDE 500 ML: 9 INJECTION, SOLUTION INTRAVENOUS at 10:45

## 2024-02-15 RX ADMIN — INSULIN LISPRO 1 UNITS: 100 INJECTION, SOLUTION INTRAVENOUS; SUBCUTANEOUS at 18:21

## 2024-02-15 RX ADMIN — RIFAXIMIN 400 MG: 200 TABLET ORAL at 14:19

## 2024-02-15 RX ADMIN — SODIUM CHLORIDE, POTASSIUM CHLORIDE, SODIUM LACTATE AND CALCIUM CHLORIDE 500 ML: 600; 310; 30; 20 INJECTION, SOLUTION INTRAVENOUS at 14:22

## 2024-02-15 RX ADMIN — ALBUMIN (HUMAN) 25 G: 0.25 INJECTION, SOLUTION INTRAVENOUS at 22:41

## 2024-02-15 RX ADMIN — WATER 1000 MG: 1 INJECTION INTRAMUSCULAR; INTRAVENOUS; SUBCUTANEOUS at 11:08

## 2024-02-15 RX ADMIN — ALBUMIN (HUMAN) 25 G: 0.25 INJECTION, SOLUTION INTRAVENOUS at 11:10

## 2024-02-15 RX ADMIN — RIFAXIMIN 400 MG: 200 TABLET ORAL at 22:42

## 2024-02-15 RX ADMIN — LACTULOSE 30 G: 20 SOLUTION ORAL at 14:21

## 2024-02-15 ASSESSMENT — LIFESTYLE VARIABLES
HOW OFTEN DO YOU HAVE A DRINK CONTAINING ALCOHOL: MONTHLY OR LESS
HOW MANY STANDARD DRINKS CONTAINING ALCOHOL DO YOU HAVE ON A TYPICAL DAY: 3 OR 4

## 2024-02-15 ASSESSMENT — PAIN SCALES - GENERAL
PAINLEVEL_OUTOF10: 0
PAINLEVEL_OUTOF10: 2
PAINLEVEL_OUTOF10: 5

## 2024-02-15 ASSESSMENT — PAIN DESCRIPTION - DESCRIPTORS: DESCRIPTORS: SHARP

## 2024-02-15 ASSESSMENT — PAIN DESCRIPTION - LOCATION
LOCATION: HEAD
LOCATION: BACK

## 2024-02-15 ASSESSMENT — PAIN DESCRIPTION - ORIENTATION: ORIENTATION: MID

## 2024-02-15 ASSESSMENT — PAIN - FUNCTIONAL ASSESSMENT: PAIN_FUNCTIONAL_ASSESSMENT: NONE - DENIES PAIN

## 2024-02-15 NOTE — ED NOTES
Patient is getting increasingly hypotensive. MAP 50, SBP in the 70s. Fluid bolus is finished infusing, 25% albumin infusing. MD paged.

## 2024-02-15 NOTE — ED TRIAGE NOTES
Patient arrives ambulatory with a chief complaint of fever. Patient is on the liver transplant list at [facility]. [facility] instructed the patient to \"come get it checked out\".Symptoms began last night. Patient doesn't recall being exposed to anything .     Patient took tylenol about an 1hr ago     Endorses: Fever, scratchy throat., ear ache     Denies: N/V/d/CP/SOB/pain with urination/ General Pain       Hx. Liver failure

## 2024-02-15 NOTE — ED PROVIDER NOTES
[facility] EMERGENCY DEP  EMERGENCY DEPARTMENT ENCOUNTER      Pt Name: Tiffanie Morgan  MRN: [id number]  Birthdate 1970  Date of evaluation: 2/15/2024  Provider: Wiley Schofield MD    CHIEF COMPLAINT       Chief Complaint   Patient presents with    Fever         HISTORY OF PRESENT ILLNESS    HPI  This is a 53-year-old female with a history of alcoholic cirrhosis who is on the transplant list at [facility].  She has had a history of metabolic encephalopathy in the past.  She had been feeling well and has no complaint except yesterday she took lactulose and had 8 diarrheal stools which is not unusual for her when she does that medication.  She has had no nausea or vomiting and denies any chest pain or shortness of breath.  She does have a port in place for paracentesis but has not had to do that as yet for ascites.  She saw her physician a week or 2 ago and did not have enough fluid to have anything drawn off.  She has had no fever or chill until this morning she felt a little warm and her  checked her temperature which was over 103.  He gave her 1000 mg of Tylenol and her temperatures came down to 101.  The transfer center at [facility] suggested she come to the hospital for evaluation.  She does have a history of the lower blood pressure and her  said the last time she was discharged from the hospital her pressures were in the upper 90s.  In triage the patient's pressure was at 77.    Review of External Medical Records:     Nursing Notes were reviewed.    REVIEW OF SYSTEMS       Review of Systems   Constitutional:  Positive for fever. Negative for activity change, chills and fatigue.   HENT:  Negative for congestion, ear pain, rhinorrhea, sinus pressure, sinus pain, sore throat and trouble swallowing.    Eyes: Negative.    Respiratory:  Negative for cough, chest tightness, shortness of breath, wheezing and stridor.    Cardiovascular:  Negative for chest pain, palpitations and leg swelling.    Gastrointestinal:  Positive for abdominal distention. Negative for abdominal pain, blood in stool, diarrhea, nausea and vomiting.   Endocrine: Negative.    Genitourinary:  Negative for decreased urine volume, difficulty urinating, flank pain, frequency and hematuria.   Musculoskeletal:  Negative for back pain, joint swelling and neck pain.   Skin:  Negative for color change, pallor and rash.   Neurological:  Negative for dizziness, syncope, speech difficulty, weakness, numbness and headaches.   Psychiatric/Behavioral:  Negative for agitation and behavioral problems.              PAST MEDICAL HISTORY     Past Medical History:   Diagnosis Date    Anemia     Chronic bilateral low back pain 2023    Depression     Encounter for long-term (current) use of other medications 2012    LBP (low back pain) 2010    Liver disease     cirrhosis    Mixed hyperlipidemia 2012    OA (osteoarthritis) 2010    Obesity 2010    Renal calculus or stone 2010    Tobacco use disorder 2010    Type II or unspecified type diabetes mellitus without mention of complication, not stated as uncontrolled 3/9/2015    Diagnosed 3/9/2015 with A1c of 8.8%; treatment initiated with dietary changes, exercise 5 days/wk and started on Metformin XR 500mg daily x 2 weeks, then increase to 500mg AC breakfast and dinner.          SURGICAL HISTORY       Past Surgical History:   Procedure Laterality Date    ANKLE FRACTURE SURGERY      BREAST SURGERY      CYST REMOVAL      GYN       X 3    TONSILLECTOMY      UPPER GASTROINTESTINAL ENDOSCOPY      UPPER GASTROINTESTINAL ENDOSCOPY N/A 2023    EGD ESOPHAGOGASTRODUODENOSCOPY performed by Doron Kuo MD at [facility] ENDOSCOPY         CURRENT MEDICATIONS       Previous Medications    BLOOD GLUCOSE TEST STRIPS ([facility] BLOOD GLUCOSE TEST) STRIP    by Other route    GLIMEPIRIDE (AMARYL) 4 MG TABLET    Take 1 tablet by mouth 2 times daily    LACTULOSE (CHRONULAC) 10

## 2024-02-15 NOTE — CONSULTS
[facility]      Doron Kuo MD, FACP, FACG, FAASLD      VANESSA Sim, [facility]   Siobhan Santoyoelkin, [facility]   Gretel Thakkar, [facility]  Grey Bettencourt, [facility]   Arlyn Mora, [facility]   Lisa Aston, [facility]   at [facility]   [address], [address]   [place]  [address] [phone]   FAX: [phone]  [facility]   [address], [address]   [place]  [address] [phone]   FAX: [phone]       HEPATOLOGY CONSULT NOTE  I was asked to see this patient in consultation for evaluation and management of cirrhosis and ascites.  I have reviewed the Emergency room note, Hospital admission note, Notes by all other physicians who have seen the patient during this hospitalization to date.  I have reviewed the problem list, all past medical history and the reason for this hospitalization.  I have reviewed the allergies and the medications the patient was taking at home prior to this hospitalization.    HISTORY:  The patient is well known to me and regularly cared for at [facility].  She is a 53 y.o. female who was found to have cirrhosis in 5/2021 when she had a CT scan to evaluate umbilical hernia.  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       She has a history of consuming 10 drinks per day for several years but has been abstinent from alcohol since 6/2021 when she found out she had cirrhosis.     PBC has been treated with MAGDALENE       The patient has developed the following complications of cirrhosis: esophageal varices, variceal bleeding, possible hepatopulmonary syndrome, hepatic encephalopathy     She has completed Liver Transplant evaluation testing and was at [facility] for hr first appointment earlier this week.     She was brought to the ED because of  117 U/L 103   ALT 12 - 78 U/L 34   AST 15 - 37 U/L 77 (H)   BILIRUBIN TOTAL 0.2 - 1.0 MG/DL 11.2 (H)   WBC 3.6 - 11.0 K/uL 10.3   Hemoglobin Quant 11.5 - 16.0 g/dL 9.0 (L)   Platelet Count 150 - 400 K/uL 61 (L)   (L): Data is abnormally low  (H): Data is abnormally high    Doron Kuo MD  [facility] [address], [address]  [place]  [address] [phone]  [facility]

## 2024-02-15 NOTE — H&P
History and Physical    Date of Service:  2/15/2024  Primary Care Provider: Homero Hoyos MD  Source of information: The patient and Chart review    Chief Complaint: Fever      History of Presenting Illness:   Tiffanie Morgan is a 53 y.o. female with a history of advanced cirrhosis undergoing evaluation for transplant by [facility] came to the ED for fever, 102.7 and confusion.  Patient is currently alert and oriented x 3.  History is obtained from her and her .  She wake up confused this morning, her  gave her lactulose, called her temperature which was 102.7 and called [facility].  You be advised him he take her to the ED.  On evaluation in the ED she was found to be hypotensive BP 77/41 with the rest of the vital signs being stable.  Further evaluation showed evidence of UTI  500 mL saline bolus and IV ceftriaxone ordered and patient is requested for admission.    Pertinent lab and imaging findings;  CMP: , creatinine 1.39, lactic acid 4.6, patient has baseline chronic lactic acidosis around 3 likely due to the liver disease  CBC: WBC 10.3, hemoglobin 9, platelet 61.  Influenza A, B and COVID-negative  UA consistent with UTI: Positive for nitrites, large leukocyte esterase, WBC> 100, 3+ bacteria.  CXR with mild edema  Abdominal ultrasound showed small ascites in the right abdomen.  Cirrhosis.  Splenomegaly        The patient denies any headache, blurry vision, sore throat, trouble swallowing, trouble with speech, chest pain, SOB, cough, fever, chills, N/V/D, abd pain, urinary symptoms, constipation, recent travels, sick contacts, focal or generalized neurological symptoms, falls, injuries, rashes, contact with COVID-19 diagnosed patients, hematemesis, melena, hemoptysis, hematuria, rashes, denies starting any new medications and denies any other concerns or problems besides as mentioned above.         REVIEW OF SYSTEMS:  A comprehensive review of systems was negative except for that  patient's available past medical records, laboratory results, and imaging studies.  Severe sepsis as evidenced by fever of 102.7 at home, hypotension, lactic acidosis.  It should be noted that the patient has chronically elevated lactic acid in the range of 3 likely due to the chronic liver disease and also she has low blood pressure in the upper 80s-90s due to again her liver disease.  Brief acute metabolic encephalopathy, resolved with, alert and oriented x 3 during this exam  Elevated creatinine  -- Unable to administer entire 30 mill per KG fluid due to ascites, chest x-ray showing edema pattern.  We will instead administer as needed boluses starting with 500 IMU, add IV albumin, oral midodrine.  -- Monitor lactic acidosis, input and output  -- Continue IV ceftriaxone  -- Follow blood and urine cultures  -- No significant ascites for therapeutic or diagnostic paracentesis  -- Will let her hepatologist know of her admission  -- I have discussed with patient and her  and make them aware that if she remains hypotensive despite saline bolus, IV albumin she may need higher level of care such as ICU for vasopressors.      Cirrhosis, complicated by coagulopathy, thrombocytopenia, ascites, hepatic encephalopathy.  Currently undergoing evaluation for liver transplant  --Continue lactulose, rifaximin    Type II DM with hyperglycemia  -- Hold oral hypoglycemics  -- Monitor: Accu-Cheks AC&HS  -- Hyperglycemia management: Humalog correction scale  -- Hypoglycemic management: Protocol in place        DIET: ADULT DIET; Regular   ISOLATION PRECAUTIONS: No active isolations  CODE STATUS: Full Code   Central Line:     DVT PROPHYLAXIS: SCDs  FUNCTIONAL STATUS PRIOR TO HOSPITALIZATION: Fully active and ambulatory; able to carry on all self-care without restriction.  Ambulatory status/function: By self   EARLY MOBILITY ASSESSMENT: Recommend routine ambulation while hospitalized with the assistance of nursing

## 2024-02-16 ENCOUNTER — APPOINTMENT (OUTPATIENT)
Facility: HOSPITAL | Age: 54
DRG: 871 | End: 2024-02-16
Payer: COMMERCIAL

## 2024-02-16 PROBLEM — R18.8 OTHER ASCITES: Status: ACTIVE | Noted: 2023-09-23

## 2024-02-16 PROBLEM — K74.69 OTHER CIRRHOSIS OF LIVER (HCC): Status: ACTIVE | Noted: 2024-02-16

## 2024-02-16 PROBLEM — N39.0 URINARY TRACT INFECTION WITHOUT HEMATURIA: Status: ACTIVE | Noted: 2024-02-16

## 2024-02-16 LAB
ALBUMIN SERPL-MCNC: 3.4 G/DL (ref 3.5–5)
ALBUMIN SERPL-MCNC: 3.6 G/DL (ref 3.5–5)
ALBUMIN/GLOB SERPL: 1 (ref 1.1–2.2)
ALBUMIN/GLOB SERPL: 1.1 (ref 1.1–2.2)
ALP SERPL-CCNC: 64 U/L (ref 45–117)
ALP SERPL-CCNC: 68 U/L (ref 45–117)
ALT SERPL-CCNC: 34 U/L (ref 12–78)
ALT SERPL-CCNC: 34 U/L (ref 12–78)
AMMONIA PLAS-SCNC: 22 UMOL/L
ANION GAP SERPL CALC-SCNC: 6 MMOL/L (ref 5–15)
AST SERPL-CCNC: 76 U/L (ref 15–37)
AST SERPL-CCNC: 79 U/L (ref 15–37)
BILIRUB DIRECT SERPL-MCNC: 5.4 MG/DL (ref 0–0.2)
BILIRUB SERPL-MCNC: 12.2 MG/DL (ref 0.2–1)
BILIRUB SERPL-MCNC: 12.8 MG/DL (ref 0.2–1)
BUN SERPL-MCNC: 27 MG/DL (ref 6–20)
BUN/CREAT SERPL: 15 (ref 12–20)
CALCIUM SERPL-MCNC: 9.3 MG/DL (ref 8.5–10.1)
CHLORIDE SERPL-SCNC: 103 MMOL/L (ref 97–108)
CO2 SERPL-SCNC: 24 MMOL/L (ref 21–32)
CREAT SERPL-MCNC: 1.82 MG/DL (ref 0.55–1.02)
ERYTHROCYTE [DISTWIDTH] IN BLOOD BY AUTOMATED COUNT: 15.7 % (ref 11.5–14.5)
FOLATE SERPL-MCNC: 14.4 NG/ML (ref 5–21)
GLOBULIN SER CALC-MCNC: 3.4 G/DL (ref 2–4)
GLOBULIN SER CALC-MCNC: 3.4 G/DL (ref 2–4)
GLUCOSE BLD STRIP.AUTO-MCNC: 235 MG/DL (ref 65–117)
GLUCOSE BLD STRIP.AUTO-MCNC: 287 MG/DL (ref 65–117)
GLUCOSE BLD STRIP.AUTO-MCNC: 289 MG/DL (ref 65–117)
GLUCOSE BLD STRIP.AUTO-MCNC: 293 MG/DL (ref 65–117)
GLUCOSE SERPL-MCNC: 210 MG/DL (ref 65–100)
HCT VFR BLD AUTO: 26.4 % (ref 35–47)
HGB BLD-MCNC: 8.6 G/DL (ref 11.5–16)
INR PPP: 2.5 (ref 0.9–1.1)
LACTATE SERPL-SCNC: 3.7 MMOL/L (ref 0.4–2)
MCH RBC QN AUTO: 36.3 PG (ref 26–34)
MCHC RBC AUTO-ENTMCNC: 32.6 G/DL (ref 30–36.5)
MCV RBC AUTO: 111.4 FL (ref 80–99)
NRBC # BLD: 0 K/UL (ref 0–0.01)
NRBC BLD-RTO: 0 PER 100 WBC
PHOSPHATE SERPL-MCNC: 3.2 MG/DL (ref 2.6–4.7)
PLATELET # BLD AUTO: 58 K/UL (ref 150–400)
PMV BLD AUTO: 11.1 FL (ref 8.9–12.9)
POTASSIUM SERPL-SCNC: 4 MMOL/L (ref 3.5–5.1)
PROCALCITONIN SERPL-MCNC: 6.53 NG/ML
PROT SERPL-MCNC: 6.8 G/DL (ref 6.4–8.2)
PROT SERPL-MCNC: 7 G/DL (ref 6.4–8.2)
PROTHROMBIN TIME: 25.1 SEC (ref 9–11.1)
RBC # BLD AUTO: 2.37 M/UL (ref 3.8–5.2)
SERVICE CMNT-IMP: ABNORMAL
SODIUM SERPL-SCNC: 133 MMOL/L (ref 136–145)
VIT B12 SERPL-MCNC: 873 PG/ML (ref 193–986)
WBC # BLD AUTO: 6.9 K/UL (ref 3.6–11)

## 2024-02-16 PROCEDURE — 2000000000 HC ICU R&B

## 2024-02-16 PROCEDURE — 80053 COMPREHEN METABOLIC PANEL: CPT

## 2024-02-16 PROCEDURE — 82607 VITAMIN B-12: CPT

## 2024-02-16 PROCEDURE — 6360000002 HC RX W HCPCS: Performed by: NURSE PRACTITIONER

## 2024-02-16 PROCEDURE — 51702 INSERT TEMP BLADDER CATH: CPT

## 2024-02-16 PROCEDURE — 6370000000 HC RX 637 (ALT 250 FOR IP): Performed by: NURSE PRACTITIONER

## 2024-02-16 PROCEDURE — 6360000002 HC RX W HCPCS: Performed by: HOSPITALIST

## 2024-02-16 PROCEDURE — 84145 PROCALCITONIN (PCT): CPT

## 2024-02-16 PROCEDURE — 99233 SBSQ HOSP IP/OBS HIGH 50: CPT | Performed by: INTERNAL MEDICINE

## 2024-02-16 PROCEDURE — 2580000003 HC RX 258: Performed by: NURSE PRACTITIONER

## 2024-02-16 PROCEDURE — 85610 PROTHROMBIN TIME: CPT

## 2024-02-16 PROCEDURE — 85027 COMPLETE CBC AUTOMATED: CPT

## 2024-02-16 PROCEDURE — 6370000000 HC RX 637 (ALT 250 FOR IP): Performed by: INTERNAL MEDICINE

## 2024-02-16 PROCEDURE — 84425 ASSAY OF VITAMIN B-1: CPT

## 2024-02-16 PROCEDURE — 82746 ASSAY OF FOLIC ACID SERUM: CPT

## 2024-02-16 PROCEDURE — 83605 ASSAY OF LACTIC ACID: CPT

## 2024-02-16 PROCEDURE — G0480 DRUG TEST DEF 1-7 CLASSES: HCPCS

## 2024-02-16 PROCEDURE — 82962 GLUCOSE BLOOD TEST: CPT

## 2024-02-16 PROCEDURE — 36415 COLL VENOUS BLD VENIPUNCTURE: CPT

## 2024-02-16 PROCEDURE — 74018 RADEX ABDOMEN 1 VIEW: CPT

## 2024-02-16 PROCEDURE — 6370000000 HC RX 637 (ALT 250 FOR IP): Performed by: HOSPITALIST

## 2024-02-16 PROCEDURE — 82140 ASSAY OF AMMONIA: CPT

## 2024-02-16 PROCEDURE — 80076 HEPATIC FUNCTION PANEL: CPT

## 2024-02-16 PROCEDURE — 84100 ASSAY OF PHOSPHORUS: CPT

## 2024-02-16 PROCEDURE — P9047 ALBUMIN (HUMAN), 25%, 50ML: HCPCS | Performed by: HOSPITALIST

## 2024-02-16 RX ORDER — PANTOPRAZOLE SODIUM 40 MG/1
40 TABLET, DELAYED RELEASE ORAL
Status: DISCONTINUED | OUTPATIENT
Start: 2024-02-17 | End: 2024-02-23 | Stop reason: HOSPADM

## 2024-02-16 RX ORDER — INSULIN LISPRO 100 [IU]/ML
0-8 INJECTION, SOLUTION INTRAVENOUS; SUBCUTANEOUS
Status: DISCONTINUED | OUTPATIENT
Start: 2024-02-16 | End: 2024-02-23 | Stop reason: HOSPADM

## 2024-02-16 RX ORDER — NOREPINEPHRINE BITARTRATE 0.06 MG/ML
.5-2 INJECTION, SOLUTION INTRAVENOUS CONTINUOUS
Status: ACTIVE | OUTPATIENT
Start: 2024-02-16 | End: 2024-02-16

## 2024-02-16 RX ORDER — NOREPINEPHRINE BITARTRATE 0.06 MG/ML
.5-2 INJECTION, SOLUTION INTRAVENOUS CONTINUOUS
Status: DISCONTINUED | OUTPATIENT
Start: 2024-02-16 | End: 2024-02-17

## 2024-02-16 RX ORDER — QUETIAPINE FUMARATE 25 MG/1
25 TABLET, FILM COATED ORAL 2 TIMES DAILY PRN
Status: DISCONTINUED | OUTPATIENT
Start: 2024-02-16 | End: 2024-02-23 | Stop reason: HOSPADM

## 2024-02-16 RX ORDER — NOREPINEPHRINE BITARTRATE 0.06 MG/ML
.5-2 INJECTION, SOLUTION INTRAVENOUS CONTINUOUS
Status: DISCONTINUED | OUTPATIENT
Start: 2024-02-16 | End: 2024-02-16

## 2024-02-16 RX ORDER — INSULIN LISPRO 100 [IU]/ML
0-4 INJECTION, SOLUTION INTRAVENOUS; SUBCUTANEOUS NIGHTLY
Status: DISCONTINUED | OUTPATIENT
Start: 2024-02-16 | End: 2024-02-23 | Stop reason: HOSPADM

## 2024-02-16 RX ORDER — PHENOBARBITAL SODIUM 65 MG/ML
100 INJECTION, SOLUTION INTRAMUSCULAR; INTRAVENOUS ONCE
Status: COMPLETED | OUTPATIENT
Start: 2024-02-16 | End: 2024-02-16

## 2024-02-16 RX ADMIN — ALBUMIN (HUMAN) 25 G: 0.25 INJECTION, SOLUTION INTRAVENOUS at 04:45

## 2024-02-16 RX ADMIN — RIFAXIMIN 400 MG: 200 TABLET ORAL at 13:48

## 2024-02-16 RX ADMIN — INSULIN LISPRO 4 UNITS: 100 INJECTION, SOLUTION INTRAVENOUS; SUBCUTANEOUS at 13:05

## 2024-02-16 RX ADMIN — LACTULOSE 30 G: 20 SOLUTION ORAL at 21:33

## 2024-02-16 RX ADMIN — RIFAXIMIN 400 MG: 200 TABLET ORAL at 21:00

## 2024-02-16 RX ADMIN — QUETIAPINE FUMARATE 25 MG: 25 TABLET ORAL at 15:00

## 2024-02-16 RX ADMIN — LACTULOSE 30 G: 20 SOLUTION ORAL at 13:48

## 2024-02-16 RX ADMIN — MIDODRINE HYDROCHLORIDE 10 MG: 5 TABLET ORAL at 09:18

## 2024-02-16 RX ADMIN — PHENOBARBITAL SODIUM 100 MG: 65 INJECTION INTRAMUSCULAR at 15:30

## 2024-02-16 RX ADMIN — INSULIN LISPRO 4 UNITS: 100 INJECTION, SOLUTION INTRAVENOUS; SUBCUTANEOUS at 18:04

## 2024-02-16 RX ADMIN — LACTULOSE 30 G: 20 SOLUTION ORAL at 09:18

## 2024-02-16 RX ADMIN — WATER 1000 MG: 1 INJECTION INTRAMUSCULAR; INTRAVENOUS; SUBCUTANEOUS at 09:17

## 2024-02-16 RX ADMIN — MIDODRINE HYDROCHLORIDE 10 MG: 5 TABLET ORAL at 18:06

## 2024-02-16 RX ADMIN — RIFAXIMIN 400 MG: 200 TABLET ORAL at 09:23

## 2024-02-16 RX ADMIN — MIDODRINE HYDROCHLORIDE 10 MG: 5 TABLET ORAL at 21:00

## 2024-02-16 RX ADMIN — INSULIN LISPRO 1 UNITS: 100 INJECTION, SOLUTION INTRAVENOUS; SUBCUTANEOUS at 09:18

## 2024-02-16 RX ADMIN — ACETAMINOPHEN 650 MG: 325 TABLET ORAL at 12:43

## 2024-02-16 RX ADMIN — MIDODRINE HYDROCHLORIDE 10 MG: 5 TABLET ORAL at 04:46

## 2024-02-16 ASSESSMENT — PAIN SCALES - GENERAL
PAINLEVEL_OUTOF10: 0

## 2024-02-16 NOTE — PROGRESS NOTES
0800: Pt A&Ox1, answering questions inappropriately, difficulty following commands tremors noted in BUE. Ty De Los Santos NP notified and assessed at bedside.     1030: Ammonia and LFT results reviewed with Ty De Los Santos NP. Discussed pt's confusion and aphasia with NP. Verbal orders for STAT KUB received from NP for potential constipation.    1200: Pt repeating \"I need to pee, let me pee.\" Advised pt to urinate with external catheter. Pt voided < 25mL. Post void residual bladder scan completed: 494mL. Ty De Los Santos NP notified. Verbal orders received for wells catheter placement.     1230: Temp sensing wells placed.    1512: Pt attempting to climb out of bed, became combative towards , not redirectable. PRN Seroquel crushed in water and administered to pt. Family reports last drink in August 2023, but no resent toxicology screen found in pt's chart. Ty MYERS notified. Orders received for one time dose of 100mg Phenobarbital IV.    1600: Pt A&Ox3, states last alcoholic drink was in 2020.    1627: Pt's  notified staff that he received call from [facility] stating a liver was available and patient needed to transfer. Ty De Los Santos NP notified. Shiela HURLEY notified via [employer] and message left at [facility] for urgent call back from MD.    1745: Advised by Ty De Los Santos NP that [facility] does not have liver for pt after calling and speaking with Juanita Menjivar Transplant Coordinator at [facility]. Ty De Los Santos NP discussed with .  admits there was misunderstanding between himself and [facility]plant. Juanita Menjivar shared that if family requests transfer to [facility], pt would be priority acceptance but not guaranteed.     Juanita Menjivar: [facility] [phone]    1750: Extended Dwell 20g placed in R upper arm. Levophed gtt switched to this IV.     1908: Shiela HURLEY at bedside. Updates given regarding patient's confusion, agitation, UOP, and lab results. MD stated probable transfer tomorrow to [facility].

## 2024-02-16 NOTE — CONSULTS
CRITICAL CARE ADMISSION NOTE      Name: Tiffanie Morgan   : 1970   MRN: [id number]   Date: 2/15/2024      Reason for ICU Admission: septic shock    ASSESSMENT and PLAN   Septic shock:  -refractory to IVF resuscitation with albumin and NS   -refractory to midodrine  -start peripheral NE  -start ceftriaxone   -she has chronic hypotension, target MAP > 60 or SBP > 90  -lactate remains elevated, will be slow to clear in the setting of ESLD    Chronic hyponatremia:  -Na 133  -trend.  Do not need to correct    Hepatic encephalopathy:  -cont lactulose  -ammonia < 10    Ascites:  -last tap last week  -may need para post resuscitation    Chronic thrombocytopenia:  -plts 61  -trend  -this is baseline for her       HISTORY OF PRESENT ILLNESS:   Ms Morgan is a 53 yof with ESLD due to ETOH listed for transplant at [facility] admitted to the ICU with septic shock due to UTI.      She has a standing order for para, last was last week.  Diagnositic done at bedside and body fluid does not suggest SBP.    UA is suggestive of UTI.      Na is 133 and she has chronic hyponatremia.     Abd US shows mild ascites and known cirrhosis.  I reviewed.     Fevers 102 PTA      ICU DAILY CHECKLIST     Code Status:full   DVT Prophylaxis:SCDs  T/L/D: PIVs  SUP: not indicated  Diet: regular  Activity Level: mobilize daily   ABCDEF Bundle/Checklist Completed:Yes  Disposition: Stay in ICU  Multidisciplinary Rounds Completed:  Yes  Patient/Family Updated: Yes      Review of Systems:     Increased abd girth  Denies fevers  + chills  Denies palpitations  Denies SOB  No falls  No syncope  Rare gerd  No LE edema   No vision changes  No rashes   No hematemesis or     Past Medical History:      has a past medical history of Anemia, Chronic bilateral low back pain, Depression, Encounter for long-term (current) use of other medications, LBP (low back pain), Liver disease, Mixed hyperlipidemia, OA (osteoarthritis), Obesity, Renal calculus or stone,

## 2024-02-16 NOTE — PROGRESS NOTES
CRITICAL CARE ADMISSION NOTE      Name: Tiffanie Morgan   : 1970   MRN: [id number]   Date: 2024      Reason for ICU Admission: septic shock    ASSESSMENT and PLAN   Septic shock:  -refractory to IVF resuscitation with albumin and NS   -Continue low dose norepinephrine, wean as able   -Continue ceftriaxone   -she has chronic hypotension, target MAP > 60 or SBP > 90  -lactate down trending,  will be slower to clear in the setting of ESLD    Chronic hyponatremia:  -Na 133  -trend.  Do not need to correct    Hepatic encephalopathy:  -cont lactulose  -ammonia < 10    Ascites:  -last tap last week  -may need para post resuscitation    Chronic thrombocytopenia:  -plts 58  -trend  -this is baseline for her       HISTORY OF PRESENT ILLNESS:   Ms Morgan is a 53 yof with ESLD due to ETOH listed for transplant at [facility] admitted to the ICU with septic shock due to UTI.      She has a standing order for para, last was last week.  Diagnositic done at bedside and body fluid does not suggest SBP.    UA is suggestive of UTI.      Na is 133 and she has chronic hyponatremia.     Abd US shows mild ascites and known cirrhosis.     Fevers 102 PTA      ICU DAILY CHECKLIST     Code Status:full   DVT Prophylaxis:SCDs  T/L/D: PIVs  SUP: not indicated  Diet: regular  Activity Level: mobilize daily   ABCDEF Bundle/Checklist Completed:Yes  Disposition: Stay in ICU  Multidisciplinary Rounds Completed:  Yes  Patient/Family Updated: Yes      Review of Systems:     Increased abd girth  Denies fevers  + chills  Denies palpitations  Denies SOB  No falls  No syncope  Rare gerd  No LE edema   No vision changes  No rashes   No hematemesis or     Past Medical History:      has a past medical history of Anemia, Chronic bilateral low back pain, Depression, Encounter for long-term (current) use of other medications, LBP (low back pain), Liver disease, Mixed hyperlipidemia, OA (osteoarthritis), Obesity, Renal calculus or stone, Tobacco

## 2024-02-16 NOTE — ED NOTES
RN messaged ICU provider about need for continuing lactic acid blood draws after 3 have been completed. Intensivist reports no more repeat lactic acid blood draws necessary until morning labs.

## 2024-02-16 NOTE — ED NOTES
TRANSFER - OUT REPORT:    Verbal report given to EBENEZER Lopez on Tiffanie Morgan  being transferred to  for routine progression of patient care       Report consisted of patient's Situation, Background, Assessment and   Recommendations(SBAR).     Information from the following report(s) Nurse Handoff Report, ED Encounter Summary, ED SBAR, Intake/Output, MAR, and Recent Results was reviewed with the receiving nurse.    [place] Fall Assessment:    Presents to emergency department  because of falls (Syncope, seizure, or loss of consciousness): No  Age > 70: No  Altered Mental Status, Intoxication with alcohol or substance confusion (Disorientation, impaired judgment, poor safety awaremess, or inability to follow instructions): No  Impaired Mobility: Ambulates or transfers with assistive devices or assistance; Unable to ambulate or transer.: No  Nursing Judgement: No          Lines:   Peripheral IV 02/15/24 Right;Dorsal Forearm (Active)   Site Assessment Clean, dry & intact 02/15/24 0737        Opportunity for questions and clarification was provided.      Patient transported with:  Monitor and Registered Nurse

## 2024-02-16 NOTE — CARE COORDINATION
02/16/24 1012   Readmission Assessment   Number of Days since last admission? 8-30 days   Previous Disposition Home with Family   Who is being Interviewed Patient   What was the patient's/caregiver's perception as to why they think they needed to return back to the hospital? Other (Comment)  (Progression of disease)   Did you visit your Primary Care Physician after you left the hospital, before you returned this time? Yes   Did you see a specialist, such as Cardiac, Pulmonary, Orthopedic Physician, etc. after you left the hospital? Yes   Who advised the patient to return to the hospital? Caregiver   Does the patient report anything that got in the way of taking their medications? No

## 2024-02-16 NOTE — ED NOTES
Pt negative for orthostatic blood pressures. Pt ambulatory to restroom. RN standby assist. Pt denies dizziness, lightheadedness, or shortness of breath upon ambulation.

## 2024-02-16 NOTE — CARE COORDINATION
Care Management Initial Assessment       RUR: 20% high   Readmission? Yes      02/16/24 0956   Service Assessment   Patient Orientation Alert and Oriented;Person;Place;Situation;Self   Cognition Alert   History Provided By Patient;Spouse  (Spouse Rosalino Morgan)   Primary Caregiver Spouse   Support Systems Spouse/Significant Other   Patient's Healthcare Decision Maker is: Legal Next of Kin  (Spouse)   PCP Verified by CM Yes  (Dr Homero Hoyos)   Last Visit to PCP Within last 3 months   Prior Functional Level Assistance with the following:;Bathing;Dressing;Toileting;Mobility   Current Functional Level Assistance with the following:;Bathing;Dressing;Toileting;Mobility   Can patient return to prior living arrangement Unknown at present   Ability to make needs known: Fair   Family able to assist with home care needs: Yes   Would you like for me to discuss the discharge plan with any other family members/significant others, and if so, who? Yes  (spouse)   Financial Resources Other (Comment)  (BCBS)   Community Resources None   Social/Functional History   Lives With Spouse   Type of Home House   Home Layout One level   Home Access Stairs to enter with rails   Entrance Stairs - Number of Steps 3   Entrance Stairs - Rails Both   Bathroom Shower/Tub Tub/Shower unit   Bathroom Toilet Standard   Bathroom Equipment Shower chair   Home Equipment None   Receives Help From Family   ADL Assistance Needs assistance   Bath Supervision   Dressing Supervision   Grooming Supervision   Feeding Independent   Toileting Independent   Ambulation Assistance Independent   Transfer Assistance Independent   Active  No   Patient's  Info Spouse   Mode of Transportation Car   Occupation Other(comment)  (Patient denied disability, Have hired a  to assist)   Discharge Planning   Living Arrangements Spouse/Significant Other   Current Services Prior To Admission None   Potential Assistance Purchasing Medications No   Type of Home

## 2024-02-17 LAB
ACCESSION NUMBER, LLC1M: ABNORMAL
ACINETOBACTER CALCOAC BAUMANNII COMPLEX BY PCR: NOT DETECTED
ALBUMIN SERPL-MCNC: 3.2 G/DL (ref 3.5–5)
ALBUMIN/GLOB SERPL: 1 (ref 1.1–2.2)
ALP SERPL-CCNC: 75 U/L (ref 45–117)
ALT SERPL-CCNC: 28 U/L (ref 12–78)
ANION GAP SERPL CALC-SCNC: 8 MMOL/L (ref 5–15)
AST SERPL-CCNC: 57 U/L (ref 15–37)
B FRAGILIS DNA BLD POS QL NAA+NON-PROBE: NOT DETECTED
BILIRUB SERPL-MCNC: 9.6 MG/DL (ref 0.2–1)
BIOFIRE TEST COMMENT: ABNORMAL
BLACTX-M ISLT/SPM QL: NOT DETECTED
BLAIMP ISLT/SPM QL: NOT DETECTED
BLAKPC BLD POS QL NAA+NON-PROBE: NOT DETECTED
BLAOXA-48-LIKE ISLT/SPM QL: NOT DETECTED
BLAVIM ISLT/SPM QL: NOT DETECTED
BUN SERPL-MCNC: 30 MG/DL (ref 6–20)
BUN/CREAT SERPL: 21 (ref 12–20)
C ALBICANS DNA BLD POS QL NAA+NON-PROBE: NOT DETECTED
C AURIS DNA BLD POS QL NAA+NON-PROBE: NOT DETECTED
C GATTII+NEOFOR DNA BLD POS QL NAA+N-PRB: NOT DETECTED
C GLABRATA DNA BLD POS QL NAA+NON-PROBE: NOT DETECTED
C KRUSEI DNA BLD POS QL NAA+NON-PROBE: NOT DETECTED
C PARAP DNA BLD POS QL NAA+NON-PROBE: NOT DETECTED
C TROPICLS DNA BLD POS QL NAA+NON-PROBE: NOT DETECTED
CALCIUM SERPL-MCNC: 9.9 MG/DL (ref 8.5–10.1)
CHLORIDE SERPL-SCNC: 107 MMOL/L (ref 97–108)
CO2 SERPL-SCNC: 25 MMOL/L (ref 21–32)
COLISTIN RES MCR-1 ISLT/SPM QL: NOT DETECTED
CREAT SERPL-MCNC: 1.44 MG/DL (ref 0.55–1.02)
E CLOAC COMP DNA BLD POS NAA+NON-PROBE: NOT DETECTED
E COLI DNA BLD POS QL NAA+NON-PROBE: DETECTED
E FAECALIS DNA BLD POS QL NAA+NON-PROBE: NOT DETECTED
E FAECIUM DNA BLD POS QL NAA+NON-PROBE: NOT DETECTED
ENTEROBACTERALES DNA BLD POS NAA+N-PRB: DETECTED
ERYTHROCYTE [DISTWIDTH] IN BLOOD BY AUTOMATED COUNT: 15.5 % (ref 11.5–14.5)
GLOBULIN SER CALC-MCNC: 3.2 G/DL (ref 2–4)
GLUCOSE BLD STRIP.AUTO-MCNC: 262 MG/DL (ref 65–117)
GLUCOSE BLD STRIP.AUTO-MCNC: 264 MG/DL (ref 65–117)
GLUCOSE BLD STRIP.AUTO-MCNC: 369 MG/DL (ref 65–117)
GLUCOSE BLD STRIP.AUTO-MCNC: 413 MG/DL (ref 65–117)
GLUCOSE SERPL-MCNC: 284 MG/DL (ref 65–100)
GP B STREP DNA BLD POS QL NAA+NON-PROBE: NOT DETECTED
HAEM INFLU DNA BLD POS QL NAA+NON-PROBE: NOT DETECTED
HCT VFR BLD AUTO: 26.6 % (ref 35–47)
HGB BLD-MCNC: 8.7 G/DL (ref 11.5–16)
K OXYTOCA DNA BLD POS QL NAA+NON-PROBE: NOT DETECTED
KLEBSIELLA SP DNA BLD POS QL NAA+NON-PRB: NOT DETECTED
KLEBSIELLA SP DNA BLD POS QL NAA+NON-PRB: NOT DETECTED
L MONOCYTOG DNA BLD POS QL NAA+NON-PROBE: NOT DETECTED
MAGNESIUM SERPL-MCNC: 2 MG/DL (ref 1.6–2.4)
MCH RBC QN AUTO: 36.1 PG (ref 26–34)
MCHC RBC AUTO-ENTMCNC: 32.7 G/DL (ref 30–36.5)
MCV RBC AUTO: 110.4 FL (ref 80–99)
N MEN DNA BLD POS QL NAA+NON-PROBE: NOT DETECTED
NRBC # BLD: 0 K/UL (ref 0–0.01)
NRBC BLD-RTO: 0 PER 100 WBC
P AERUGINOSA DNA BLD POS NAA+NON-PROBE: NOT DETECTED
PHOSPHATE SERPL-MCNC: 2 MG/DL (ref 2.6–4.7)
PLATELET # BLD AUTO: 49 K/UL (ref 150–400)
PMV BLD AUTO: 11.9 FL (ref 8.9–12.9)
POTASSIUM SERPL-SCNC: 3.8 MMOL/L (ref 3.5–5.1)
PROT SERPL-MCNC: 6.4 G/DL (ref 6.4–8.2)
PROTEUS SP DNA BLD POS QL NAA+NON-PROBE: NOT DETECTED
RBC # BLD AUTO: 2.41 M/UL (ref 3.8–5.2)
RESISTANT GENE NDM BY PCR: NOT DETECTED
RESISTANT GENE TARGETS: ABNORMAL
S AUREUS DNA BLD POS QL NAA+NON-PROBE: NOT DETECTED
S AUREUS+CONS DNA BLD POS NAA+NON-PROBE: NOT DETECTED
S EPIDERMIDIS DNA BLD POS QL NAA+NON-PRB: NOT DETECTED
S LUGDUNENSIS DNA BLD POS QL NAA+NON-PRB: NOT DETECTED
S MALTOPHILIA DNA BLD POS QL NAA+NON-PRB: NOT DETECTED
S MARCESCENS DNA BLD POS NAA+NON-PROBE: NOT DETECTED
S PNEUM DNA BLD POS QL NAA+NON-PROBE: NOT DETECTED
S PYO DNA BLD POS QL NAA+NON-PROBE: NOT DETECTED
SALMONELLA DNA BLD POS QL NAA+NON-PROBE: NOT DETECTED
SERVICE CMNT-IMP: ABNORMAL
SODIUM SERPL-SCNC: 140 MMOL/L (ref 136–145)
STREPTOCOCCUS DNA BLD POS NAA+NON-PROBE: NOT DETECTED
WBC # BLD AUTO: 5 K/UL (ref 3.6–11)

## 2024-02-17 PROCEDURE — 80053 COMPREHEN METABOLIC PANEL: CPT

## 2024-02-17 PROCEDURE — 2500000003 HC RX 250 WO HCPCS: Performed by: NURSE PRACTITIONER

## 2024-02-17 PROCEDURE — 6370000000 HC RX 637 (ALT 250 FOR IP): Performed by: NURSE PRACTITIONER

## 2024-02-17 PROCEDURE — 6370000000 HC RX 637 (ALT 250 FOR IP): Performed by: INTERNAL MEDICINE

## 2024-02-17 PROCEDURE — 2580000003 HC RX 258: Performed by: NURSE PRACTITIONER

## 2024-02-17 PROCEDURE — 83735 ASSAY OF MAGNESIUM: CPT

## 2024-02-17 PROCEDURE — 36415 COLL VENOUS BLD VENIPUNCTURE: CPT

## 2024-02-17 PROCEDURE — 82962 GLUCOSE BLOOD TEST: CPT

## 2024-02-17 PROCEDURE — 99233 SBSQ HOSP IP/OBS HIGH 50: CPT | Performed by: INTERNAL MEDICINE

## 2024-02-17 PROCEDURE — 84100 ASSAY OF PHOSPHORUS: CPT

## 2024-02-17 PROCEDURE — 85027 COMPLETE CBC AUTOMATED: CPT

## 2024-02-17 PROCEDURE — 6360000002 HC RX W HCPCS: Performed by: NURSE PRACTITIONER

## 2024-02-17 PROCEDURE — 6370000000 HC RX 637 (ALT 250 FOR IP): Performed by: HOSPITALIST

## 2024-02-17 PROCEDURE — 2000000000 HC ICU R&B

## 2024-02-17 RX ORDER — NOREPINEPHRINE BITARTRATE 0.06 MG/ML
1-20 INJECTION, SOLUTION INTRAVENOUS CONTINUOUS
Status: DISCONTINUED | OUTPATIENT
Start: 2024-02-18 | End: 2024-02-22

## 2024-02-17 RX ORDER — INSULIN LISPRO 100 [IU]/ML
8 INJECTION, SOLUTION INTRAVENOUS; SUBCUTANEOUS ONCE
Status: COMPLETED | OUTPATIENT
Start: 2024-02-17 | End: 2024-02-17

## 2024-02-17 RX ADMIN — ACETAMINOPHEN 650 MG: 325 TABLET ORAL at 04:41

## 2024-02-17 RX ADMIN — ACETAMINOPHEN 650 MG: 325 TABLET ORAL at 20:23

## 2024-02-17 RX ADMIN — INSULIN LISPRO 8 UNITS: 100 INJECTION, SOLUTION INTRAVENOUS; SUBCUTANEOUS at 22:22

## 2024-02-17 RX ADMIN — INSULIN LISPRO 4 UNITS: 100 INJECTION, SOLUTION INTRAVENOUS; SUBCUTANEOUS at 11:12

## 2024-02-17 RX ADMIN — INSULIN HUMAN 8 UNITS: 100 INJECTION, SUSPENSION SUBCUTANEOUS at 20:12

## 2024-02-17 RX ADMIN — INSULIN HUMAN 4 UNITS: 100 INJECTION, SUSPENSION SUBCUTANEOUS at 22:22

## 2024-02-17 RX ADMIN — MIDODRINE HYDROCHLORIDE 10 MG: 5 TABLET ORAL at 04:41

## 2024-02-17 RX ADMIN — INSULIN LISPRO 4 UNITS: 100 INJECTION, SOLUTION INTRAVENOUS; SUBCUTANEOUS at 08:34

## 2024-02-17 RX ADMIN — MIDODRINE HYDROCHLORIDE 10 MG: 5 TABLET ORAL at 15:18

## 2024-02-17 RX ADMIN — MIDODRINE HYDROCHLORIDE 10 MG: 5 TABLET ORAL at 22:22

## 2024-02-17 RX ADMIN — RIFAXIMIN 400 MG: 200 TABLET ORAL at 21:07

## 2024-02-17 RX ADMIN — TRAMADOL HYDROCHLORIDE 50 MG: 50 TABLET ORAL at 13:50

## 2024-02-17 RX ADMIN — RIFAXIMIN 400 MG: 200 TABLET ORAL at 13:45

## 2024-02-17 RX ADMIN — LACTULOSE 30 G: 20 SOLUTION ORAL at 13:41

## 2024-02-17 RX ADMIN — INSULIN LISPRO 8 UNITS: 100 INJECTION, SOLUTION INTRAVENOUS; SUBCUTANEOUS at 16:49

## 2024-02-17 RX ADMIN — LACTULOSE 30 G: 20 SOLUTION ORAL at 21:07

## 2024-02-17 RX ADMIN — WATER 1000 MG: 1 INJECTION INTRAMUSCULAR; INTRAVENOUS; SUBCUTANEOUS at 08:24

## 2024-02-17 RX ADMIN — POTASSIUM PHOSPHATE, MONOBASIC AND POTASSIUM PHOSPHATE, DIBASIC 30 MMOL: 224; 236 INJECTION, SOLUTION, CONCENTRATE INTRAVENOUS at 08:26

## 2024-02-17 RX ADMIN — TRAMADOL HYDROCHLORIDE 50 MG: 50 TABLET ORAL at 21:17

## 2024-02-17 RX ADMIN — TRAMADOL HYDROCHLORIDE 50 MG: 50 TABLET ORAL at 05:20

## 2024-02-17 ASSESSMENT — PAIN SCALES - GENERAL
PAINLEVEL_OUTOF10: 2
PAINLEVEL_OUTOF10: 5
PAINLEVEL_OUTOF10: 0
PAINLEVEL_OUTOF10: 10
PAINLEVEL_OUTOF10: 0
PAINLEVEL_OUTOF10: 0
PAINLEVEL_OUTOF10: 4

## 2024-02-17 ASSESSMENT — PAIN DESCRIPTION - LOCATION
LOCATION: BACK
LOCATION: BACK

## 2024-02-17 ASSESSMENT — PAIN DESCRIPTION - ORIENTATION: ORIENTATION: MID

## 2024-02-17 ASSESSMENT — PAIN DESCRIPTION - DESCRIPTORS: DESCRIPTORS: SHARP

## 2024-02-17 NOTE — PROGRESS NOTES
CRITICAL CARE ADMISSION NOTE      Name: Tiffanie Morgan   : 1970   MRN: [id number]   Date: 2024      Reason for ICU Admission: septic shock    ASSESSMENT and PLAN   Septic shock:  -refractory to IVF resuscitation with albumin and NS   -Continue low dose norepinephrine, wean as able   -Continue ceftriaxone   -she has chronic hypotension, target MAP > 60 or SBP > 90  -lactate down trending,  will be slower to clear in the setting of ESLD    Chronic hyponatremia:  -Sodium now 140  -trend    Hepatic encephalopathy:  -cont lactulose  -Trend ammonia  - For severe agitation will use phenobarbital     Ascites:  - May need paracentesis in the next week   - Will discuss with hepatology     Chronic thrombocytopenia:  -plts 49  -trend  -this is baseline for her       HISTORY OF PRESENT ILLNESS:   Ms Morgan is a 53 yof with ESLD due to ETOH listed for transplant at [facility] admitted to the ICU with septic shock due to UTI.      She has a standing order for para, last was last week.  Diagnositic done at bedside and body fluid does not suggest SBP.    UA is suggestive of UTI.       chronic hyponatremia.     Abd US shows mild ascites and known cirrhosis.       ICU DAILY CHECKLIST     Code Status:full   DVT Prophylaxis:SCDs  T/L/D: PIVs  SUP: not indicated  Diet: regular  Activity Level: mobilize daily   ABCDEF Bundle/Checklist Completed:Yes  Disposition: Stay in ICU  Multidisciplinary Rounds Completed:  Yes  Patient/Family Updated: Yes      Review of Systems:     Increased abd girth  Denies fevers  + chills  Denies palpitations  Denies SOB  No falls  No syncope  Rare gerd  No LE edema   No vision changes  No rashes   No hematemesis or     Past Medical History:      has a past medical history of Anemia, Chronic bilateral low back pain, Depression, Encounter for long-term (current) use of other medications, LBP (low back pain), Liver disease, Mixed hyperlipidemia, OA (osteoarthritis), Obesity, Renal calculus or  deterioration.    I personally spent 30 minutes of critical care time.  This is time spent at this critically ill patient's bedside actively involved in patient care as well as the coordination of care.  This does not include any procedural time which has been billed separately.      Ty De Los Santos, DNP, APRN, AGACNP-BC, CCRN  [facility]  2/17/2024

## 2024-02-17 NOTE — CONSULTS
[facility]      Doron Kuo MD, FACP, FACG, FAASLD      VANESSA Sim, [facility]   Siobhan Santoyoelkin, [facility]   Gretel Bijan, [facility]  Grey Bettencourt, [facility]   Arlyn Mora, [facility]   Lisa Escotoon, [facility]   [address], [address]   [place]  [address] [phone]   FAX: [phone]  [facility]   [address], [address]   [place]  [address] [phone]   FAX: [phone]       HEPATOLOGY PROGRESS NOTE  The patient is well known to me and regularly cared for at [facility].  She is a 53 y.o. female who was found to have cirrhosis in 5/2021 when she had a CT scan to evaluate umbilical hernia.  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       She has a history of consuming 10 drinks per day for several years but has been abstinent from alcohol since 6/2021 when she found out she had cirrhosis.     PBC has been treated with MAGDALENE       The patient has developed the following complications of cirrhosis: esophageal varices, variceal bleeding, possible hepatopulmonary syndrome, hepatic encephalopathy     She has completed Liver Transplant evaluation testing and was at [facility] for hr first appointment earlier this week.     She was brought to the ED because of worsening confusion and fevers.     In the ED Laboratory studies were significant for:    Sna 133, Scr 1.39 mg, AST 77, ALT 34, , TBILI 11.2 mg, ALB 2.8 gm, WBC 10.3, HB 9.0 gms, PLT 61, INR 2.5,     Imaging of the liver with Ultrasound demonstrated small amount of ascites.  Small sample of ascites aspirated.  Cell count was negative for SBP      Hospital Course:  Blood culture NGSF  Urine culture growing gm negative rods  On IV ABX   On IV albumin to treat FIONA  On NE to support

## 2024-02-17 NOTE — PROGRESS NOTES
treat FIONA  SBP now >120 and NE being weaned    MELD is down from 35 to 30    Hepatology Plan:  I updated [facility] LT team on current status.    She cannot get LT now and until she clears infection, probably 5 days.    By then MELD will have declined to high 20s and she would not be able to get LT.  Best plan is to continue to treat here, DC when ready and continue to monitor her in office until she is called for LT      ASSESSMENT AND PLAN:  Cirrhosis  The diagnosis of cirrhosis is based upon imaging, Fibroscan and laboratory studies.   Cirrhosis is secondary to PBC and alcohol. She has remained abstinent since 6/2021.       The CTP is 11.  Child class C.  The MELD score is 30.     The patient has completed liver transplant evaluation testing and has been seen by [facility] LT team several days ago.    I have communicated with [facility] Hepatology-LT team.  We will keep her e here for now and as long as her condition continues to improve.  No point in transferring if her MELD drops below 30 as it would be unlikely she would get a LT emergently.       Primary Biliary Cholangitis  The diagnosis is based upon a positive AMA and an elevation in ALP.  AMA was negative at [facility] in 9/2022 and 10/2023     A liver biopsy has not been performed.    Fibroscan in 8/2021  was 75.0 kPa which correlates with cirrhosis. The CAP score of 350 suggests hepatic steatosis.     The patient is being treated with MAGDALENE 1000 mg every day. Will continue MAGDALENE at 1000 mg daily.      She was taken off MAGDALENE when hosptialized at [facility] in 10/2023 and the ALP increased  We restarted on MAGDALENE.     Ascites   Ascites developed for the first time in 9/2023.  Paracentesis was perforemd for the first time in 9/2023.    She has small amount of ascites by US.  Sample of ascites was negative for SBP     Will hold diuretics because of FIONA     Acute kidney injury  The patient has developed an elevation in serum creatinine 1.39 mg  FIONA is secondary to the effects of cirrhosis and  diuretics.    It is unlikely this is HRS.     Will stop diuretics.  Sommers start IV albumin ay usual dose     Hyponatremia  This is secondary to cirrhosis and diuretics  The current NA is 126     Will stop diuretics  Will give IV albumin 25 gm Q6H.     Screening for Esophageal varices   Thee patient has esophageal varices with prior bleeding.  EGD in 9/2021 at [facility]. Banding performed.    EGD by MLS.  In 11/2021.  Banding performed.   EGD by MLS in 1/2022.  Banding performed.    EGD by MLS in 3/2023.  Banding performed.    EGD by MLS in 9/203.  Banding perfomred.       Hepatic encephalopathy   HE is being treated with Lactulose TID and Xifaxan BID  HE is well controlled.    Laculose is being tolerted with only mild intermittent diarrhea.  Will continue lactulose and xifaxan     Portal vein thrombosis  The patient was found to have partial PV thrombosis by MRI at [facility] in 9/2023  Anti-coagulation is not indicated at this time.  MRI in 1/2024 did not demonstrate PVT.  A CT scan at [facility] earlier this week did not demonstrate PVT.     Anemia   This is due to multifactorial causes including portal hypertension with chronic GI blood loss, cirrhosis and poor FE absorption     The most recent HB is 9.0 gms.    The most recent FE studies were from 1/2024.  The serum ferritin is 274.  The FE saturation is 43%.      Thrombocytopenia   This is secondary to cirrhosis.  There is no evidence of overt bleeding.    No treatment is required.  The platelet count is adequate for the patient to undergo procedures without the need for platelet transfusion or platelet growth factors.     Screening for Hepatocellular Carcinoma  HCC screening was last performed with MRI in 1/22024.    Will repeat US in 6 months.          PHYSICAL EXAMINATION:  VS: per nursing note  General:  Unresponsive.    Eyes:  Sclera icteric  ENT:  No oral lesions.  Thyroid normal.  Nodes:  No adenopathy.   Skin:  Spider angiomata.  Jaundice.  Respiratory:  Lungs clear to

## 2024-02-18 LAB
ALBUMIN SERPL-MCNC: 2.9 G/DL (ref 3.5–5)
ALBUMIN/GLOB SERPL: 0.8 (ref 1.1–2.2)
ALP SERPL-CCNC: 74 U/L (ref 45–117)
ALT SERPL-CCNC: 26 U/L (ref 12–78)
ANION GAP SERPL CALC-SCNC: 5 MMOL/L (ref 5–15)
AST SERPL-CCNC: 44 U/L (ref 15–37)
BACTERIA SPEC CULT: ABNORMAL
BILIRUB SERPL-MCNC: 8.6 MG/DL (ref 0.2–1)
BUN SERPL-MCNC: 25 MG/DL (ref 6–20)
BUN/CREAT SERPL: 20 (ref 12–20)
CALCIUM SERPL-MCNC: 9.3 MG/DL (ref 8.5–10.1)
CC UR VC: ABNORMAL
CHLORIDE SERPL-SCNC: 106 MMOL/L (ref 97–108)
CO2 SERPL-SCNC: 25 MMOL/L (ref 21–32)
CREAT SERPL-MCNC: 1.24 MG/DL (ref 0.55–1.02)
ERYTHROCYTE [DISTWIDTH] IN BLOOD BY AUTOMATED COUNT: 15.3 % (ref 11.5–14.5)
GLOBULIN SER CALC-MCNC: 3.5 G/DL (ref 2–4)
GLUCOSE BLD STRIP.AUTO-MCNC: 276 MG/DL (ref 65–117)
GLUCOSE BLD STRIP.AUTO-MCNC: 318 MG/DL (ref 65–117)
GLUCOSE BLD STRIP.AUTO-MCNC: 364 MG/DL (ref 65–117)
GLUCOSE BLD STRIP.AUTO-MCNC: 442 MG/DL (ref 65–117)
GLUCOSE SERPL-MCNC: 261 MG/DL (ref 65–100)
HCT VFR BLD AUTO: 26.4 % (ref 35–47)
HGB BLD-MCNC: 8.4 G/DL (ref 11.5–16)
INR PPP: 2.1 (ref 0.9–1.1)
MAGNESIUM SERPL-MCNC: 1.9 MG/DL (ref 1.6–2.4)
MCH RBC QN AUTO: 35.3 PG (ref 26–34)
MCHC RBC AUTO-ENTMCNC: 31.8 G/DL (ref 30–36.5)
MCV RBC AUTO: 110.9 FL (ref 80–99)
NRBC # BLD: 0 K/UL (ref 0–0.01)
NRBC BLD-RTO: 0 PER 100 WBC
PHOSPHATE SERPL-MCNC: 2 MG/DL (ref 2.6–4.7)
PLATELET # BLD AUTO: 52 K/UL (ref 150–400)
PMV BLD AUTO: 10.6 FL (ref 8.9–12.9)
POTASSIUM SERPL-SCNC: 3.8 MMOL/L (ref 3.5–5.1)
PROCALCITONIN SERPL-MCNC: 3.59 NG/ML
PROT SERPL-MCNC: 6.4 G/DL (ref 6.4–8.2)
PROTHROMBIN TIME: 20.7 SEC (ref 9–11.1)
RBC # BLD AUTO: 2.38 M/UL (ref 3.8–5.2)
SERVICE CMNT-IMP: ABNORMAL
SODIUM SERPL-SCNC: 136 MMOL/L (ref 136–145)
WBC # BLD AUTO: 6.3 K/UL (ref 3.6–11)

## 2024-02-18 PROCEDURE — 94760 N-INVAS EAR/PLS OXIMETRY 1: CPT

## 2024-02-18 PROCEDURE — 2000000000 HC ICU R&B

## 2024-02-18 PROCEDURE — 83735 ASSAY OF MAGNESIUM: CPT

## 2024-02-18 PROCEDURE — 6360000002 HC RX W HCPCS: Performed by: NURSE PRACTITIONER

## 2024-02-18 PROCEDURE — 6370000000 HC RX 637 (ALT 250 FOR IP): Performed by: NURSE PRACTITIONER

## 2024-02-18 PROCEDURE — 6370000000 HC RX 637 (ALT 250 FOR IP): Performed by: INTERNAL MEDICINE

## 2024-02-18 PROCEDURE — 84145 PROCALCITONIN (PCT): CPT

## 2024-02-18 PROCEDURE — 6370000000 HC RX 637 (ALT 250 FOR IP): Performed by: HOSPITALIST

## 2024-02-18 PROCEDURE — 85027 COMPLETE CBC AUTOMATED: CPT

## 2024-02-18 PROCEDURE — 82962 GLUCOSE BLOOD TEST: CPT

## 2024-02-18 PROCEDURE — 85610 PROTHROMBIN TIME: CPT

## 2024-02-18 PROCEDURE — 36415 COLL VENOUS BLD VENIPUNCTURE: CPT

## 2024-02-18 PROCEDURE — 99233 SBSQ HOSP IP/OBS HIGH 50: CPT | Performed by: INTERNAL MEDICINE

## 2024-02-18 PROCEDURE — 84100 ASSAY OF PHOSPHORUS: CPT

## 2024-02-18 PROCEDURE — 2580000003 HC RX 258: Performed by: NURSE PRACTITIONER

## 2024-02-18 PROCEDURE — 80053 COMPREHEN METABOLIC PANEL: CPT

## 2024-02-18 RX ORDER — INSULIN LISPRO 100 [IU]/ML
12 INJECTION, SOLUTION INTRAVENOUS; SUBCUTANEOUS ONCE
Status: COMPLETED | OUTPATIENT
Start: 2024-02-18 | End: 2024-02-18

## 2024-02-18 RX ORDER — INSULIN LISPRO 100 [IU]/ML
14 INJECTION, SOLUTION INTRAVENOUS; SUBCUTANEOUS ONCE
Status: COMPLETED | OUTPATIENT
Start: 2024-02-18 | End: 2024-02-18

## 2024-02-18 RX ADMIN — TRAMADOL HYDROCHLORIDE 50 MG: 50 TABLET ORAL at 10:50

## 2024-02-18 RX ADMIN — MIDODRINE HYDROCHLORIDE 10 MG: 5 TABLET ORAL at 06:23

## 2024-02-18 RX ADMIN — TRAMADOL HYDROCHLORIDE 50 MG: 50 TABLET ORAL at 20:28

## 2024-02-18 RX ADMIN — MIDODRINE HYDROCHLORIDE 10 MG: 5 TABLET ORAL at 10:50

## 2024-02-18 RX ADMIN — INSULIN HUMAN 6 UNITS: 100 INJECTION, SUSPENSION SUBCUTANEOUS at 20:24

## 2024-02-18 RX ADMIN — INSULIN LISPRO 12 UNITS: 100 INJECTION, SOLUTION INTRAVENOUS; SUBCUTANEOUS at 17:23

## 2024-02-18 RX ADMIN — RIFAXIMIN 400 MG: 200 TABLET ORAL at 14:02

## 2024-02-18 RX ADMIN — RIFAXIMIN 400 MG: 200 TABLET ORAL at 20:31

## 2024-02-18 RX ADMIN — LACTULOSE 30 G: 20 SOLUTION ORAL at 08:24

## 2024-02-18 RX ADMIN — LACTULOSE 30 G: 20 SOLUTION ORAL at 20:24

## 2024-02-18 RX ADMIN — PANTOPRAZOLE SODIUM 40 MG: 40 TABLET, DELAYED RELEASE ORAL at 06:23

## 2024-02-18 RX ADMIN — INSULIN HUMAN 16 UNITS: 100 INJECTION, SUSPENSION SUBCUTANEOUS at 17:22

## 2024-02-18 RX ADMIN — INSULIN LISPRO 4 UNITS: 100 INJECTION, SOLUTION INTRAVENOUS; SUBCUTANEOUS at 20:23

## 2024-02-18 RX ADMIN — WATER 1000 MG: 1 INJECTION INTRAMUSCULAR; INTRAVENOUS; SUBCUTANEOUS at 08:23

## 2024-02-18 RX ADMIN — MIDODRINE HYDROCHLORIDE 10 MG: 5 TABLET ORAL at 22:29

## 2024-02-18 RX ADMIN — INSULIN HUMAN 12 UNITS: 100 INJECTION, SUSPENSION SUBCUTANEOUS at 07:05

## 2024-02-18 RX ADMIN — RIFAXIMIN 400 MG: 200 TABLET ORAL at 08:29

## 2024-02-18 RX ADMIN — LACTULOSE 30 G: 20 SOLUTION ORAL at 14:02

## 2024-02-18 RX ADMIN — INSULIN LISPRO 4 UNITS: 100 INJECTION, SOLUTION INTRAVENOUS; SUBCUTANEOUS at 08:32

## 2024-02-18 RX ADMIN — MIDODRINE HYDROCHLORIDE 10 MG: 5 TABLET ORAL at 17:23

## 2024-02-18 RX ADMIN — INSULIN LISPRO 14 UNITS: 100 INJECTION, SOLUTION INTRAVENOUS; SUBCUTANEOUS at 12:14

## 2024-02-18 ASSESSMENT — PAIN SCALES - GENERAL
PAINLEVEL_OUTOF10: 0
PAINLEVEL_OUTOF10: 0
PAINLEVEL_OUTOF10: 7
PAINLEVEL_OUTOF10: 0
PAINLEVEL_OUTOF10: 4

## 2024-02-18 ASSESSMENT — PAIN DESCRIPTION - LOCATION: LOCATION: BACK

## 2024-02-18 NOTE — PROGRESS NOTES
[facility]      Doron Kuo MD, FACP, FACG, FAASLD      VANESSA Sim, [facility]   Siobhan Santoyoelkin, [facility]   Gretel Bijan, [facility]  Grey Bettencourt, [facility]   Arlyn Mora, [facility]   Lisa Escotoon, [facility]   [address], [address]   [place]  [address] [phone]   FAX: [phone]  [facility]   [address], [address]   [place]  [address] [phone]   FAX: [phone]       HEPATOLOGY PROGRESS NOTE  The patient is well known to me and regularly cared for at [facility].  She is a 53 y.o. female who was found to have cirrhosis in 5/2021 when she had a CT scan to evaluate umbilical hernia.  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       She has a history of consuming 10 drinks per day for several years but has been abstinent from alcohol since 6/2021 when she found out she had cirrhosis.     PBC has been treated with MAGDALENE       The patient has developed the following complications of cirrhosis: esophageal varices, variceal bleeding, possible hepatopulmonary syndrome, hepatic encephalopathy     She has completed Liver Transplant evaluation testing and was at [facility] for hr first appointment earlier this week.     She was brought to the ED because of worsening confusion and fevers.     In the ED Laboratory studies were significant for:    Sna 133, Scr 1.39 mg, AST 77, ALT 34, , TBILI 11.2 mg, ALB 2.8 gm, WBC 10.3, HB 9.0 gms, PLT 61, INR 2.5,     Imaging of the liver with Ultrasound demonstrated small amount of ascites.  Small sample of ascites aspirated.  Cell count was negative for SBP    Hospital Course:  Blood culture growing E Coli  Urine culture growing E Coli  On IV ABX  On IV albumin to treat FIONA  No off NE    MELD  angiomata.  Jaundice.  Respiratory:  Lungs clear to auscultation.   Cardiovascular:  Regular heart rate.  Abdomen:  Soft, non-tender, Some ascites may be present.    Extremities:  No lower extremity edema.  Muscle wasting.  Neurologic:  Unable to respond.  Unable to assess.      LABORATORY:   Latest Reference Range & Units 02/16/24 04:24 02/17/24 04:50 02/18/24 05:19   Sodium 136 - 145 mmol/L 133 (L) 140 136   Potassium 3.5 - 5.1 mmol/L 4.0 3.8 3.8   Chloride 97 - 108 mmol/L 103 107 106   CO2 21 - 32 mmol/L 24 25 25   BUN,BUNPL 6 - 20 MG/DL 27 (H) 30 (H) 25 (H)   Creatinine 0.55 - 1.02 MG/DL 1.82 (H) 1.44 (H) 1.24 (H)   AMMONIA, PLASMA, 823015 <32 UMOL/L      Albumin 3.5 - 5.0 g/dL 3.4 (L) 3.2 (L) 2.9 (L)   Alk Phos 45 - 117 U/L 68 75 74   ALT 12 - 78 U/L 34 28 26   AST 15 - 37 U/L 79 (H) 57 (H) 44 (H)   BILIRUBIN TOTAL 0.2 - 1.0 MG/DL 12.2 (H) 9.6 (H) 8.6 (H)   WBC 3.6 - 11.0 K/uL 6.9 5.0 6.3   RBC 3.80 - 5.20 M/uL 2.37 (L) 2.41 (L) 2.38 (L)   Platelet Count 150 - 400 K/uL 58 (L) 49 (LL) 52 (L)   INR 0.9 - 1.1   2.5 (H)  2.1 (H)   (LL): Data is critically low  (L): Data is abnormally low  (H): Data is abnormally high      Doron Kuo MD  [address], [address]  [place]  [address] [phone]  [facility]

## 2024-02-18 NOTE — PROGRESS NOTES
Mother     Cancer Father          OBJECTIVE:     Labs and Data: Reviewed 24  Medications: Reviewed 24  Imaging: Reviewed 24      BP (!) 97/52   Pulse 78   Temp 97.7 °F (36.5 °C) (Bladder)   Resp 12   Ht 1.575 m (5' 2\")   Wt 73.6 kg (162 lb 4.1 oz)   SpO2 97%   BMI 29.68 kg/m²      Temp (24hrs), Av.5 °F (36.9 °C), Min:97.7 °F (36.5 °C), Max:100.3 °F (37.9 °C)         GEN: awake alert. No distress. Not confused  HEENT  -Head: NC/AT;  -Eyes: PERRL, EOMI. No discharge or redness;  -Ears: External ears are normal. No discharge   -Nose: Normal nares. No epistaxis.   -Mouth and throat: patient eating without difficulty . Dentition normal  NECK: Supple, with no masses.  CV: RRR, murmur  LUNGS: CTAB, no w/r/c.  ABD: Soft, NT, distended, normal bowel sounds  : Ferguson in place with copious output  SKIN: Warm, well perfused. No skin rashes or abnormal lesions.  MSK: Normal for patient  EXT: No clubbing, cyanosis. Edema RLE at baseline per patient /   NEURO: no focal deficits     Intake/Output:     Intake/Output Summary (Last 24 hours) at 2024 0946  Last data filed at 2024 0800  Gross per 24 hour   Intake 578.53 ml   Output 1590 ml   Net -1011.47 ml       Imaging  Imaging of the liver with Ultrasound demonstrated small amount of ascites.  Small sample of ascites aspirated.  Cell count was negative for SBP    Hb 8.4 (8.7, 8.6)  WBC 6.3  Plt 52      Na 138  Cr 1.24    CRITICAL CARE DOCUMENTATION. This patient is critically ill with systemic organ failure and life threatening illness.    I had a face to face encounter with the patient, reviewed and interpreted patient data including clinical events, labs, images, vital signs, I/O's, and examined patient.  I have discussed the case and the plan and management of the patient's care with the consulting services, the bedside nurses and the respiratory therapist.      NOTE OF PERSONAL INVOLVEMENT IN CARE   This patient has a high

## 2024-02-19 LAB
ALBUMIN SERPL-MCNC: 2.7 G/DL (ref 3.5–5)
ALBUMIN/GLOB SERPL: 0.9 (ref 1.1–2.2)
ALP SERPL-CCNC: 99 U/L (ref 45–117)
ALT SERPL-CCNC: 24 U/L (ref 12–78)
ANION GAP SERPL CALC-SCNC: 3 MMOL/L (ref 5–15)
AST SERPL-CCNC: 35 U/L (ref 15–37)
BACTERIA SPEC CULT: ABNORMAL
BACTERIA SPEC CULT: NORMAL
BILIRUB SERPL-MCNC: 6.9 MG/DL (ref 0.2–1)
BUN SERPL-MCNC: 20 MG/DL (ref 6–20)
BUN/CREAT SERPL: 18 (ref 12–20)
CALCIUM SERPL-MCNC: 9.1 MG/DL (ref 8.5–10.1)
CHLORIDE SERPL-SCNC: 107 MMOL/L (ref 97–108)
CO2 SERPL-SCNC: 27 MMOL/L (ref 21–32)
CREAT SERPL-MCNC: 1.13 MG/DL (ref 0.55–1.02)
ERYTHROCYTE [DISTWIDTH] IN BLOOD BY AUTOMATED COUNT: 15.3 % (ref 11.5–14.5)
GLOBULIN SER CALC-MCNC: 3.1 G/DL (ref 2–4)
GLUCOSE BLD STRIP.AUTO-MCNC: 270 MG/DL (ref 65–117)
GLUCOSE BLD STRIP.AUTO-MCNC: 274 MG/DL (ref 65–117)
GLUCOSE BLD STRIP.AUTO-MCNC: 284 MG/DL (ref 65–117)
GLUCOSE BLD STRIP.AUTO-MCNC: 286 MG/DL (ref 65–117)
GLUCOSE BLD STRIP.AUTO-MCNC: 388 MG/DL (ref 65–117)
GLUCOSE SERPL-MCNC: 251 MG/DL (ref 65–100)
GRAM STN SPEC: NORMAL
GRAM STN SPEC: NORMAL
HCT VFR BLD AUTO: 24.3 % (ref 35–47)
HGB BLD-MCNC: 7.9 G/DL (ref 11.5–16)
INR PPP: 1.9 (ref 0.9–1.1)
MAGNESIUM SERPL-MCNC: 2 MG/DL (ref 1.6–2.4)
MCH RBC QN AUTO: 35.6 PG (ref 26–34)
MCHC RBC AUTO-ENTMCNC: 32.5 G/DL (ref 30–36.5)
MCV RBC AUTO: 109.5 FL (ref 80–99)
NRBC # BLD: 0 K/UL (ref 0–0.01)
NRBC BLD-RTO: 0 PER 100 WBC
PHOSPHATE SERPL-MCNC: 2.2 MG/DL (ref 2.6–4.7)
PLATELET # BLD AUTO: 59 K/UL (ref 150–400)
PMV BLD AUTO: 11.4 FL (ref 8.9–12.9)
POTASSIUM SERPL-SCNC: 3.7 MMOL/L (ref 3.5–5.1)
PROT SERPL-MCNC: 5.8 G/DL (ref 6.4–8.2)
PROTHROMBIN TIME: 19.3 SEC (ref 9–11.1)
RBC # BLD AUTO: 2.22 M/UL (ref 3.8–5.2)
SERVICE CMNT-IMP: ABNORMAL
SERVICE CMNT-IMP: NORMAL
SODIUM SERPL-SCNC: 137 MMOL/L (ref 136–145)
WBC # BLD AUTO: 5.4 K/UL (ref 3.6–11)

## 2024-02-19 PROCEDURE — 2060000000 HC ICU INTERMEDIATE R&B

## 2024-02-19 PROCEDURE — 6370000000 HC RX 637 (ALT 250 FOR IP): Performed by: NURSE PRACTITIONER

## 2024-02-19 PROCEDURE — 6370000000 HC RX 637 (ALT 250 FOR IP): Performed by: INTERNAL MEDICINE

## 2024-02-19 PROCEDURE — 85027 COMPLETE CBC AUTOMATED: CPT

## 2024-02-19 PROCEDURE — 85610 PROTHROMBIN TIME: CPT

## 2024-02-19 PROCEDURE — 6370000000 HC RX 637 (ALT 250 FOR IP): Performed by: HOSPITALIST

## 2024-02-19 PROCEDURE — 82962 GLUCOSE BLOOD TEST: CPT

## 2024-02-19 PROCEDURE — 80053 COMPREHEN METABOLIC PANEL: CPT

## 2024-02-19 PROCEDURE — 6360000002 HC RX W HCPCS: Performed by: NURSE PRACTITIONER

## 2024-02-19 PROCEDURE — 83735 ASSAY OF MAGNESIUM: CPT

## 2024-02-19 PROCEDURE — 84100 ASSAY OF PHOSPHORUS: CPT

## 2024-02-19 PROCEDURE — 36415 COLL VENOUS BLD VENIPUNCTURE: CPT

## 2024-02-19 PROCEDURE — 2580000003 HC RX 258: Performed by: NURSE PRACTITIONER

## 2024-02-19 PROCEDURE — 99232 SBSQ HOSP IP/OBS MODERATE 35: CPT | Performed by: INTERNAL MEDICINE

## 2024-02-19 RX ADMIN — RIFAXIMIN 400 MG: 200 TABLET ORAL at 13:59

## 2024-02-19 RX ADMIN — INSULIN LISPRO 4 UNITS: 100 INJECTION, SOLUTION INTRAVENOUS; SUBCUTANEOUS at 11:27

## 2024-02-19 RX ADMIN — MIDODRINE HYDROCHLORIDE 10 MG: 5 TABLET ORAL at 04:15

## 2024-02-19 RX ADMIN — INSULIN LISPRO 4 UNITS: 100 INJECTION, SOLUTION INTRAVENOUS; SUBCUTANEOUS at 08:37

## 2024-02-19 RX ADMIN — LACTULOSE 30 G: 20 SOLUTION ORAL at 13:59

## 2024-02-19 RX ADMIN — ACETAMINOPHEN 650 MG: 325 TABLET ORAL at 00:29

## 2024-02-19 RX ADMIN — INSULIN LISPRO 8 UNITS: 100 INJECTION, SOLUTION INTRAVENOUS; SUBCUTANEOUS at 17:12

## 2024-02-19 RX ADMIN — RIFAXIMIN 400 MG: 200 TABLET ORAL at 08:36

## 2024-02-19 RX ADMIN — MIDODRINE HYDROCHLORIDE 10 MG: 5 TABLET ORAL at 17:12

## 2024-02-19 RX ADMIN — MIDODRINE HYDROCHLORIDE 10 MG: 5 TABLET ORAL at 10:26

## 2024-02-19 RX ADMIN — WATER 1000 MG: 1 INJECTION INTRAMUSCULAR; INTRAVENOUS; SUBCUTANEOUS at 08:36

## 2024-02-19 RX ADMIN — INSULIN HUMAN 22 UNITS: 100 INJECTION, SUSPENSION SUBCUTANEOUS at 08:36

## 2024-02-19 RX ADMIN — INSULIN HUMAN 22 UNITS: 100 INJECTION, SUSPENSION SUBCUTANEOUS at 20:33

## 2024-02-19 RX ADMIN — MIDODRINE HYDROCHLORIDE 10 MG: 5 TABLET ORAL at 22:00

## 2024-02-19 RX ADMIN — TRAMADOL HYDROCHLORIDE 50 MG: 50 TABLET ORAL at 20:35

## 2024-02-19 RX ADMIN — RIFAXIMIN 400 MG: 200 TABLET ORAL at 20:35

## 2024-02-19 RX ADMIN — PANTOPRAZOLE SODIUM 40 MG: 40 TABLET, DELAYED RELEASE ORAL at 06:44

## 2024-02-19 RX ADMIN — LACTULOSE 30 G: 20 SOLUTION ORAL at 20:36

## 2024-02-19 RX ADMIN — TRAMADOL HYDROCHLORIDE 50 MG: 50 TABLET ORAL at 09:13

## 2024-02-19 RX ADMIN — LACTULOSE 30 G: 20 SOLUTION ORAL at 08:36

## 2024-02-19 ASSESSMENT — PAIN DESCRIPTION - LOCATION
LOCATION: BACK

## 2024-02-19 ASSESSMENT — PAIN DESCRIPTION - ORIENTATION
ORIENTATION: MID
ORIENTATION: MID

## 2024-02-19 ASSESSMENT — PAIN SCALES - GENERAL
PAINLEVEL_OUTOF10: 6
PAINLEVEL_OUTOF10: 3
PAINLEVEL_OUTOF10: 5
PAINLEVEL_OUTOF10: 6
PAINLEVEL_OUTOF10: 5

## 2024-02-19 ASSESSMENT — PAIN DESCRIPTION - PAIN TYPE: TYPE: CHRONIC PAIN
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
## 2024-02-19 NOTE — H&P
of infection until she makes it to her liver transplant.  Continue midodrine.      Cirrhosis, complicated by coagulopathy, thrombocytopenia, ascites, hepatic encephalopathy.  Currently undergoing evaluation for liver transplant  --Continue lactulose, rifaximin    Type II DM with hyperglycemia  -- Hold oral hypoglycemics  -- Monitor: Accu-Cheks AC&HS  -- Hyperglycemia management: Humalog correction scale  -- Hypoglycemic management: Protocol in place        DIET: ADULT DIET; Regular; 4 carb choices (60 gm/meal)   ISOLATION PRECAUTIONS: No active isolations  CODE STATUS: Full Code   Central Line:     DVT PROPHYLAXIS: SCDs  FUNCTIONAL STATUS PRIOR TO HOSPITALIZATION: Fully active and ambulatory; able to carry on all self-care without restriction.  Ambulatory status/function: By self   EARLY MOBILITY ASSESSMENT: Recommend routine ambulation while hospitalized with the assistance of nursing staff  ANTICIPATED DISCHARGE: Greater than 48 hours.  ANTICIPATED DISPOSITION: Home  EMERGENCY CONTACT/SURROGATE DECISION MAKER:   Extended Emergency Contact Information  Primary Emergency Contact: Rosalino Morgan  Home Phone: [phone]  Relation: Spouse  REVIEW OF SYSTEMS:  A comprehensive review of systems was negative except for that written in the History of Present Illness.     Past Medical History:   Diagnosis Date    Anemia     Chronic bilateral low back pain 12/26/2023    Depression     Encounter for long-term (current) use of other medications 7/8/2012    LBP (low back pain) 4/27/2010    Liver disease     cirrhosis    Mixed hyperlipidemia 7/8/2012    OA (osteoarthritis) 4/27/2010    Obesity 4/27/2010    Renal calculus or stone 4/27/2010    Tobacco use disorder 4/27/2010    Type II or unspecified type diabetes mellitus without mention of complication, not stated as uncontrolled 3/9/2015    Diagnosed 3/9/2015 with A1c of 8.8%; treatment initiated with dietary changes, exercise 5 days/wk and started on Metformin XR 500mg  within normal limits       [unfilled]    IMAGING:   XR ABDOMEN (KUB) (SINGLE AP VIEW)   Final Result   1.  No evidence of ileus or obstruction.      US ABDOMEN LIMITED   Final Result      Small volume ascites in the right abdomen. Cirrhosis. Splenomegaly.           XR CHEST PORTABLE   Final Result      Mild edema pattern.               ECG/ECHO:  [unfilled]       Notes reviewed from all clinical/nonclinical/nursing services involved in patient's clinical care. Care coordination discussions were held with appropriate clinical/nonclinical/ nursing providers based on care coordination needs.         Signed By: Cierra Smith MD     February 19, 2024         Please note that this dictation may have been completed with Dragon, the BufferBox voice recognition software.  Quite often unanticipated grammatical, syntax, homophones, and other interpretive errors are inadvertently transcribed by the computer software.  Please disregard these errors.  Please excuse any errors that have escaped final proofreading.

## 2024-02-19 NOTE — ADT AUTH CERT
April Warner #[id number] (CSN:[id number]) (:1970 53 y.o. F) (Adm: 02/15/24)  [id number]  PCP    SHAYNE BLAND  Demographics  Comment        Address   [address] [place] [address]    Home Phone   [phone]    Work Phone   [phone]    Mobile Phone   [phone]             Social Security Number       Insurance Information   BCBS    Marital Status       Holiness   Taoism               Admission Information    Arrival Date/Time: 02/15/2024 0708 Admit Date/Time: 02/15/2024 0721 IP Adm. Date/Time: 02/15/2024 1051   Admission Type: Emergency Point of Origin: Non-health Care Facility/self Admit Category:    Means of Arrival: Car Primary Service: Internal Medicine Secondary Service: N/A   Transfer Source:  Service Area: [facility] Unit: [facility] 7 INTENSIVE CARE   Admit Provider: Cierra Smith MD Attending Provider: Wiley Schofield MD Referring Provider:      Patient Diagnoses    Reasons for Admission Coded Admission Diagnoses    *Other specified hypotension [I95.89]    Severe sepsis (HCC) [A41.9, R65.20]    Urinary tract infection without hematuria, site unspecified [N39.0]     Insurance Payors as of 2024    BCBS    Plan: BCBS OUT OF STATE Group: [id number] Member: [id number]   Effective from: 2023 Subscriber: APRIL WARNER Subscriber ID: [id number]   Guarantor: APRIL WARNER     Documents Filed to Patient    Power of  Living Will Clinical Unknown Study Attachment Consent Form ABN Waiver After Visit Summary Lab Result Scan Code Status MyChart Status Advance Care Planning    Not on File  Not on File  Not on File  Not on File  Not on File  Not on File  Filed  Not on File  FULL [Updated on 02/15/24 1051]  Active Jump to the Activity      Auth/Cert Information    Open auth/cert linked to hospital account [id number]      Patient Contacts    Name Relation Home Work Mobile   Rosalino Warner

## 2024-02-20 LAB
ALBUMIN SERPL-MCNC: 2.9 G/DL (ref 3.5–5)
ALBUMIN/GLOB SERPL: 0.8 (ref 1.1–2.2)
ALP SERPL-CCNC: 145 U/L (ref 45–117)
ALT SERPL-CCNC: 29 U/L (ref 12–78)
ANION GAP SERPL CALC-SCNC: 3 MMOL/L (ref 5–15)
AST SERPL-CCNC: 43 U/L (ref 15–37)
BACTERIA SPEC CULT: NORMAL
BILIRUB SERPL-MCNC: 6.3 MG/DL (ref 0.2–1)
BUN SERPL-MCNC: 15 MG/DL (ref 6–20)
BUN/CREAT SERPL: 13 (ref 12–20)
CALCIUM SERPL-MCNC: 9.7 MG/DL (ref 8.5–10.1)
CHLORIDE SERPL-SCNC: 106 MMOL/L (ref 97–108)
CO2 SERPL-SCNC: 27 MMOL/L (ref 21–32)
CREAT SERPL-MCNC: 1.18 MG/DL (ref 0.55–1.02)
ERYTHROCYTE [DISTWIDTH] IN BLOOD BY AUTOMATED COUNT: 15.2 % (ref 11.5–14.5)
GLOBULIN SER CALC-MCNC: 3.7 G/DL (ref 2–4)
GLUCOSE BLD STRIP.AUTO-MCNC: 239 MG/DL (ref 65–117)
GLUCOSE BLD STRIP.AUTO-MCNC: 245 MG/DL (ref 65–117)
GLUCOSE BLD STRIP.AUTO-MCNC: 294 MG/DL (ref 65–117)
GLUCOSE BLD STRIP.AUTO-MCNC: 296 MG/DL (ref 65–117)
GLUCOSE BLD STRIP.AUTO-MCNC: 321 MG/DL (ref 65–117)
GLUCOSE SERPL-MCNC: 275 MG/DL (ref 65–100)
HCT VFR BLD AUTO: 28.3 % (ref 35–47)
HGB BLD-MCNC: 9.2 G/DL (ref 11.5–16)
INR PPP: 1.9 (ref 0.9–1.1)
MAGNESIUM SERPL-MCNC: 2.1 MG/DL (ref 1.6–2.4)
MCH RBC QN AUTO: 35.5 PG (ref 26–34)
MCHC RBC AUTO-ENTMCNC: 32.5 G/DL (ref 30–36.5)
MCV RBC AUTO: 109.3 FL (ref 80–99)
NRBC # BLD: 0 K/UL (ref 0–0.01)
NRBC BLD-RTO: 0 PER 100 WBC
PHOSPHATE SERPL-MCNC: 2.5 MG/DL (ref 2.6–4.7)
PLATELET # BLD AUTO: 81 K/UL (ref 150–400)
PMV BLD AUTO: 11 FL (ref 8.9–12.9)
POTASSIUM SERPL-SCNC: 3.7 MMOL/L (ref 3.5–5.1)
PROT SERPL-MCNC: 6.6 G/DL (ref 6.4–8.2)
PROTHROMBIN TIME: 18.8 SEC (ref 9–11.1)
RBC # BLD AUTO: 2.59 M/UL (ref 3.8–5.2)
SERVICE CMNT-IMP: ABNORMAL
SERVICE CMNT-IMP: NORMAL
SODIUM SERPL-SCNC: 136 MMOL/L (ref 136–145)
WBC # BLD AUTO: 6.3 K/UL (ref 3.6–11)

## 2024-02-20 PROCEDURE — 6360000002 HC RX W HCPCS: Performed by: HOSPITALIST

## 2024-02-20 PROCEDURE — 83735 ASSAY OF MAGNESIUM: CPT

## 2024-02-20 PROCEDURE — 6370000000 HC RX 637 (ALT 250 FOR IP): Performed by: INTERNAL MEDICINE

## 2024-02-20 PROCEDURE — 99254 IP/OBS CNSLTJ NEW/EST MOD 60: CPT | Performed by: INTERNAL MEDICINE

## 2024-02-20 PROCEDURE — 97161 PT EVAL LOW COMPLEX 20 MIN: CPT

## 2024-02-20 PROCEDURE — 82962 GLUCOSE BLOOD TEST: CPT

## 2024-02-20 PROCEDURE — 6370000000 HC RX 637 (ALT 250 FOR IP): Performed by: NURSE PRACTITIONER

## 2024-02-20 PROCEDURE — 2580000003 HC RX 258: Performed by: HOSPITALIST

## 2024-02-20 PROCEDURE — 85027 COMPLETE CBC AUTOMATED: CPT

## 2024-02-20 PROCEDURE — 97530 THERAPEUTIC ACTIVITIES: CPT

## 2024-02-20 PROCEDURE — 85610 PROTHROMBIN TIME: CPT

## 2024-02-20 PROCEDURE — 80053 COMPREHEN METABOLIC PANEL: CPT

## 2024-02-20 PROCEDURE — 84100 ASSAY OF PHOSPHORUS: CPT

## 2024-02-20 PROCEDURE — 6370000000 HC RX 637 (ALT 250 FOR IP): Performed by: HOSPITALIST

## 2024-02-20 PROCEDURE — 99222 1ST HOSP IP/OBS MODERATE 55: CPT | Performed by: INTERNAL MEDICINE

## 2024-02-20 PROCEDURE — 87040 BLOOD CULTURE FOR BACTERIA: CPT

## 2024-02-20 PROCEDURE — 2060000000 HC ICU INTERMEDIATE R&B

## 2024-02-20 PROCEDURE — 36415 COLL VENOUS BLD VENIPUNCTURE: CPT

## 2024-02-20 RX ADMIN — RIFAXIMIN 400 MG: 200 TABLET ORAL at 08:23

## 2024-02-20 RX ADMIN — RIFAXIMIN 400 MG: 200 TABLET ORAL at 14:50

## 2024-02-20 RX ADMIN — QUETIAPINE FUMARATE 25 MG: 25 TABLET ORAL at 21:40

## 2024-02-20 RX ADMIN — INSULIN LISPRO 6 UNITS: 100 INJECTION, SOLUTION INTRAVENOUS; SUBCUTANEOUS at 16:51

## 2024-02-20 RX ADMIN — INSULIN HUMAN 22 UNITS: 100 INJECTION, SUSPENSION SUBCUTANEOUS at 20:12

## 2024-02-20 RX ADMIN — INSULIN LISPRO 4 UNITS: 100 INJECTION, SOLUTION INTRAVENOUS; SUBCUTANEOUS at 12:07

## 2024-02-20 RX ADMIN — TRAMADOL HYDROCHLORIDE 50 MG: 50 TABLET ORAL at 21:40

## 2024-02-20 RX ADMIN — INSULIN LISPRO 2 UNITS: 100 INJECTION, SOLUTION INTRAVENOUS; SUBCUTANEOUS at 08:22

## 2024-02-20 RX ADMIN — MIDODRINE HYDROCHLORIDE 10 MG: 5 TABLET ORAL at 08:34

## 2024-02-20 RX ADMIN — RIFAXIMIN 400 MG: 200 TABLET ORAL at 20:12

## 2024-02-20 RX ADMIN — LACTULOSE 30 G: 20 SOLUTION ORAL at 20:11

## 2024-02-20 RX ADMIN — LACTULOSE 30 G: 20 SOLUTION ORAL at 14:50

## 2024-02-20 RX ADMIN — LACTULOSE 30 G: 20 SOLUTION ORAL at 08:23

## 2024-02-20 RX ADMIN — WATER 1000 MG: 1 INJECTION INTRAMUSCULAR; INTRAVENOUS; SUBCUTANEOUS at 08:22

## 2024-02-20 RX ADMIN — PANTOPRAZOLE SODIUM 40 MG: 40 TABLET, DELAYED RELEASE ORAL at 07:02

## 2024-02-20 RX ADMIN — INSULIN HUMAN 22 UNITS: 100 INJECTION, SUSPENSION SUBCUTANEOUS at 08:22

## 2024-02-20 RX ADMIN — MIDODRINE HYDROCHLORIDE 10 MG: 5 TABLET ORAL at 16:51

## 2024-02-20 RX ADMIN — MIDODRINE HYDROCHLORIDE 10 MG: 5 TABLET ORAL at 04:07

## 2024-02-20 RX ADMIN — TRAMADOL HYDROCHLORIDE 50 MG: 50 TABLET ORAL at 07:02

## 2024-02-20 ASSESSMENT — PAIN DESCRIPTION - LOCATION: LOCATION: BACK

## 2024-02-20 NOTE — PROGRESS NOTES
Hospitalist Progress Note  Ozzy Nicholas MD  Answering service: [phone]        Date of Service:  2024  NAME:  Tiffanie Morgan  :  1970  MRN:  [id number]      Admission Summary:     Patient admitted with septic shock due to UTI.  Patient required ICU admission from 2/15 to .    Interval history / Subjective:     Patient has no acute complaint.     Assessment & Plan:     Septic shock  -Patient presented with septic shock due to UTI  -Shock refractory to IV fluid resuscitation, albumin and midodrine  -Patient initially required vasopressors, admitted to ICU from 2/15 to   -Septic shock now resolved and off pressors, continue midodrine    Bacteremia  -E. coli growing in 1 of 2 bottles on initial blood cultures 2/15, likely so UTI  -Repeat blood cultures today, ID consult, continue Rocephin    Urinary tract infection  -Urinalysis shows large blood, positive nitrite, large leukocyte esterase, pyuria and bacteriuria  -Urine culture growing E. Coli  -Continue Rocephin    FIONA  -Likely prerenal and also ATN in the setting of shock  -FIONA improved    Ascites  -Patient with known history of liver cirrhosis with ascites  -Ascites fluid analysis on admission negative for SBP    Liver cirrhosis  -Etiology of cirrhosis is secondary to primary biliary cholangitis and alcohol  -Ongoing transplant evaluation at [facility]    Hepatic encephalopathy  -Stable, continue lactulose and rifaximin    Diabetes type 2  -On Lantus and insulin sliding coverage    Anemia thrombocytopenia  -Due to cirrhosis     Code status: Full  Prophylaxis: SCDs  Care Plan discussed with: Patient and patient's  present at bedside.  Anticipated Disposition: Likely more than 48 hours  Central Line:  None             Review of Systems:   Pertinent items are noted in HPI.         Vital Signs:    Last 24hrs VS reviewed since prior progress note. Most

## 2024-02-20 NOTE — CARE COORDINATION
Transition of Care Plan:    RUR: 19% Moderate   Prior Level of Functioning: Needs some assistance with ADL's   Disposition: Home   Follow up appointments: PCP, Hepatology   DME needed: NA  Transportation at discharge: Spouse   IM/IMM Medicare/ letter given: NA  Is patient a [place] and connected with [facility]? No  Caregiver Contact:  Rosalino Morgan   Discharge Caregiver contacted prior to discharge? No  Care Conference needed? No  Barriers to discharge:    Levophed weaned off   Per therapy no skilled needs identified.  Patient being evaluated for a liver transplant at [facility].  Per spouse has been denied disability three times, they have hired an .  Care management continuing to follow.  Sadia Pike RN,Care Management

## 2024-02-20 NOTE — PLAN OF CARE
Problem: Physical Therapy - Adult  Goal: By Discharge: Performs mobility at highest level of function for planned discharge setting.  See evaluation for individualized goals.  Description: FUNCTIONAL STATUS PRIOR TO ADMISSION: Patient was independent and active without use of DME.    HOME SUPPORT PRIOR TO ADMISSION: The patient lived with  who was available to assist when needed.    Physical Therapy Goals  Initiated 2/20/2024  4.  Patient will ambulate with supervision/set-up for 300 feet with the least restrictive device within 7 day(s).   5.  Patient will ascend/descend 3 stairs with one handrail(s) with contact guard assist within 7 day(s).   Outcome: Adequate for Discharge       PHYSICAL THERAPY EVALUATION    Patient: Tiffanie Morgan (53 y.o. female)  Date: 2/20/2024  Primary Diagnosis: Other specified hypotension [I95.89]  Severe sepsis (HCC) [A41.9, R65.20]  Urinary tract infection without hematuria, site unspecified [N39.0]       Precautions: Restrictions/Precautions:  (contact; mrsa)                      ASSESSMENT : Tiffanie Morgan is a 53 y.o. female with a history of advanced cirrhosis undergoing evaluation for transplant by [facility] [place] came to the ED for fever, 102.7 and confusion   DEFICITS/IMPAIRMENTS:   The patient is limited by decreased activity tolerance, endurance, balance who would benefit from PT to address these impairments. Patient demonstrated bed mobility, transfers and gait with stand by assist to supervision. Patient stated that she felt close to her functional baseline.   Patient's  present and very supportive; can provide patient with assist when she returns home.       Functional Outcome Measure:  The patient scored 19/24 on the [facility] outcome measure which is indicative of ?171,2,3 had higher odds of discharging home with home health or need of SNF/IPR.  .           PLAN :  Recommendations and Planned Interventions:   bed mobility training, transfer training, gait training,  need... (If the patient hasn't done an activity recently, how much help from another person do you think they would need if they tried?) Total A Lot A Little None   1.  Turning from your back to your side while in a flat bed without using bedrails? []  1 []  2 []  3  [x]  4   2.  Moving from lying on your back to sitting on the side of a flat bed without using bedrails? []  1 []  2 [x]  3  []  4   3.  Moving to and from a bed to a chair (including a wheelchair)? []  1 []  2 [x]  3  []  4   4. Standing up from a chair using your arms (e.g. wheelchair or bedside chair)? []  1 []  2 [x]  3  []  4   5.  Walking in hospital room? []  1 []  2 [x]  3  []  4   6.  Climbing 3-5 steps with a railing? []  1 []  2 [x]  3  []  4     Raw Score: 19/24                            Cutoff score ?171,2,3 had higher odds of discharging home with home health or need of SNF/IPR.    1. Evelin Valdez, Elizabeth Barrett, Lv Azevedo, Laura Williamson, Jagdish Carrero, Tadeo Valdez.  Validity of the AM-PAC “6-Clicks” Inpatient Daily Activity and Basic Mobility Short Forms. Physical Therapy Mar 2014, [phone]) [phone]; DOI: 10.2522/ptj.25025007  2. Franky CHADWICK, Louie J, Salome J, Doe J. Association of AM-PAC \"6-Clicks\" Basic Mobility and Daily Activity Scores With Discharge Destination. Phys Ther. 2021 Apr 4;101(4):almw958. doi: 10.1093/ptj/htzy443. PMID: 29404335.  3. Bridger HAMMOND, Carlos TAY, Monique S, Tone K, Bruna S. Activity Measure for Post-Acute Care \"6-Clicks\" Basic Mobility Scores Predict Discharge Destination After Acute Care Hospitalization in Select Patient Groups: A Retrospective, Observational Study. Arch Rehabil Res Clin Transl. 2022 Jul 16;4(3):993482. doi: 10.1016/j.arrct.2022.773277. PMID: 11634158; PMCID: TTS8970585.  4. José Miguel GE, Mesha S, Richmond W, Monica P. AM-PAC Short Forms Manual 4.0. Revised 2/2020.

## 2024-02-20 NOTE — PROGRESS NOTES
Orders received, chart reviewed and patient evaluated by physical therapy. Pending progression with skilled acute physical therapy, recommend:  No skilled physical therapy -  to provide support    Recommend with nursing OOB to chair 3x/day and walking daily with stand by  assist and  no assistive device . Thank you for completing as able in order to maintain patient strength, endurance and independence.     Full evaluation to follow.

## 2024-02-20 NOTE — PROGRESS NOTES
[facility]      Doron Kuo MD, FACP, FACG, FAASLD      VANESSA Sim, [facility]   Siobhan Santoyoelkin, [facility]   Gretel Bijan, [facility]  Grey Bettencourt, [facility]   Arlyn Mora, [facility]   Lisa Escotoon, [facility]   [address], [address]   [place]  [address] [phone]   FAX: [phone]  [facility]   [address], [address]   [place]  [address] [phone]   FAX: [phone]       HEPATOLOGY PROGRESS NOTE  The patient is well known to me and regularly cared for at [facility].  She is a 53 y.o. female who was found to have cirrhosis in 5/2021 when she had a CT scan to evaluate umbilical hernia.  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       She has a history of consuming 10 drinks per day for several years but has been abstinent from alcohol since 6/2021 when she found out she had cirrhosis.     PBC has been treated with MAGDALENE       The patient has developed the following complications of cirrhosis: esophageal varices, variceal bleeding, possible hepatopulmonary syndrome, hepatic encephalopathy     She has completed Liver Transplant evaluation testing and was at [facility] for hr first appointment earlier this week.     She was brought to the ED because of worsening confusion and fevers.     In the ED Laboratory studies were significant for:    Sna 133, Scr 1.39 mg, AST 77, ALT 34, , TBILI 11.2 mg, ALB 2.8 gm, WBC 10.3, HB 9.0 gms, PLT 61, INR 2.5,     Imaging of the liver with Ultrasound demonstrated small amount of ascites.  Small sample of ascites aspirated.  Cell count was negative for SBP    Hospital Course:  Blood culture growing E Coli  Urine culture growing E Coli  On IV ABX  On IV albumin to treat FIONA  No off NE    MELD  start IV albumin ay usual dose     Hyponatremia  This is secondary to cirrhosis and diuretics  The current NA is 126     Will stop diuretics  Will give IV albumin 25 gm Q6H.     Screening for Esophageal varices   Thee patient has esophageal varices with prior bleeding.  EGD in 9/2021 at [facility]. Banding performed.    EGD by MLS.  In 11/2021.  Banding performed.   EGD by MLS in 1/2022.  Banding performed.    EGD by MLS in 3/2023.  Banding performed.    EGD by MLS in 9/203.  Banding perfomred.       Hepatic encephalopathy   HE is being treated with Lactulose TID and Xifaxan BID  HE is well controlled.    Laculose is being tolerted with only mild intermittent diarrhea.  Will continue lactulose and xifaxan     Portal vein thrombosis  The patient was found to have partial PV thrombosis by MRI at [facility] in 9/2023  Anti-coagulation is not indicated at this time.  MRI in 1/2024 did not demonstrate PVT.  A CT scan at [facility] earlier this week did not demonstrate PVT.     Anemia   This is due to multifactorial causes including portal hypertension with chronic GI blood loss, cirrhosis and poor FE absorption     The most recent HB is 9.0 gms.    The most recent FE studies were from 1/2024.  The serum ferritin is 274.  The FE saturation is 43%.      Thrombocytopenia   This is secondary to cirrhosis.  There is no evidence of overt bleeding.    No treatment is required.  The platelet count is adequate for the patient to undergo procedures without the need for platelet transfusion or platelet growth factors.     Screening for Hepatocellular Carcinoma  HCC screening was last performed with MRI in 1/22024.    Will repeat US in 6 months.          PHYSICAL EXAMINATION:  VS: per nursing note  General:  Unresponsive.    Eyes:  Sclera icteric  ENT:  No oral lesions.  Thyroid normal.  Nodes:  No adenopathy.   Skin:  Spider angiomata.  Jaundice.  Respiratory:  Lungs clear to auscultation.   Cardiovascular:  Regular heart rate.  Abdomen:  Soft,

## 2024-02-21 ENCOUNTER — TELEPHONE (OUTPATIENT)
Age: 54
End: 2024-02-21

## 2024-02-21 LAB
ALBUMIN SERPL-MCNC: 2.6 G/DL (ref 3.5–5)
ALBUMIN/GLOB SERPL: 0.7 (ref 1.1–2.2)
ALP SERPL-CCNC: 107 U/L (ref 45–117)
ALT SERPL-CCNC: 24 U/L (ref 12–78)
ANION GAP SERPL CALC-SCNC: 4 MMOL/L (ref 5–15)
AST SERPL-CCNC: 42 U/L (ref 15–37)
BASOPHILS # BLD: 0 K/UL (ref 0–0.1)
BASOPHILS NFR BLD: 0 % (ref 0–1)
BILIRUB SERPL-MCNC: 5.3 MG/DL (ref 0.2–1)
BUN SERPL-MCNC: 14 MG/DL (ref 6–20)
BUN/CREAT SERPL: 14 (ref 12–20)
CALCIUM SERPL-MCNC: 9 MG/DL (ref 8.5–10.1)
CHLORIDE SERPL-SCNC: 109 MMOL/L (ref 97–108)
CO2 SERPL-SCNC: 25 MMOL/L (ref 21–32)
CREAT SERPL-MCNC: 1 MG/DL (ref 0.55–1.02)
DIFFERENTIAL METHOD BLD: ABNORMAL
EOSINOPHIL # BLD: 0.1 K/UL (ref 0–0.4)
EOSINOPHIL NFR BLD: 3 % (ref 0–7)
ERYTHROCYTE [DISTWIDTH] IN BLOOD BY AUTOMATED COUNT: 15.3 % (ref 11.5–14.5)
GLOBULIN SER CALC-MCNC: 3.5 G/DL (ref 2–4)
GLUCOSE BLD STRIP.AUTO-MCNC: 140 MG/DL (ref 65–117)
GLUCOSE BLD STRIP.AUTO-MCNC: 256 MG/DL (ref 65–117)
GLUCOSE BLD STRIP.AUTO-MCNC: 270 MG/DL (ref 65–117)
GLUCOSE BLD STRIP.AUTO-MCNC: 320 MG/DL (ref 65–117)
GLUCOSE SERPL-MCNC: 150 MG/DL (ref 65–100)
HCT VFR BLD AUTO: 23.8 % (ref 35–47)
HGB BLD-MCNC: 8 G/DL (ref 11.5–16)
IMM GRANULOCYTES # BLD AUTO: 0 K/UL
IMM GRANULOCYTES NFR BLD AUTO: 0 %
INR PPP: 1.8 (ref 0.9–1.1)
LYMPHOCYTES # BLD: 0.6 K/UL (ref 0.8–3.5)
LYMPHOCYTES NFR BLD: 13 % (ref 12–49)
MAGNESIUM SERPL-MCNC: 1.8 MG/DL (ref 1.6–2.4)
MCH RBC QN AUTO: 36.4 PG (ref 26–34)
MCHC RBC AUTO-ENTMCNC: 33.6 G/DL (ref 30–36.5)
MCV RBC AUTO: 108.2 FL (ref 80–99)
METAMYELOCYTES NFR BLD MANUAL: 1 %
MONOCYTES # BLD: 0.4 K/UL (ref 0–1)
MONOCYTES NFR BLD: 9 % (ref 5–13)
MYELOCYTES NFR BLD MANUAL: 3 %
NEUTS SEG # BLD: 3.1 K/UL (ref 1.8–8)
NEUTS SEG NFR BLD: 71 % (ref 32–75)
NRBC # BLD: 0 K/UL (ref 0–0.01)
NRBC BLD-RTO: 0 PER 100 WBC
PETH BLD QL SCN: NEGATIVE
PETH BLD-MCNC: NEGATIVE NG/ML
PHOSPHATE SERPL-MCNC: 2.5 MG/DL (ref 2.6–4.7)
PLATELET # BLD AUTO: 85 K/UL (ref 150–400)
PMV BLD AUTO: 10.6 FL (ref 8.9–12.9)
POTASSIUM SERPL-SCNC: 3.6 MMOL/L (ref 3.5–5.1)
PROT SERPL-MCNC: 6.1 G/DL (ref 6.4–8.2)
PROTHROMBIN TIME: 18.4 SEC (ref 9–11.1)
RBC # BLD AUTO: 2.2 M/UL (ref 3.8–5.2)
RBC MORPH BLD: ABNORMAL
RBC MORPH BLD: ABNORMAL
SERVICE CMNT-IMP: ABNORMAL
SODIUM SERPL-SCNC: 138 MMOL/L (ref 136–145)
WBC # BLD AUTO: 4.4 K/UL (ref 3.6–11)
WBC MORPH BLD: ABNORMAL

## 2024-02-21 PROCEDURE — 83735 ASSAY OF MAGNESIUM: CPT

## 2024-02-21 PROCEDURE — 6370000000 HC RX 637 (ALT 250 FOR IP): Performed by: NURSE PRACTITIONER

## 2024-02-21 PROCEDURE — 80053 COMPREHEN METABOLIC PANEL: CPT

## 2024-02-21 PROCEDURE — 36415 COLL VENOUS BLD VENIPUNCTURE: CPT

## 2024-02-21 PROCEDURE — 2580000003 HC RX 258: Performed by: INTERNAL MEDICINE

## 2024-02-21 PROCEDURE — 2060000000 HC ICU INTERMEDIATE R&B

## 2024-02-21 PROCEDURE — 85610 PROTHROMBIN TIME: CPT

## 2024-02-21 PROCEDURE — 6370000000 HC RX 637 (ALT 250 FOR IP): Performed by: HOSPITALIST

## 2024-02-21 PROCEDURE — 84100 ASSAY OF PHOSPHORUS: CPT

## 2024-02-21 PROCEDURE — 85025 COMPLETE CBC W/AUTO DIFF WBC: CPT

## 2024-02-21 PROCEDURE — 6360000002 HC RX W HCPCS: Performed by: INTERNAL MEDICINE

## 2024-02-21 PROCEDURE — 6370000000 HC RX 637 (ALT 250 FOR IP): Performed by: INTERNAL MEDICINE

## 2024-02-21 PROCEDURE — 82962 GLUCOSE BLOOD TEST: CPT

## 2024-02-21 RX ADMIN — MIDODRINE HYDROCHLORIDE 10 MG: 5 TABLET ORAL at 21:55

## 2024-02-21 RX ADMIN — LACTULOSE 30 G: 20 SOLUTION ORAL at 21:54

## 2024-02-21 RX ADMIN — LACTULOSE 30 G: 20 SOLUTION ORAL at 08:35

## 2024-02-21 RX ADMIN — RIFAXIMIN 400 MG: 200 TABLET ORAL at 08:35

## 2024-02-21 RX ADMIN — MIDODRINE HYDROCHLORIDE 10 MG: 5 TABLET ORAL at 04:42

## 2024-02-21 RX ADMIN — RIFAXIMIN 400 MG: 200 TABLET ORAL at 14:30

## 2024-02-21 RX ADMIN — INSULIN LISPRO 6 UNITS: 100 INJECTION, SOLUTION INTRAVENOUS; SUBCUTANEOUS at 17:14

## 2024-02-21 RX ADMIN — PANTOPRAZOLE SODIUM 40 MG: 40 TABLET, DELAYED RELEASE ORAL at 06:39

## 2024-02-21 RX ADMIN — INSULIN HUMAN 22 UNITS: 100 INJECTION, SUSPENSION SUBCUTANEOUS at 08:35

## 2024-02-21 RX ADMIN — TRAMADOL HYDROCHLORIDE 50 MG: 50 TABLET ORAL at 12:27

## 2024-02-21 RX ADMIN — RIFAXIMIN 400 MG: 200 TABLET ORAL at 21:54

## 2024-02-21 RX ADMIN — MIDODRINE HYDROCHLORIDE 10 MG: 5 TABLET ORAL at 16:28

## 2024-02-21 RX ADMIN — MIDODRINE HYDROCHLORIDE 10 MG: 5 TABLET ORAL at 11:05

## 2024-02-21 RX ADMIN — INSULIN HUMAN 22 UNITS: 100 INJECTION, SUSPENSION SUBCUTANEOUS at 21:55

## 2024-02-21 RX ADMIN — LACTULOSE 30 G: 20 SOLUTION ORAL at 14:30

## 2024-02-21 RX ADMIN — INSULIN LISPRO 4 UNITS: 100 INJECTION, SOLUTION INTRAVENOUS; SUBCUTANEOUS at 12:24

## 2024-02-21 RX ADMIN — WATER 2000 MG: 1 INJECTION INTRAMUSCULAR; INTRAVENOUS; SUBCUTANEOUS at 21:55

## 2024-02-21 ASSESSMENT — PAIN DESCRIPTION - ORIENTATION: ORIENTATION: MID

## 2024-02-21 ASSESSMENT — PAIN SCALES - GENERAL
PAINLEVEL_OUTOF10: 5
PAINLEVEL_OUTOF10: 0

## 2024-02-21 ASSESSMENT — PAIN DESCRIPTION - LOCATION: LOCATION: BACK

## 2024-02-21 ASSESSMENT — PAIN DESCRIPTION - DESCRIPTORS: DESCRIPTORS: ACHING

## 2024-02-21 NOTE — CONSULTS
Infectious Disease Consult Note    Reason for Consult: Bacteremia  Date of Consultation: February 20, 2024  Date of Admission: 2/15/2024  Referring Physician: Dr Nicholas      HPI:    She was personally evaluated and examined by me at bedside today.  The patient appears to be comfortable and relaxed and her  is sitting at bedside.  The patient denies headache, fever, sweats or chills.  She states that she has no shortness of breath, cough, sputum, chest pain or chest pressure.  She states that she is eating some  but has small intake without nausea, vomiting, but with some diarrhea from lactulose.  She states that she has 3 planned bowel movements a day.    The patient states that she was to get liver transplant this Friday, but because of the infection this had to be postponed.  She states that she is otherwise feeling better and is afebrile without leukocytosis.  Blood pressure still low, but improved since admission. She has been abstinent from alcohol since last August and is eager to get a transplanted liver.  Patient is being followed by Dr. Kuo, hepatology.    She states that she has a history of urinary tract infections in the past as well as a history of nephrolithiasis and diabetes.  She denies having had recent urinary tract symptoms      The patient was 02/15/2024 by way of the emergency room in the setting of advanced cirrhosis pending liver transplant at [facility].  The patient presented with a fever of 102.7 along with altered mental status and confusion.  She was hypotension with blood pressure of 77 systolic, with evidence of UTI.  In the ED she was pancultured and initiated on ceftriaxone.  There was no leukocytosis with WBC 10.3, with clean CXR with mild edema.  Preliminary blood cultures returned with E. coli growing in 1 /1 bottles  along with E. coli UTI.  Blood pressure is slowly improving with patient being followed by hepatology as well as by the hospitalist team..          Past Medical

## 2024-02-21 NOTE — TELEPHONE ENCOUNTER
----- Message from Juanita Cooper sent at 2/21/2024 10:54 AM EST -----  Subject: Message to Provider    QUESTIONS  Information for Provider? Patient is requesting a call from the PCP's   nurse Jo. Patient cancelled appointment for 2/22/2024 due to being in   the hospital.     CALL BACK INFO  [phone]; OK to leave message on voicemail,OK to respond with electronic   message via [employer] portal (only for patients who have registered [employer]   account)    SCRIPT ANSWERS  Relationship to Patient? Self  ----------------------------------------------------------------------------    Pt is at [facility] with UTI and blood infection,, she can be reached at [phone].

## 2024-02-21 NOTE — PROGRESS NOTES
Hospitalist Progress Note  Ozzy Nicholas MD  Answering service: [phone]        Date of Service:  2024  NAME:  Tiffanie Morgan  :  1970  MRN:  [id number]      Admission Summary:     Patient admitted with septic shock due to UTI.  Patient required ICU admission from 2/15 to .    Interval history / Subjective:     Patient has no acute complaint.     Assessment & Plan:     Septic shock  -Patient presented with septic shock due to UTI  -Shock refractory to IV fluid resuscitation, albumin and midodrine  -Patient initially required vasopressors, admitted to ICU from 2/15 to   -Septic shock now resolved and off pressors, continue midodrine    Bacteremia  -E. coli growing in 1 of 2 bottles on initial blood cultures 2/15, likely source UTI  -Repeat blood cultures 2024 so far negative, ID consult pending, continue Rocephin    Urinary tract infection  -Urinalysis shows large blood, positive nitrite, large leukocyte esterase, pyuria and bacteriuria  -Urine culture growing E. 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  Anticipated Disposition: Likely more than 48 hours  Central Line:  None             Review of Systems:   Pertinent items are noted in HPI.         Vital Signs:    Last 24hrs VS reviewed

## 2024-02-22 ENCOUNTER — APPOINTMENT (OUTPATIENT)
Facility: HOSPITAL | Age: 54
DRG: 871 | End: 2024-02-22
Payer: COMMERCIAL

## 2024-02-22 ENCOUNTER — CLINICAL DOCUMENTATION (OUTPATIENT)
Age: 54
End: 2024-02-22

## 2024-02-22 LAB
ALBUMIN SERPL-MCNC: 2.8 G/DL (ref 3.5–5)
ALBUMIN/GLOB SERPL: 0.7 (ref 1.1–2.2)
ALP SERPL-CCNC: 119 U/L (ref 45–117)
ALT SERPL-CCNC: 29 U/L (ref 12–78)
ANION GAP SERPL CALC-SCNC: 6 MMOL/L (ref 5–15)
AST SERPL-CCNC: 49 U/L (ref 15–37)
BASOPHILS # BLD: 0.1 K/UL (ref 0–0.1)
BASOPHILS NFR BLD: 1 % (ref 0–1)
BILIRUB SERPL-MCNC: 5.5 MG/DL (ref 0.2–1)
BUN SERPL-MCNC: 15 MG/DL (ref 6–20)
BUN/CREAT SERPL: 15 (ref 12–20)
CALCIUM SERPL-MCNC: 9.5 MG/DL (ref 8.5–10.1)
CHLORIDE SERPL-SCNC: 110 MMOL/L (ref 97–108)
CO2 SERPL-SCNC: 25 MMOL/L (ref 21–32)
CREAT SERPL-MCNC: 0.98 MG/DL (ref 0.55–1.02)
DIFFERENTIAL METHOD BLD: ABNORMAL
EOSINOPHIL # BLD: 0.2 K/UL (ref 0–0.4)
EOSINOPHIL NFR BLD: 3 % (ref 0–7)
ERYTHROCYTE [DISTWIDTH] IN BLOOD BY AUTOMATED COUNT: 15.9 % (ref 11.5–14.5)
GLOBULIN SER CALC-MCNC: 3.9 G/DL (ref 2–4)
GLUCOSE BLD STRIP.AUTO-MCNC: 150 MG/DL (ref 65–117)
GLUCOSE BLD STRIP.AUTO-MCNC: 228 MG/DL (ref 65–117)
GLUCOSE BLD STRIP.AUTO-MCNC: 237 MG/DL (ref 65–117)
GLUCOSE BLD STRIP.AUTO-MCNC: 285 MG/DL (ref 65–117)
GLUCOSE SERPL-MCNC: 147 MG/DL (ref 65–100)
HCT VFR BLD AUTO: 26.7 % (ref 35–47)
HGB BLD-MCNC: 8.7 G/DL (ref 11.5–16)
IMM GRANULOCYTES # BLD AUTO: 0 K/UL
IMM GRANULOCYTES NFR BLD AUTO: 0 %
INR PPP: 1.8 (ref 0.9–1.1)
LYMPHOCYTES # BLD: 1.1 K/UL (ref 0.8–3.5)
LYMPHOCYTES NFR BLD: 19 % (ref 12–49)
MAGNESIUM SERPL-MCNC: 2.1 MG/DL (ref 1.6–2.4)
MCH RBC QN AUTO: 36 PG (ref 26–34)
MCHC RBC AUTO-ENTMCNC: 32.6 G/DL (ref 30–36.5)
MCV RBC AUTO: 110.3 FL (ref 80–99)
MONOCYTES # BLD: 0.2 K/UL (ref 0–1)
MONOCYTES NFR BLD: 3 % (ref 5–13)
NEUTS BAND NFR BLD MANUAL: 2 % (ref 0–6)
NEUTS SEG # BLD: 4.1 K/UL (ref 1.8–8)
NEUTS SEG NFR BLD: 72 % (ref 32–75)
NRBC # BLD: 0 K/UL (ref 0–0.01)
NRBC BLD-RTO: 0 PER 100 WBC
PHOSPHATE SERPL-MCNC: 2.7 MG/DL (ref 2.6–4.7)
PLATELET # BLD AUTO: 104 K/UL (ref 150–400)
PMV BLD AUTO: 10.1 FL (ref 8.9–12.9)
POTASSIUM SERPL-SCNC: 4 MMOL/L (ref 3.5–5.1)
PROT SERPL-MCNC: 6.7 G/DL (ref 6.4–8.2)
PROTHROMBIN TIME: 17.7 SEC (ref 9–11.1)
RBC # BLD AUTO: 2.42 M/UL (ref 3.8–5.2)
RBC MORPH BLD: ABNORMAL
RBC MORPH BLD: ABNORMAL
SERVICE CMNT-IMP: ABNORMAL
SODIUM SERPL-SCNC: 141 MMOL/L (ref 136–145)
WBC # BLD AUTO: 5.7 K/UL (ref 3.6–11)
WBC MORPH BLD: ABNORMAL

## 2024-02-22 PROCEDURE — 80053 COMPREHEN METABOLIC PANEL: CPT

## 2024-02-22 PROCEDURE — 6360000002 HC RX W HCPCS: Performed by: INTERNAL MEDICINE

## 2024-02-22 PROCEDURE — 6370000000 HC RX 637 (ALT 250 FOR IP): Performed by: INTERNAL MEDICINE

## 2024-02-22 PROCEDURE — 76770 US EXAM ABDO BACK WALL COMP: CPT

## 2024-02-22 PROCEDURE — 84100 ASSAY OF PHOSPHORUS: CPT

## 2024-02-22 PROCEDURE — 2580000003 HC RX 258: Performed by: INTERNAL MEDICINE

## 2024-02-22 PROCEDURE — 85610 PROTHROMBIN TIME: CPT

## 2024-02-22 PROCEDURE — 36415 COLL VENOUS BLD VENIPUNCTURE: CPT

## 2024-02-22 PROCEDURE — 6370000000 HC RX 637 (ALT 250 FOR IP): Performed by: HOSPITALIST

## 2024-02-22 PROCEDURE — 82962 GLUCOSE BLOOD TEST: CPT

## 2024-02-22 PROCEDURE — 85025 COMPLETE CBC W/AUTO DIFF WBC: CPT

## 2024-02-22 PROCEDURE — 83735 ASSAY OF MAGNESIUM: CPT

## 2024-02-22 PROCEDURE — 6370000000 HC RX 637 (ALT 250 FOR IP): Performed by: NURSE PRACTITIONER

## 2024-02-22 PROCEDURE — 1200000000 HC SEMI PRIVATE

## 2024-02-22 PROCEDURE — 97116 GAIT TRAINING THERAPY: CPT

## 2024-02-22 RX ADMIN — TRAMADOL HYDROCHLORIDE 50 MG: 50 TABLET ORAL at 19:16

## 2024-02-22 RX ADMIN — RIFAXIMIN 400 MG: 200 TABLET ORAL at 08:47

## 2024-02-22 RX ADMIN — RIFAXIMIN 400 MG: 200 TABLET ORAL at 14:13

## 2024-02-22 RX ADMIN — RIFAXIMIN 400 MG: 200 TABLET ORAL at 21:47

## 2024-02-22 RX ADMIN — INSULIN LISPRO 2 UNITS: 100 INJECTION, SOLUTION INTRAVENOUS; SUBCUTANEOUS at 12:10

## 2024-02-22 RX ADMIN — TRAMADOL HYDROCHLORIDE 50 MG: 50 TABLET ORAL at 03:03

## 2024-02-22 RX ADMIN — MIDODRINE HYDROCHLORIDE 10 MG: 5 TABLET ORAL at 21:47

## 2024-02-22 RX ADMIN — MIDODRINE HYDROCHLORIDE 10 MG: 5 TABLET ORAL at 03:04

## 2024-02-22 RX ADMIN — INSULIN HUMAN 22 UNITS: 100 INJECTION, SUSPENSION SUBCUTANEOUS at 08:47

## 2024-02-22 RX ADMIN — MIDODRINE HYDROCHLORIDE 10 MG: 5 TABLET ORAL at 08:48

## 2024-02-22 RX ADMIN — TRAMADOL HYDROCHLORIDE 50 MG: 50 TABLET ORAL at 12:10

## 2024-02-22 RX ADMIN — MIDODRINE HYDROCHLORIDE 10 MG: 5 TABLET ORAL at 16:33

## 2024-02-22 RX ADMIN — LACTULOSE 30 G: 20 SOLUTION ORAL at 08:47

## 2024-02-22 RX ADMIN — WATER 2000 MG: 1 INJECTION INTRAMUSCULAR; INTRAVENOUS; SUBCUTANEOUS at 21:48

## 2024-02-22 RX ADMIN — PANTOPRAZOLE SODIUM 40 MG: 40 TABLET, DELAYED RELEASE ORAL at 05:58

## 2024-02-22 RX ADMIN — LACTULOSE 30 G: 20 SOLUTION ORAL at 14:13

## 2024-02-22 RX ADMIN — INSULIN LISPRO 4 UNITS: 100 INJECTION, SOLUTION INTRAVENOUS; SUBCUTANEOUS at 17:12

## 2024-02-22 RX ADMIN — INSULIN HUMAN 22 UNITS: 100 INJECTION, SUSPENSION SUBCUTANEOUS at 21:48

## 2024-02-22 ASSESSMENT — PAIN DESCRIPTION - DESCRIPTORS
DESCRIPTORS: ACHING;SPASM
DESCRIPTORS: ACHING
DESCRIPTORS: ACHING

## 2024-02-22 ASSESSMENT — PAIN SCALES - GENERAL
PAINLEVEL_OUTOF10: 7
PAINLEVEL_OUTOF10: 6
PAINLEVEL_OUTOF10: 7
PAINLEVEL_OUTOF10: 3

## 2024-02-22 ASSESSMENT — PAIN DESCRIPTION - ORIENTATION
ORIENTATION: MID
ORIENTATION: MID

## 2024-02-22 ASSESSMENT — PAIN DESCRIPTION - LOCATION
LOCATION: BREAST
LOCATION: BACK
LOCATION: BACK

## 2024-02-22 NOTE — PLAN OF CARE
Problem: Physical Therapy - Adult  Goal: By Discharge: Performs mobility at highest level of function for planned discharge setting.  See evaluation for individualized goals.  Description: FUNCTIONAL STATUS PRIOR TO ADMISSION: Patient was independent and active without use of DME.    HOME SUPPORT PRIOR TO ADMISSION: The patient lived with  who was available to assist when needed.    Physical Therapy Goals  Initiated 2/20/2024  4.  Patient will ambulate with supervision/set-up for 300 feet with the least restrictive device within 7 day(s).   5.  Patient will ascend/descend 3 stairs with one handrail(s) with contact guard assist within 7 day(s).   Outcome: Progressing   PHYSICAL THERAPY TREATMENT    Patient: Tiffanie Morgan (53 y.o. female)  Date: 2/22/2024  Diagnosis: Other specified hypotension [I95.89]  Severe sepsis (HCC) [A41.9, R65.20]  Urinary tract infection without hematuria, site unspecified [N39.0] Severe sepsis (HCC)      Precautions:  (contact; mrsa)                      ASSESSMENT:  Patient continues to benefit from skilled PT services and is progressing towards goals. Pt able to complete 2 laps of gait with CGA, eager to DC home and asking to walk without assistance. Recommend one more day prior to independent ambulation however patient demoing well. Will attempt outdoor mobility tomorrow pending weather to assess high level balance and functional I         PLAN:  Patient continues to benefit from skilled intervention to address the above impairments.  Continue treatment per established plan of care.    Recommendation for discharge: (in order for the patient to meet his/her long term goals): No skilled physical therapy or Therapy 2 days/week in the home pending    Other factors to consider for discharge: no additional factors    IF patient discharges home will need the following DME: none       SUBJECTIVE:   Patient stated, \"I would LOVE to go outside.\"    OBJECTIVE DATA SUMMARY:   Critical  Behavior:          Functional Mobility Training:  Bed Mobility:  Bed Mobility Training  Bed Mobility Training: Yes  Overall Level of Assistance: Modified independent  Rolling: Modified independent  Supine to Sit: Modified independent  Scooting: Modified independent  Transfers:  Transfer Training  Transfer Training: Yes  Sit to Stand: Supervision  Stand to Sit: Supervision  Stand Pivot Transfers: Supervision  Balance:  Balance  Sitting: Intact  Standing: Intact  Standing - Static: Good  Standing - Dynamic: Good   Ambulation/Gait Training:     Gait  Overall Level of Assistance: Supervision  Distance (ft): 500 Feet  Assistive Device: Gait belt (CGA at hand for safety)  Neuro Re-Education:                    Pain Rating:       Activity Tolerance:   Good and Fair     After treatment:   Patient left in no apparent distress in bed and Call bell within reach      COMMUNICATION/EDUCATION:   The patient's plan of care was discussed with: registered nurse and            Siobhan Guzman, PT  Minutes: 18

## 2024-02-22 NOTE — PROGRESS NOTES
Hospitalist Progress Note  Sanjuanita Dahl MD  Answering service: [phone]        Date of Service:  2024  NAME:  Tiffanie Morgan  :  1970  MRN:  [id number]      Admission Summary:     Patient admitted with septic shock due to UTI.  Patient required ICU admission from 2/15 to .    Interval history / Subjective:     Patient seen examined at bedside earlier feels well no acute plaints     Assessment & Plan:     Septic shock  -Patient presented with septic shock due to UTI  -Shock refractory to IV fluid resuscitation, albumin and midodrine  -Patient initially required vasopressors, admitted to ICU from 2/15 to   -Septic shock now resolved and off pressors, continue midodrine wean as tolerated    Ecoli Bacteremia  UTI  -E. 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         Vital Signs:    Last 24hrs VS reviewed since prior

## 2024-02-23 ENCOUNTER — APPOINTMENT (OUTPATIENT)
Facility: HOSPITAL | Age: 54
DRG: 871 | End: 2024-02-23
Payer: COMMERCIAL

## 2024-02-23 VITALS
RESPIRATION RATE: 18 BRPM | BODY MASS INDEX: 31.8 KG/M2 | DIASTOLIC BLOOD PRESSURE: 54 MMHG | OXYGEN SATURATION: 96 % | HEIGHT: 62 IN | HEART RATE: 74 BPM | WEIGHT: 172.8 LBS | SYSTOLIC BLOOD PRESSURE: 96 MMHG | TEMPERATURE: 98.8 F

## 2024-02-23 LAB
ALBUMIN SERPL-MCNC: 2.8 G/DL (ref 3.5–5)
ALBUMIN/GLOB SERPL: 0.8 (ref 1.1–2.2)
ALP SERPL-CCNC: 122 U/L (ref 45–117)
ALT SERPL-CCNC: 26 U/L (ref 12–78)
ANION GAP SERPL CALC-SCNC: 4 MMOL/L (ref 5–15)
APPEARANCE FLD: ABNORMAL
AST SERPL-CCNC: 51 U/L (ref 15–37)
BILIRUB SERPL-MCNC: 5.7 MG/DL (ref 0.2–1)
BUN SERPL-MCNC: 14 MG/DL (ref 6–20)
BUN/CREAT SERPL: 17 (ref 12–20)
CALCIUM SERPL-MCNC: 9.4 MG/DL (ref 8.5–10.1)
CHLORIDE SERPL-SCNC: 110 MMOL/L (ref 97–108)
CO2 SERPL-SCNC: 26 MMOL/L (ref 21–32)
COLOR FLD: ABNORMAL
CREAT SERPL-MCNC: 0.81 MG/DL (ref 0.55–1.02)
EKG ATRIAL RATE: 74 BPM
EKG DIAGNOSIS: NORMAL
EKG P AXIS: 46 DEGREES
EKG P-R INTERVAL: 190 MS
EKG Q-T INTERVAL: 422 MS
EKG QRS DURATION: 124 MS
EKG QTC CALCULATION (BAZETT): 468 MS
EKG R AXIS: 2 DEGREES
EKG T AXIS: 21 DEGREES
EKG VENTRICULAR RATE: 74 BPM
GLOBULIN SER CALC-MCNC: 3.7 G/DL (ref 2–4)
GLUCOSE BLD STRIP.AUTO-MCNC: 111 MG/DL (ref 65–117)
GLUCOSE BLD STRIP.AUTO-MCNC: 190 MG/DL (ref 65–117)
GLUCOSE BLD STRIP.AUTO-MCNC: 333 MG/DL (ref 65–117)
GLUCOSE SERPL-MCNC: 102 MG/DL (ref 65–100)
INR PPP: 1.8 (ref 0.9–1.1)
LYMPHOCYTES NFR FLD: 57 %
MAGNESIUM SERPL-MCNC: 2.1 MG/DL (ref 1.6–2.4)
MONOS+MACROS NFR FLD: 34 %
NEUTROPHILS NFR FLD: 9 %
NUC CELL # FLD: 82 /CU MM
POTASSIUM SERPL-SCNC: 3.7 MMOL/L (ref 3.5–5.1)
PROT SERPL-MCNC: 6.5 G/DL (ref 6.4–8.2)
PROTHROMBIN TIME: 17.7 SEC (ref 9–11.1)
RBC # FLD: >100 /CU MM
SERVICE CMNT-IMP: ABNORMAL
SERVICE CMNT-IMP: ABNORMAL
SERVICE CMNT-IMP: NORMAL
SODIUM SERPL-SCNC: 140 MMOL/L (ref 136–145)
SPECIMEN SOURCE FLD: ABNORMAL

## 2024-02-23 PROCEDURE — 85610 PROTHROMBIN TIME: CPT

## 2024-02-23 PROCEDURE — 6370000000 HC RX 637 (ALT 250 FOR IP): Performed by: NURSE PRACTITIONER

## 2024-02-23 PROCEDURE — 49083 ABD PARACENTESIS W/IMAGING: CPT

## 2024-02-23 PROCEDURE — 36415 COLL VENOUS BLD VENIPUNCTURE: CPT

## 2024-02-23 PROCEDURE — 6370000000 HC RX 637 (ALT 250 FOR IP): Performed by: INTERNAL MEDICINE

## 2024-02-23 PROCEDURE — 82962 GLUCOSE BLOOD TEST: CPT

## 2024-02-23 PROCEDURE — C1751 CATH, INF, PER/CENT/MIDLINE: HCPCS

## 2024-02-23 PROCEDURE — 0W9G3ZX DRAINAGE OF PERITONEAL CAVITY, PERCUTANEOUS APPROACH, DIAGNOSTIC: ICD-10-PCS | Performed by: PHYSICIAN ASSISTANT

## 2024-02-23 PROCEDURE — 02HV33Z INSERTION OF INFUSION DEVICE INTO SUPERIOR VENA CAVA, PERCUTANEOUS APPROACH: ICD-10-PCS | Performed by: STUDENT IN AN ORGANIZED HEALTH CARE EDUCATION/TRAINING PROGRAM

## 2024-02-23 PROCEDURE — 2580000003 HC RX 258: Performed by: INTERNAL MEDICINE

## 2024-02-23 PROCEDURE — 89050 BODY FLUID CELL COUNT: CPT

## 2024-02-23 PROCEDURE — 76937 US GUIDE VASCULAR ACCESS: CPT

## 2024-02-23 PROCEDURE — 83735 ASSAY OF MAGNESIUM: CPT

## 2024-02-23 PROCEDURE — 6360000002 HC RX W HCPCS: Performed by: INTERNAL MEDICINE

## 2024-02-23 PROCEDURE — 93010 ELECTROCARDIOGRAM REPORT: CPT | Performed by: SPECIALIST

## 2024-02-23 PROCEDURE — 93005 ELECTROCARDIOGRAM TRACING: CPT

## 2024-02-23 PROCEDURE — 6370000000 HC RX 637 (ALT 250 FOR IP): Performed by: HOSPITALIST

## 2024-02-23 PROCEDURE — 2709999900 HC NON-CHARGEABLE SUPPLY

## 2024-02-23 PROCEDURE — 80053 COMPREHEN METABOLIC PANEL: CPT

## 2024-02-23 RX ORDER — MIDODRINE HYDROCHLORIDE 10 MG/1
10 TABLET ORAL 3 TIMES DAILY
Qty: 90 TABLET | Refills: 0 | Status: SHIPPED | OUTPATIENT
Start: 2024-02-23 | End: 2024-02-28 | Stop reason: CLARIF

## 2024-02-23 RX ORDER — CIPROFLOXACIN 250 MG/1
250 TABLET, FILM COATED ORAL 2 TIMES DAILY
Qty: 8 TABLET | Refills: 0 | Status: SHIPPED | OUTPATIENT
Start: 2024-03-01 | End: 2024-03-05

## 2024-02-23 RX ADMIN — MIDODRINE HYDROCHLORIDE 10 MG: 5 TABLET ORAL at 11:30

## 2024-02-23 RX ADMIN — MIDODRINE HYDROCHLORIDE 10 MG: 5 TABLET ORAL at 17:34

## 2024-02-23 RX ADMIN — TRAMADOL HYDROCHLORIDE 50 MG: 50 TABLET ORAL at 11:30

## 2024-02-23 RX ADMIN — INSULIN HUMAN 22 UNITS: 100 INJECTION, SUSPENSION SUBCUTANEOUS at 11:30

## 2024-02-23 RX ADMIN — LACTULOSE 30 G: 20 SOLUTION ORAL at 14:17

## 2024-02-23 RX ADMIN — RIFAXIMIN 400 MG: 200 TABLET ORAL at 11:30

## 2024-02-23 RX ADMIN — RIFAXIMIN 400 MG: 200 TABLET ORAL at 14:17

## 2024-02-23 RX ADMIN — PANTOPRAZOLE SODIUM 40 MG: 40 TABLET, DELAYED RELEASE ORAL at 04:15

## 2024-02-23 RX ADMIN — LACTULOSE 30 G: 20 SOLUTION ORAL at 11:30

## 2024-02-23 RX ADMIN — WATER 2000 MG: 1 INJECTION INTRAMUSCULAR; INTRAVENOUS; SUBCUTANEOUS at 17:35

## 2024-02-23 RX ADMIN — MIDODRINE HYDROCHLORIDE 10 MG: 5 TABLET ORAL at 04:15

## 2024-02-23 RX ADMIN — INSULIN LISPRO 6 UNITS: 100 INJECTION, SOLUTION INTRAVENOUS; SUBCUTANEOUS at 17:34

## 2024-02-23 ASSESSMENT — PAIN SCALES - GENERAL: PAINLEVEL_OUTOF10: 0

## 2024-02-23 NOTE — DISCHARGE SUMMARY
Discharge Summary       PATIENT ID: Tiffanie Morgan  MRN: [id number]   YOB: 1970    DATE OF ADMISSION: 2/15/2024  7:21 AM    DATE OF DISCHARGE: 02/23/24    PRIMARY CARE PROVIDER: Homero Hoyos MD     ATTENDING PHYSICIAN: Sanjuanita Dahl MD   DISCHARGING PROVIDER: Sanjuanita Dahl MD    To contact this individual call [phone] and ask the  to page.  If unavailable ask to be transferred the Adult Hospitalist Department.    CONSULTATIONS: IP CONSULT TO HEPATOLOGY  IP CONSULT TO INTENSIVIST  IP CONSULT TO INFECTIOUS DISEASES  IP CONSULT TO UROLOGY  IP CONSULT HOME HEALTH    PROCEDURES/SURGERIES: * No surgery found *     ADMITTING DIAGNOSES & HOSPITAL COURSE:   \"Patient admitted with septic shock due to UTI.  Patient required ICU admission from 2/15 to 2/19.\"           DISCHARGE DIAGNOSES / PLAN:      Septic shock  -Patient presented with septic shock due to UTI  -Shock refractory to IV fluid resuscitation, albumin and midodrine  -Patient initially required vasopressors, admitted to ICU from 2/15 to 2/19  -Septic shock now resolved and off pressors, continue midodrine wean as tolerated     Ecoli Bacteremia  UTI  -E. coli growing in 1 of 2 bottles on initial blood cultures 2/15, likely source UTI urine cx same  -Repeat blood cultures 2/21/2024 so far negative, ID consulted on Rocephin, IV discharge abx orders written: rocephin IV 2 g every 24 through 2/29 then Cipro p.o. to 50 mg twice daily for 4 days  -picc ordered 2/23  -fu op with ID  -Appreciate urology, CT on this admission showed bilateral nonobstructing renal stones we will follow-up outpatient for definitive stone treatment following completion of treatment of infection       FIONA  -Likely prerenal and also ATN in the setting of shock  -FIONA improved  -Renal ultrasound no hydro      Ascites  -Patient with known history of liver cirrhosis with ascites  -Ascites fluid analysis on admission negative for SBP, hx of recurrent

## 2024-02-23 NOTE — PROGRESS NOTES
Infectious Diseases Follow Up    2024      Assessment & Plan:     53 y.o. female with a history of advanced cirrhosis undergoing evaluation for transplant by [facility]  who was admitted for septic shock.    E.coli bacteremia secondary to UTI  + blood cx 2/15, repeat Blood Cx  NGTD  UA bacteria 3+, large leuks WBC >100  Currently to ceftriaxone, discharge on ceftriaxone for 10 days from first sterile Cx through 24 and then ciprofloxacin  mg BID for 4 days.     FIONA  Likely prerenal  Resolved    Liver Cirrhosis/Ascites  Followed by Dr. uKo, on lactulose, rifaximin  Transplant eval for [facility]    DM II. 7.4, controlled    Obesity. BMI 31.61          Subjective:     Patient in bed, no complaints. Discussed abx therapy    Objective:     Vitals: BP (!) 107/56   Pulse 74   Temp 98.6 °F (37 °C) (Oral)   Resp 18   Ht 1.575 m (5' 2\")   Wt 78.4 kg (172 lb 12.8 oz)   SpO2 95%   BMI 31.61 kg/m²      Tmax:  Temp (24hrs), Av.2 °F (36.8 °C), Min:97.5 °F (36.4 °C), Max:99.1 °F (37.3 °C)      Exam:   Patient is intubated:  no    Exam:    General:  Alert, cooperative, well nourished, NAD   Eyes:  Sclera anicteric. Pupils equally round and reactive to light.   Mouth/Throat: Mucous membranes normal, oral pharynx clear   Neck: Supple   Lungs:   Clear to auscultation bilaterally, good effort   CV:  Regular rate and rhythm,murmur, no click, rub or gallop   Abdomen:   Soft, non-tender. bowel sounds normal.    Extremities: No cyanosis, BLE 2+   Skin: Skin color, texture, turgor normal. no acute rash or lesions   Lymph nodes: Deferred   Musculoskeletal: No swelling or deformity   Lines/Devices:  Intact, no erythema, drainage or tenderness   Psych: Alert and oriented, normal mood affect given the setting           Labs:        Invalid input(s): \"ITNL\"   No results for input(s): \"CPK\", \"CKMB\" in the last 72 hours.    Invalid input(s): \"TROIQ\"  Recent Labs     24  0417 24  0418 24  0651  02/23/24  0645   NA  --  138 141 140   K  --  3.6 4.0 3.7   CL  --  109* 110* 110*   CO2  --  25 25 26   BUN  --  14 15 14   PHOS  --  2.5* 2.7  --    MG  --  1.8 2.1 2.1   WBC 4.4  --  5.7  --    HGB 8.0*  --  8.7*  --    HCT 23.8*  --  26.7*  --    PLT 85*  --  104*  --      Recent Labs     02/21/24  0417 02/22/24  0651 02/23/24  0645   INR 1.8* 1.8* 1.8*         Antibiotics:    ceftriaxone      Case discussed with:    Patient, Dr. Marina Alvarez, LEENA - NP

## 2024-02-23 NOTE — CONSULTS
Requesting Provider: Sanjuanita Dahl MD - Reason for Consultation: \"renal stone\"  Pre-existing [facility] Patient:   No                Patient: Tiffanie Morgan MRN: [id number]  SSN: [id number]    YOB: 1970  Age: 53 y.o.  Sex: female     Location: [facility]/01       Code Status: Full Code   PCP: Homero Hoyos MD  - [phone]   Emergency Contact:  Primary Emergency Contact: Rosalino Morgan, [place] Phone: [phone]   Race/Mu-ism/Language: White (non-) / Mandaeism / Speaks English   Payor: Payor: BCBS / Plan: BCBS OUT OF STATE / Product Type: *No Product type* /    Prior Admission Data: 1/24/24 [facility] 6E MED ONCOLOGY Peter Santana   Hospitalized:  Hospital Day: 9 - Admitted 2/15/2024  7:21 AM     CONSULTANTS  IP CONSULT TO HEPATOLOGY  IP CONSULT TO INTENSIVIST  IP CONSULT TO INFECTIOUS DISEASES  IP CONSULT TO UROLOGY  IP CONSULT HOME HEALTH   ADMISSION DIAGNOSES  [unfilled]      Assessment/Plan:       54 yo F admitted for septic shock 2/2 UTI/ bacteremia with bilateral non-obstructing renal stones  - Discussed follow up for definitive stone treatment after her current infection has been treated. She agrees and follow up arranged. Call with questions.      CC: [unfilled]   HPI: She is a 53 y.o. female with past medical history of advanced cirrhosis undergoing evaluation for liver transplant by [facility], hyperlipidemia, obesity, type 2 diabetes mellitus who presented to the hospital on 2/15/2024 for fever and confusion.  She was admitted to the ICU for septic shock secondary to E. coli UTI and bacteremia.  She is improving on antibiotic therapy and is preparing for discharge.  Urology is consulted for kidney stones.  She has hx of kidney stones several years ago. Reports 6-8 UTIs in the last 6 months, no documented cultures in [facility] since 2015.   Recent CT of the abdomen with and without contrast at [facility] dated 1/18/2024 demonstrates bilateral nonobstructive renal stones, the largest  axillary lymphadenopathy.  THYROID: No nodule.  MEDIASTINUM: There is a 12 mm lymph node in the subcarinal region. This is  mildly enlarged but unchanged when compared 10/28/2013.  STEF: No mass or lymphadenopathy.  THORACIC AORTA: No aneurysm.  MAIN PULMONARY ARTERY: The main pulmonary artery segment is enlarged measuring  34 mm..  TRACHEA/BRONCHI: Patent.  ESOPHAGUS: No wall thickening or dilatation.  HEART: There is mild cardiomegaly. There are findings suggestive of anemia..  Coronary artery calcification is present. This is greater than expected for  patient this age.  PLEURA: No effusion or pneumothorax.  LUNGS: The lungs demonstrate a 5 x 2 mm oval nodule in the superior segment of  the right lower lobe that is probably in the plane of the superior accessory  fissure. This is unchanged and is consistent with a benign nodule.    There is a small subpleural nodule anterolaterally in the right middle lobe that  is unchanged in the interval.    No other nodules are noted. Lungs are clear acute process. No pneumonia. Central  airways are patent..  INCIDENTALLY IMAGED UPPER ABDOMEN: Liver demonstrates findings consistent with  cirrhosis. The spleen is partially imaged and is enlarged. There is ascites in  the upper abdomen which has decreased in the interval. Umbilical vein is  recanalized and there are collateral vessels in the anterior upper abdominal  wall and chest wall.    There are nonobstructing calcifications in the right kidney.. There is a  gallstone present.  BONES: No destructive bone lesion.    - Impression -  1. The lungs are clear of an acute process. There is a benign stable nodule in  the superior segment of the right lower lobe. There is a benign stable nodule in  the periphery of the right middle lobe.  2. The main pulmonary segment is enlarged suggesting pulmonary hypertension.  3. Coronary artery calcification is present, greater than expected for patient  this age.  4. Please see above

## 2024-02-23 NOTE — ADT AUTH CERT
*WE HAVE NO WAY TO FAX AN ACTUAL FORM, WE WORK FROM HOME SO IT IS ALL ELECTRONIC USING [employer] ITS, BELOW ID THE INFORMATION FROM THE FORM TYPED IN ORDER*    TYPED FORM    NAME: BIJAL CORDON  DATE: 24  PHONE: [phone] FAX: [phone]    PATIENT NAME: APRIL WARNER  : 1970  MEMBER ID#: [id number] GROUP: [id number]  ADDRESS: [address] [place] [address]  PHONE: [phone]  CITY: [place]  [place]  ZIP: [address]  ADMIT DATE: 02/15/24  PLACE OF SERVICE: INPATIENT FACILITY  ADMITTING PHYSICIAN: Cierra Smith MD  PHONE: [phone]  [facility] ADDRESS: [address] [place] [address]  DIAGNOSIS CODES: I95.89; N39.0; A41.9,R65.20  HOSPITAL: [facility] PHONE: [phone]  HOSPITAL ADDRESS:  [place] [address]  HOSPITAL PHONE: [phone]  HOSPITAL NPI: [id number]

## 2024-02-23 NOTE — CARE COORDINATION
Transition of Care Plan:    RUR: 21% High  Prior Level of Functioning: Needs some assistance with ADLs  Disposition: Home with IV ABX   Follow up appointments: PCP, Specialist  DME needed: NA  Transportation at discharge: Family   IM/IMM Medicare/ letter given: RICHARD  Caregiver Contact:  Rosalino Morgan  Discharge Caregiver contacted prior to discharge? No  Care Conference needed? No  Barriers to discharge: None    CM initiated referrals to [facility] and home health for SN. CM to update AVS when providers are confirmed. Patient is planned for DC today. Picc line to be placed. Patient will need bedside education prior to DC. CM to monitor.    5:15 CM unable to secure home health due to insurance and patient locality, [facility] will provide nursing for patient at the infusion suite in [place]. AVS updated.    Merari Hernandez, MS  [phone]

## 2024-02-23 NOTE — PROCEDURES
Ultrasound-guided bedside PICC placement with Altar Sherlock 3CG TCS    Consent obtained from pt after risks of  possible DVT, air embolism, infection, arterial puncture and catheter malposition are explained and all questions answered.    PRE-PROCEDURE VERIFICATION  Correct Procedure: yes  Correct Site: yes  Temperature: Temp: 98.8 °F (37.1 °C), Temperature Source:    Recent Labs     02/22/24  0651 02/23/24  0645   BUN 15 14   *  --    INR 1.8* 1.8*   WBC 5.7  --        Allergies: Patient has no known allergies.  Education materials, including PICC Booklet, for PICC Care given to patient: yes.   See Patient Education activity for further details.    PROCEDURE DETAIL  PICC placed using modified Seldinger method.  A single lumen PICC line was started for Home IV Therapy. The following documentation is in addition to the PICC properties in the lines/airways flowsheet :  Lot #: [id number]  Was xylocaine 1% used intradermally: yes  Catheter to vein ratio: 28%  Arm Circumference 10 cm above AC: 22 (cm)  Total Catheter Length: 37 (cm)  External Catheter Length: 0 (cm)  Vein Selection for PICC: left brachial  Central Line Bundle followed: yes  Complication Related to Insertion: none    The placement was verified by ECG  technology: The tip location is on the left side and the tip is in the  superior vena cava. See ECG results for PICC tip placement.    Report given to nurse.    Line is okay to use.    WILLIAM YUAN RN

## 2024-02-23 NOTE — PLAN OF CARE
INFECTIOUS DISEASE discharge plan:    Diagnosis: E.coli bacteremia    Antibiotic: ceftriaxone IV 2 g Q 24 H through 2/29/24    PICC line insertion for outpatient antibiotic.     Routine PICC/ Tabby/ Portacath Care including PRN catheter flow management    Weekly labs with results fax to # [phone]. Call critical lab values to [phone].   [x] CBC w/diff  [x] BUN/Creatinine  [x] AST, ALT  [] CPK  [] CRP, ESR  [] Vancomycin trough level goal  drawn every Monday and Thursday. Pharm D consult for Vancomycin dosing.     Home Health to pull PICC line after last dose or send to IR for Tabby removal    May send to IR for line evaluation or replacement PRN Phone # [phone]

## 2024-02-25 LAB
BACTERIA SPEC CULT: NORMAL
SERVICE CMNT-IMP: NORMAL

## 2024-02-26 ENCOUNTER — TELEPHONE (OUTPATIENT)
Age: 54
End: 2024-02-26

## 2024-02-26 LAB — VIT B1 BLD-SCNC: 80.7 NMOL/L (ref 66.5–200)

## 2024-02-26 NOTE — TELEPHONE ENCOUNTER
Patient states she had a para done as a inpatient and is now leaking fluid and would like to know what she should do

## 2024-02-26 NOTE — TELEPHONE ENCOUNTER
Patient was discharged from the hospital on Friday and started leaking from the paracentesis site on Saturday. She reports leakage has slowed after applying liquid band-aid; however, one part of the incision is still open and leaking. I recommended she lay on the opposite side of paracentesis site. She plans to try that, hoping the area will be dry enough to reapply liquid band-aid. I told her if she's still leaking, radiology may need to assess for possible stitch. She'll let me know in the morning.

## 2024-02-27 ENCOUNTER — HOSPITAL ENCOUNTER (OUTPATIENT)
Facility: HOSPITAL | Age: 54
Discharge: HOME | End: 2024-03-01
Payer: COMMERCIAL

## 2024-02-27 DIAGNOSIS — R18.8 CIRRHOSIS OF LIVER WITH ASCITES, UNSPECIFIED HEPATIC CIRRHOSIS TYPE (HCC): Primary | ICD-10-CM

## 2024-02-27 DIAGNOSIS — K74.60 CIRRHOSIS OF LIVER WITH ASCITES, UNSPECIFIED HEPATIC CIRRHOSIS TYPE (HCC): Primary | ICD-10-CM

## 2024-02-27 DIAGNOSIS — K74.69 OTHER CIRRHOSIS OF LIVER (HCC): ICD-10-CM

## 2024-02-27 PROBLEM — K31.89 PORTAL HYPERTENSIVE GASTROPATHY (HCC): Status: RESOLVED | Noted: 2023-09-23 | Resolved: 2024-02-27

## 2024-02-27 PROBLEM — A41.9 SEVERE SEPSIS (HCC): Status: RESOLVED | Noted: 2024-02-15 | Resolved: 2024-02-27

## 2024-02-27 PROBLEM — D50.0 IRON DEFICIENCY ANEMIA DUE TO CHRONIC BLOOD LOSS: Status: RESOLVED | Noted: 2023-09-23 | Resolved: 2024-02-27

## 2024-02-27 PROBLEM — K70.9 ALCOHOL INDUCED LIVER DISORDER (HCC): Status: ACTIVE | Noted: 2023-09-20

## 2024-02-27 PROBLEM — Z76.82 LIVER TRANSPLANT CANDIDATE: Status: ACTIVE | Noted: 2024-02-27

## 2024-02-27 PROBLEM — E66.01 SEVERE OBESITY (HCC): Status: RESOLVED | Noted: 2018-12-30 | Resolved: 2024-02-27

## 2024-02-27 PROBLEM — N39.0 URINARY TRACT INFECTION WITHOUT HEMATURIA: Status: RESOLVED | Noted: 2024-02-16 | Resolved: 2024-02-27

## 2024-02-27 PROBLEM — K76.6 PORTAL HYPERTENSIVE GASTROPATHY (HCC): Status: RESOLVED | Noted: 2023-09-23 | Resolved: 2024-02-27

## 2024-02-27 PROBLEM — R65.20 SEVERE SEPSIS (HCC): Status: RESOLVED | Noted: 2024-02-15 | Resolved: 2024-02-27

## 2024-02-27 PROBLEM — I85.00 ESOPHAGEAL VARICES (HCC): Status: ACTIVE | Noted: 2023-03-26

## 2024-02-27 PROBLEM — M54.6 RIGHT-SIDED THORACIC BACK PAIN: Status: RESOLVED | Noted: 2021-06-29 | Resolved: 2024-02-27

## 2024-02-27 PROCEDURE — 76705 ECHO EXAM OF ABDOMEN: CPT

## 2024-02-27 NOTE — PROGRESS NOTES
Spoke with ultrasound and angio departments at [facility]. Patient is still leaking from previous paracentesis site. Plan is to have patient come in this afternoon for paracentesis - if there's not enough fluid to drain, they'll glue the previous site. [employer] message sent to patient.

## 2024-02-28 ENCOUNTER — CLINICAL DOCUMENTATION (OUTPATIENT)
Age: 54
End: 2024-02-28

## 2024-02-28 ENCOUNTER — OFFICE VISIT (OUTPATIENT)
Age: 54
End: 2024-02-28
Payer: COMMERCIAL

## 2024-02-28 VITALS
WEIGHT: 166.4 LBS | RESPIRATION RATE: 18 BRPM | SYSTOLIC BLOOD PRESSURE: 119 MMHG | HEIGHT: 62 IN | DIASTOLIC BLOOD PRESSURE: 66 MMHG | TEMPERATURE: 97.9 F | HEART RATE: 75 BPM | OXYGEN SATURATION: 100 % | BODY MASS INDEX: 30.62 KG/M2

## 2024-02-28 DIAGNOSIS — A41.50 SEVERE SEPSIS WITH ACUTE ORGAN DYSFUNCTION DUE TO GRAM NEGATIVE BACTERIA (HCC): Primary | ICD-10-CM

## 2024-02-28 DIAGNOSIS — E11.9 TYPE 2 DIABETES MELLITUS WITHOUT COMPLICATION, WITHOUT LONG-TERM CURRENT USE OF INSULIN (HCC): ICD-10-CM

## 2024-02-28 DIAGNOSIS — R65.20 SEVERE SEPSIS WITH ACUTE ORGAN DYSFUNCTION DUE TO GRAM NEGATIVE BACTERIA (HCC): Primary | ICD-10-CM

## 2024-02-28 DIAGNOSIS — K74.60 CIRRHOSIS OF LIVER WITH ASCITES, UNSPECIFIED HEPATIC CIRRHOSIS TYPE (HCC): ICD-10-CM

## 2024-02-28 DIAGNOSIS — M54.6 PAIN IN THORACIC SPINE: ICD-10-CM

## 2024-02-28 DIAGNOSIS — N39.0 URINARY TRACT INFECTION WITHOUT HEMATURIA, SITE UNSPECIFIED: ICD-10-CM

## 2024-02-28 DIAGNOSIS — R18.8 CIRRHOSIS OF LIVER WITH ASCITES, UNSPECIFIED HEPATIC CIRRHOSIS TYPE (HCC): ICD-10-CM

## 2024-02-28 LAB — GLUCOSE, POC: 170 MG/DL

## 2024-02-28 PROCEDURE — 99214 OFFICE O/P EST MOD 30 MIN: CPT | Performed by: FAMILY MEDICINE

## 2024-02-28 PROCEDURE — 82947 ASSAY GLUCOSE BLOOD QUANT: CPT | Performed by: FAMILY MEDICINE

## 2024-02-28 PROCEDURE — 3051F HG A1C>EQUAL 7.0%<8.0%: CPT | Performed by: FAMILY MEDICINE

## 2024-02-28 RX ORDER — TRAMADOL HYDROCHLORIDE 50 MG/1
50 TABLET ORAL EVERY 6 HOURS PRN
Qty: 60 TABLET | Refills: 0 | Status: SHIPPED | OUTPATIENT
Start: 2024-02-28 | End: 2024-03-29

## 2024-02-28 RX ORDER — BLOOD SUGAR DIAGNOSTIC
1 STRIP MISCELLANEOUS DAILY
Qty: 100 EACH | Refills: 3 | Status: SHIPPED | OUTPATIENT
Start: 2024-02-28

## 2024-02-28 ASSESSMENT — ENCOUNTER SYMPTOMS
VISUAL CHANGE: 0
CHANGE IN BOWEL HABIT: 0
BLURRED VISION: 0
BACK PAIN: 1
CHEST TIGHTNESS: 0

## 2024-02-28 ASSESSMENT — PATIENT HEALTH QUESTIONNAIRE - PHQ9
1. LITTLE INTEREST OR PLEASURE IN DOING THINGS: 1
SUM OF ALL RESPONSES TO PHQ QUESTIONS 1-9: 2
SUM OF ALL RESPONSES TO PHQ9 QUESTIONS 1 & 2: 2
2. FEELING DOWN, DEPRESSED OR HOPELESS: 1
SUM OF ALL RESPONSES TO PHQ QUESTIONS 1-9: 2

## 2024-02-28 NOTE — PROGRESS NOTES
Chief Complaint   Patient presents with    Follow-Up from Hospital     Patient is here today for a hospital follow up.      1. Have you been to the ER, urgent care clinic since your last visit?  Hospitalized since your last visit? 2/15/24-2/23/24 [facility] for severe sepsis     2. Have you seen or consulted any other health care providers outside of the [facility] since your last visit?  Include any pap smears or colon screening. No    
  Cardiovascular:      Pulses: Normal pulses.      Heart sounds: Normal heart sounds.   Pulmonary:      Effort: Pulmonary effort is normal.      Breath sounds: Normal breath sounds.   Abdominal:      General: Abdomen is flat. There is distension.      Palpations: Abdomen is soft. There is no mass.      Tenderness: There is no abdominal tenderness. There is no guarding.   Musculoskeletal:      Thoracic back: Spasms and tenderness present. Decreased range of motion.        Back:    Skin:     Comments: Deeply jaundiced   Neurological:      General: No focal deficit present.      Mental Status: She is alert and oriented to person, place, and time.     Tiffanie was seen today for follow-up from hospital.    Diagnoses and all orders for this visit:    Severe sepsis with acute organ dysfunction due to Gram negative bacteria (HCC)    Urinary tract infection without hematuria, site unspecified    Cirrhosis of liver with ascites, unspecified hepatic cirrhosis type (HCC)    Type 2 diabetes mellitus without complication, without long-term current use of insulin (HCC)  -     AMB POC GLUCOSE, QUANTITATIVE, BLOOD    Pain in thoracic spine  -     traMADol (ULTRAM) 50 MG tablet; Take 1 tablet by mouth every 6 hours as needed for Pain for up to 30 days. Intended supply: 7 days. Take lowest dose possible to manage pain Max Daily Amount: 200 mg    Other orders  -     blood glucose test strips (EXACTECH TEST) strip; 1 each by In Vitro route daily As needed.    Continue current meds and treatments,and call if you have any questions.  Awaiting Uro,Hepatology recommendations,      Homero Hoyos MD

## 2024-03-05 ENCOUNTER — OFFICE VISIT (OUTPATIENT)
Age: 54
End: 2024-03-05
Payer: COMMERCIAL

## 2024-03-05 VITALS
DIASTOLIC BLOOD PRESSURE: 69 MMHG | HEART RATE: 86 BPM | TEMPERATURE: 96.4 F | HEIGHT: 62 IN | SYSTOLIC BLOOD PRESSURE: 109 MMHG | OXYGEN SATURATION: 100 % | WEIGHT: 161 LBS | BODY MASS INDEX: 29.63 KG/M2

## 2024-03-05 DIAGNOSIS — K70.31 ALCOHOLIC CIRRHOSIS OF LIVER WITH ASCITES (HCC): Primary | ICD-10-CM

## 2024-03-05 PROCEDURE — 99215 OFFICE O/P EST HI 40 MIN: CPT | Performed by: NURSE PRACTITIONER

## 2024-03-05 RX ORDER — TAMSULOSIN HYDROCHLORIDE 0.4 MG/1
CAPSULE ORAL
COMMUNITY
Start: 2024-02-29

## 2024-03-05 RX ORDER — SPIRONOLACTONE 100 MG/1
100 TABLET, FILM COATED ORAL DAILY
COMMUNITY

## 2024-03-05 RX ORDER — ESTRADIOL 0.1 MG/G
CREAM VAGINAL
COMMUNITY
Start: 2024-02-29

## 2024-03-05 NOTE — PROGRESS NOTES
Identified pt with two pt identifiers(name and ). Reviewed record in preparation for visit and have obtained necessary documentation.    Chief Complaint   Patient presents with    Follow-up     /69 (Site: Left Upper Arm, Position: Sitting, Cuff Size: Medium Adult)   Pulse 86   Temp (!) 96.4 °F (35.8 °C) (Temporal)   Ht 1.575 m (5' 2\")   Wt 73 kg (161 lb)   SpO2 100%   BMI 29.45 kg/m²       1. \"Have you been to the ER, urgent care clinic since your last visit?  Hospitalized since your last visit?\" Yes yes per pt for a UTI here in 2024    2. \"Have you seen or consulted any other health care providers outside of the [facility] since your last visit?\" Yes Where: [facility] on on last Thursday       Patient is accompanied by   I have received verbal consent from Tiffanie Morgan to discuss any/all medical information while they are present in the room.        
Surface AB QL  Non Reactive   Ferritin 15 - 150 ng/mL 284 (H)   Iron % Saturation 15 - 55 % 12 (L)   DAYRON, IFA  Negative   C-ANCA Neg:<1:20 titer <1:20   P-ANCA Neg:<1:20 titer <1:20   ANCA Neg:<1:20 titer <1:20   ASMCA 0 - 19 Units 14   M2 Ab 0.0 - 20.0 Units 41.7 (H)   Ceruloplasmin 19.0 - 39.0 mg/dL 30.2   Alpha-1 antitrypsin level 101 - 187 mg/dL 187     9/2023. PETH 49  10/2023.  PETH negative  2/2024.  PETH negative    LIVER HISTOLOGY:  8/2021.  FibroScan performed at [facility]. EkPa was 75.0.  IQR/med 0%.  . The results suggested a fibrosis level of F4. The CAP score suggests there is hepatic steatosis.      ENDOSCOPIC PROCEDURES:  9/2021: EGD performed at [facility] showed esophageal varices.  Four bands was placed  11/2021. EGD by Dr. Kuo. Esophageal varices with 7 bands placed.  1/2022. EGD by Dr. Kuo. Esophageal varices with 3 bands placed. Repeat in 6 months.  3/2023. EGD by Dr. Kuo. Esophageal varices. Banding x 4.  9/2023. EGD by Dr. Kuo. Esophageal varices. Banding x 2.     RADIOLOGY:  5/2021. CT of Abdomen. Cirrhotic appearance of the liver. Cholelithiasis. No ductal dilatation. Right renal calculi. Splenomegaly. No ascites.   3/2022.  Ultrasound of liver.   Echogenic consistent with cirrhosis.  No liver mass lesions.  No dilated bile ducts.  No ascites.  9/2022.  Ultrasound of liver.   Echogenic consistent with cirrhosis.  No liver mass lesions.  No dilated bile ducts.  No ascites.  4/2023.  Ultrasound of liver.   Echogenic consistent with cirrhosis.  No liver mass lesions.  No dilated bile ducts.  Mild ascites.    9/2023. CT of Abdomen. Moderate ascites. Cirrhosis. Gallbladder distended with stone in the neck. Splenomegaly.  9/2023. MRI of liver. 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 No mass or lesion. No ductal dilatation. 7 mm stone. Heterogeneous enhancement in the left lobe measuring 1.8 cm with no washout or

## 2024-03-06 LAB
ALBUMIN SERPL-MCNC: 3.2 G/DL (ref 3.5–5)
ALBUMIN/GLOB SERPL: 0.8 (ref 1.1–2.2)
ALP SERPL-CCNC: 140 U/L (ref 45–117)
ALT SERPL-CCNC: 28 U/L (ref 12–78)
ANION GAP SERPL CALC-SCNC: 8 MMOL/L (ref 5–15)
AST SERPL-CCNC: 59 U/L (ref 15–37)
BASOPHILS # BLD: 0.1 K/UL (ref 0–0.1)
BASOPHILS NFR BLD: 2 % (ref 0–1)
BILIRUB DIRECT SERPL-MCNC: 3.4 MG/DL (ref 0–0.2)
BILIRUB SERPL-MCNC: 9.2 MG/DL (ref 0.2–1)
BUN SERPL-MCNC: 6 MG/DL (ref 6–20)
BUN/CREAT SERPL: 7 (ref 12–20)
CALCIUM SERPL-MCNC: 10 MG/DL (ref 8.5–10.1)
CHLORIDE SERPL-SCNC: 106 MMOL/L (ref 97–108)
CO2 SERPL-SCNC: 25 MMOL/L (ref 21–32)
CREAT SERPL-MCNC: 0.81 MG/DL (ref 0.55–1.02)
DIFFERENTIAL METHOD BLD: ABNORMAL
EOSINOPHIL # BLD: 0.1 K/UL (ref 0–0.4)
EOSINOPHIL NFR BLD: 2 % (ref 0–7)
ERYTHROCYTE [DISTWIDTH] IN BLOOD BY AUTOMATED COUNT: 15.1 % (ref 11.5–14.5)
GLOBULIN SER CALC-MCNC: 4.2 G/DL (ref 2–4)
GLUCOSE SERPL-MCNC: 130 MG/DL (ref 65–100)
HCT VFR BLD AUTO: 31.8 % (ref 35–47)
HGB BLD-MCNC: 10 G/DL (ref 11.5–16)
IMM GRANULOCYTES # BLD AUTO: 0 K/UL (ref 0–0.04)
IMM GRANULOCYTES NFR BLD AUTO: 0 % (ref 0–0.5)
INR PPP: 1.7 (ref 0.9–1.1)
LYMPHOCYTES # BLD: 0.4 K/UL (ref 0.8–3.5)
LYMPHOCYTES NFR BLD: 10 % (ref 12–49)
MCH RBC QN AUTO: 35.2 PG (ref 26–34)
MCHC RBC AUTO-ENTMCNC: 31.4 G/DL (ref 30–36.5)
MCV RBC AUTO: 112 FL (ref 80–99)
MONOCYTES # BLD: 0.3 K/UL (ref 0–1)
MONOCYTES NFR BLD: 8 % (ref 5–13)
NEUTS SEG # BLD: 2.9 K/UL (ref 1.8–8)
NEUTS SEG NFR BLD: 78 % (ref 32–75)
NRBC # BLD: 0 K/UL (ref 0–0.01)
NRBC BLD-RTO: 0 PER 100 WBC
PLATELET # BLD AUTO: 95 K/UL (ref 150–400)
PMV BLD AUTO: 11.1 FL (ref 8.9–12.9)
POTASSIUM SERPL-SCNC: 4.3 MMOL/L (ref 3.5–5.1)
PROT SERPL-MCNC: 7.4 G/DL (ref 6.4–8.2)
PROTHROMBIN TIME: 17.2 SEC (ref 9–11.1)
RBC # BLD AUTO: 2.84 M/UL (ref 3.8–5.2)
RBC MORPH BLD: ABNORMAL
SODIUM SERPL-SCNC: 139 MMOL/L (ref 136–145)
WBC # BLD AUTO: 3.8 K/UL (ref 3.6–11)

## 2024-03-20 DIAGNOSIS — R18.8 CIRRHOSIS OF LIVER WITH ASCITES, UNSPECIFIED HEPATIC CIRRHOSIS TYPE (HCC): Primary | ICD-10-CM

## 2024-03-20 DIAGNOSIS — K74.60 CIRRHOSIS OF LIVER WITH ASCITES, UNSPECIFIED HEPATIC CIRRHOSIS TYPE (HCC): Primary | ICD-10-CM

## 2024-03-20 NOTE — PROGRESS NOTES
Received message from Juanita, pre-transplant coordinator at [facility], indicating MELD labs are needed for wait list activation.     Called patient and reviewed the above. She'll go to [facility] today.    [facility] [phone].     I sent a message to Juanita updating her on plan for MELD labs.

## 2024-03-21 ENCOUNTER — TELEPHONE (OUTPATIENT)
Age: 54
End: 2024-03-21

## 2024-03-21 LAB
ALBUMIN SERPL-MCNC: 3.4 G/DL (ref 3.8–4.9)
ALP SERPL-CCNC: 164 IU/L (ref 44–121)
ALT SERPL-CCNC: 26 IU/L (ref 0–32)
AST SERPL-CCNC: 44 IU/L (ref 0–40)
BILIRUB DIRECT SERPL-MCNC: 2.47 MG/DL (ref 0–0.4)
BILIRUB SERPL-MCNC: 6.2 MG/DL (ref 0–1.2)
BUN SERPL-MCNC: 6 MG/DL (ref 6–24)
BUN/CREAT SERPL: 10 (ref 9–23)
CALCIUM SERPL-MCNC: 9.9 MG/DL (ref 8.7–10.2)
CHLORIDE SERPL-SCNC: 101 MMOL/L (ref 96–106)
CO2 SERPL-SCNC: 24 MMOL/L (ref 20–29)
CREAT SERPL-MCNC: 0.63 MG/DL (ref 0.57–1)
EGFRCR SERPLBLD CKD-EPI 2021: 105 ML/MIN/1.73
GLUCOSE SERPL-MCNC: 215 MG/DL (ref 70–99)
INR PPP: 1.6 (ref 0.9–1.2)
POTASSIUM SERPL-SCNC: 4 MMOL/L (ref 3.5–5.2)
PROT SERPL-MCNC: 7.2 G/DL (ref 6–8.5)
PROTHROMBIN TIME: 17.2 SEC (ref 9.1–12)
SODIUM SERPL-SCNC: 140 MMOL/L (ref 134–144)

## 2024-03-21 RX ORDER — FUROSEMIDE 20 MG/1
20 TABLET ORAL DAILY
Qty: 90 TABLET | Refills: 3
Start: 2024-03-21

## 2024-03-21 NOTE — TELEPHONE ENCOUNTER
Labs sent to Juanita at [facility] for placement on the liver transplant wait list. MELD score is 20. Reviewed lab results with LUCIA Lau. Received VO to start furosemide 20 mg daily for LE edema. Patient to continue spironolactone 100 mg daily. Update sent to wait list coordinators.     Spoke with patient and updated her regarding MELD score. I advised patient to begin furosemide 20 mg daily and continue spironolactone 100 mg daily, per LUCIA Lau. Will recheck labs at follow up appointment on 4/3/2024. Patient verbalized understanding.

## 2024-03-24 DIAGNOSIS — M54.6 PAIN IN THORACIC SPINE: ICD-10-CM

## 2024-03-28 RX ORDER — TRAMADOL HYDROCHLORIDE 50 MG/1
50 TABLET ORAL EVERY 8 HOURS PRN
Qty: 50 TABLET | Refills: 0 | Status: SHIPPED | OUTPATIENT
Start: 2024-03-28 | End: 2024-04-27

## 2024-04-03 ENCOUNTER — OFFICE VISIT (OUTPATIENT)
Age: 54
End: 2024-04-03
Payer: COMMERCIAL

## 2024-04-03 VITALS
DIASTOLIC BLOOD PRESSURE: 64 MMHG | BODY MASS INDEX: 25.76 KG/M2 | TEMPERATURE: 97.3 F | WEIGHT: 140 LBS | OXYGEN SATURATION: 99 % | HEIGHT: 62 IN | HEART RATE: 101 BPM | SYSTOLIC BLOOD PRESSURE: 129 MMHG

## 2024-04-03 DIAGNOSIS — K74.60 CIRRHOSIS OF LIVER WITH ASCITES, UNSPECIFIED HEPATIC CIRRHOSIS TYPE (HCC): Primary | ICD-10-CM

## 2024-04-03 DIAGNOSIS — K76.82 HEPATIC ENCEPHALOPATHY (HCC): ICD-10-CM

## 2024-04-03 DIAGNOSIS — E87.1 HYPONATREMIA: ICD-10-CM

## 2024-04-03 DIAGNOSIS — F10.21 ALCOHOL DEPENDENCE IN REMISSION (HCC): ICD-10-CM

## 2024-04-03 DIAGNOSIS — D50.0 IRON DEFICIENCY ANEMIA DUE TO CHRONIC BLOOD LOSS: ICD-10-CM

## 2024-04-03 DIAGNOSIS — I85.10 SECONDARY ESOPHAGEAL VARICES WITHOUT BLEEDING (HCC): ICD-10-CM

## 2024-04-03 DIAGNOSIS — Z76.82 LIVER TRANSPLANT CANDIDATE: ICD-10-CM

## 2024-04-03 DIAGNOSIS — R18.8 CIRRHOSIS OF LIVER WITH ASCITES, UNSPECIFIED HEPATIC CIRRHOSIS TYPE (HCC): Primary | ICD-10-CM

## 2024-04-03 PROCEDURE — 99215 OFFICE O/P EST HI 40 MIN: CPT | Performed by: NURSE PRACTITIONER

## 2024-04-03 NOTE — PROGRESS NOTES
Identified pt with two pt identifiers(name and ). Reviewed record in preparation for visit and have obtained necessary documentation.    Chief Complaint   Patient presents with    Follow-up     /64 (Site: Right Upper Arm, Position: Sitting, Cuff Size: Large Adult)   Pulse (!) 101   Temp 97.3 °F (36.3 °C) (Temporal)   Ht 1.575 m (5' 2\")   Wt 63.5 kg (140 lb)   SpO2 99%   BMI 25.61 kg/m²       1. \"Have you been to the ER, urgent care clinic since your last visit?  Hospitalized since your last visit?\" No    2. \"Have you seen or consulted any other health care providers outside of the [facility] since your last visit?\"  Yes per she saw [facility] about 2 weeks ago for kidney stones    Patient is accompanied by self and mom  I have received verbal consent from Tiffanie Morgan to discuss any/all medical information while they are present in the room.        
the largest measuring 0.6 cm.     OTHER TESTIN2024. CT of Chest. Lymph node enlargement of 12 mm in the subcarinal region.     FOLLOW-UP:  All of the issues listed above in the Assessment and Plan were discussed with the patient.  All questions were answered.  The patient expressed a clear understanding of the above.    Follow-up [facility] in 4 weeks for ongoing monitoring and treatment.     MAXWELL Parks-BC  [address], [address]  [place]  [address] [phone]  [facility]

## 2024-04-04 PROBLEM — E87.1 HYPONATREMIA: Status: ACTIVE | Noted: 2024-04-04

## 2024-04-04 LAB
ALBUMIN SERPL-MCNC: 3.7 G/DL (ref 3.8–4.9)
ALP SERPL-CCNC: 207 IU/L (ref 44–121)
ALT SERPL-CCNC: 35 IU/L (ref 0–32)
AST SERPL-CCNC: 47 IU/L (ref 0–40)
BASOPHILS # BLD AUTO: 0.1 X10E3/UL (ref 0–0.2)
BASOPHILS NFR BLD AUTO: 1 %
BILIRUB DIRECT SERPL-MCNC: 2.68 MG/DL (ref 0–0.4)
BILIRUB SERPL-MCNC: 7.2 MG/DL (ref 0–1.2)
BUN SERPL-MCNC: 7 MG/DL (ref 6–24)
BUN/CREAT SERPL: 11 (ref 9–23)
CALCIUM SERPL-MCNC: 10.1 MG/DL (ref 8.7–10.2)
CHLORIDE SERPL-SCNC: 94 MMOL/L (ref 96–106)
CO2 SERPL-SCNC: 22 MMOL/L (ref 20–29)
CREAT SERPL-MCNC: 0.64 MG/DL (ref 0.57–1)
EGFRCR SERPLBLD CKD-EPI 2021: 105 ML/MIN/1.73
EOSINOPHIL # BLD AUTO: 0.1 X10E3/UL (ref 0–0.4)
EOSINOPHIL NFR BLD AUTO: 3 %
ERYTHROCYTE [DISTWIDTH] IN BLOOD BY AUTOMATED COUNT: 13.3 % (ref 11.7–15.4)
GLUCOSE SERPL-MCNC: 326 MG/DL (ref 70–99)
HCT VFR BLD AUTO: 28.7 % (ref 34–46.6)
HGB BLD-MCNC: 10.6 G/DL (ref 11.1–15.9)
IMM GRANULOCYTES # BLD AUTO: 0 X10E3/UL (ref 0–0.1)
IMM GRANULOCYTES NFR BLD AUTO: 0 %
INR PPP: 1.5 (ref 0.9–1.2)
LYMPHOCYTES # BLD AUTO: 0.4 X10E3/UL (ref 0.7–3.1)
LYMPHOCYTES NFR BLD AUTO: 9 %
MCH RBC QN AUTO: 34.1 PG (ref 26.6–33)
MCHC RBC AUTO-ENTMCNC: 36.9 G/DL (ref 31.5–35.7)
MCV RBC AUTO: 92 FL (ref 79–97)
MONOCYTES # BLD AUTO: 0.4 X10E3/UL (ref 0.1–0.9)
MONOCYTES NFR BLD AUTO: 8 %
MORPHOLOGY BLD-IMP: ABNORMAL
NEUTROPHILS # BLD AUTO: 3.9 X10E3/UL (ref 1.4–7)
NEUTROPHILS NFR BLD AUTO: 79 %
PLATELET # BLD AUTO: 91 X10E3/UL (ref 150–450)
POTASSIUM SERPL-SCNC: 3.9 MMOL/L (ref 3.5–5.2)
PROT SERPL-MCNC: 7.6 G/DL (ref 6–8.5)
PROTHROMBIN TIME: 15.5 SEC (ref 9.1–12)
RBC # BLD AUTO: 3.11 X10E6/UL (ref 3.77–5.28)
SODIUM SERPL-SCNC: 131 MMOL/L (ref 134–144)
WBC # BLD AUTO: 5 X10E3/UL (ref 3.4–10.8)

## 2024-04-05 ENCOUNTER — CLINICAL DOCUMENTATION (OUTPATIENT)
Age: 54
End: 2024-04-05

## 2024-04-05 DIAGNOSIS — K70.31 ALCOHOLIC CIRRHOSIS OF LIVER WITH ASCITES (HCC): Primary | ICD-10-CM

## 2024-04-11 LAB
ALBUMIN SERPL-MCNC: 3.8 G/DL (ref 3.8–4.9)
ALP SERPL-CCNC: 227 IU/L (ref 44–121)
ALT SERPL-CCNC: 38 IU/L (ref 0–32)
AST SERPL-CCNC: 53 IU/L (ref 0–40)
BASOPHILS # BLD AUTO: 0.1 X10E3/UL (ref 0–0.2)
BASOPHILS NFR BLD AUTO: 1 %
BILIRUB DIRECT SERPL-MCNC: 2.68 MG/DL (ref 0–0.4)
BILIRUB SERPL-MCNC: 6.7 MG/DL (ref 0–1.2)
BUN SERPL-MCNC: 6 MG/DL (ref 6–24)
BUN/CREAT SERPL: 8 (ref 9–23)
CALCIUM SERPL-MCNC: 10.5 MG/DL (ref 8.7–10.2)
CHLORIDE SERPL-SCNC: 95 MMOL/L (ref 96–106)
CO2 SERPL-SCNC: 23 MMOL/L (ref 20–29)
CREAT SERPL-MCNC: 0.74 MG/DL (ref 0.57–1)
EGFRCR SERPLBLD CKD-EPI 2021: 96 ML/MIN/1.73
EOSINOPHIL # BLD AUTO: 0.1 X10E3/UL (ref 0–0.4)
EOSINOPHIL NFR BLD AUTO: 3 %
ERYTHROCYTE [DISTWIDTH] IN BLOOD BY AUTOMATED COUNT: 13.4 % (ref 11.7–15.4)
GLUCOSE SERPL-MCNC: 359 MG/DL (ref 70–99)
HCT VFR BLD AUTO: 33.5 % (ref 34–46.6)
HGB BLD-MCNC: 11.5 G/DL (ref 11.1–15.9)
IMM GRANULOCYTES # BLD AUTO: 0 X10E3/UL (ref 0–0.1)
IMM GRANULOCYTES NFR BLD AUTO: 0 %
INR PPP: 1.5 (ref 0.9–1.2)
LYMPHOCYTES # BLD AUTO: 0.3 X10E3/UL (ref 0.7–3.1)
LYMPHOCYTES NFR BLD AUTO: 9 %
MCH RBC QN AUTO: 32.8 PG (ref 26.6–33)
MCHC RBC AUTO-ENTMCNC: 34.3 G/DL (ref 31.5–35.7)
MCV RBC AUTO: 95 FL (ref 79–97)
MONOCYTES # BLD AUTO: 0.4 X10E3/UL (ref 0.1–0.9)
MONOCYTES NFR BLD AUTO: 10 %
MORPHOLOGY BLD-IMP: ABNORMAL
NEUTROPHILS # BLD AUTO: 2.8 X10E3/UL (ref 1.4–7)
NEUTROPHILS NFR BLD AUTO: 77 %
PLATELET # BLD AUTO: 94 X10E3/UL (ref 150–450)
POTASSIUM SERPL-SCNC: 3.7 MMOL/L (ref 3.5–5.2)
PROT SERPL-MCNC: 8.1 G/DL (ref 6–8.5)
PROTHROMBIN TIME: 15.4 SEC (ref 9.1–12)
RBC # BLD AUTO: 3.51 X10E6/UL (ref 3.77–5.28)
SODIUM SERPL-SCNC: 135 MMOL/L (ref 134–144)
WBC # BLD AUTO: 3.7 X10E3/UL (ref 3.4–10.8)

## 2024-04-15 ENCOUNTER — PREP FOR PROCEDURE (OUTPATIENT)
Age: 54
End: 2024-04-15

## 2024-04-15 PROBLEM — R18.8 CIRRHOSIS OF LIVER WITH ASCITES (HCC): Status: ACTIVE | Noted: 2024-04-15

## 2024-04-15 PROBLEM — K74.60 CIRRHOSIS OF LIVER WITH ASCITES (HCC): Status: ACTIVE | Noted: 2024-04-15

## 2024-04-20 DIAGNOSIS — K21.9 GASTROESOPHAGEAL REFLUX DISEASE WITHOUT ESOPHAGITIS: ICD-10-CM

## 2024-04-21 DIAGNOSIS — M54.6 PAIN IN THORACIC SPINE: ICD-10-CM

## 2024-04-21 DIAGNOSIS — K21.9 GASTROESOPHAGEAL REFLUX DISEASE WITHOUT ESOPHAGITIS: ICD-10-CM

## 2024-04-22 DIAGNOSIS — M54.6 PAIN IN THORACIC SPINE: ICD-10-CM

## 2024-04-22 RX ORDER — PANTOPRAZOLE SODIUM 40 MG/1
40 TABLET, DELAYED RELEASE ORAL DAILY
Qty: 30 TABLET | Refills: 2 | Status: SHIPPED | OUTPATIENT
Start: 2024-04-22

## 2024-04-22 RX ORDER — PANTOPRAZOLE SODIUM 40 MG/1
40 TABLET, DELAYED RELEASE ORAL DAILY
Qty: 30 TABLET | Refills: 2 | OUTPATIENT
Start: 2024-04-22

## 2024-04-22 NOTE — TELEPHONE ENCOUNTER
Last appointment: 2/28/24  Next appointment: 5/31/24  Previous refill encounter(s): 3/28/24 #50    Requested Prescriptions     Pending Prescriptions Disp Refills    traMADol (ULTRAM) 50 MG tablet 50 tablet 0     Sig: Take 1 tablet by mouth every 8 hours as needed for Pain for up to 30 days. Max Daily Amount: 150 mg         For Pharmacy Admin Tracking Only    Program: Medication Refill  CPA in place:    Recommendation Provided To:   Intervention Detail: New Rx: 1, reason: Patient Preference  Intervention Accepted By:   Gap Closed?:    Time Spent (min): 5

## 2024-04-23 RX ORDER — TRAMADOL HYDROCHLORIDE 50 MG/1
TABLET ORAL
Qty: 50 TABLET | Refills: 0 | OUTPATIENT
Start: 2024-04-23

## 2024-04-23 RX ORDER — TRAMADOL HYDROCHLORIDE 50 MG/1
50 TABLET ORAL EVERY 8 HOURS PRN
Qty: 50 TABLET | Refills: 0 | Status: SHIPPED | OUTPATIENT
Start: 2024-04-23 | End: 2024-05-23

## 2024-05-10 ENCOUNTER — OFFICE VISIT (OUTPATIENT)
Age: 54
End: 2024-05-10
Payer: COMMERCIAL

## 2024-05-10 VITALS
HEART RATE: 95 BPM | TEMPERATURE: 97.3 F | HEIGHT: 63 IN | WEIGHT: 132.6 LBS | DIASTOLIC BLOOD PRESSURE: 68 MMHG | RESPIRATION RATE: 17 BRPM | OXYGEN SATURATION: 100 % | SYSTOLIC BLOOD PRESSURE: 118 MMHG | BODY MASS INDEX: 23.5 KG/M2

## 2024-05-10 DIAGNOSIS — R18.8 CIRRHOSIS OF LIVER WITH ASCITES, UNSPECIFIED HEPATIC CIRRHOSIS TYPE (HCC): Primary | ICD-10-CM

## 2024-05-10 DIAGNOSIS — E87.1 HYPONATREMIA: ICD-10-CM

## 2024-05-10 DIAGNOSIS — F10.21 ALCOHOL DEPENDENCE IN REMISSION (HCC): ICD-10-CM

## 2024-05-10 DIAGNOSIS — K74.60 CIRRHOSIS OF LIVER WITH ASCITES, UNSPECIFIED HEPATIC CIRRHOSIS TYPE (HCC): Primary | ICD-10-CM

## 2024-05-10 PROCEDURE — 99214 OFFICE O/P EST MOD 30 MIN: CPT | Performed by: NURSE PRACTITIONER

## 2024-05-10 RX ORDER — SPIRONOLACTONE 50 MG/1
50 TABLET, FILM COATED ORAL DAILY
Qty: 30 TABLET | Refills: 5 | Status: SHIPPED | OUTPATIENT
Start: 2024-05-10

## 2024-05-10 ASSESSMENT — PATIENT HEALTH QUESTIONNAIRE - PHQ9
SUM OF ALL RESPONSES TO PHQ QUESTIONS 1-9: 0
DEPRESSION UNABLE TO ASSESS: FUNCTIONAL CAPACITY MOTIVATION LIMITS ACCURACY
2. FEELING DOWN, DEPRESSED OR HOPELESS: NOT AT ALL
SUM OF ALL RESPONSES TO PHQ QUESTIONS 1-9: 0
SUM OF ALL RESPONSES TO PHQ9 QUESTIONS 1 & 2: 0
SUM OF ALL RESPONSES TO PHQ QUESTIONS 1-9: 0
1. LITTLE INTEREST OR PLEASURE IN DOING THINGS: NOT AT ALL
SUM OF ALL RESPONSES TO PHQ QUESTIONS 1-9: 0

## 2024-05-10 NOTE — PROGRESS NOTES
Identified pt with two pt identifiers(name and ). Reviewed record in preparation for visit and have obtained necessary documentation.  Vitals:    05/10/24 1104   BP: 118/68   Site: Left Upper Arm   Position: Sitting   Cuff Size: Small Adult   Pulse: 95   Resp: 17   Temp: 97.3 °F (36.3 °C)   TempSrc: Temporal   SpO2: 100%   Weight: 60.1 kg (132 lb 9.6 oz)   Height: 1.588 m (5' 2.5\")        Health Maintenance Review: Patient reminded of \"due or due soon\" health maintenance. I have asked the patient to contact his/her primary care provider (PCP) for follow-up on his/her health maintenance.    Coordination of Care Questionnaire:  :   1) Have you been to an emergency room, urgent care, or hospitalized since your last visit?  If yes, where when, and reason for visit? no       2. Have seen or consulted any other health care provider since your last visit?   If yes, where when, and reason for visit?  Yes, [facility]      Patient is accompanied by self I have received verbal consent from Tiffanie Morgan to discuss any/all medical information while they are present in the room.   
Mild ascites.    2023. CT of Abdomen. Moderate ascites. Cirrhosis. Gallbladder distended with stone in the neck. Splenomegaly.  2023. MRI of liver. 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 No mass or lesion. No ductal dilatation. 7 mm stone. Heterogeneous enhancement in the left lobe measuring 1.8 cm with no washout or capsule. Will order repeat MRI.   2024. CT of Abdomen. No liver mass or lesion. Cholelithiasis with no cholecystitis. Right lower lung 2 nodules with the largest measuring 0.6 cm.     OTHER TESTIN2024. CT of Chest. Lymph node enlargement of 12 mm in the subcarinal region.     FOLLOW-UP:  All of the issues listed above in the Assessment and Plan were discussed with the patient.  All questions were answered.  The patient expressed a clear understanding of the above.    Follow-up [facility] in 3 months for ongoing monitoring and treatment.     Judi Briscoe AGPCNP-BC  [address], [address]  [place]  [address] [phone]  [facility]

## 2024-05-16 DIAGNOSIS — M54.6 PAIN IN THORACIC SPINE: ICD-10-CM

## 2024-05-17 DIAGNOSIS — K21.9 GASTROESOPHAGEAL REFLUX DISEASE WITHOUT ESOPHAGITIS: ICD-10-CM

## 2024-05-17 RX ORDER — TRAMADOL HYDROCHLORIDE 50 MG/1
50 TABLET ORAL EVERY 8 HOURS PRN
Qty: 50 TABLET | Refills: 0 | Status: SHIPPED | OUTPATIENT
Start: 2024-05-17 | End: 2024-06-16

## 2024-05-17 RX ORDER — PANTOPRAZOLE SODIUM 40 MG/1
40 TABLET, DELAYED RELEASE ORAL DAILY
Qty: 30 TABLET | Refills: 2 | OUTPATIENT
Start: 2024-05-17

## 2024-05-17 NOTE — TELEPHONE ENCOUNTER
Last appointment: 2/28/24  Next appointment: 5/31/24  Previous refill encounter(s): 4/23/24 #50    Requested Prescriptions     Pending Prescriptions Disp Refills    traMADol (ULTRAM) 50 MG tablet 50 tablet 0     Sig: Take 1 tablet by mouth every 8 hours as needed for Pain for up to 30 days. Max Daily Amount: 150 mg         For Pharmacy Admin Tracking Only    Program: Medication Refill  CPA in place:    Recommendation Provided To:   Intervention Detail: New Rx: 1, reason: Patient Preference  Intervention Accepted By:   Gap Closed?:    Time Spent (min): 5

## 2024-05-17 NOTE — TELEPHONE ENCOUNTER
Protonix was sent on 4/22/24 for #30 with 2 refills    For Pharmacy Admin Tracking Only    Program: Medication Refill  CPA in place:    Recommendation Provided To:   Intervention Detail: Discontinued Rx: 1, reason: Duplicate Therapy  Intervention Accepted By:   Gap Closed?:    Time Spent (min): 5

## 2024-05-22 ENCOUNTER — HOSPITAL ENCOUNTER (OUTPATIENT)
Facility: HOSPITAL | Age: 54
Discharge: HOME | End: 2024-05-25
Payer: COMMERCIAL

## 2024-05-22 DIAGNOSIS — R18.8 CIRRHOSIS OF LIVER WITH ASCITES, UNSPECIFIED HEPATIC CIRRHOSIS TYPE (HCC): ICD-10-CM

## 2024-05-22 DIAGNOSIS — K74.60 CIRRHOSIS OF LIVER WITH ASCITES, UNSPECIFIED HEPATIC CIRRHOSIS TYPE (HCC): ICD-10-CM

## 2024-05-22 DIAGNOSIS — K76.82 HEPATIC ENCEPHALOPATHY (HCC): ICD-10-CM

## 2024-05-22 PROCEDURE — 76700 US EXAM ABDOM COMPLETE: CPT

## 2024-05-22 RX ORDER — RIFAXIMIN 550 MG/1
TABLET ORAL
Qty: 60 TABLET | Refills: 5 | Status: SHIPPED | OUTPATIENT
Start: 2024-05-22 | End: 2024-05-23 | Stop reason: SDUPTHER

## 2024-05-23 DIAGNOSIS — K76.82 HEPATIC ENCEPHALOPATHY (HCC): Primary | ICD-10-CM

## 2024-05-27 DIAGNOSIS — K76.82 HEPATIC ENCEPHALOPATHY (HCC): ICD-10-CM

## 2024-05-28 ENCOUNTER — TELEPHONE (OUTPATIENT)
Age: 54
End: 2024-05-28

## 2024-05-28 NOTE — TELEPHONE ENCOUNTER
Spoke with patient regarding MELD update labs. She has an EGD scheduled on 5/30/2024 and plans to get labs drawn the same day at [facility].

## 2024-05-28 NOTE — TELEPHONE ENCOUNTER
Refills have been requested for the following medications:         rifAXIMin (XIFAXAN) 550 MG tablet    Preferred pharmacy: [facility] #[id number] [facility] [address] [facility] -  [phone] - f [phone]

## 2024-05-28 NOTE — TELEPHONE ENCOUNTER
Dr. Kuo approved refill request on 5/25/2024. Per chart review, [facility] Liaison verified patient's prescription benefits for coverage. Xifaxan prescription was routed to [facility] on 5/28/2024.

## 2024-05-30 ENCOUNTER — ANESTHESIA EVENT (OUTPATIENT)
Facility: HOSPITAL | Age: 54
End: 2024-05-30
Payer: COMMERCIAL

## 2024-05-30 ENCOUNTER — HOSPITAL ENCOUNTER (OUTPATIENT)
Facility: HOSPITAL | Age: 54
Setting detail: OUTPATIENT SURGERY
Discharge: HOME | End: 2024-05-30
Attending: INTERNAL MEDICINE | Admitting: INTERNAL MEDICINE
Payer: COMMERCIAL

## 2024-05-30 ENCOUNTER — OFFICE VISIT (OUTPATIENT)
Age: 54
End: 2024-05-30

## 2024-05-30 ENCOUNTER — ANESTHESIA (OUTPATIENT)
Facility: HOSPITAL | Age: 54
End: 2024-05-30
Payer: COMMERCIAL

## 2024-05-30 VITALS
HEIGHT: 63 IN | SYSTOLIC BLOOD PRESSURE: 117 MMHG | TEMPERATURE: 98 F | HEART RATE: 87 BPM | DIASTOLIC BLOOD PRESSURE: 61 MMHG | RESPIRATION RATE: 13 BRPM | BODY MASS INDEX: 24.98 KG/M2 | WEIGHT: 141 LBS | OXYGEN SATURATION: 97 %

## 2024-05-30 DIAGNOSIS — R18.8 CIRRHOSIS OF LIVER WITH ASCITES, UNSPECIFIED HEPATIC CIRRHOSIS TYPE (HCC): ICD-10-CM

## 2024-05-30 DIAGNOSIS — K74.60 CIRRHOSIS OF LIVER WITH ASCITES, UNSPECIFIED HEPATIC CIRRHOSIS TYPE (HCC): ICD-10-CM

## 2024-05-30 LAB
ALBUMIN SERPL-MCNC: 3.2 G/DL (ref 3.5–5)
ALBUMIN/GLOB SERPL: 0.6 (ref 1.1–2.2)
ALP SERPL-CCNC: 259 U/L (ref 45–117)
ALT SERPL-CCNC: 54 U/L (ref 12–78)
ANION GAP SERPL CALC-SCNC: 7 MMOL/L (ref 5–15)
AST SERPL-CCNC: 71 U/L (ref 15–37)
BASOPHILS # BLD: 0 K/UL (ref 0–0.1)
BASOPHILS NFR BLD: 1 % (ref 0–1)
BILIRUB DIRECT SERPL-MCNC: 3.4 MG/DL (ref 0–0.2)
BILIRUB SERPL-MCNC: 7 MG/DL (ref 0.2–1)
BUN SERPL-MCNC: 10 MG/DL (ref 6–20)
BUN/CREAT SERPL: 11 (ref 12–20)
CALCIUM SERPL-MCNC: 10.9 MG/DL (ref 8.5–10.1)
CHLORIDE SERPL-SCNC: 99 MMOL/L (ref 97–108)
CO2 SERPL-SCNC: 25 MMOL/L (ref 21–32)
CREAT SERPL-MCNC: 0.87 MG/DL (ref 0.55–1.02)
DIFFERENTIAL METHOD BLD: ABNORMAL
EOSINOPHIL # BLD: 0.1 K/UL (ref 0–0.4)
EOSINOPHIL NFR BLD: 3 % (ref 0–7)
ERYTHROCYTE [DISTWIDTH] IN BLOOD BY AUTOMATED COUNT: 17.2 % (ref 11.5–14.5)
GLOBULIN SER CALC-MCNC: 5 G/DL (ref 2–4)
GLUCOSE SERPL-MCNC: 217 MG/DL (ref 65–100)
HCT VFR BLD AUTO: 33.2 % (ref 35–47)
HGB BLD-MCNC: 10.9 G/DL (ref 11.5–16)
IMM GRANULOCYTES # BLD AUTO: 0 K/UL (ref 0–0.04)
IMM GRANULOCYTES NFR BLD AUTO: 1 % (ref 0–0.5)
INR PPP: 1.4 (ref 0.9–1.1)
LYMPHOCYTES # BLD: 0.4 K/UL (ref 0.8–3.5)
LYMPHOCYTES NFR BLD: 9 % (ref 12–49)
MCH RBC QN AUTO: 34.2 PG (ref 26–34)
MCHC RBC AUTO-ENTMCNC: 32.8 G/DL (ref 30–36.5)
MCV RBC AUTO: 104.1 FL (ref 80–99)
MONOCYTES # BLD: 0.4 K/UL (ref 0–1)
MONOCYTES NFR BLD: 8 % (ref 5–13)
NEUTS SEG # BLD: 3.6 K/UL (ref 1.8–8)
NEUTS SEG NFR BLD: 78 % (ref 32–75)
NRBC # BLD: 0 K/UL (ref 0–0.01)
NRBC BLD-RTO: 0 PER 100 WBC
PLATELET # BLD AUTO: 62 K/UL (ref 150–400)
PMV BLD AUTO: 11.4 FL (ref 8.9–12.9)
POTASSIUM SERPL-SCNC: 4.8 MMOL/L (ref 3.5–5.1)
PROT SERPL-MCNC: 8.2 G/DL (ref 6.4–8.2)
PROTHROMBIN TIME: 14.1 SEC (ref 9–11.1)
RBC # BLD AUTO: 3.19 M/UL (ref 3.8–5.2)
RBC MORPH BLD: ABNORMAL
SODIUM SERPL-SCNC: 131 MMOL/L (ref 136–145)
WBC # BLD AUTO: 4.5 K/UL (ref 3.6–11)

## 2024-05-30 PROCEDURE — 7100000011 HC PHASE II RECOVERY - ADDTL 15 MIN: Performed by: INTERNAL MEDICINE

## 2024-05-30 PROCEDURE — 2500000003 HC RX 250 WO HCPCS: Performed by: NURSE ANESTHETIST, CERTIFIED REGISTERED

## 2024-05-30 PROCEDURE — 7100000010 HC PHASE II RECOVERY - FIRST 15 MIN: Performed by: INTERNAL MEDICINE

## 2024-05-30 PROCEDURE — 2720000010 HC SURG SUPPLY STERILE: Performed by: INTERNAL MEDICINE

## 2024-05-30 PROCEDURE — 2580000003 HC RX 258: Performed by: INTERNAL MEDICINE

## 2024-05-30 PROCEDURE — 3600007502: Performed by: INTERNAL MEDICINE

## 2024-05-30 PROCEDURE — 3700000000 HC ANESTHESIA ATTENDED CARE: Performed by: INTERNAL MEDICINE

## 2024-05-30 PROCEDURE — 43244 EGD VARICES LIGATION: CPT | Performed by: INTERNAL MEDICINE

## 2024-05-30 PROCEDURE — 6360000002 HC RX W HCPCS: Performed by: NURSE ANESTHETIST, CERTIFIED REGISTERED

## 2024-05-30 RX ORDER — SODIUM CHLORIDE 0.9 % (FLUSH) 0.9 %
5-40 SYRINGE (ML) INJECTION EVERY 12 HOURS SCHEDULED
Status: DISCONTINUED | OUTPATIENT
Start: 2024-05-30 | End: 2024-05-30 | Stop reason: HOSPADM

## 2024-05-30 RX ORDER — ONDANSETRON 2 MG/ML
2 INJECTION INTRAMUSCULAR; INTRAVENOUS AS NEEDED
Status: DISCONTINUED | OUTPATIENT
Start: 2024-05-30 | End: 2024-05-30 | Stop reason: HOSPADM

## 2024-05-30 RX ORDER — SODIUM CHLORIDE 0.9 % (FLUSH) 0.9 %
5-40 SYRINGE (ML) INJECTION PRN
Status: DISCONTINUED | OUTPATIENT
Start: 2024-05-30 | End: 2024-05-30 | Stop reason: HOSPADM

## 2024-05-30 RX ORDER — SODIUM CHLORIDE 9 MG/ML
INJECTION, SOLUTION INTRAVENOUS PRN
Status: DISCONTINUED | OUTPATIENT
Start: 2024-05-30 | End: 2024-05-30 | Stop reason: HOSPADM

## 2024-05-30 RX ORDER — FENTANYL CITRATE 50 UG/ML
25 INJECTION, SOLUTION INTRAMUSCULAR; INTRAVENOUS AS NEEDED
Status: DISCONTINUED | OUTPATIENT
Start: 2024-05-30 | End: 2024-05-30 | Stop reason: HOSPADM

## 2024-05-30 RX ORDER — LIDOCAINE HYDROCHLORIDE 20 MG/ML
INJECTION, SOLUTION EPIDURAL; INFILTRATION; INTRACAUDAL; PERINEURAL PRN
Status: DISCONTINUED | OUTPATIENT
Start: 2024-05-30 | End: 2024-05-30 | Stop reason: SDUPTHER

## 2024-05-30 RX ADMIN — SODIUM CHLORIDE: 9 INJECTION, SOLUTION INTRAVENOUS at 12:30

## 2024-05-30 RX ADMIN — PROPOFOL 50 MG: 10 INJECTION, EMULSION INTRAVENOUS at 13:26

## 2024-05-30 RX ADMIN — PROPOFOL 50 MG: 10 INJECTION, EMULSION INTRAVENOUS at 13:27

## 2024-05-30 RX ADMIN — PROPOFOL 50 MG: 10 INJECTION, EMULSION INTRAVENOUS at 13:24

## 2024-05-30 RX ADMIN — PROPOFOL 50 MG: 10 INJECTION, EMULSION INTRAVENOUS at 13:25

## 2024-05-30 RX ADMIN — LIDOCAINE HYDROCHLORIDE 100 MG: 20 INJECTION, SOLUTION EPIDURAL; INFILTRATION; INTRACAUDAL; PERINEURAL at 13:20

## 2024-05-30 RX ADMIN — PROPOFOL 100 MG: 10 INJECTION, EMULSION INTRAVENOUS at 13:20

## 2024-05-30 RX ADMIN — PROPOFOL 50 MG: 10 INJECTION, EMULSION INTRAVENOUS at 13:22

## 2024-05-30 RX ADMIN — PROPOFOL 50 MG: 10 INJECTION, EMULSION INTRAVENOUS at 13:23

## 2024-05-30 RX ADMIN — PROPOFOL 50 MG: 10 INJECTION, EMULSION INTRAVENOUS at 13:21

## 2024-05-30 ASSESSMENT — PAIN - FUNCTIONAL ASSESSMENT: PAIN_FUNCTIONAL_ASSESSMENT: NONE - DENIES PAIN

## 2024-05-30 NOTE — H&P
lactulose (CHRONULAC) 10 GM/15ML solution Take 45 mLs by mouth 3 times daily 4050 mL 5       For EGD to assess for esophageal and gastric varices.  Plan to perform banding if indicated based upon variceal size and appearance.    PHYSICAL EXAMINATION:  Vital signs per nursing and anesthesia records      General: No acute distress.   Eyes: Sclera anicteric.   ENT: No oral lesions.  Thyroid normal.  Nodes: No adenopathy.   Skin: No spider angiomata.  No jaundice.  No palmar erythema.  Respiratory: Lungs clear to auscultation.   Cardiovascular: Regular heart rate.  No murmurs.  No JVD.  Abdomen: Soft non-tender, liver size normal to percussion/palpation.  Spleen not palpable. No obvious ascites.  Extremities: No edema.  No muscle wasting.  No gross arthritic changes.  Neurologic: Alert and oriented.  Cranial nerves grossly intact.  No asterixis.      MOST RECENT LABORATORY STUDIES:  Lab Results   Component Value Date    WBC 3.7 04/10/2024    HGB 11.5 04/10/2024    PLT 94 (LL) 04/10/2024     Lab Results   Component Value Date    AST 53 (H) 04/10/2024    ALT 38 (H) 04/10/2024    BILITOT 6.7 (H) 04/10/2024     Lab Results   Component Value Date    INR 1.5 (H) 04/10/2024          PRE-PROCEDURE DOCUMENTATION  The risks of the procedure were discussed with the patient.  These included reaction to anesthesia, pain, perforation and bleeding.    All questions were answered.    The patient wishes to proceed with the procedure.    ASA status  3    Airway assessment:   Mouth opening:  3   Mallampati:  3   Dentition:  Intact     ASSESSMENT AND PLAN:  EGD to assess for esophageal and/or gastric varices.  Sedation per anesthesiology    Doron Kuo MD  [address], [address]  [place]  [address] [phone]  [facility]

## 2024-05-30 NOTE — ANESTHESIA PRE PROCEDURE
Department of Anesthesiology  Preprocedure Note       Name:  Tiffanie Morgan   Age:  53 y.o.  :  1970                                          MRN:  [id number]         Date:  2024      Surgeon: Surgeon(s):  Doron Kuo MD    Procedure: Procedure(s):  ESOPHAGOGASTRODUODENOSCOPY    Medications prior to admission:   Prior to Admission medications    Medication Sig Start Date End Date Taking? Authorizing Provider   rifAXIMin (XIFAXAN) 550 MG tablet TAKE ONE TABLET BY MOUTH TWICE A DAY, IN THE MORNING AND AT BEDTIME 24   Doron Kuo MD   traMADol (ULTRAM) 50 MG tablet Take 1 tablet by mouth every 8 hours as needed for Pain for up to 30 days. Max Daily Amount: 150 mg 24  Homero Hoyos MD   spironolactone (ALDACTONE) 50 MG tablet Take 1 tablet by mouth daily 5/10/24   Judi Briscoe APRN - NP   pantoprazole (PROTONIX) 40 MG tablet Take 1 tablet by mouth daily 24   Judi Briscoe APRN - NP   furosemide (LASIX) 20 MG tablet Take 1 tablet by mouth daily 3/21/24   Judi Briscoe APRN - NP   estradiol (ESTRACE) 0.1 MG/GM vaginal cream  24   Rashida Spencer MD   tamsulosin (FLOMAX) 0.4 MG capsule  24   Rashida Spencer MD   blood glucose test strips (EXACTECH TEST) strip 1 each by In Vitro route daily As needed. 24   Homero Hoyos MD   metFORMIN (GLUCOPHAGE-XR) 750 MG extended release tablet Take 1 tablet by mouth 2 times daily (with meals) 24   Homero Hoyos MD   OZEMPIC, 0.25 OR 0.5 MG/DOSE, 2 MG/3ML SOPN INJECT 0.5MG UNDER THE SKIN EVERY 7 DAYS 24   Homero Hoyos MD   lactulose (CHRONULAC) 10 GM/15ML solution Take 45 mLs by mouth 3 times daily 23   Judi Briscoe, APRN - NP       Current medications:    Current Facility-Administered Medications   Medication Dose Route Frequency Provider Last Rate Last Admin   • sodium chloride flush 0.9 % injection 5-40 mL  5-40 mL

## 2024-05-30 NOTE — OP NOTE
[facility]      Doron Kuo MD, FACP, FACG, FAASLD      Adia Cordova, VANESSA Briscoe, [facility]   Siobhan Santoyomikovijaya, [facility]   Gretel Bijan, [facility]  Grey Bettencourt, [facility]   Arlyn Mora, [facility]   Lisa Lee, [facility]   [address], [address]   [place]  [address] [phone]   FAX: [phone]  [facility]   [address], [address]   [place]  [address] [phone]   FAX: [phone]          UPPER ENDOSCOPY WITH BANDING OF ESOPHAGEAL VARICES PROCEDURE NOTE    NAME: Tiffanie Morgan  :  1970  MRN:  [id number]      INDICATION: Cirrhosis.  Screening for esophageal varices with variceal ligation if indicated.    : Doron Kuo MD    SURGICAL ASSISTANT:  None    PROSTHETIC DEVISES, TISSUE GRAFTS, ORGAN TRANSPLANTS:  Not applicable     ANESTHESIA/SEDATION: Propofol was administered by anesthesia      PROCEDURE DESCRIPTION:  Informed consent was obtained from the patient for the procedure.  All risks and benefits of the procedure explained.     The procedure was performed in the endoscopy suite.  The patient was laying on a stretcher and moved to the left lateral decubitus position prior to administration of sedation.    Sedation was administered by anesthesiology.  See their note for details.      The endoscope was inserted into the mouth and advanced under direct vision to the second portion of the duodenum.  Careful inspection of upper gastrointestinal tract was made as the endoscope was inserted and withdrawn.  Retroflexion of the endoscope to view of the cardia of the stomach was performed.  After withdrawing the endoscope the banding devise was placed on the tip of the endoscope.  The scope was then reinserted under direct inspection and

## 2024-05-30 NOTE — PROGRESS NOTES

## 2024-05-30 NOTE — ANESTHESIA POSTPROCEDURE EVALUATION
Department of Anesthesiology  Postprocedure Note    Patient: Tiffanie Morgan  MRN: [id number]  YOB: 1970  Date of evaluation: 5/30/2024    Procedure Summary       Date: 05/30/24 Room / Location: [facility] 02 / [facility] ENDOSCOPY    Anesthesia Start: 1318 Anesthesia Stop: 1329    Procedure: ESOPHAGOGASTRODUODENOSCOPY Diagnosis:       Cirrhosis of liver with ascites (HCC)      (Cirrhosis of liver with ascites (HCC) [K74.60, R18.8])    Surgeons: Doron Kuo MD Responsible Provider: Sheldon Hurd MD    Anesthesia Type: MAC ASA Status: 3            Anesthesia Type: No value filed.    Janes Phase I: Janes Score: 10    Janes Phase II: Janes Score: 9    Anesthesia Post Evaluation    Patient location during evaluation: bedside  Nausea & Vomiting: no nausea  Cardiovascular status: blood pressure returned to baseline  Respiratory status: acceptable  Hydration status: euvolemic    No notable events documented.

## 2024-05-30 NOTE — DISCHARGE INSTRUCTIONS
[facility]      Doron Kuo MD, FACP, FACG, FAASLD      VANESSA Sim, [facility]   Siobhan Santoyoelkin, [facility]   Gretel Bijan, [facility]  Grey Bettencourt, [facility]   Arlyn Mora, [facility]   Lisa Escotoon, [facility]   [address]   [place]  [address] [phone]   FAX: [phone]  [facility]   [address], [address]   [place]  [address] [phone]   FAX: [phone]         ENDOSCOPY WITH BANDING DISCHARGE INSTRUCTIONS    Tiffanie Morgan    1970  Date: 5/30/2024    DISCOMFORT:  Use lozenges or warm salt water gargle for sore thoat  Apply warm compress to IV site if red.  If redness or soreness persists call the office.  You may experience gas and bloating.  Walking and belching will help relieve this.  You may experience chest pain or discomfort or feel as though food is \"sticking\" in your food pipe for a few days after the procedure. This is a normal feeling after banding of esophageal varices.    CHEST PRESSURE:  Banding of dilated veins (varices) in the food tube (esophagus) can be painful in some persons.  The pain is caused by squeezing the varices and lining of the esophagus by the bands used to seal the varices.  You can take liquid pain medication either acetaminophen or alternative.  The best treatment for this is to drink a an \"Icee\" or \"Slurpee\" since the ice crystals will freeze the nerve endings in the food tube and relieve the pain.    DIET:  Regular food may dislodge the bands placed on the varices.  For this reason you should only have liquid for the rest of today.  Eat only soft food that does not need to be chewed all day tomorrow.  You may advance to your regular diet in 2 days.    ACTIVITY:  Spend the remainder of the day

## 2024-05-31 ENCOUNTER — OFFICE VISIT (OUTPATIENT)
Age: 54
End: 2024-05-31

## 2024-05-31 VITALS
DIASTOLIC BLOOD PRESSURE: 70 MMHG | HEART RATE: 77 BPM | RESPIRATION RATE: 18 BRPM | HEIGHT: 63 IN | TEMPERATURE: 98 F | WEIGHT: 140.2 LBS | BODY MASS INDEX: 24.84 KG/M2 | SYSTOLIC BLOOD PRESSURE: 115 MMHG | OXYGEN SATURATION: 100 %

## 2024-05-31 DIAGNOSIS — M54.6 PAIN IN THORACIC SPINE: ICD-10-CM

## 2024-05-31 DIAGNOSIS — K70.31 ALCOHOLIC CIRRHOSIS OF LIVER WITH ASCITES (HCC): Primary | ICD-10-CM

## 2024-05-31 DIAGNOSIS — E11.9 TYPE 2 DIABETES MELLITUS WITHOUT COMPLICATION, WITHOUT LONG-TERM CURRENT USE OF INSULIN (HCC): ICD-10-CM

## 2024-05-31 DIAGNOSIS — K74.3 PRIMARY BILIARY CHOLANGITIS (HCC): ICD-10-CM

## 2024-05-31 DIAGNOSIS — E78.2 MIXED HYPERLIPIDEMIA: ICD-10-CM

## 2024-05-31 LAB
GLUCOSE, POC: 337 MG/DL
HBA1C MFR BLD: 7.5 %

## 2024-05-31 ASSESSMENT — PATIENT HEALTH QUESTIONNAIRE - PHQ9
SUM OF ALL RESPONSES TO PHQ QUESTIONS 1-9: 2
SUM OF ALL RESPONSES TO PHQ9 QUESTIONS 1 & 2: 2
SUM OF ALL RESPONSES TO PHQ QUESTIONS 1-9: 2
2. FEELING DOWN, DEPRESSED OR HOPELESS: SEVERAL DAYS
1. LITTLE INTEREST OR PLEASURE IN DOING THINGS: SEVERAL DAYS

## 2024-05-31 ASSESSMENT — ENCOUNTER SYMPTOMS
DIARRHEA: 0
ABDOMINAL DISTENTION: 1
CHEST TIGHTNESS: 0
BLOOD IN STOOL: 0
ANAL BLEEDING: 0
ABDOMINAL PAIN: 0

## 2024-05-31 NOTE — PROGRESS NOTES
Chief Complaint   Patient presents with    Follow-up     Patient is here today for a follow up.      1. Have you been to the ER, urgent care clinic since your last visit?  Hospitalized since your last visit? 5/30/24 for esophagogastroduodenoscopy at [facility]    2. Have you seen or consulted any other health care providers outside of the [facility] since your last visit?  Include any pap smears or colon screening. No    
daily As needed.    metFORMIN (GLUCOPHAGE-XR) 750 MG extended release tablet Take 1 tablet by mouth 2 times daily (with meals)    OZEMPIC, 0.25 OR 0.5 MG/DOSE, 2 MG/3ML SOPN INJECT 0.5MG UNDER THE SKIN EVERY 7 DAYS    lactulose (CHRONULAC) 10 GM/15ML solution Take 45 mLs by mouth 3 times daily    tamsulosin (FLOMAX) 0.4 MG capsule  (Patient not taking: Reported on 5/31/2024)     No current facility-administered medications for this visit.        Objective:   Vitals:  /70 (Site: Left Upper Arm, Position: Sitting, Cuff Size: Small Adult)   Pulse 77   Temp 98 °F (36.7 °C) (Oral)   Resp 18   Ht 1.588 m (5' 2.5\")   Wt 63.6 kg (140 lb 3.2 oz)   SpO2 100%   BMI 25.23 kg/m²        PHYSICAL EXAM:  General:     Alert, cooperative, no distress, appears stated age.     Head:    Normocephalic, without obvious abnormality, atraumatic.  Ears:   External canals WNL TMs WNL   Eyes:    Conjunctivae/corneas clear.  Mild scleral icterus  Nose:   Nares normal. No drainage or sinus tenderness.  Throat:     Lips, mucosa, and tongue normal.  No Thrush  Neck:   Supple, symmetrical,  no adenopathy, thyroid: non tender     no carotid bruit and no JVD.  Back:     Symmetric,  No CVA tenderness.  Lungs:    Clear to auscultation bilaterally.  No Wheezing or Rhonchi. No rales.  Heart:    Regular rate and rhythm,  no murmur, rub or gallop.  Abdomen:    Soft, non-tender. Not distended.  Bowel sounds normal. No masses  Extremities:  Extremities normal, atraumatic, No cyanosis.  No edema. No clubbing  Neurologic:  Normal strength, Alert and oriented X 3.   Skin:                mild jaundice      Lab Data Reviewed:    Recent Results (from the past 24 hour(s))   CBC with Auto Differential    Collection Time: 05/30/24 11:13 AM   Result Value Ref Range    WBC 4.5 3.6 - 11.0 K/uL    RBC 3.19 (L) 3.80 - 5.20 M/uL    Hemoglobin 10.9 (L) 11.5 - 16.0 g/dL    Hematocrit 33.2 (L) 35.0 - 47.0 %    .1 (H) 80.0 - 99.0 FL    MCH 34.2 (H) 26.0 -

## 2024-06-03 ENCOUNTER — TELEPHONE (OUTPATIENT)
Age: 54
End: 2024-06-03

## 2024-06-03 DIAGNOSIS — K74.60 CIRRHOSIS OF LIVER WITH ASCITES, UNSPECIFIED HEPATIC CIRRHOSIS TYPE (HCC): Primary | ICD-10-CM

## 2024-06-03 DIAGNOSIS — R18.8 CIRRHOSIS OF LIVER WITH ASCITES, UNSPECIFIED HEPATIC CIRRHOSIS TYPE (HCC): Primary | ICD-10-CM

## 2024-06-03 LAB
AFP L3 MFR SERPL: 8.5 % (ref 0–9.9)
AFP SERPL-MCNC: 3.9 NG/ML (ref 0–9.2)

## 2024-06-03 NOTE — TELEPHONE ENCOUNTER
Called patient and confirmed MELD has been updated by [facility]. Next labs will be drawn on 6/27/2024. I faxed the lab requisition to [facility] ([place]) at [phone]. Also mailed a hard copy to pt. We reviewed need for repeat EGD in 9/2024. I told patient that  staff would reach out to schedule.

## 2024-06-20 DIAGNOSIS — M54.6 PAIN IN THORACIC SPINE: ICD-10-CM

## 2024-06-20 RX ORDER — TRAMADOL HYDROCHLORIDE 50 MG/1
50 TABLET ORAL EVERY 8 HOURS PRN
Qty: 50 TABLET | Refills: 0 | Status: SHIPPED | OUTPATIENT
Start: 2024-06-20 | End: 2024-07-20

## 2024-06-20 RX ORDER — BLOOD SUGAR DIAGNOSTIC
1 STRIP MISCELLANEOUS DAILY
Qty: 100 EACH | Refills: 3 | OUTPATIENT
Start: 2024-06-20

## 2024-06-20 NOTE — TELEPHONE ENCOUNTER
Last appointment: 5/31/24  Next appointment: Advised to follow-up 7/31/24  Previous refill encounter(s): 5/17/24 #50    Requested Prescriptions     Pending Prescriptions Disp Refills    traMADol (ULTRAM) 50 MG tablet [Pharmacy Med Name: TRAMADOL HCL 50MG TABS] 50 tablet 0     Sig: Take 1 tablet by mouth every 8 hours as needed for Pain for up to 30 days. Max Daily Amount: 150 mg         For Pharmacy Admin Tracking Only    Program: Medication Refill  CPA in place:    Recommendation Provided To:   Intervention Detail: New Rx: 1, reason: Patient Preference  Intervention Accepted By:   Gap Closed?:    Time Spent (min): 5

## 2024-06-20 NOTE — TELEPHONE ENCOUNTER
Test strips were sent on 2/28/24 for #100 with 3 refills    For Pharmacy Admin Tracking Only    Program: Medication Refill  CPA in place:    Recommendation Provided To:   Intervention Detail: Discontinued Rx: 1, reason: Duplicate Therapy  Intervention Accepted By:   Gap Closed?:    Time Spent (min): 5

## 2024-06-25 ENCOUNTER — TELEPHONE (OUTPATIENT)
Age: 54
End: 2024-06-25

## 2024-06-25 NOTE — TELEPHONE ENCOUNTER
----- Message from Tiffanie Morgan sent at 6/25/2024  1:00 PM EDT -----  Regarding: UTI  prescription   Contact: [phone]  I have been having a lot ofUTI lately and they want me to have this antibiotic on hand so I don’t end up in the hospital for infection. I talked to Dr Hoyos yesterday about it and he told me to send him the name of the antibiotic.  Thanks  Tiffanie

## 2024-06-25 NOTE — TELEPHONE ENCOUNTER
----- Message from Tiffanie Morgan sent at 6/25/2024  7:54 AM EDT -----  Regarding: UTI  prescription   Contact: [phone]  Hi  The name of the prescription 10 nitrofurantoin monohyd Mac 100 caps    Thanks  Tiffanie

## 2024-06-25 NOTE — TELEPHONE ENCOUNTER
----- Message from Tiffanie Morgan sent at 6/25/2024  1:00 PM EDT -----  Regarding: UTI  prescription   Contact: [phone]  I have been having a lot ofUTI lately and they want me to have this antibiotic on hand so I don’t end up in the hospital for infection. I talked to Dr Hoyos yesterday about it and he told me to send him the name of the antibiotic.  Thanks  Tiffanie    Requesting the prescription   nitrofurantoin monohyd Mac 100 caps     Thanks  Tiffanie

## 2024-06-26 DIAGNOSIS — N30.90 CYSTITIS: Primary | ICD-10-CM

## 2024-06-26 RX ORDER — NITROFURANTOIN 25; 75 MG/1; MG/1
100 CAPSULE ORAL 2 TIMES DAILY
Qty: 10 CAPSULE | Refills: 0 | Status: SHIPPED | OUTPATIENT
Start: 2024-06-26

## 2024-06-28 ENCOUNTER — TELEPHONE (OUTPATIENT)
Age: 54
End: 2024-06-28

## 2024-06-28 DIAGNOSIS — R18.8 CIRRHOSIS OF LIVER WITH ASCITES, UNSPECIFIED HEPATIC CIRRHOSIS TYPE (HCC): Primary | ICD-10-CM

## 2024-06-28 DIAGNOSIS — K74.60 CIRRHOSIS OF LIVER WITH ASCITES, UNSPECIFIED HEPATIC CIRRHOSIS TYPE (HCC): Primary | ICD-10-CM

## 2024-06-28 LAB
ALBUMIN SERPL-MCNC: 3.7 G/DL (ref 3.8–4.9)
ALP SERPL-CCNC: 282 IU/L (ref 44–121)
ALT SERPL-CCNC: 51 IU/L (ref 0–32)
AST SERPL-CCNC: 71 IU/L (ref 0–40)
BILIRUB DIRECT SERPL-MCNC: 3.07 MG/DL (ref 0–0.4)
BILIRUB SERPL-MCNC: 6.9 MG/DL (ref 0–1.2)
BUN SERPL-MCNC: 18 MG/DL (ref 6–24)
BUN/CREAT SERPL: 23 (ref 9–23)
CALCIUM SERPL-MCNC: 10.7 MG/DL (ref 8.7–10.2)
CHLORIDE SERPL-SCNC: 89 MMOL/L (ref 96–106)
CO2 SERPL-SCNC: 24 MMOL/L (ref 20–29)
CREAT SERPL-MCNC: 0.77 MG/DL (ref 0.57–1)
EGFRCR SERPLBLD CKD-EPI 2021: 92 ML/MIN/1.73
GLUCOSE SERPL-MCNC: 353 MG/DL (ref 70–99)
INR PPP: 1.4 (ref 0.9–1.2)
POTASSIUM SERPL-SCNC: 4.2 MMOL/L (ref 3.5–5.2)
PROT SERPL-MCNC: 8.1 G/DL (ref 6–8.5)
PROTHROMBIN TIME: 14.7 SEC (ref 9.1–12)
SODIUM SERPL-SCNC: 130 MMOL/L (ref 134–144)

## 2024-06-28 NOTE — TELEPHONE ENCOUNTER
Reviewed labs with LUCIA Lau. Sent results to pts pre-liver transplant coordinator at [facility] for MELD update. MELD score is 22.    Called pt and updated her regarding MELD score. Labs to be repeated on 7/25/2024 - mailed lab requisition to pt. We reviewed elevated blood sugars. She reports PCP manages DM and last A1C was 7.5 (5/31/2024). I stressed importance of improved blood glucose control, as uncontrolled DM could impact liver transplant candidacy. Pt acknowledges diet is a factor. We discussed making better choices and I recommended she remove sugary/sweet foods from the home to reduce temptation and easy access. Pt appeared motivated to improve diet.

## 2024-07-09 ENCOUNTER — TELEPHONE (OUTPATIENT)
Age: 54
End: 2024-07-09

## 2024-07-09 DIAGNOSIS — E11.9 TYPE 2 DIABETES MELLITUS WITHOUT COMPLICATION, WITHOUT LONG-TERM CURRENT USE OF INSULIN (HCC): Primary | ICD-10-CM

## 2024-07-09 RX ORDER — BLOOD-GLUCOSE SENSOR
1 EACH MISCELLANEOUS
Qty: 2 EACH | Refills: 5 | Status: SHIPPED | OUTPATIENT
Start: 2024-07-09

## 2024-07-09 RX ORDER — SEMAGLUTIDE 1.34 MG/ML
1 INJECTION, SOLUTION SUBCUTANEOUS
Qty: 1 ADJUSTABLE DOSE PRE-FILLED PEN SYRINGE | Refills: 3 | Status: SHIPPED | OUTPATIENT
Start: 2024-07-09

## 2024-07-09 RX ORDER — FLASH GLUCOSE SCANNING READER
1 EACH MISCELLANEOUS CONTINUOUS
Qty: 2 EACH | Refills: 3 | Status: SHIPPED | OUTPATIENT
Start: 2024-07-09

## 2024-07-09 NOTE — TELEPHONE ENCOUNTER
----- Message from Tiffanie Morgan sent at 7/9/2024  8:04 AM EDT -----  Regarding:   Blood sugar   Contact: [phone]  My number has been high.  I need know the amount of sugar a day I can have.  Also can you send in a prescription for the Jaimie.     Thanks  Tiffanie  ------------------------------------------------------------------    Pt BS running in the low 400's.

## 2024-07-16 DIAGNOSIS — K21.9 GASTROESOPHAGEAL REFLUX DISEASE WITHOUT ESOPHAGITIS: ICD-10-CM

## 2024-07-16 RX ORDER — PANTOPRAZOLE SODIUM 40 MG/1
40 TABLET, DELAYED RELEASE ORAL DAILY
Qty: 30 TABLET | Refills: 2 | Status: SHIPPED | OUTPATIENT
Start: 2024-07-16

## 2024-07-26 ENCOUNTER — TELEPHONE (OUTPATIENT)
Age: 54
End: 2024-07-26

## 2024-07-26 NOTE — TELEPHONE ENCOUNTER
Called patient to let her know labs were faxed to [facility] for MELD update - MELD is 20 with 7/24/2024 labs, patient verbalized understanding and will reach out to our office if she needs anything.

## 2024-07-29 DIAGNOSIS — M54.6 PAIN IN THORACIC SPINE: Primary | ICD-10-CM

## 2024-07-29 RX ORDER — TRAMADOL HYDROCHLORIDE 50 MG/1
50 TABLET ORAL EVERY 8 HOURS PRN
Qty: 50 TABLET | Refills: 0 | Status: SHIPPED | OUTPATIENT
Start: 2024-07-29 | End: 2024-08-28

## 2024-07-30 DIAGNOSIS — K76.82 HEPATIC ENCEPHALOPATHY (HCC): ICD-10-CM

## 2024-07-31 RX ORDER — LACTULOSE 10 G/15ML
SOLUTION ORAL
Qty: 4050 ML | Refills: 5 | Status: SHIPPED | OUTPATIENT
Start: 2024-07-31

## 2024-08-01 ENCOUNTER — PREP FOR PROCEDURE (OUTPATIENT)
Age: 54
End: 2024-08-01

## 2024-08-01 DIAGNOSIS — K70.31 ALCOHOLIC CIRRHOSIS OF LIVER WITH ASCITES (HCC): ICD-10-CM

## 2024-08-01 DIAGNOSIS — K74.3 PRIMARY BILIARY CIRRHOSIS (HCC): ICD-10-CM

## 2024-08-01 PROBLEM — E11.9 DIABETES MELLITUS (HCC): Status: ACTIVE | Noted: 2024-08-01

## 2024-08-05 ENCOUNTER — OFFICE VISIT (OUTPATIENT)
Age: 54
End: 2024-08-05
Payer: COMMERCIAL

## 2024-08-05 VITALS
OXYGEN SATURATION: 99 % | HEIGHT: 63 IN | DIASTOLIC BLOOD PRESSURE: 67 MMHG | HEART RATE: 91 BPM | WEIGHT: 133.2 LBS | SYSTOLIC BLOOD PRESSURE: 113 MMHG | TEMPERATURE: 97.7 F | BODY MASS INDEX: 23.6 KG/M2 | RESPIRATION RATE: 18 BRPM

## 2024-08-05 DIAGNOSIS — K21.9 GASTROESOPHAGEAL REFLUX DISEASE WITHOUT ESOPHAGITIS: ICD-10-CM

## 2024-08-05 DIAGNOSIS — K70.31 ALCOHOLIC CIRRHOSIS OF LIVER WITH ASCITES (HCC): ICD-10-CM

## 2024-08-05 DIAGNOSIS — M54.6 PAIN IN THORACIC SPINE: ICD-10-CM

## 2024-08-05 DIAGNOSIS — E11.9 TYPE 2 DIABETES MELLITUS WITHOUT COMPLICATION, WITHOUT LONG-TERM CURRENT USE OF INSULIN (HCC): Primary | ICD-10-CM

## 2024-08-05 DIAGNOSIS — K74.3 PRIMARY BILIARY CHOLANGITIS (HCC): ICD-10-CM

## 2024-08-05 LAB — GLUCOSE, POC: 175 MG/DL

## 2024-08-05 PROCEDURE — 82947 ASSAY GLUCOSE BLOOD QUANT: CPT | Performed by: FAMILY MEDICINE

## 2024-08-05 PROCEDURE — 99213 OFFICE O/P EST LOW 20 MIN: CPT | Performed by: FAMILY MEDICINE

## 2024-08-05 PROCEDURE — 3051F HG A1C>EQUAL 7.0%<8.0%: CPT | Performed by: FAMILY MEDICINE

## 2024-08-05 RX ORDER — LANCETS 30 GAUGE
1 EACH MISCELLANEOUS 2 TIMES DAILY
Qty: 100 EACH | Refills: 3 | Status: SHIPPED | OUTPATIENT
Start: 2024-08-05

## 2024-08-05 RX ORDER — SPIRONOLACTONE 100 MG/1
200 TABLET, FILM COATED ORAL DAILY
COMMUNITY
Start: 2024-07-09 | End: 2024-08-05

## 2024-08-05 SDOH — ECONOMIC STABILITY: INCOME INSECURITY: HOW HARD IS IT FOR YOU TO PAY FOR THE VERY BASICS LIKE FOOD, HOUSING, MEDICAL CARE, AND HEATING?: NOT VERY HARD

## 2024-08-05 SDOH — ECONOMIC STABILITY: FOOD INSECURITY: WITHIN THE PAST 12 MONTHS, YOU WORRIED THAT YOUR FOOD WOULD RUN OUT BEFORE YOU GOT MONEY TO BUY MORE.: SOMETIMES TRUE

## 2024-08-05 SDOH — ECONOMIC STABILITY: FOOD INSECURITY: WITHIN THE PAST 12 MONTHS, THE FOOD YOU BOUGHT JUST DIDN'T LAST AND YOU DIDN'T HAVE MONEY TO GET MORE.: SOMETIMES TRUE

## 2024-08-05 ASSESSMENT — ENCOUNTER SYMPTOMS
DIARRHEA: 1
BACK PAIN: 1
CHANGE IN BOWEL HABIT: 0
ANAL BLEEDING: 0
ABDOMINAL PAIN: 0
BLOOD IN STOOL: 0
COLOR CHANGE: 1

## 2024-08-05 ASSESSMENT — PATIENT HEALTH QUESTIONNAIRE - PHQ9
SUM OF ALL RESPONSES TO PHQ QUESTIONS 1-9: 2
1. LITTLE INTEREST OR PLEASURE IN DOING THINGS: SEVERAL DAYS
2. FEELING DOWN, DEPRESSED OR HOPELESS: SEVERAL DAYS
SUM OF ALL RESPONSES TO PHQ9 QUESTIONS 1 & 2: 2

## 2024-08-05 NOTE — PROGRESS NOTES
Chief Complaint   Patient presents with    Follow-up     Patient is here today for a follow up.      \"Have you been to the ER, urgent care clinic since your last visit?  Hospitalized since your last visit?\"    NO    “Have you seen or consulted any other health care providers outside of [facility] since your last visit?”    NO            Click Here for Release of Records Request

## 2024-08-05 NOTE — PROGRESS NOTES
NAME:  Tiffanie Morgan   :   1970   MRN:   [id number]     Date/Time:  2024 10:07 AM  Subjective: f/u dm2,cirrhosis,chronic back pain.Tolerating Ozempic well.Sugars improving.Frustrated with transplant process   Back Pain  This is a chronic problem. The problem is unchanged. The pain is present in the thoracic spine. The quality of the pain is described as aching. Pertinent negatives include no abdominal pain or fever.   Abnormal Lab  This is a chronic problem. The problem occurs daily. The problem has been unchanged. Associated symptoms include fatigue. Pertinent negatives include no abdominal pain, arthralgias, change in bowel habit or fever.    Diabetes  She presents for her follow-up diabetic visit. She has type 2 diabetes mellitus. The initial diagnosis of diabetes was made 3 years ago. Pertinent negatives for hypoglycemia include no dizziness. Associated symptoms include fatigue. Pertinent negatives for diabetes include no blurred vision, no chest pain, no polyuria, no visual change and no weight loss.   .   Review of Systems   Constitutional:  Positive for fatigue. Negative for fever.   Gastrointestinal:  Positive for diarrhea. Negative for abdominal pain, anal bleeding, blood in stool and change in bowel habit.   Musculoskeletal:  Positive for back pain. Negative for arthralgias.   Skin:  Positive for color change.   Neurological:  Positive for light-headedness.           Medications reviewed:  Current Outpatient Medications   Medication Sig    lactulose (CONSTULOSE) 10 GM/15ML solution TAKE 45ML BY MOUTH 3 TIMES DAILY    traMADol (ULTRAM) 50 MG tablet Take 1 tablet by mouth every 8 hours as needed for Pain for up to 30 days. Max Daily Amount: 150 mg    pantoprazole (PROTONIX) 40 MG tablet TAKE ONE TABLET BY MOUTH EVERY DAY    Semaglutide, 1 MG/DOSE, (OZEMPIC, 1 MG/DOSE,) 4 MG/3ML SOPN sc injection Inject 1 mg into the skin every 7 days    Continuous Glucose Sensor (FREESTYLE LATRICIA 3

## 2024-08-15 ENCOUNTER — OFFICE VISIT (OUTPATIENT)
Age: 54
End: 2024-08-15
Payer: COMMERCIAL

## 2024-08-15 VITALS
OXYGEN SATURATION: 100 % | HEIGHT: 63 IN | DIASTOLIC BLOOD PRESSURE: 73 MMHG | TEMPERATURE: 97.6 F | BODY MASS INDEX: 24.27 KG/M2 | SYSTOLIC BLOOD PRESSURE: 119 MMHG | HEART RATE: 62 BPM | WEIGHT: 137 LBS

## 2024-08-15 DIAGNOSIS — E87.1 HYPONATREMIA: ICD-10-CM

## 2024-08-15 DIAGNOSIS — F10.21 ALCOHOL DEPENDENCE IN REMISSION (HCC): ICD-10-CM

## 2024-08-15 DIAGNOSIS — K70.31 ALCOHOLIC CIRRHOSIS OF LIVER WITH ASCITES (HCC): Primary | ICD-10-CM

## 2024-08-15 DIAGNOSIS — E11.65 TYPE 2 DIABETES MELLITUS WITH HYPERGLYCEMIA, WITHOUT LONG-TERM CURRENT USE OF INSULIN (HCC): ICD-10-CM

## 2024-08-15 PROCEDURE — 99215 OFFICE O/P EST HI 40 MIN: CPT | Performed by: NURSE PRACTITIONER

## 2024-08-15 PROCEDURE — 3051F HG A1C>EQUAL 7.0%<8.0%: CPT | Performed by: NURSE PRACTITIONER

## 2024-08-15 ASSESSMENT — PATIENT HEALTH QUESTIONNAIRE - PHQ9
SUM OF ALL RESPONSES TO PHQ QUESTIONS 1-9: 0
SUM OF ALL RESPONSES TO PHQ QUESTIONS 1-9: 0
DEPRESSION UNABLE TO ASSESS: FUNCTIONAL CAPACITY MOTIVATION LIMITS ACCURACY
1. LITTLE INTEREST OR PLEASURE IN DOING THINGS: NOT AT ALL
SUM OF ALL RESPONSES TO PHQ9 QUESTIONS 1 & 2: 0
SUM OF ALL RESPONSES TO PHQ QUESTIONS 1-9: 0
2. FEELING DOWN, DEPRESSED OR HOPELESS: NOT AT ALL
SUM OF ALL RESPONSES TO PHQ QUESTIONS 1-9: 0

## 2024-08-15 ASSESSMENT — ANXIETY QUESTIONNAIRES
5. BEING SO RESTLESS THAT IT IS HARD TO SIT STILL: NOT AT ALL
4. TROUBLE RELAXING: NOT AT ALL
6. BECOMING EASILY ANNOYED OR IRRITABLE: NOT AT ALL
2. NOT BEING ABLE TO STOP OR CONTROL WORRYING: NOT AT ALL
7. FEELING AFRAID AS IF SOMETHING AWFUL MIGHT HAPPEN: NOT AT ALL
3. WORRYING TOO MUCH ABOUT DIFFERENT THINGS: NOT AT ALL
1. FEELING NERVOUS, ANXIOUS, OR ON EDGE: NOT AT ALL
GAD7 TOTAL SCORE: 0
IF YOU CHECKED OFF ANY PROBLEMS ON THIS QUESTIONNAIRE, HOW DIFFICULT HAVE THESE PROBLEMS MADE IT FOR YOU TO DO YOUR WORK, TAKE CARE OF THINGS AT HOME, OR GET ALONG WITH OTHER PEOPLE: NOT DIFFICULT AT ALL

## 2024-08-15 NOTE — PROGRESS NOTES
[facility]      Doron Kuo MD, FACP, FACG, FAASLD      Adia Cordova, PA-C    Judi Briscoe, [facility]   Siohban Santoyoelkin, [facility]   Gretel Thakkar, [facility]  Grey Bettencourt, [facility]   Arlyn Mora, [facility]   Lisa Aston, [facility]   [address]   [place]  [address] [phone]   FAX: [phone]  [facility]   [address]   [place]  [address] [phone]   FAX: [phone]       Patient Care Team:  Homero Hoyos MD as PCP - General  Homero Hoyos MD as PCP - Empaneled Provider  Renetta West, RN as Nurse Navigator (Hepatology)  Judi Briscoe, APRN - NP (Hepatology)  Judi Briscoe APRN - NP (Hepatology)      Patient Active Problem List   Diagnosis    Type 2 diabetes mellitus (HCC)    Primary biliary cholangitis (HCC)    Renal calculus or stone    Anemia    Insomnia    Esophageal varices (HCC)    Alcohol induced liver disorder (HCC)    Ascites    Alcohol dependence in remission (HCC)    Thrombocytopenia (HCC)    Hepatic encephalopathy (HCC)    Chronic bilateral low back pain    Cirrhosis (HCC)    Liver transplant candidate    Hyponatremia    Cirrhosis of liver with ascites (HCC)    Alcoholic cirrhosis of liver with ascites (HCC)    Diabetes mellitus (HCC)    Primary biliary cirrhosis (HCC)       Tiffanie Morgan is being seen at [facility] for management of 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     The active problem list, all pertinent past medical history, medications, radiologic findings and laboratory findings related to the liver disorder were reviewed and discussed with the patient.      The patient is a 53 y.o.  female who was found to have chronic liver disease

## 2024-08-15 NOTE — PROGRESS NOTES
Chief Complaint   Patient presents with    Follow-up     Vitals:    08/15/24 1038   BP: 119/73   Site: Right Upper Arm   Position: Sitting   Pulse: 62   Temp: 97.6 °F (36.4 °C)   TempSrc: Temporal   SpO2: 100%   Weight: 62.1 kg (137 lb)   Height: 1.588 m (5' 2.5\")     .  \"Have you been to the ER, urgent care clinic since your last visit?  Hospitalized since your last visit?\"    NO    “Have you seen or consulted any other health care providers outside of [facility] since your last visit?”    NO            Click Here for Release of Records Request

## 2024-08-18 DIAGNOSIS — M54.6 PAIN IN THORACIC SPINE: ICD-10-CM

## 2024-08-18 RX ORDER — TRAMADOL HYDROCHLORIDE 50 MG/1
50 TABLET ORAL EVERY 8 HOURS PRN
Qty: 50 TABLET | Refills: 0 | Status: CANCELLED | OUTPATIENT
Start: 2024-08-18 | End: 2024-09-17

## 2024-08-20 DIAGNOSIS — M54.6 PAIN IN THORACIC SPINE: ICD-10-CM

## 2024-08-20 RX ORDER — METFORMIN HYDROCHLORIDE 750 MG/1
750 TABLET, EXTENDED RELEASE ORAL 2 TIMES DAILY WITH MEALS
Qty: 60 TABLET | Refills: 3 | OUTPATIENT
Start: 2024-08-20

## 2024-08-20 NOTE — TELEPHONE ENCOUNTER
Last appointment: 8/5/24  Next appointment: 9/3/24  Previous refill encounter(s): 7/29/24 #50    Requested Prescriptions     Pending Prescriptions Disp Refills    traMADol (ULTRAM) 50 MG tablet 50 tablet 0     Sig: Take 1 tablet by mouth every 8 hours as needed for Pain for up to 30 days. Max Daily Amount: 150 mg         For Pharmacy Admin Tracking Only    Program: Medication Refill  CPA in place:    Recommendation Provided To:   Intervention Detail: New Rx: 1, reason: Patient Preference  Intervention Accepted By:   Gap Closed?:    Time Spent (min): 5

## 2024-08-21 RX ORDER — TRAMADOL HYDROCHLORIDE 50 MG/1
50 TABLET ORAL EVERY 8 HOURS PRN
Qty: 50 TABLET | Refills: 0 | Status: SHIPPED | OUTPATIENT
Start: 2024-08-21 | End: 2024-09-20

## 2024-08-21 RX ORDER — METFORMIN HYDROCHLORIDE 750 MG/1
750 TABLET, EXTENDED RELEASE ORAL 2 TIMES DAILY WITH MEALS
Qty: 180 TABLET | Refills: 3 | Status: SHIPPED | OUTPATIENT
Start: 2024-08-21

## 2024-08-23 LAB
ALBUMIN SERPL-MCNC: 3.5 G/DL (ref 3.8–4.9)
ALP SERPL-CCNC: 162 IU/L (ref 44–121)
ALT SERPL-CCNC: 47 IU/L (ref 0–32)
AST SERPL-CCNC: 56 IU/L (ref 0–40)
BILIRUB DIRECT SERPL-MCNC: 2.37 MG/DL (ref 0–0.4)
BILIRUB SERPL-MCNC: 5.5 MG/DL (ref 0–1.2)
BUN SERPL-MCNC: 15 MG/DL (ref 6–24)
BUN/CREAT SERPL: 13 (ref 9–23)
CALCIUM SERPL-MCNC: 10.8 MG/DL (ref 8.7–10.2)
CHLORIDE SERPL-SCNC: 101 MMOL/L (ref 96–106)
CO2 SERPL-SCNC: 20 MMOL/L (ref 20–29)
CREAT SERPL-MCNC: 1.16 MG/DL (ref 0.57–1)
EGFRCR SERPLBLD CKD-EPI 2021: 56 ML/MIN/1.73
GLUCOSE SERPL-MCNC: 203 MG/DL (ref 70–99)
INR PPP: 1.4 (ref 0.9–1.2)
POTASSIUM SERPL-SCNC: 4.8 MMOL/L (ref 3.5–5.2)
PROT SERPL-MCNC: 7.3 G/DL (ref 6–8.5)
PROTHROMBIN TIME: 15.6 SEC (ref 9.1–12)
SODIUM SERPL-SCNC: 138 MMOL/L (ref 134–144)

## 2024-08-24 LAB
BASOPHILS # BLD AUTO: 0.1 X10E3/UL (ref 0–0.2)
BASOPHILS NFR BLD AUTO: 1 %
EOSINOPHIL # BLD AUTO: 0.1 X10E3/UL (ref 0–0.4)
EOSINOPHIL NFR BLD AUTO: 3 %
ERYTHROCYTE [DISTWIDTH] IN BLOOD BY AUTOMATED COUNT: 13.3 % (ref 11.7–15.4)
HCT VFR BLD AUTO: 30.8 % (ref 34–46.6)
HGB BLD-MCNC: 11.3 G/DL (ref 11.1–15.9)
IMM GRANULOCYTES # BLD AUTO: 0 X10E3/UL (ref 0–0.1)
IMM GRANULOCYTES NFR BLD AUTO: 0 %
LYMPHOCYTES # BLD AUTO: 0.4 X10E3/UL (ref 0.7–3.1)
LYMPHOCYTES NFR BLD AUTO: 9 %
MCH RBC QN AUTO: 36.3 PG (ref 26.6–33)
MCHC RBC AUTO-ENTMCNC: 36.7 G/DL (ref 31.5–35.7)
MCV RBC AUTO: 99 FL (ref 79–97)
MONOCYTES # BLD AUTO: 0.4 X10E3/UL (ref 0.1–0.9)
MONOCYTES NFR BLD AUTO: 10 %
MORPHOLOGY BLD-IMP: ABNORMAL
NEUTROPHILS # BLD AUTO: 3 X10E3/UL (ref 1.4–7)
NEUTROPHILS NFR BLD AUTO: 77 %
PLATELET # BLD AUTO: 63 X10E3/UL (ref 150–450)
RBC # BLD AUTO: 3.11 X10E6/UL (ref 3.77–5.28)
WBC # BLD AUTO: 3.9 X10E3/UL (ref 3.4–10.8)

## 2024-08-26 ENCOUNTER — TELEPHONE (OUTPATIENT)
Age: 54
End: 2024-08-26

## 2024-08-26 DIAGNOSIS — R18.8 CIRRHOSIS OF LIVER WITH ASCITES, UNSPECIFIED HEPATIC CIRRHOSIS TYPE (HCC): Primary | ICD-10-CM

## 2024-08-26 DIAGNOSIS — K74.60 CIRRHOSIS OF LIVER WITH ASCITES, UNSPECIFIED HEPATIC CIRRHOSIS TYPE (HCC): Primary | ICD-10-CM

## 2024-08-26 DIAGNOSIS — Z01.818 PRE-TRANSPLANT EVALUATION FOR LIVER TRANSPLANT: ICD-10-CM

## 2024-08-26 NOTE — TELEPHONE ENCOUNTER
Spoke with patient and updated her regarding MELD score. I told her that [facility] has updated her status on the wait list and score is valid for a month. Patient due for repeat blood work on or around 9/18 - mailed the lab requisition to her.     Reviewed need for updated echocardiogram as it's almost been a year since her last one. I gave patient [facility]'s phone number, so she can schedule the abdominal ultrasound and echo.     Patient reports she has annual GYN appointment on 10/28/2024. I told her that I'd request records when they're available.

## 2024-08-27 RX ORDER — FUROSEMIDE 20 MG
20 TABLET ORAL DAILY
Qty: 90 TABLET | Refills: 1 | Status: SHIPPED | OUTPATIENT
Start: 2024-08-27

## 2024-08-27 NOTE — TELEPHONE ENCOUNTER
Last appointment: 8/5/24  Next appointment: 9/3/24  Previous refill encounter(s): 9/19/23    Requested Prescriptions     Pending Prescriptions Disp Refills    furosemide (LASIX) 20 MG tablet [Pharmacy Med Name: FUROSEMIDE 20MG TABS] 90 tablet 3     Sig: TAKE ONE TABLET BY MOUTH EVERY DAY         For Pharmacy Admin Tracking Only    Program: Medication Refill  CPA in place:    Recommendation Provided To:   Intervention Detail: New Rx: 1, reason: Patient Preference  Intervention Accepted By:   Gap Closed?:    Time Spent (min): 5

## 2024-08-29 ENCOUNTER — ANESTHESIA EVENT (OUTPATIENT)
Facility: HOSPITAL | Age: 54
End: 2024-08-29
Payer: COMMERCIAL

## 2024-08-29 ENCOUNTER — ANESTHESIA (OUTPATIENT)
Facility: HOSPITAL | Age: 54
End: 2024-08-29
Payer: COMMERCIAL

## 2024-08-29 ENCOUNTER — HOSPITAL ENCOUNTER (OUTPATIENT)
Facility: HOSPITAL | Age: 54
Setting detail: OUTPATIENT SURGERY
Discharge: HOME | End: 2024-08-29
Attending: INTERNAL MEDICINE | Admitting: INTERNAL MEDICINE
Payer: COMMERCIAL

## 2024-08-29 VITALS
RESPIRATION RATE: 19 BRPM | SYSTOLIC BLOOD PRESSURE: 118 MMHG | BODY MASS INDEX: 25.36 KG/M2 | WEIGHT: 140.9 LBS | OXYGEN SATURATION: 98 % | TEMPERATURE: 97.5 F | HEART RATE: 92 BPM | DIASTOLIC BLOOD PRESSURE: 62 MMHG

## 2024-08-29 PROCEDURE — 2580000003 HC RX 258: Performed by: INTERNAL MEDICINE

## 2024-08-29 PROCEDURE — 3600007512: Performed by: INTERNAL MEDICINE

## 2024-08-29 PROCEDURE — 3700000000 HC ANESTHESIA ATTENDED CARE: Performed by: INTERNAL MEDICINE

## 2024-08-29 PROCEDURE — 7100000011 HC PHASE II RECOVERY - ADDTL 15 MIN: Performed by: INTERNAL MEDICINE

## 2024-08-29 PROCEDURE — 6360000002 HC RX W HCPCS

## 2024-08-29 PROCEDURE — 2500000003 HC RX 250 WO HCPCS

## 2024-08-29 PROCEDURE — 2720000010 HC SURG SUPPLY STERILE: Performed by: INTERNAL MEDICINE

## 2024-08-29 PROCEDURE — 43244 EGD VARICES LIGATION: CPT | Performed by: INTERNAL MEDICINE

## 2024-08-29 PROCEDURE — 3600007502: Performed by: INTERNAL MEDICINE

## 2024-08-29 PROCEDURE — 7100000010 HC PHASE II RECOVERY - FIRST 15 MIN: Performed by: INTERNAL MEDICINE

## 2024-08-29 PROCEDURE — 3700000001 HC ADD 15 MINUTES (ANESTHESIA): Performed by: INTERNAL MEDICINE

## 2024-08-29 RX ORDER — SODIUM CHLORIDE 0.9 % (FLUSH) 0.9 %
5-40 SYRINGE (ML) INJECTION PRN
Status: DISCONTINUED | OUTPATIENT
Start: 2024-08-29 | End: 2024-08-29 | Stop reason: HOSPADM

## 2024-08-29 RX ORDER — FENTANYL CITRATE 50 UG/ML
25 INJECTION, SOLUTION INTRAMUSCULAR; INTRAVENOUS AS NEEDED
Status: CANCELLED | OUTPATIENT
Start: 2024-08-29

## 2024-08-29 RX ORDER — GLYCOPYRROLATE 0.2 MG/ML
INJECTION INTRAMUSCULAR; INTRAVENOUS PRN
Status: DISCONTINUED | OUTPATIENT
Start: 2024-08-29 | End: 2024-08-29 | Stop reason: SDUPTHER

## 2024-08-29 RX ORDER — ONDANSETRON 2 MG/ML
2 INJECTION INTRAMUSCULAR; INTRAVENOUS AS NEEDED
Status: CANCELLED | OUTPATIENT
Start: 2024-08-29

## 2024-08-29 RX ORDER — MUPIROCIN 20 MG/G
OINTMENT TOPICAL
COMMUNITY
Start: 2024-08-24

## 2024-08-29 RX ORDER — LIDOCAINE HYDROCHLORIDE 20 MG/ML
INJECTION, SOLUTION EPIDURAL; INFILTRATION; INTRACAUDAL; PERINEURAL PRN
Status: DISCONTINUED | OUTPATIENT
Start: 2024-08-29 | End: 2024-08-29 | Stop reason: SDUPTHER

## 2024-08-29 RX ORDER — SODIUM CHLORIDE 0.9 % (FLUSH) 0.9 %
5-40 SYRINGE (ML) INJECTION EVERY 12 HOURS SCHEDULED
Status: DISCONTINUED | OUTPATIENT
Start: 2024-08-29 | End: 2024-08-29 | Stop reason: HOSPADM

## 2024-08-29 RX ORDER — SODIUM CHLORIDE 9 MG/ML
INJECTION, SOLUTION INTRAVENOUS PRN
Status: DISCONTINUED | OUTPATIENT
Start: 2024-08-29 | End: 2024-08-29 | Stop reason: HOSPADM

## 2024-08-29 RX ADMIN — GLYCOPYRROLATE 0.1 MG: 0.2 INJECTION INTRAMUSCULAR; INTRAVENOUS at 13:54

## 2024-08-29 RX ADMIN — PROPOFOL 20 MG: 10 INJECTION, EMULSION INTRAVENOUS at 14:03

## 2024-08-29 RX ADMIN — LIDOCAINE HYDROCHLORIDE 50 MG: 20 INJECTION, SOLUTION EPIDURAL; INFILTRATION; INTRACAUDAL; PERINEURAL at 13:57

## 2024-08-29 RX ADMIN — PROPOFOL 20 MG: 10 INJECTION, EMULSION INTRAVENOUS at 14:04

## 2024-08-29 RX ADMIN — PROPOFOL 30 MG: 10 INJECTION, EMULSION INTRAVENOUS at 14:01

## 2024-08-29 RX ADMIN — PROPOFOL 60 MG: 10 INJECTION, EMULSION INTRAVENOUS at 13:59

## 2024-08-29 RX ADMIN — PROPOFOL 60 MG: 10 INJECTION, EMULSION INTRAVENOUS at 13:57

## 2024-08-29 RX ADMIN — PROPOFOL 60 MG: 10 INJECTION, EMULSION INTRAVENOUS at 14:05

## 2024-08-29 RX ADMIN — PROPOFOL 30 MG: 10 INJECTION, EMULSION INTRAVENOUS at 13:58

## 2024-08-29 RX ADMIN — SODIUM CHLORIDE: 9 INJECTION, SOLUTION INTRAVENOUS at 13:52

## 2024-08-29 RX ADMIN — PROPOFOL 70 MG: 10 INJECTION, EMULSION INTRAVENOUS at 14:06

## 2024-08-29 ASSESSMENT — PAIN - FUNCTIONAL ASSESSMENT: PAIN_FUNCTIONAL_ASSESSMENT: 0-10

## 2024-08-29 NOTE — DISCHARGE INSTRUCTIONS
[facility]      Doron Kuo MD, FACP, FACG, FAASLD      VANESSA Sim, [facility]   Siobhan Santoyoelkin, [facility]   Gretel Bijan, [facility]  Grey Bettencourt, [facility]   Arlyn Mora, [facility]   Lisa Escotoon, [facility]   [address]   [place]  [address] [phone]   FAX: [phone]  [facility]   [address]   [place]  [address] [phone]   FAX: [phone]         ENDOSCOPY WITH BANDING DISCHARGE INSTRUCTIONS    Tiffanie Morgan    1970  Date: 8/29/2024    DISCOMFORT:  Use lozenges or warm salt water gargle for sore thoat  Apply warm compress to IV site if red.  If redness or soreness persists call the office.  You may experience gas and bloating.  Walking and belching will help relieve this.  You may experience chest pain or discomfort or feel as though food is \"sticking\" in your food pipe for a few days after the procedure. This is a normal feeling after banding of esophageal varices.    CHEST PRESSURE:  Banding of dilated veins (varices) in the food tube (esophagus) can be painful in some persons.  The pain is caused by squeezing the varices and lining of the esophagus by the bands used to seal the varices.  You can take liquid pain medication either acetaminophen or alternative.  The best treatment for this is to drink a an \"Icee\" or \"Slurpee\" since the ice crystals will freeze the nerve endings in the food tube and relieve the pain.    DIET:  Regular food may dislodge the bands placed on the varices.  For this reason you should only have liquid for the rest of today.  Eat only soft food that does not need to be chewed all day tomorrow.  You may advance to your regular diet in 2 days.    ACTIVITY:  Spend the remainder of the day

## 2024-08-29 NOTE — OP NOTE
[facility]      Doron Kuo MD, FACP, FACG, FAASLD      Adia Cordova, VANESSA Briscoe, [facility]   Siobhan Santoyomikovijaya, [facility]   Gretel Bijan, [facility]  Grey Bettencourt, [facility]   Arlyn Mora, [facility]   Lisa Lee, [facility]   [address]   [place]  [address] [phone]   FAX: [phone]  [facility]   [address]   [place]  [address] [phone]   FAX: [phone]          UPPER ENDOSCOPY WITH BANDING OF ESOPHAGEAL VARICES PROCEDURE NOTE    NAME: Tiffanie Morgan  :  1970  MRN:  [id number]      INDICATION: Cirrhosis.  Screening for esophageal varices with variceal ligation if indicated.    : Doron Kuo MD    SURGICAL ASSISTANT:  None    PROSTHETIC DEVISES, TISSUE GRAFTS, ORGAN TRANSPLANTS:  Not applicable     ANESTHESIA/SEDATION: Propofol was administered by anesthesia      PROCEDURE DESCRIPTION:  Informed consent was obtained from the patient for the procedure.  All risks and benefits of the procedure explained.     The procedure was performed in the endoscopy suite.  The patient was laying on a stretcher and moved to the left lateral decubitus position prior to administration of sedation.    Sedation was administered by anesthesiology.  See their note for details.      The endoscope was inserted into the mouth and advanced under direct vision to the second portion of the duodenum.  Careful inspection of upper gastrointestinal tract was made as the endoscope was inserted and withdrawn.  Retroflexion of the endoscope to view of the cardia of the stomach was performed.  After withdrawing the endoscope the banding devise was placed on the tip of the endoscope.  The scope was then reinserted under direct inspection and

## 2024-08-29 NOTE — ANESTHESIA POSTPROCEDURE EVALUATION
Department of Anesthesiology  Postprocedure Note    Patient: Tiffanie Morgna  MRN: [id number]  YOB: 1970  Date of evaluation: 8/29/2024    Procedure Summary       Date: 08/29/24 Room / Location: [facility] ENDO 04 / [facility] ENDOSCOPY    Anesthesia Start: 1352 Anesthesia Stop: 1411    Procedure: EGD DIAGNOSTIC ONLY Diagnosis:       Alcoholic cirrhosis of liver with ascites (HCC)      Diabetes mellitus (HCC)      Primary biliary cirrhosis (HCC)      (Alcoholic cirrhosis of liver with ascites (HCC) [K70.31])      (Diabetes mellitus (HCC) [E11.9])      (Primary biliary cirrhosis (HCC) [K74.3])    Surgeons: Doron Kuo MD Responsible Provider: Shon Dubon Jr., MD    Anesthesia Type: MAC ASA Status: 3            Anesthesia Type: MAC    Janes Phase I: Janes Score: 10    Janes Phase II: Janes Score: 9    Anesthesia Post Evaluation    Patient location during evaluation: PACU  Patient participation: complete - patient participated  Level of consciousness: awake  Airway patency: patent  Nausea & Vomiting: no nausea  Cardiovascular status: blood pressure returned to baseline and hemodynamically stable  Respiratory status: acceptable  Hydration status: stable    No notable events documented.

## 2024-08-29 NOTE — H&P
anesthesiology      MD Clemente Grimaldo [facility]  [address] [place] [address], [address]  [place]  [address] [phone]  [facility]

## 2024-08-29 NOTE — ANESTHESIA PRE PROCEDURE
Department of Anesthesiology  Preprocedure Note       Name:  Tiffanie Morgan   Age:  54 y.o.  :  1970                                          MRN:  [id number]         Date:  2024      Surgeon: Surgeon(s):  Doron Kuo MD    Procedure: Procedure(s):  EGD DIAGNOSTIC ONLY    Medications prior to admission:   Prior to Admission medications    Medication Sig Start Date End Date Taking? Authorizing Provider   mupirocin (BACTROBAN) 2 % ointment APPLY TOPICALLY THREE TIMES A DAY 24  Yes Rashida Spencer MD   furosemide (LASIX) 20 MG tablet TAKE ONE TABLET BY MOUTH EVERY DAY 24  Yes Homero Hoyos MD   metFORMIN (GLUCOPHAGE-XR) 750 MG extended release tablet Take 1 tablet by mouth 2 times daily (with meals) 24  Yes Homero Hoyos MD   traMADol (ULTRAM) 50 MG tablet Take 1 tablet by mouth every 8 hours as needed for Pain for up to 30 days. Max Daily Amount: 150 mg 24 Yes Homero Hoyos MD   lactulose (CONSTULOSE) 10 GM/15ML solution TAKE 45ML BY MOUTH 3 TIMES DAILY 24  Yes Judi Briscoe APRN - NP   pantoprazole (PROTONIX) 40 MG tablet TAKE ONE TABLET BY MOUTH EVERY DAY 24  Yes Judi Briscoe APRN - NP   rifAXIMin (XIFAXAN) 550 MG tablet TAKE ONE TABLET BY MOUTH TWICE A DAY, IN THE MORNING AND AT BEDTIME 24  Yes Doron Kuo MD   spironolactone (ALDACTONE) 50 MG tablet Take 1 tablet by mouth daily 5/10/24  Yes Judi Briscoe APRN - NP   estradiol (ESTRACE) 0.1 MG/GM vaginal cream  24  Yes Rashida Spencer MD   blood glucose test strips (EXACTECH TEST) strip 1 each by In Vitro route daily As needed. 24  Yes Homero Hoyos MD   Lancets MISC 1 each by Does not apply route 2 times daily 24   Homero Hoyos MD   Semaglutide, 1 MG/DOSE, (OZEMPIC, 1 MG/DOSE,) 4 MG/3ML SOPN sc injection Inject 1 mg into the skin every 7 days 24   Homero Hoyos MD       Current  09:05 AM       CMP:   Lab Results   Component Value Date/Time     08/22/2024 09:05 AM    K 4.8 08/22/2024 09:05 AM     08/22/2024 09:05 AM    CO2 20 08/22/2024 09:05 AM    BUN 15 08/22/2024 09:05 AM    CREATININE 1.16 08/22/2024 09:05 AM    GFRAA 116 01/18/2022 02:05 PM    AGRATIO 0.9 03/24/2023 10:32 AM    LABGLOM 56 08/22/2024 09:05 AM    LABGLOM 96 04/10/2024 11:28 AM    GLUCOSE 203 08/22/2024 09:05 AM    CALCIUM 10.8 08/22/2024 09:05 AM    BILITOT 5.5 08/22/2024 09:05 AM    ALKPHOS 162 08/22/2024 09:05 AM    AST 56 08/22/2024 09:05 AM    ALT 47 08/22/2024 09:05 AM       POC Tests: No results for input(s): \"POCGLU\", \"POCNA\", \"POCK\", \"POCCL\", \"POCBUN\", \"POCHEMO\", \"POCHCT\" in the last 72 hours.    Coags:   Lab Results   Component Value Date/Time    PROTIME 15.6 08/22/2024 09:05 AM    INR 1.4 08/22/2024 09:05 AM       HCG (If Applicable): No results found for: \"PREGTESTUR\", \"PREGSERUM\", \"HCG\", \"HCGQUANT\"     ABGs: No results found for: \"PHART\", \"PO2ART\", \"CPR3BMR\", \"OWF2IKD\", \"BEART\", \"X2IHTJGX\"     Type & Screen (If Applicable):  No results found for: \"LABABO\"    Drug/Infectious Status (If Applicable):  No results found for: \"HIV\", \"HEPCAB\"    COVID-19 Screening (If Applicable):   Lab Results   Component Value Date/Time    COVID19 Not detected 02/15/2024 08:31 AM    COVID19 Not detected 11/15/2021 01:19 PM           Anesthesia Evaluation  Patient summary reviewed and Nursing notes reviewed  Airway: Mallampati: II          Dental: normal exam         Pulmonary:Negative Pulmonary ROS and normal exam                               Cardiovascular:  Exercise tolerance: good (>4 METS)        NYHA Classification: IV                 Beta Blocker:  Not on Beta Blocker         Neuro/Psych:   Negative Neuro/Psych ROS  (+) psychiatric history:            GI/Hepatic/Renal:   (+) GERD:, liver disease:          Endo/Other: Negative Endo/Other ROS   (+) DiabetesType II DM.                 Abdominal: normal exam

## 2024-09-03 ENCOUNTER — OFFICE VISIT (OUTPATIENT)
Age: 54
End: 2024-09-03
Payer: COMMERCIAL

## 2024-09-03 VITALS
OXYGEN SATURATION: 99 % | HEART RATE: 90 BPM | BODY MASS INDEX: 25.45 KG/M2 | RESPIRATION RATE: 18 BRPM | DIASTOLIC BLOOD PRESSURE: 70 MMHG | SYSTOLIC BLOOD PRESSURE: 122 MMHG | HEIGHT: 63 IN | WEIGHT: 143.6 LBS | TEMPERATURE: 98.2 F

## 2024-09-03 DIAGNOSIS — K70.31 ALCOHOLIC CIRRHOSIS OF LIVER WITH ASCITES (HCC): ICD-10-CM

## 2024-09-03 DIAGNOSIS — E11.9 TYPE 2 DIABETES MELLITUS WITHOUT COMPLICATION, WITHOUT LONG-TERM CURRENT USE OF INSULIN (HCC): Primary | ICD-10-CM

## 2024-09-03 DIAGNOSIS — M54.6 PAIN IN THORACIC SPINE: ICD-10-CM

## 2024-09-03 DIAGNOSIS — K74.3 PRIMARY BILIARY CHOLANGITIS (HCC): ICD-10-CM

## 2024-09-03 LAB — HBA1C MFR BLD: 7.9 %

## 2024-09-03 PROCEDURE — 99213 OFFICE O/P EST LOW 20 MIN: CPT | Performed by: FAMILY MEDICINE

## 2024-09-03 PROCEDURE — 83036 HEMOGLOBIN GLYCOSYLATED A1C: CPT | Performed by: FAMILY MEDICINE

## 2024-09-03 PROCEDURE — 3051F HG A1C>EQUAL 7.0%<8.0%: CPT | Performed by: FAMILY MEDICINE

## 2024-09-03 ASSESSMENT — ENCOUNTER SYMPTOMS
CHEST TIGHTNESS: 0
COUGH: 0
BACK PAIN: 1
ABDOMINAL PAIN: 0
BLOOD IN STOOL: 0
CONSTIPATION: 0
SHORTNESS OF BREATH: 0

## 2024-09-03 ASSESSMENT — PATIENT HEALTH QUESTIONNAIRE - PHQ9
2. FEELING DOWN, DEPRESSED OR HOPELESS: NOT AT ALL
SUM OF ALL RESPONSES TO PHQ QUESTIONS 1-9: 0
SUM OF ALL RESPONSES TO PHQ9 QUESTIONS 1 & 2: 0
SUM OF ALL RESPONSES TO PHQ QUESTIONS 1-9: 0
1. LITTLE INTEREST OR PLEASURE IN DOING THINGS: NOT AT ALL

## 2024-09-03 NOTE — PROGRESS NOTES
Chief Complaint   Patient presents with    Follow-up     Patient is here today for a 4 week follow up.      \"Have you been to the ER, urgent care clinic since your last visit?  Hospitalized since your last visit?\"    8/29/24 [facility] for endoscopy    “Have you seen or consulted any other health care providers outside of [facility] since your last visit?”    Transplant Team            Click Here for Release of Records Request    
Take 1 tablet by mouth every 8 hours as needed for Pain for up to 30 days. Max Daily Amount: 150 mg    Lancets MISC 1 each by Does not apply route 2 times daily    lactulose (CONSTULOSE) 10 GM/15ML solution TAKE 45ML BY MOUTH 3 TIMES DAILY    pantoprazole (PROTONIX) 40 MG tablet TAKE ONE TABLET BY MOUTH EVERY DAY    Semaglutide, 1 MG/DOSE, (OZEMPIC, 1 MG/DOSE,) 4 MG/3ML SOPN sc injection Inject 1 mg into the skin every 7 days    rifAXIMin (XIFAXAN) 550 MG tablet TAKE ONE TABLET BY MOUTH TWICE A DAY, IN THE MORNING AND AT BEDTIME    spironolactone (ALDACTONE) 50 MG tablet Take 1 tablet by mouth daily    estradiol (ESTRACE) 0.1 MG/GM vaginal cream     blood glucose test strips (EXACTECH TEST) strip 1 each by In Vitro route daily As needed.     No current facility-administered medications for this visit.        Objective:   Vitals:  /70 (Site: Left Upper Arm, Position: Sitting, Cuff Size: Small Adult)   Pulse 90   Temp 98.2 °F (36.8 °C) (Infrared)   Resp 18   Ht 1.588 m (5' 2.5\")   Wt 65.1 kg (143 lb 9.6 oz)   SpO2 99%   BMI 25.85 kg/m²        PHYSICAL EXAM:  General:     Alert, cooperative, no distress, appears stated age.     Head:    Normocephalic, without obvious abnormality, atraumatic.  Ears:   External canals WNL TMs WNL   Eyes:    Conjunctivae/corneas clear.  PERRLA  Nose:   Nares normal. No drainage or sinus tenderness.  Throat:     Lips, mucosa, and tongue normal.  No Thrush  Neck:   Supple, symmetrical,  no adenopathy, thyroid: non tender     no carotid bruit and no JVD.  Back:     Symmetric, tender rt thoracic paraspinals  No CVA tenderness.  Lungs:    Clear to auscultation bilaterally.  No Wheezing or Rhonchi. No rales.  Heart:    Regular rate and rhythm,  no murmur, rub or gallop.  Abdomen:    Soft, non-tender. Not distended.  Bowel sounds normal. No masses  Neurologic:  Normal strength, Alert and oriented X 3.   Skin:                mildly jaundiced      Lab Data Reviewed:    Recent Results

## 2024-09-06 ENCOUNTER — HOSPITAL ENCOUNTER (INPATIENT)
Facility: HOSPITAL | Age: 54
LOS: 1 days | Discharge: HOME | DRG: 441 | End: 2024-09-08
Attending: STUDENT IN AN ORGANIZED HEALTH CARE EDUCATION/TRAINING PROGRAM | Admitting: INTERNAL MEDICINE
Payer: COMMERCIAL

## 2024-09-06 ENCOUNTER — APPOINTMENT (OUTPATIENT)
Facility: HOSPITAL | Age: 54
DRG: 441 | End: 2024-09-06
Payer: COMMERCIAL

## 2024-09-06 DIAGNOSIS — K76.82 HEPATIC ENCEPHALOPATHY (HCC): Primary | ICD-10-CM

## 2024-09-06 LAB
ALBUMIN SERPL-MCNC: 3 G/DL (ref 3.5–5)
ALBUMIN/GLOB SERPL: 0.6 (ref 1.1–2.2)
ALP SERPL-CCNC: 184 U/L (ref 45–117)
ALT SERPL-CCNC: 53 U/L (ref 12–78)
AMMONIA PLAS-SCNC: 96 UMOL/L
ANION GAP SERPL CALC-SCNC: 7 MMOL/L (ref 2–12)
AST SERPL-CCNC: 61 U/L (ref 15–37)
BASOPHILS # BLD: 0.1 K/UL (ref 0–0.1)
BASOPHILS NFR BLD: 1 % (ref 0–1)
BILIRUB SERPL-MCNC: 5.9 MG/DL (ref 0.2–1)
BUN SERPL-MCNC: 14 MG/DL (ref 6–20)
BUN/CREAT SERPL: 11 (ref 12–20)
CALCIUM SERPL-MCNC: 11.5 MG/DL (ref 8.5–10.1)
CHLORIDE SERPL-SCNC: 100 MMOL/L (ref 97–108)
CO2 SERPL-SCNC: 25 MMOL/L (ref 21–32)
COMMENT:: NORMAL
CREAT SERPL-MCNC: 1.31 MG/DL (ref 0.55–1.02)
DIFFERENTIAL METHOD BLD: ABNORMAL
EOSINOPHIL # BLD: 0.1 K/UL (ref 0–0.4)
EOSINOPHIL NFR BLD: 2 % (ref 0–7)
ERYTHROCYTE [DISTWIDTH] IN BLOOD BY AUTOMATED COUNT: 15.5 % (ref 11.5–14.5)
GLOBULIN SER CALC-MCNC: 5.4 G/DL (ref 2–4)
GLUCOSE SERPL-MCNC: 317 MG/DL (ref 65–100)
HCT VFR BLD AUTO: 37.2 % (ref 35–47)
HGB BLD-MCNC: 12.8 G/DL (ref 11.5–16)
IMM GRANULOCYTES # BLD AUTO: 0 K/UL (ref 0–0.04)
IMM GRANULOCYTES NFR BLD AUTO: 0 % (ref 0–0.5)
LYMPHOCYTES # BLD: 0.5 K/UL (ref 0.8–3.5)
LYMPHOCYTES NFR BLD: 9 % (ref 12–49)
MCH RBC QN AUTO: 36.2 PG (ref 26–34)
MCHC RBC AUTO-ENTMCNC: 34.4 G/DL (ref 30–36.5)
MCV RBC AUTO: 105.1 FL (ref 80–99)
MONOCYTES # BLD: 0.6 K/UL (ref 0–1)
MONOCYTES NFR BLD: 11 % (ref 5–13)
NEUTS SEG # BLD: 4.3 K/UL (ref 1.8–8)
NEUTS SEG NFR BLD: 77 % (ref 32–75)
NRBC # BLD: 0 K/UL (ref 0–0.01)
NRBC BLD-RTO: 0 PER 100 WBC
PLATELET # BLD AUTO: 80 K/UL (ref 150–400)
PMV BLD AUTO: 9.4 FL (ref 8.9–12.9)
POTASSIUM SERPL-SCNC: 4.5 MMOL/L (ref 3.5–5.1)
PROT SERPL-MCNC: 8.4 G/DL (ref 6.4–8.2)
RBC # BLD AUTO: 3.54 M/UL (ref 3.8–5.2)
RBC MORPH BLD: ABNORMAL
SODIUM SERPL-SCNC: 132 MMOL/L (ref 136–145)
SPECIMEN HOLD: NORMAL
WBC # BLD AUTO: 5.6 K/UL (ref 3.6–11)

## 2024-09-06 PROCEDURE — 6370000000 HC RX 637 (ALT 250 FOR IP): Performed by: EMERGENCY MEDICINE

## 2024-09-06 PROCEDURE — 85025 COMPLETE CBC W/AUTO DIFF WBC: CPT

## 2024-09-06 PROCEDURE — 71045 X-RAY EXAM CHEST 1 VIEW: CPT

## 2024-09-06 PROCEDURE — 36415 COLL VENOUS BLD VENIPUNCTURE: CPT

## 2024-09-06 PROCEDURE — 80053 COMPREHEN METABOLIC PANEL: CPT

## 2024-09-06 PROCEDURE — 82140 ASSAY OF AMMONIA: CPT

## 2024-09-06 PROCEDURE — 99284 EMERGENCY DEPT VISIT MOD MDM: CPT

## 2024-09-06 RX ORDER — LACTULOSE 10 G/15ML
45 SOLUTION ORAL ONCE
Status: COMPLETED | OUTPATIENT
Start: 2024-09-06 | End: 2024-09-06

## 2024-09-06 RX ADMIN — LACTULOSE 45 G: 20 SOLUTION ORAL at 22:18

## 2024-09-07 PROBLEM — K76.82 ACUTE HEPATIC ENCEPHALOPATHY (HCC): Status: ACTIVE | Noted: 2024-09-07

## 2024-09-07 LAB
ALBUMIN SERPL-MCNC: 2.8 G/DL (ref 3.5–5)
ALBUMIN/GLOB SERPL: 0.6 (ref 1.1–2.2)
ALP SERPL-CCNC: 150 U/L (ref 45–117)
ALT SERPL-CCNC: 43 U/L (ref 12–78)
ANION GAP SERPL CALC-SCNC: 5 MMOL/L (ref 2–12)
APPEARANCE UR: CLEAR
AST SERPL-CCNC: 52 U/L (ref 15–37)
BACTERIA URNS QL MICRO: NEGATIVE /HPF
BASOPHILS # BLD: 0 K/UL (ref 0–0.1)
BASOPHILS NFR BLD: 1 % (ref 0–1)
BILIRUB SERPL-MCNC: 6.4 MG/DL (ref 0.2–1)
BILIRUB UR QL: NEGATIVE
BUN SERPL-MCNC: 12 MG/DL (ref 6–20)
BUN/CREAT SERPL: 12 (ref 12–20)
CALCIUM SERPL-MCNC: 11.5 MG/DL (ref 8.5–10.1)
CHLORIDE SERPL-SCNC: 103 MMOL/L (ref 97–108)
CO2 SERPL-SCNC: 28 MMOL/L (ref 21–32)
COLOR UR: ABNORMAL
COMMENT:: NORMAL
CREAT SERPL-MCNC: 0.99 MG/DL (ref 0.55–1.02)
DIFFERENTIAL METHOD BLD: ABNORMAL
EKG ATRIAL RATE: 87 BPM
EKG DIAGNOSIS: NORMAL
EKG P AXIS: 51 DEGREES
EKG P-R INTERVAL: 182 MS
EKG Q-T INTERVAL: 398 MS
EKG QRS DURATION: 124 MS
EKG QTC CALCULATION (BAZETT): 478 MS
EKG R AXIS: -33 DEGREES
EKG T AXIS: 14 DEGREES
EKG VENTRICULAR RATE: 87 BPM
EOSINOPHIL # BLD: 0.1 K/UL (ref 0–0.4)
EOSINOPHIL NFR BLD: 3 % (ref 0–7)
EPITH CASTS URNS QL MICRO: ABNORMAL /LPF
ERYTHROCYTE [DISTWIDTH] IN BLOOD BY AUTOMATED COUNT: 15.6 % (ref 11.5–14.5)
EST. AVERAGE GLUCOSE BLD GHB EST-MCNC: 160 MG/DL
GLOBULIN SER CALC-MCNC: 4.7 G/DL (ref 2–4)
GLUCOSE BLD STRIP.AUTO-MCNC: 228 MG/DL (ref 65–117)
GLUCOSE BLD STRIP.AUTO-MCNC: 299 MG/DL (ref 65–117)
GLUCOSE BLD STRIP.AUTO-MCNC: 340 MG/DL (ref 65–117)
GLUCOSE BLD STRIP.AUTO-MCNC: 362 MG/DL (ref 65–117)
GLUCOSE SERPL-MCNC: 228 MG/DL (ref 65–100)
GLUCOSE UR STRIP.AUTO-MCNC: 250 MG/DL
HBA1C MFR BLD: 7.2 % (ref 4–5.6)
HCT VFR BLD AUTO: 35.2 % (ref 35–47)
HGB BLD-MCNC: 12.2 G/DL (ref 11.5–16)
HGB UR QL STRIP: ABNORMAL
HYALINE CASTS URNS QL MICRO: ABNORMAL /LPF (ref 0–5)
IMM GRANULOCYTES # BLD AUTO: 0 K/UL (ref 0–0.04)
IMM GRANULOCYTES NFR BLD AUTO: 0 % (ref 0–0.5)
KETONES UR QL STRIP.AUTO: ABNORMAL MG/DL
LEUKOCYTE ESTERASE UR QL STRIP.AUTO: ABNORMAL
LYMPHOCYTES # BLD: 0.5 K/UL (ref 0.8–3.5)
LYMPHOCYTES NFR BLD: 11 % (ref 12–49)
MAGNESIUM SERPL-MCNC: 1.3 MG/DL (ref 1.6–2.4)
MCH RBC QN AUTO: 36.5 PG (ref 26–34)
MCHC RBC AUTO-ENTMCNC: 34.7 G/DL (ref 30–36.5)
MCV RBC AUTO: 105.4 FL (ref 80–99)
MONOCYTES # BLD: 0.6 K/UL (ref 0–1)
MONOCYTES NFR BLD: 13 % (ref 5–13)
NEUTS SEG # BLD: 3.4 K/UL (ref 1.8–8)
NEUTS SEG NFR BLD: 72 % (ref 32–75)
NITRITE UR QL STRIP.AUTO: NEGATIVE
NRBC # BLD: 0 K/UL (ref 0–0.01)
NRBC BLD-RTO: 0 PER 100 WBC
PH UR STRIP: 6 (ref 5–8)
PHOSPHATE SERPL-MCNC: 2.8 MG/DL (ref 2.6–4.7)
PLATELET # BLD AUTO: 73 K/UL (ref 150–400)
PMV BLD AUTO: 9.5 FL (ref 8.9–12.9)
POTASSIUM SERPL-SCNC: 3.8 MMOL/L (ref 3.5–5.1)
PROT SERPL-MCNC: 7.5 G/DL (ref 6.4–8.2)
PROT UR STRIP-MCNC: NEGATIVE MG/DL
RBC # BLD AUTO: 3.34 M/UL (ref 3.8–5.2)
RBC #/AREA URNS HPF: ABNORMAL /HPF (ref 0–5)
RBC MORPH BLD: ABNORMAL
RBC MORPH BLD: ABNORMAL
SERVICE CMNT-IMP: ABNORMAL
SODIUM SERPL-SCNC: 136 MMOL/L (ref 136–145)
SP GR UR REFRACTOMETRY: 1.02 (ref 1–1.03)
SPECIMEN HOLD: NORMAL
SPECIMEN HOLD: NORMAL
TROPONIN I SERPL HS-MCNC: 5 NG/L (ref 0–51)
TSH SERPL DL<=0.05 MIU/L-ACNC: 1.2 UIU/ML (ref 0.36–3.74)
UROBILINOGEN UR QL STRIP.AUTO: 0.2 EU/DL (ref 0.2–1)
WBC # BLD AUTO: 4.6 K/UL (ref 3.6–11)
WBC URNS QL MICRO: ABNORMAL /HPF (ref 0–4)

## 2024-09-07 PROCEDURE — 36415 COLL VENOUS BLD VENIPUNCTURE: CPT

## 2024-09-07 PROCEDURE — 81001 URINALYSIS AUTO W/SCOPE: CPT

## 2024-09-07 PROCEDURE — 87040 BLOOD CULTURE FOR BACTERIA: CPT

## 2024-09-07 PROCEDURE — 83735 ASSAY OF MAGNESIUM: CPT

## 2024-09-07 PROCEDURE — 6360000002 HC RX W HCPCS: Performed by: INTERNAL MEDICINE

## 2024-09-07 PROCEDURE — 84484 ASSAY OF TROPONIN QUANT: CPT

## 2024-09-07 PROCEDURE — 6370000000 HC RX 637 (ALT 250 FOR IP): Performed by: INTERNAL MEDICINE

## 2024-09-07 PROCEDURE — 99254 IP/OBS CNSLTJ NEW/EST MOD 60: CPT | Performed by: STUDENT IN AN ORGANIZED HEALTH CARE EDUCATION/TRAINING PROGRAM

## 2024-09-07 PROCEDURE — 84443 ASSAY THYROID STIM HORMONE: CPT

## 2024-09-07 PROCEDURE — 84100 ASSAY OF PHOSPHORUS: CPT

## 2024-09-07 PROCEDURE — 6370000000 HC RX 637 (ALT 250 FOR IP): Performed by: STUDENT IN AN ORGANIZED HEALTH CARE EDUCATION/TRAINING PROGRAM

## 2024-09-07 PROCEDURE — 93005 ELECTROCARDIOGRAM TRACING: CPT | Performed by: INTERNAL MEDICINE

## 2024-09-07 PROCEDURE — 82962 GLUCOSE BLOOD TEST: CPT

## 2024-09-07 PROCEDURE — 1100000000 HC RM PRIVATE

## 2024-09-07 PROCEDURE — 85025 COMPLETE CBC W/AUTO DIFF WBC: CPT

## 2024-09-07 PROCEDURE — 83036 HEMOGLOBIN GLYCOSYLATED A1C: CPT

## 2024-09-07 PROCEDURE — 80053 COMPREHEN METABOLIC PANEL: CPT

## 2024-09-07 RX ORDER — INSULIN LISPRO 100 [IU]/ML
0-8 INJECTION, SOLUTION INTRAVENOUS; SUBCUTANEOUS
Status: DISCONTINUED | OUTPATIENT
Start: 2024-09-07 | End: 2024-09-08 | Stop reason: HOSPADM

## 2024-09-07 RX ORDER — ONDANSETRON 2 MG/ML
4 INJECTION INTRAMUSCULAR; INTRAVENOUS EVERY 6 HOURS PRN
Status: DISCONTINUED | OUTPATIENT
Start: 2024-09-07 | End: 2024-09-08 | Stop reason: HOSPADM

## 2024-09-07 RX ORDER — MAGNESIUM SULFATE IN WATER 40 MG/ML
2000 INJECTION, SOLUTION INTRAVENOUS ONCE
Status: COMPLETED | OUTPATIENT
Start: 2024-09-07 | End: 2024-09-07

## 2024-09-07 RX ORDER — POTASSIUM CHLORIDE 750 MG/1
40 TABLET, EXTENDED RELEASE ORAL PRN
Status: DISCONTINUED | OUTPATIENT
Start: 2024-09-07 | End: 2024-09-08 | Stop reason: HOSPADM

## 2024-09-07 RX ORDER — PANTOPRAZOLE SODIUM 40 MG/1
40 TABLET, DELAYED RELEASE ORAL DAILY
Status: DISCONTINUED | OUTPATIENT
Start: 2024-09-07 | End: 2024-09-08 | Stop reason: HOSPADM

## 2024-09-07 RX ORDER — ONDANSETRON 4 MG/1
4 TABLET, ORALLY DISINTEGRATING ORAL EVERY 8 HOURS PRN
Status: DISCONTINUED | OUTPATIENT
Start: 2024-09-07 | End: 2024-09-08 | Stop reason: HOSPADM

## 2024-09-07 RX ORDER — INSULIN LISPRO 100 [IU]/ML
8 INJECTION, SOLUTION INTRAVENOUS; SUBCUTANEOUS ONCE
Status: DISCONTINUED | OUTPATIENT
Start: 2024-09-07 | End: 2024-09-07

## 2024-09-07 RX ORDER — LACTULOSE 10 G/15ML
30 SOLUTION ORAL EVERY 6 HOURS
Status: DISCONTINUED | OUTPATIENT
Start: 2024-09-07 | End: 2024-09-07

## 2024-09-07 RX ORDER — LACTULOSE 10 G/15ML
30 SOLUTION ORAL EVERY 6 HOURS SCHEDULED
Status: DISCONTINUED | OUTPATIENT
Start: 2024-09-07 | End: 2024-09-08 | Stop reason: HOSPADM

## 2024-09-07 RX ORDER — OXYCODONE HYDROCHLORIDE 5 MG/1
5 TABLET ORAL EVERY 4 HOURS PRN
Status: DISCONTINUED | OUTPATIENT
Start: 2024-09-07 | End: 2024-09-07

## 2024-09-07 RX ORDER — POLYETHYLENE GLYCOL 3350 17 G/17G
17 POWDER, FOR SOLUTION ORAL DAILY PRN
Status: DISCONTINUED | OUTPATIENT
Start: 2024-09-07 | End: 2024-09-08 | Stop reason: HOSPADM

## 2024-09-07 RX ORDER — FUROSEMIDE 20 MG
20 TABLET ORAL DAILY
Status: DISCONTINUED | OUTPATIENT
Start: 2024-09-07 | End: 2024-09-08 | Stop reason: HOSPADM

## 2024-09-07 RX ORDER — POTASSIUM CHLORIDE 7.45 MG/ML
10 INJECTION INTRAVENOUS PRN
Status: DISCONTINUED | OUTPATIENT
Start: 2024-09-07 | End: 2024-09-08 | Stop reason: HOSPADM

## 2024-09-07 RX ORDER — MAGNESIUM SULFATE IN WATER 40 MG/ML
2000 INJECTION, SOLUTION INTRAVENOUS PRN
Status: DISCONTINUED | OUTPATIENT
Start: 2024-09-07 | End: 2024-09-08 | Stop reason: HOSPADM

## 2024-09-07 RX ORDER — TRAMADOL HYDROCHLORIDE 50 MG/1
50 TABLET ORAL EVERY 6 HOURS PRN
Status: DISCONTINUED | OUTPATIENT
Start: 2024-09-07 | End: 2024-09-08 | Stop reason: HOSPADM

## 2024-09-07 RX ORDER — SPIRONOLACTONE 25 MG/1
50 TABLET ORAL DAILY
Status: DISCONTINUED | OUTPATIENT
Start: 2024-09-07 | End: 2024-09-08 | Stop reason: HOSPADM

## 2024-09-07 RX ORDER — SODIUM CHLORIDE 9 MG/ML
INJECTION, SOLUTION INTRAVENOUS PRN
Status: DISCONTINUED | OUTPATIENT
Start: 2024-09-07 | End: 2024-09-08 | Stop reason: HOSPADM

## 2024-09-07 RX ORDER — SODIUM CHLORIDE 0.9 % (FLUSH) 0.9 %
5-40 SYRINGE (ML) INJECTION EVERY 12 HOURS SCHEDULED
Status: DISCONTINUED | OUTPATIENT
Start: 2024-09-07 | End: 2024-09-08 | Stop reason: HOSPADM

## 2024-09-07 RX ORDER — SODIUM CHLORIDE 0.9 % (FLUSH) 0.9 %
5-40 SYRINGE (ML) INJECTION PRN
Status: DISCONTINUED | OUTPATIENT
Start: 2024-09-07 | End: 2024-09-08 | Stop reason: HOSPADM

## 2024-09-07 RX ORDER — INSULIN LISPRO 100 [IU]/ML
0-4 INJECTION, SOLUTION INTRAVENOUS; SUBCUTANEOUS NIGHTLY
Status: DISCONTINUED | OUTPATIENT
Start: 2024-09-07 | End: 2024-09-08 | Stop reason: HOSPADM

## 2024-09-07 RX ADMIN — LACTULOSE 30 G: 20 SOLUTION ORAL at 18:41

## 2024-09-07 RX ADMIN — RIFAXIMIN 550 MG: 550 TABLET ORAL at 09:13

## 2024-09-07 RX ADMIN — INSULIN LISPRO 4 UNITS: 100 INJECTION, SOLUTION INTRAVENOUS; SUBCUTANEOUS at 12:30

## 2024-09-07 RX ADMIN — PANTOPRAZOLE SODIUM 40 MG: 40 TABLET, DELAYED RELEASE ORAL at 09:13

## 2024-09-07 RX ADMIN — INSULIN LISPRO 4 UNITS: 100 INJECTION, SOLUTION INTRAVENOUS; SUBCUTANEOUS at 21:00

## 2024-09-07 RX ADMIN — LACTULOSE 30 G: 20 SOLUTION ORAL at 06:52

## 2024-09-07 RX ADMIN — INSULIN LISPRO 8 UNITS: 100 INJECTION, SOLUTION INTRAVENOUS; SUBCUTANEOUS at 18:40

## 2024-09-07 RX ADMIN — TRAMADOL HYDROCHLORIDE 50 MG: 50 TABLET ORAL at 23:12

## 2024-09-07 RX ADMIN — LACTULOSE 30 G: 20 SOLUTION ORAL at 23:12

## 2024-09-07 RX ADMIN — RIFAXIMIN 550 MG: 550 TABLET ORAL at 21:00

## 2024-09-07 RX ADMIN — SPIRONOLACTONE 50 MG: 25 TABLET ORAL at 09:14

## 2024-09-07 RX ADMIN — TRAMADOL HYDROCHLORIDE 50 MG: 50 TABLET ORAL at 16:38

## 2024-09-07 RX ADMIN — MAGNESIUM SULFATE HEPTAHYDRATE 2000 MG: 40 INJECTION, SOLUTION INTRAVENOUS at 09:21

## 2024-09-07 RX ADMIN — FUROSEMIDE 20 MG: 20 TABLET ORAL at 09:15

## 2024-09-07 RX ADMIN — LACTULOSE 30 G: 20 SOLUTION ORAL at 12:26

## 2024-09-07 ASSESSMENT — PAIN DESCRIPTION - FREQUENCY
FREQUENCY: CONTINUOUS
FREQUENCY: CONTINUOUS

## 2024-09-07 ASSESSMENT — PAIN SCALES - GENERAL
PAINLEVEL_OUTOF10: 8
PAINLEVEL_OUTOF10: 0
PAINLEVEL_OUTOF10: 8

## 2024-09-07 ASSESSMENT — PAIN DESCRIPTION - LOCATION
LOCATION: BACK

## 2024-09-07 ASSESSMENT — PAIN - FUNCTIONAL ASSESSMENT
PAIN_FUNCTIONAL_ASSESSMENT: ACTIVITIES ARE NOT PREVENTED
PAIN_FUNCTIONAL_ASSESSMENT: PREVENTS OR INTERFERES SOME ACTIVE ACTIVITIES AND ADLS

## 2024-09-07 ASSESSMENT — PAIN DESCRIPTION - DESCRIPTORS
DESCRIPTORS: ACHING

## 2024-09-07 ASSESSMENT — PAIN DESCRIPTION - ORIENTATION
ORIENTATION: MID;UPPER
ORIENTATION: UPPER;MID
ORIENTATION: MID;UPPER

## 2024-09-07 ASSESSMENT — PAIN DESCRIPTION - PAIN TYPE: TYPE: CHRONIC PAIN

## 2024-09-07 ASSESSMENT — PAIN DESCRIPTION - ONSET: ONSET: ON-GOING

## 2024-09-08 VITALS
HEART RATE: 96 BPM | TEMPERATURE: 98.2 F | HEIGHT: 63 IN | WEIGHT: 143 LBS | DIASTOLIC BLOOD PRESSURE: 72 MMHG | SYSTOLIC BLOOD PRESSURE: 117 MMHG | BODY MASS INDEX: 25.34 KG/M2 | OXYGEN SATURATION: 99 % | RESPIRATION RATE: 15 BRPM

## 2024-09-08 LAB
ALBUMIN SERPL-MCNC: 2.8 G/DL (ref 3.5–5)
ALBUMIN/GLOB SERPL: 0.6 (ref 1.1–2.2)
ALP SERPL-CCNC: 129 U/L (ref 45–117)
ALT SERPL-CCNC: 45 U/L (ref 12–78)
AMMONIA PLAS-SCNC: 22 UMOL/L
ANION GAP SERPL CALC-SCNC: 6 MMOL/L (ref 2–12)
AST SERPL-CCNC: 53 U/L (ref 15–37)
BASOPHILS # BLD: 0 K/UL (ref 0–0.1)
BASOPHILS NFR BLD: 1 % (ref 0–1)
BILIRUB DIRECT SERPL-MCNC: 2.8 MG/DL (ref 0–0.2)
BILIRUB SERPL-MCNC: 6.7 MG/DL (ref 0.2–1)
BUN SERPL-MCNC: 9 MG/DL (ref 6–20)
BUN/CREAT SERPL: 9 (ref 12–20)
CALCIUM SERPL-MCNC: 10 MG/DL (ref 8.5–10.1)
CHLORIDE SERPL-SCNC: 100 MMOL/L (ref 97–108)
CO2 SERPL-SCNC: 27 MMOL/L (ref 21–32)
CREAT SERPL-MCNC: 1.03 MG/DL (ref 0.55–1.02)
DIFFERENTIAL METHOD BLD: ABNORMAL
EOSINOPHIL # BLD: 0.1 K/UL (ref 0–0.4)
EOSINOPHIL NFR BLD: 3 % (ref 0–7)
ERYTHROCYTE [DISTWIDTH] IN BLOOD BY AUTOMATED COUNT: 15.1 % (ref 11.5–14.5)
GLOBULIN SER CALC-MCNC: 4.4 G/DL (ref 2–4)
GLUCOSE BLD STRIP.AUTO-MCNC: 264 MG/DL (ref 65–117)
GLUCOSE BLD STRIP.AUTO-MCNC: 362 MG/DL (ref 65–117)
GLUCOSE BLD STRIP.AUTO-MCNC: 413 MG/DL (ref 65–117)
GLUCOSE SERPL-MCNC: 247 MG/DL (ref 65–100)
HCT VFR BLD AUTO: 33.3 % (ref 35–47)
HGB BLD-MCNC: 11.6 G/DL (ref 11.5–16)
IMM GRANULOCYTES # BLD AUTO: 0 K/UL (ref 0–0.04)
IMM GRANULOCYTES NFR BLD AUTO: 1 % (ref 0–0.5)
INR PPP: 1.5 (ref 0.9–1.1)
LYMPHOCYTES # BLD: 0.4 K/UL (ref 0.8–3.5)
LYMPHOCYTES NFR BLD: 8 % (ref 12–49)
MCH RBC QN AUTO: 36.3 PG (ref 26–34)
MCHC RBC AUTO-ENTMCNC: 34.8 G/DL (ref 30–36.5)
MCV RBC AUTO: 104.1 FL (ref 80–99)
MONOCYTES # BLD: 0.8 K/UL (ref 0–1)
MONOCYTES NFR BLD: 16 % (ref 5–13)
NEUTS SEG # BLD: 3.4 K/UL (ref 1.8–8)
NEUTS SEG NFR BLD: 71 % (ref 32–75)
NRBC # BLD: 0 K/UL (ref 0–0.01)
NRBC BLD-RTO: 0 PER 100 WBC
PLATELET # BLD AUTO: 73 K/UL (ref 150–400)
PMV BLD AUTO: 8.9 FL (ref 8.9–12.9)
POTASSIUM SERPL-SCNC: 3.6 MMOL/L (ref 3.5–5.1)
PROT SERPL-MCNC: 7.2 G/DL (ref 6.4–8.2)
PROTHROMBIN TIME: 15.5 SEC (ref 9–11.1)
RBC # BLD AUTO: 3.2 M/UL (ref 3.8–5.2)
RBC MORPH BLD: ABNORMAL
SERVICE CMNT-IMP: ABNORMAL
SODIUM SERPL-SCNC: 133 MMOL/L (ref 136–145)
WBC # BLD AUTO: 4.7 K/UL (ref 3.6–11)

## 2024-09-08 PROCEDURE — 80048 BASIC METABOLIC PNL TOTAL CA: CPT

## 2024-09-08 PROCEDURE — 82140 ASSAY OF AMMONIA: CPT

## 2024-09-08 PROCEDURE — 2580000003 HC RX 258: Performed by: INTERNAL MEDICINE

## 2024-09-08 PROCEDURE — 85610 PROTHROMBIN TIME: CPT

## 2024-09-08 PROCEDURE — 6370000000 HC RX 637 (ALT 250 FOR IP): Performed by: STUDENT IN AN ORGANIZED HEALTH CARE EDUCATION/TRAINING PROGRAM

## 2024-09-08 PROCEDURE — 80076 HEPATIC FUNCTION PANEL: CPT

## 2024-09-08 PROCEDURE — 36415 COLL VENOUS BLD VENIPUNCTURE: CPT

## 2024-09-08 PROCEDURE — 99231 SBSQ HOSP IP/OBS SF/LOW 25: CPT | Performed by: STUDENT IN AN ORGANIZED HEALTH CARE EDUCATION/TRAINING PROGRAM

## 2024-09-08 PROCEDURE — 82962 GLUCOSE BLOOD TEST: CPT

## 2024-09-08 PROCEDURE — 6370000000 HC RX 637 (ALT 250 FOR IP): Performed by: INTERNAL MEDICINE

## 2024-09-08 PROCEDURE — 85025 COMPLETE CBC W/AUTO DIFF WBC: CPT

## 2024-09-08 RX ORDER — INSULIN LISPRO 100 [IU]/ML
10 INJECTION, SOLUTION INTRAVENOUS; SUBCUTANEOUS ONCE
Status: DISCONTINUED | OUTPATIENT
Start: 2024-09-08 | End: 2024-09-08

## 2024-09-08 RX ADMIN — TRAMADOL HYDROCHLORIDE 50 MG: 50 TABLET ORAL at 06:50

## 2024-09-08 RX ADMIN — LACTULOSE 30 G: 20 SOLUTION ORAL at 07:32

## 2024-09-08 RX ADMIN — SODIUM CHLORIDE, PRESERVATIVE FREE 10 ML: 5 INJECTION INTRAVENOUS at 08:26

## 2024-09-08 RX ADMIN — INSULIN HUMAN 10 UNITS: 100 INJECTION, SOLUTION PARENTERAL at 12:41

## 2024-09-08 RX ADMIN — PANTOPRAZOLE SODIUM 40 MG: 40 TABLET, DELAYED RELEASE ORAL at 08:25

## 2024-09-08 RX ADMIN — INSULIN LISPRO 4 UNITS: 100 INJECTION, SOLUTION INTRAVENOUS; SUBCUTANEOUS at 08:25

## 2024-09-08 RX ADMIN — RIFAXIMIN 550 MG: 550 TABLET ORAL at 08:25

## 2024-09-08 RX ADMIN — LACTULOSE 30 G: 20 SOLUTION ORAL at 11:48

## 2024-09-08 RX ADMIN — INSULIN LISPRO 8 UNITS: 100 INJECTION, SOLUTION INTRAVENOUS; SUBCUTANEOUS at 11:55

## 2024-09-08 ASSESSMENT — PAIN SCALES - GENERAL
PAINLEVEL_OUTOF10: 7
PAINLEVEL_OUTOF10: 5
PAINLEVEL_OUTOF10: 0

## 2024-09-08 ASSESSMENT — PAIN DESCRIPTION - LOCATION: LOCATION: ABDOMEN

## 2024-09-09 ENCOUNTER — TELEPHONE (OUTPATIENT)
Age: 54
End: 2024-09-09

## 2024-09-09 DIAGNOSIS — M54.6 PAIN IN THORACIC SPINE: ICD-10-CM

## 2024-09-12 ENCOUNTER — HOSPITAL ENCOUNTER (OUTPATIENT)
Facility: HOSPITAL | Age: 54
Discharge: HOME | End: 2024-09-15
Payer: COMMERCIAL

## 2024-09-12 ENCOUNTER — HOSPITAL ENCOUNTER (OUTPATIENT)
Facility: HOSPITAL | Age: 54
Discharge: HOME | End: 2024-09-14
Payer: COMMERCIAL

## 2024-09-12 VITALS — WEIGHT: 143 LBS | HEIGHT: 63 IN | BODY MASS INDEX: 25.34 KG/M2

## 2024-09-12 DIAGNOSIS — Z01.818 PRE-TRANSPLANT EVALUATION FOR LIVER TRANSPLANT: ICD-10-CM

## 2024-09-12 DIAGNOSIS — K70.31 ALCOHOLIC CIRRHOSIS OF LIVER WITH ASCITES (HCC): ICD-10-CM

## 2024-09-12 DIAGNOSIS — R18.8 CIRRHOSIS OF LIVER WITH ASCITES, UNSPECIFIED HEPATIC CIRRHOSIS TYPE (HCC): ICD-10-CM

## 2024-09-12 DIAGNOSIS — K74.60 CIRRHOSIS OF LIVER WITH ASCITES, UNSPECIFIED HEPATIC CIRRHOSIS TYPE (HCC): ICD-10-CM

## 2024-09-12 DIAGNOSIS — M54.6 PAIN IN THORACIC SPINE: ICD-10-CM

## 2024-09-12 LAB
BACTERIA SPEC CULT: NORMAL
BACTERIA SPEC CULT: NORMAL
ECHO AO ROOT DIAM: 3 CM
ECHO AO ROOT INDEX: 1.79 CM/M2
ECHO AV AREA PEAK VELOCITY: 3 CM2
ECHO AV AREA/BSA PEAK VELOCITY: 1.8 CM2/M2
ECHO AV PEAK GRADIENT: 16 MMHG
ECHO AV PEAK VELOCITY: 2 M/S
ECHO AV VELOCITY RATIO: 0.95
ECHO BSA: 1.7 M2
ECHO LA DIAMETER INDEX: 2.32 CM/M2
ECHO LA DIAMETER: 3.9 CM
ECHO LA TO AORTIC ROOT RATIO: 1.3
ECHO LA VOL A-L A2C: 44 ML (ref 22–52)
ECHO LA VOL A-L A4C: 63 ML (ref 22–52)
ECHO LA VOL MOD A2C: 41 ML (ref 22–52)
ECHO LA VOL MOD A4C: 58 ML (ref 22–52)
ECHO LA VOLUME AREA LENGTH: 54 ML
ECHO LA VOLUME INDEX A-L A2C: 26 ML/M2 (ref 16–34)
ECHO LA VOLUME INDEX A-L A4C: 38 ML/M2 (ref 16–34)
ECHO LA VOLUME INDEX AREA LENGTH: 32 ML/M2 (ref 16–34)
ECHO LA VOLUME INDEX MOD A2C: 24 ML/M2 (ref 16–34)
ECHO LA VOLUME INDEX MOD A4C: 35 ML/M2 (ref 16–34)
ECHO LV E' LATERAL VELOCITY: 9 CM/S
ECHO LV E' SEPTAL VELOCITY: 6 CM/S
ECHO LV EF PHYSICIAN: 67 %
ECHO LV FRACTIONAL SHORTENING: 30 % (ref 28–44)
ECHO LV INTERNAL DIMENSION DIASTOLE INDEX: 1.79 CM/M2
ECHO LV INTERNAL DIMENSION DIASTOLIC: 3 CM (ref 3.9–5.3)
ECHO LV INTERNAL DIMENSION SYSTOLIC INDEX: 1.25 CM/M2
ECHO LV INTERNAL DIMENSION SYSTOLIC: 2.1 CM
ECHO LV IVSD: 1.5 CM (ref 0.6–0.9)
ECHO LV MASS 2D: 157.8 G (ref 67–162)
ECHO LV MASS INDEX 2D: 94 G/M2 (ref 43–95)
ECHO LV POSTERIOR WALL DIASTOLIC: 1.5 CM (ref 0.6–0.9)
ECHO LV RELATIVE WALL THICKNESS RATIO: 1
ECHO LVOT AREA: 3.1 CM2
ECHO LVOT DIAM: 2 CM
ECHO LVOT PEAK GRADIENT: 15 MMHG
ECHO LVOT PEAK VELOCITY: 1.9 M/S
ECHO MV A VELOCITY: 0.94 M/S
ECHO MV E DECELERATION TIME (DT): 282 MS
ECHO MV E VELOCITY: 0.81 M/S
ECHO MV E/A RATIO: 0.86
ECHO MV E/E' LATERAL: 9
ECHO MV E/E' RATIO (AVERAGED): 11.25
ECHO MV E/E' SEPTAL: 13.5
ECHO RA VOLUME: 35 ML
ECHO RA VOLUME: 37 ML
ECHO RV TAPSE: 2.2 CM (ref 1.7–?)
ECHO TV REGURGITANT MAX VELOCITY: 2.12 M/S
ECHO TV REGURGITANT PEAK GRADIENT: 18 MMHG
SERVICE CMNT-IMP: NORMAL
SERVICE CMNT-IMP: NORMAL

## 2024-09-12 PROCEDURE — 76700 US EXAM ABDOM COMPLETE: CPT

## 2024-09-12 PROCEDURE — 93306 TTE W/DOPPLER COMPLETE: CPT

## 2024-09-12 RX ORDER — TRAMADOL HYDROCHLORIDE 50 MG/1
50 TABLET ORAL EVERY 8 HOURS PRN
Qty: 50 TABLET | Refills: 0 | OUTPATIENT
Start: 2024-09-12 | End: 2024-10-12

## 2024-09-12 RX ORDER — FUROSEMIDE 20 MG/1
20 TABLET ORAL DAILY
Qty: 30 TABLET | Refills: 0 | OUTPATIENT
Start: 2024-09-12

## 2024-09-12 RX ORDER — TRAMADOL HYDROCHLORIDE 50 MG/1
50 TABLET ORAL EVERY 8 HOURS PRN
Qty: 50 TABLET | Refills: 0 | Status: SHIPPED | OUTPATIENT
Start: 2024-09-12 | End: 2024-10-12

## 2024-09-12 NOTE — TELEPHONE ENCOUNTER
Ultram is pending in another encounter.  Lasix was sent on 8/27/24 for #90 with 1 refill.    For Pharmacy Admin Tracking Only    Program: Medication Refill  CPA in place:    Recommendation Provided To:   Intervention Detail: Discontinued Rx: 2, reason: Duplicate Therapy  Intervention Accepted By:   Gap Closed?:    Time Spent (min): 5

## 2024-09-20 ENCOUNTER — TELEPHONE (OUTPATIENT)
Age: 54
End: 2024-09-20

## 2024-09-23 ENCOUNTER — OFFICE VISIT (OUTPATIENT)
Age: 54
End: 2024-09-23
Payer: COMMERCIAL

## 2024-09-23 VITALS
DIASTOLIC BLOOD PRESSURE: 63 MMHG | OXYGEN SATURATION: 97 % | HEART RATE: 84 BPM | TEMPERATURE: 97.9 F | BODY MASS INDEX: 25.76 KG/M2 | WEIGHT: 145.4 LBS | RESPIRATION RATE: 16 BRPM | HEIGHT: 63 IN | SYSTOLIC BLOOD PRESSURE: 107 MMHG

## 2024-09-23 DIAGNOSIS — K74.60 CIRRHOSIS OF LIVER WITH ASCITES, UNSPECIFIED HEPATIC CIRRHOSIS TYPE (HCC): Primary | ICD-10-CM

## 2024-09-23 DIAGNOSIS — E11.65 TYPE 2 DIABETES MELLITUS WITH HYPERGLYCEMIA, WITHOUT LONG-TERM CURRENT USE OF INSULIN (HCC): ICD-10-CM

## 2024-09-23 DIAGNOSIS — R18.8 CIRRHOSIS OF LIVER WITH ASCITES, UNSPECIFIED HEPATIC CIRRHOSIS TYPE (HCC): Primary | ICD-10-CM

## 2024-09-23 DIAGNOSIS — K76.82 HEPATIC ENCEPHALOPATHY (HCC): ICD-10-CM

## 2024-09-23 DIAGNOSIS — F10.21 ALCOHOL DEPENDENCE IN REMISSION (HCC): ICD-10-CM

## 2024-09-23 DIAGNOSIS — I85.10 SECONDARY ESOPHAGEAL VARICES WITHOUT BLEEDING (HCC): ICD-10-CM

## 2024-09-23 DIAGNOSIS — Z76.82 LIVER TRANSPLANT CANDIDATE: ICD-10-CM

## 2024-09-23 PROCEDURE — 99215 OFFICE O/P EST HI 40 MIN: CPT | Performed by: NURSE PRACTITIONER

## 2024-09-23 PROCEDURE — 3051F HG A1C>EQUAL 7.0%<8.0%: CPT | Performed by: NURSE PRACTITIONER

## 2024-09-23 RX ORDER — GLIMEPIRIDE 4 MG/1
4 TABLET ORAL 2 TIMES DAILY
COMMUNITY
Start: 2024-09-16

## 2024-09-24 LAB
ALBUMIN SERPL-MCNC: 3.6 G/DL (ref 3.8–4.9)
ALP SERPL-CCNC: 205 IU/L (ref 44–121)
ALT SERPL-CCNC: 41 IU/L (ref 0–32)
AST SERPL-CCNC: 50 IU/L (ref 0–40)
BILIRUB DIRECT SERPL-MCNC: 2.43 MG/DL (ref 0–0.4)
BILIRUB SERPL-MCNC: 5.2 MG/DL (ref 0–1.2)
BUN SERPL-MCNC: 10 MG/DL (ref 6–24)
BUN/CREAT SERPL: 14 (ref 9–23)
CALCIUM SERPL-MCNC: 10.6 MG/DL (ref 8.7–10.2)
CHLORIDE SERPL-SCNC: 95 MMOL/L (ref 96–106)
CO2 SERPL-SCNC: 24 MMOL/L (ref 20–29)
CREAT SERPL-MCNC: 0.73 MG/DL (ref 0.57–1)
EGFRCR SERPLBLD CKD-EPI 2021: 97 ML/MIN/1.73
GLUCOSE SERPL-MCNC: 411 MG/DL (ref 70–99)
INR PPP: 1.3 (ref 0.9–1.2)
POTASSIUM SERPL-SCNC: 4.9 MMOL/L (ref 3.5–5.2)
PROT SERPL-MCNC: 7.2 G/DL (ref 6–8.5)
PROTHROMBIN TIME: 14.6 SEC (ref 9.1–12)
SODIUM SERPL-SCNC: 135 MMOL/L (ref 134–144)

## 2024-09-25 DIAGNOSIS — M54.6 PAIN IN THORACIC SPINE: ICD-10-CM

## 2024-09-25 LAB
ERYTHROCYTE [DISTWIDTH] IN BLOOD BY AUTOMATED COUNT: 13.1 % (ref 11.7–15.4)
HCT VFR BLD AUTO: 32.6 % (ref 34–46.6)
HGB BLD-MCNC: 10.9 G/DL (ref 11.1–15.9)
MCH RBC QN AUTO: 36.2 PG (ref 26.6–33)
MCHC RBC AUTO-ENTMCNC: 33.4 G/DL (ref 31.5–35.7)
MCV RBC AUTO: 108 FL (ref 79–97)
MORPHOLOGY BLD-IMP: NORMAL
PLATELET # BLD AUTO: 69 X10E3/UL (ref 150–450)
RBC # BLD AUTO: 3.01 X10E6/UL (ref 3.77–5.28)
WBC # BLD AUTO: 4.3 X10E3/UL (ref 3.4–10.8)

## 2024-09-25 RX ORDER — TRAMADOL HYDROCHLORIDE 50 MG/1
50 TABLET ORAL EVERY 6 HOURS PRN
Qty: 50 TABLET | Refills: 0 | Status: SHIPPED | OUTPATIENT
Start: 2024-09-25 | End: 2024-10-25

## 2024-09-25 RX ORDER — TRAMADOL HYDROCHLORIDE 50 MG/1
TABLET ORAL
Qty: 50 TABLET | Refills: 0 | OUTPATIENT
Start: 2024-09-25

## 2024-10-04 DIAGNOSIS — M54.6 PAIN IN THORACIC SPINE: ICD-10-CM

## 2024-10-04 NOTE — TELEPHONE ENCOUNTER
Last appointment: 9/3/24  Next appointment: 11/25/24  Previous refill encounter(s): 9/25/24 #50    Requested Prescriptions     Pending Prescriptions Disp Refills    traMADol (ULTRAM) 50 MG tablet 50 tablet 0     Sig: Take 1 tablet by mouth every 6 hours as needed for Pain for up to 30 days. Max Daily Amount: 200 mg         For Pharmacy Admin Tracking Only    Program: Medication Refill  CPA in place:    Recommendation Provided To:   Intervention Detail: New Rx: 1, reason: Patient Preference  Intervention Accepted By:   Gap Closed?:    Time Spent (min): 5

## 2024-10-07 RX ORDER — TRAMADOL HYDROCHLORIDE 50 MG/1
50 TABLET ORAL EVERY 6 HOURS PRN
Qty: 50 TABLET | Refills: 0 | Status: SHIPPED | OUTPATIENT
Start: 2024-10-07 | End: 2024-11-06

## 2024-10-21 DIAGNOSIS — M54.6 PAIN IN THORACIC SPINE: ICD-10-CM

## 2024-10-21 RX ORDER — TRAMADOL HYDROCHLORIDE 50 MG/1
50 TABLET ORAL EVERY 6 HOURS PRN
Qty: 50 TABLET | Refills: 0 | Status: SHIPPED | OUTPATIENT
Start: 2024-10-21 | End: 2024-11-20

## 2024-10-29 DIAGNOSIS — M54.6 PAIN IN THORACIC SPINE: ICD-10-CM

## 2024-10-30 RX ORDER — TRAMADOL HYDROCHLORIDE 50 MG/1
50 TABLET ORAL EVERY 6 HOURS PRN
Qty: 50 TABLET | Refills: 0 | OUTPATIENT
Start: 2024-10-30 | End: 2024-11-29

## 2024-10-30 NOTE — TELEPHONE ENCOUNTER
Last appointment: 9/3/24  Next appointment: 11/25/24  Previous refill encounter(s): 10/21/24 #50    Requested Prescriptions     Pending Prescriptions Disp Refills    traMADol (ULTRAM) 50 MG tablet 50 tablet 0     Sig: Take 1 tablet by mouth every 6 hours as needed for Pain for up to 30 days. Max Daily Amount: 200 mg         For Pharmacy Admin Tracking Only    Program: Medication Refill  CPA in place:    Recommendation Provided To:   Intervention Detail: New Rx: 1, reason: Patient Preference  Intervention Accepted By:   Gap Closed?:    Time Spent (min): 5

## 2024-10-31 DIAGNOSIS — M54.6 PAIN IN THORACIC SPINE: ICD-10-CM

## 2024-11-01 RX ORDER — TRAMADOL HYDROCHLORIDE 50 MG/1
50 TABLET ORAL EVERY 6 HOURS PRN
Qty: 50 TABLET | Refills: 0 | Status: SHIPPED | OUTPATIENT
Start: 2024-11-01 | End: 2024-12-01

## 2024-11-06 DIAGNOSIS — K21.9 GASTROESOPHAGEAL REFLUX DISEASE WITHOUT ESOPHAGITIS: ICD-10-CM

## 2024-11-07 RX ORDER — PANTOPRAZOLE SODIUM 40 MG/1
40 TABLET, DELAYED RELEASE ORAL DAILY
Qty: 30 TABLET | Refills: 2 | Status: SHIPPED | OUTPATIENT
Start: 2024-11-07

## 2024-11-08 ENCOUNTER — OFFICE VISIT (OUTPATIENT)
Age: 54
End: 2024-11-08
Payer: COMMERCIAL

## 2024-11-08 VITALS
HEART RATE: 86 BPM | TEMPERATURE: 98.1 F | BODY MASS INDEX: 25.83 KG/M2 | WEIGHT: 140.4 LBS | DIASTOLIC BLOOD PRESSURE: 78 MMHG | SYSTOLIC BLOOD PRESSURE: 125 MMHG | HEIGHT: 62 IN | OXYGEN SATURATION: 96 % | RESPIRATION RATE: 16 BRPM

## 2024-11-08 DIAGNOSIS — K70.31 ALCOHOLIC CIRRHOSIS OF LIVER WITH ASCITES (HCC): Primary | ICD-10-CM

## 2024-11-08 PROCEDURE — 99214 OFFICE O/P EST MOD 30 MIN: CPT | Performed by: NURSE PRACTITIONER

## 2024-11-08 NOTE — PROGRESS NOTES
[facility]      Doron Kuo MD, FACP, FACG, FAASLD      VANESSA Sim, [facility]   Siobhan Santoyomikovijaya, [facility]   Gretel Thakkar, [facility]-  Grey Bettencourt, [facility]   Arlyn Mora, [facility]   Lisa Aston, [facility]   [address], [address]   [place]  [address] [phone]   FAX: [phone]  [facility]   [address], [address]   [place]  [address] [phone]   FAX: [phone]       Patient Care Team:  Homero Hoyos MD as PCP - General  Homero Hoyos MD as PCP - Empaneled Provider  Renetta West, RN as Nurse Navigator (Hepatology)  Judi Briscoe, APRN - NP (Hepatology)  Judi Briscoe APRN - NP (Hepatology)      Patient Active Problem List   Diagnosis    Type 2 diabetes mellitus (HCC)    Primary biliary cholangitis (HCC)    Renal calculus or stone    Anemia    Insomnia    Esophageal varices (HCC)    Alcohol induced liver disorder (HCC)    Ascites    Alcohol dependence in remission (HCC)    Thrombocytopenia (HCC)    Hepatic encephalopathy (HCC)    Chronic bilateral low back pain    Cirrhosis (HCC)    Liver transplant candidate    Hyponatremia    Cirrhosis of liver with ascites (HCC)    Alcoholic cirrhosis of liver with ascites (HCC)    Diabetes mellitus (HCC)    Primary biliary cirrhosis (HCC)    Acute hepatic encephalopathy (HCC)       Tiffanie Morgan is being seen at [facility] for management of 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     The active problem list, all pertinent past medical history, medications, radiologic findings and laboratory findings related to the liver disorder were reviewed and discussed with the patient.      The patient is a 54 y.o.  female who was

## 2024-11-08 NOTE — PROGRESS NOTES
Tiffanie Morgan is a 54 y.o. female    Chief Complaint   Patient presents with    3 Month Follow-Up     Cirrhosis of liver     Vitals:    11/08/24 1004   BP: 125/78   Pulse: 86   Resp: 16   Temp: 98.1 °F (36.7 °C)   SpO2: 96%         Health Maintenance Due   Topic Date Due    Diabetic retinal exam  Never done    Hepatitis C screen  Never done    Hepatitis A vaccine (1 of 2 - Risk 2-dose series) Never done    Hepatitis B vaccine (1 of 3 - 19+ 3-dose series) Never done    Diabetic foot exam  07/01/2017    Shingles vaccine (1 of 2) Never done    Pneumococcal 0-64 years Vaccine (2 of 2 - PCV) 08/07/2021    Diabetic Alb to Cr ratio (uACR) test  05/13/2022    Colorectal Cancer Screen  09/22/2023    Flu vaccine (1) 08/01/2024    COVID-19 Vaccine (3 - 2023-24 season) 09/01/2024    Lipids  09/22/2024         \"Have you been to the ER, urgent care clinic since your last visit?  Hospitalized since your last visit?\"    No     “Have you seen or consulted any other health care providers outside of [facility] since your last visit?”    No     “Have you had a colorectal cancer screening such as a colonoscopy/FIT/Cologuard?    No   Date of last Colonoscopy: 8/3/2020  No cologuard on file  Date of last FIT: 9/21/2023   No flexible sigmoidoscopy on file

## 2024-11-11 ENCOUNTER — TELEPHONE (OUTPATIENT)
Age: 54
End: 2024-11-11

## 2024-11-11 NOTE — TELEPHONE ENCOUNTER
Patient had an X rAY at [facility] and wishes to speak about her turning down the donor. She wishes to speak about them having other offers in the future. Friday, she went to the OBGYN and they are going to move forward with removing  \"bad cells'  
Unknown

## 2024-11-13 DIAGNOSIS — M54.6 PAIN IN THORACIC SPINE: ICD-10-CM

## 2024-11-13 RX ORDER — TRAMADOL HYDROCHLORIDE 50 MG/1
50 TABLET ORAL EVERY 6 HOURS PRN
Qty: 50 TABLET | Refills: 0 | Status: CANCELLED | OUTPATIENT
Start: 2024-11-13 | End: 2024-12-13

## 2024-11-14 NOTE — TELEPHONE ENCOUNTER
Patient comment: I started this prescription on 11/2 and it will end on 11/15. Hugo is off Friday and Saturday. Can you put the refill in for the 15th. I have an appointment on 11/25     Last appointment: 9/3/24  Next appointment: 11/25/24  Previous refill encounter(s): 11/1/24 #50    Requested Prescriptions     Pending Prescriptions Disp Refills    traMADol (ULTRAM) 50 MG tablet 50 tablet 0     Sig: Take 1 tablet by mouth every 6 hours as needed for Pain for up to 30 days. Max Daily Amount: 200 mg         For Pharmacy Admin Tracking Only    Program: Medication Refill  CPA in place:    Recommendation Provided To:   Intervention Detail: New Rx: 1, reason: Patient Preference  Intervention Accepted By:   Gap Closed?:    Time Spent (min): 5

## 2024-11-15 ENCOUNTER — TELEPHONE (OUTPATIENT)
Age: 54
End: 2024-11-15

## 2024-11-15 DIAGNOSIS — M54.6 PAIN IN THORACIC SPINE: ICD-10-CM

## 2024-11-15 RX ORDER — TRAMADOL HYDROCHLORIDE 50 MG/1
50 TABLET ORAL EVERY 6 HOURS PRN
Qty: 50 TABLET | Refills: 0 | Status: SHIPPED | OUTPATIENT
Start: 2024-11-15 | End: 2024-12-15

## 2024-11-15 NOTE — TELEPHONE ENCOUNTER
Pt wants to know when her Tramadol will be approved.  She changed the pharmacy to [facility] because she stated [facility] system is down.

## 2024-11-23 ENCOUNTER — APPOINTMENT (OUTPATIENT)
Facility: HOSPITAL | Age: 54
DRG: 682 | End: 2024-11-23
Payer: COMMERCIAL

## 2024-11-23 ENCOUNTER — HOSPITAL ENCOUNTER (INPATIENT)
Facility: HOSPITAL | Age: 54
LOS: 4 days | Discharge: HOME | DRG: 682 | End: 2024-11-27
Attending: EMERGENCY MEDICINE | Admitting: STUDENT IN AN ORGANIZED HEALTH CARE EDUCATION/TRAINING PROGRAM
Payer: COMMERCIAL

## 2024-11-23 DIAGNOSIS — R41.0 DISORIENTATION: ICD-10-CM

## 2024-11-23 DIAGNOSIS — E11.9 TYPE 2 DIABETES MELLITUS WITHOUT COMPLICATION, WITHOUT LONG-TERM CURRENT USE OF INSULIN (HCC): ICD-10-CM

## 2024-11-23 DIAGNOSIS — K76.82 HEPATIC ENCEPHALOPATHY (HCC): ICD-10-CM

## 2024-11-23 DIAGNOSIS — A41.9 SEPTICEMIA (HCC): Primary | ICD-10-CM

## 2024-11-23 DIAGNOSIS — E11.65 TYPE 2 DIABETES MELLITUS WITH HYPERGLYCEMIA (HCC): ICD-10-CM

## 2024-11-23 PROBLEM — R65.20 SEVERE SEPSIS (HCC): Status: ACTIVE | Noted: 2024-11-23

## 2024-11-23 LAB
ALBUMIN SERPL-MCNC: 2.7 G/DL (ref 3.5–5)
ALBUMIN SERPL-MCNC: 2.7 G/DL (ref 3.5–5)
ALBUMIN/GLOB SERPL: 0.6 (ref 1.1–2.2)
ALBUMIN/GLOB SERPL: 0.6 (ref 1.1–2.2)
ALP SERPL-CCNC: 101 U/L (ref 45–117)
ALP SERPL-CCNC: 96 U/L (ref 45–117)
ALT SERPL-CCNC: 46 U/L (ref 12–78)
ALT SERPL-CCNC: 48 U/L (ref 12–78)
AMMONIA PLAS-SCNC: 23 UMOL/L
ANION GAP SERPL CALC-SCNC: 10 MMOL/L (ref 2–12)
ANION GAP SERPL CALC-SCNC: 9 MMOL/L (ref 2–12)
APPEARANCE UR: CLEAR
AST SERPL-CCNC: 84 U/L (ref 15–37)
AST SERPL-CCNC: ABNORMAL U/L (ref 15–37)
BACTERIA URNS QL MICRO: NEGATIVE /HPF
BASOPHILS # BLD: 0.1 K/UL (ref 0–0.1)
BASOPHILS NFR BLD: 1 % (ref 0–1)
BILIRUB DIRECT SERPL-MCNC: ABNORMAL MG/DL (ref 0–0.2)
BILIRUB SERPL-MCNC: 7.9 MG/DL (ref 0.2–1)
BILIRUB SERPL-MCNC: 8 MG/DL (ref 0.2–1)
BILIRUB UR QL CFM: POSITIVE
BUN SERPL-MCNC: 17 MG/DL (ref 6–20)
BUN SERPL-MCNC: 18 MG/DL (ref 6–20)
BUN/CREAT SERPL: 11 (ref 12–20)
BUN/CREAT SERPL: 11 (ref 12–20)
CALCIUM SERPL-MCNC: 8.7 MG/DL (ref 8.5–10.1)
CALCIUM SERPL-MCNC: 9.3 MG/DL (ref 8.5–10.1)
CHLORIDE SERPL-SCNC: 104 MMOL/L (ref 97–108)
CHLORIDE SERPL-SCNC: 105 MMOL/L (ref 97–108)
CO2 SERPL-SCNC: 19 MMOL/L (ref 21–32)
CO2 SERPL-SCNC: 22 MMOL/L (ref 21–32)
COLOR UR: ABNORMAL
COMMENT:: NORMAL
COMMENT:: NORMAL
CREAT SERPL-MCNC: 1.49 MG/DL (ref 0.55–1.02)
CREAT SERPL-MCNC: 1.58 MG/DL (ref 0.55–1.02)
DIFFERENTIAL METHOD BLD: ABNORMAL
EOSINOPHIL # BLD: 0 K/UL (ref 0–0.4)
EOSINOPHIL NFR BLD: 0 % (ref 0–7)
EPITH CASTS URNS QL MICRO: ABNORMAL /LPF
ERYTHROCYTE [DISTWIDTH] IN BLOOD BY AUTOMATED COUNT: 16.8 % (ref 11.5–14.5)
FLUAV RNA SPEC QL NAA+PROBE: NOT DETECTED
FLUBV RNA SPEC QL NAA+PROBE: NOT DETECTED
GLOBULIN SER CALC-MCNC: 4.3 G/DL (ref 2–4)
GLOBULIN SER CALC-MCNC: 4.4 G/DL (ref 2–4)
GLUCOSE BLD STRIP.AUTO-MCNC: 160 MG/DL (ref 65–117)
GLUCOSE SERPL-MCNC: 242 MG/DL (ref 65–100)
GLUCOSE SERPL-MCNC: 243 MG/DL (ref 65–100)
GLUCOSE UR STRIP.AUTO-MCNC: NEGATIVE MG/DL
HCT VFR BLD AUTO: 34.8 % (ref 35–47)
HGB BLD-MCNC: 11.5 G/DL (ref 11.5–16)
HGB UR QL STRIP: ABNORMAL
IMM GRANULOCYTES # BLD AUTO: 0.1 K/UL (ref 0–0.04)
IMM GRANULOCYTES NFR BLD AUTO: 1 % (ref 0–0.5)
INR PPP: 1.6 (ref 0.9–1.1)
KETONES UR QL STRIP.AUTO: ABNORMAL MG/DL
LACTATE SERPL-SCNC: 5.8 MMOL/L (ref 0.4–2)
LEUKOCYTE ESTERASE UR QL STRIP.AUTO: NEGATIVE
LYMPHOCYTES # BLD: 0.2 K/UL (ref 0.8–3.5)
LYMPHOCYTES NFR BLD: 3 % (ref 12–49)
MCH RBC QN AUTO: 37 PG (ref 26–34)
MCHC RBC AUTO-ENTMCNC: 33 G/DL (ref 30–36.5)
MCV RBC AUTO: 111.9 FL (ref 80–99)
MONOCYTES # BLD: 0.5 K/UL (ref 0–1)
MONOCYTES NFR BLD: 8 % (ref 5–13)
NEUTS SEG # BLD: 5.2 K/UL (ref 1.8–8)
NEUTS SEG NFR BLD: 87 % (ref 32–75)
NITRITE UR QL STRIP.AUTO: NEGATIVE
NRBC # BLD: 0 K/UL (ref 0–0.01)
NRBC BLD-RTO: 0 PER 100 WBC
PH UR STRIP: 5.5 (ref 5–8)
PLATELET # BLD AUTO: 55 K/UL (ref 150–400)
PMV BLD AUTO: 11.1 FL (ref 8.9–12.9)
POTASSIUM SERPL-SCNC: 4.4 MMOL/L (ref 3.5–5.1)
POTASSIUM SERPL-SCNC: ABNORMAL MMOL/L (ref 3.5–5.1)
PROT SERPL-MCNC: 7 G/DL (ref 6.4–8.2)
PROT SERPL-MCNC: 7.1 G/DL (ref 6.4–8.2)
PROT UR STRIP-MCNC: NEGATIVE MG/DL
PROTHROMBIN TIME: 16.1 SEC (ref 9–11.1)
RBC # BLD AUTO: 3.11 M/UL (ref 3.8–5.2)
RBC #/AREA URNS HPF: ABNORMAL /HPF (ref 0–5)
RBC MORPH BLD: ABNORMAL
RBC MORPH BLD: ABNORMAL
SARS-COV-2 RNA RESP QL NAA+PROBE: NOT DETECTED
SERVICE CMNT-IMP: ABNORMAL
SODIUM SERPL-SCNC: 134 MMOL/L (ref 136–145)
SODIUM SERPL-SCNC: 135 MMOL/L (ref 136–145)
SOURCE: NORMAL
SP GR UR REFRACTOMETRY: 1.02 (ref 1–1.03)
SPECIMEN HOLD: NORMAL
SPECIMEN HOLD: NORMAL
URINE CULTURE IF INDICATED: ABNORMAL
UROBILINOGEN UR QL STRIP.AUTO: 0.2 EU/DL (ref 0.2–1)
WBC # BLD AUTO: 6.1 K/UL (ref 3.6–11)
WBC URNS QL MICRO: ABNORMAL /HPF (ref 0–4)

## 2024-11-23 PROCEDURE — 71250 CT THORAX DX C-: CPT

## 2024-11-23 PROCEDURE — 96365 THER/PROPH/DIAG IV INF INIT: CPT

## 2024-11-23 PROCEDURE — 80053 COMPREHEN METABOLIC PANEL: CPT

## 2024-11-23 PROCEDURE — 83605 ASSAY OF LACTIC ACID: CPT

## 2024-11-23 PROCEDURE — 70450 CT HEAD/BRAIN W/O DYE: CPT

## 2024-11-23 PROCEDURE — 71045 X-RAY EXAM CHEST 1 VIEW: CPT

## 2024-11-23 PROCEDURE — 85025 COMPLETE CBC W/AUTO DIFF WBC: CPT

## 2024-11-23 PROCEDURE — 96375 TX/PRO/DX INJ NEW DRUG ADDON: CPT

## 2024-11-23 PROCEDURE — 80076 HEPATIC FUNCTION PANEL: CPT

## 2024-11-23 PROCEDURE — 2060000000 HC ICU INTERMEDIATE R&B

## 2024-11-23 PROCEDURE — 2580000003 HC RX 258: Performed by: EMERGENCY MEDICINE

## 2024-11-23 PROCEDURE — 99285 EMERGENCY DEPT VISIT HI MDM: CPT

## 2024-11-23 PROCEDURE — 81001 URINALYSIS AUTO W/SCOPE: CPT

## 2024-11-23 PROCEDURE — 74176 CT ABD & PELVIS W/O CONTRAST: CPT

## 2024-11-23 PROCEDURE — 36415 COLL VENOUS BLD VENIPUNCTURE: CPT

## 2024-11-23 PROCEDURE — 6360000002 HC RX W HCPCS: Performed by: EMERGENCY MEDICINE

## 2024-11-23 PROCEDURE — 87636 SARSCOV2 & INF A&B AMP PRB: CPT

## 2024-11-23 PROCEDURE — 85610 PROTHROMBIN TIME: CPT

## 2024-11-23 PROCEDURE — 82962 GLUCOSE BLOOD TEST: CPT

## 2024-11-23 PROCEDURE — 82140 ASSAY OF AMMONIA: CPT

## 2024-11-23 PROCEDURE — 0202U NFCT DS 22 TRGT SARS-COV-2: CPT

## 2024-11-23 RX ORDER — ONDANSETRON 2 MG/ML
4 INJECTION INTRAMUSCULAR; INTRAVENOUS EVERY 6 HOURS PRN
Status: DISCONTINUED | OUTPATIENT
Start: 2024-11-23 | End: 2024-11-28 | Stop reason: HOSPADM

## 2024-11-23 RX ORDER — SODIUM CHLORIDE 0.9 % (FLUSH) 0.9 %
5-40 SYRINGE (ML) INJECTION EVERY 12 HOURS SCHEDULED
Status: DISCONTINUED | OUTPATIENT
Start: 2024-11-23 | End: 2024-11-28 | Stop reason: HOSPADM

## 2024-11-23 RX ORDER — POLYETHYLENE GLYCOL 3350 17 G/17G
17 POWDER, FOR SOLUTION ORAL DAILY PRN
Status: DISCONTINUED | OUTPATIENT
Start: 2024-11-23 | End: 2024-11-28 | Stop reason: HOSPADM

## 2024-11-23 RX ORDER — POTASSIUM CHLORIDE 7.45 MG/ML
10 INJECTION INTRAVENOUS PRN
Status: DISCONTINUED | OUTPATIENT
Start: 2024-11-23 | End: 2024-11-28 | Stop reason: HOSPADM

## 2024-11-23 RX ORDER — 0.9 % SODIUM CHLORIDE 0.9 %
30 INTRAVENOUS SOLUTION INTRAVENOUS ONCE
Status: COMPLETED | OUTPATIENT
Start: 2024-11-23 | End: 2024-11-23

## 2024-11-23 RX ORDER — MAGNESIUM SULFATE IN WATER 40 MG/ML
2000 INJECTION, SOLUTION INTRAVENOUS PRN
Status: DISCONTINUED | OUTPATIENT
Start: 2024-11-23 | End: 2024-11-28 | Stop reason: HOSPADM

## 2024-11-23 RX ORDER — LACTULOSE 10 G/15ML
10 SOLUTION ORAL 3 TIMES DAILY
Status: DISCONTINUED | OUTPATIENT
Start: 2024-11-23 | End: 2024-11-23

## 2024-11-23 RX ORDER — ONDANSETRON 4 MG/1
4 TABLET, ORALLY DISINTEGRATING ORAL EVERY 8 HOURS PRN
Status: DISCONTINUED | OUTPATIENT
Start: 2024-11-23 | End: 2024-11-28 | Stop reason: HOSPADM

## 2024-11-23 RX ORDER — LACTULOSE 10 G/15ML
10 SOLUTION ORAL 3 TIMES DAILY
Status: DISCONTINUED | OUTPATIENT
Start: 2024-11-24 | End: 2024-11-28 | Stop reason: HOSPADM

## 2024-11-23 RX ORDER — DEXTROSE MONOHYDRATE 100 MG/ML
INJECTION, SOLUTION INTRAVENOUS CONTINUOUS PRN
Status: DISCONTINUED | OUTPATIENT
Start: 2024-11-23 | End: 2024-11-28 | Stop reason: HOSPADM

## 2024-11-23 RX ORDER — INSULIN LISPRO 100 [IU]/ML
0-4 INJECTION, SOLUTION INTRAVENOUS; SUBCUTANEOUS
Status: DISCONTINUED | OUTPATIENT
Start: 2024-11-24 | End: 2024-11-24

## 2024-11-23 RX ORDER — PANTOPRAZOLE SODIUM 40 MG/1
40 TABLET, DELAYED RELEASE ORAL DAILY
Status: DISCONTINUED | OUTPATIENT
Start: 2024-11-24 | End: 2024-11-28 | Stop reason: HOSPADM

## 2024-11-23 RX ORDER — SODIUM CHLORIDE 9 MG/ML
INJECTION, SOLUTION INTRAVENOUS CONTINUOUS
Status: DISPENSED | OUTPATIENT
Start: 2024-11-23 | End: 2024-11-24

## 2024-11-23 RX ORDER — SODIUM CHLORIDE 0.9 % (FLUSH) 0.9 %
5-40 SYRINGE (ML) INJECTION PRN
Status: DISCONTINUED | OUTPATIENT
Start: 2024-11-23 | End: 2024-11-28 | Stop reason: HOSPADM

## 2024-11-23 RX ORDER — POTASSIUM CHLORIDE 750 MG/1
40 TABLET, EXTENDED RELEASE ORAL PRN
Status: DISCONTINUED | OUTPATIENT
Start: 2024-11-23 | End: 2024-11-28 | Stop reason: HOSPADM

## 2024-11-23 RX ORDER — SODIUM CHLORIDE 9 MG/ML
INJECTION, SOLUTION INTRAVENOUS PRN
Status: DISCONTINUED | OUTPATIENT
Start: 2024-11-23 | End: 2024-11-28 | Stop reason: HOSPADM

## 2024-11-23 RX ADMIN — PIPERACILLIN AND TAZOBACTAM 4500 MG: 4; .5 INJECTION, POWDER, FOR SOLUTION INTRAVENOUS at 21:15

## 2024-11-23 RX ADMIN — SODIUM CHLORIDE 1500 MG: 9 INJECTION, SOLUTION INTRAVENOUS at 22:16

## 2024-11-23 RX ADMIN — Medication: at 23:34

## 2024-11-23 RX ADMIN — SODIUM CHLORIDE 2000 ML: 9 INJECTION, SOLUTION INTRAVENOUS at 21:14

## 2024-11-24 LAB
ALBUMIN SERPL-MCNC: 2.2 G/DL (ref 3.5–5)
ALBUMIN/GLOB SERPL: 0.6 (ref 1.1–2.2)
ALP SERPL-CCNC: 72 U/L (ref 45–117)
ALT SERPL-CCNC: 39 U/L (ref 12–78)
ANION GAP SERPL CALC-SCNC: 8 MMOL/L (ref 2–12)
AST SERPL-CCNC: 70 U/L (ref 15–37)
B PERT DNA SPEC QL NAA+PROBE: NOT DETECTED
BASOPHILS # BLD: 0 K/UL (ref 0–0.1)
BASOPHILS NFR BLD: 0 % (ref 0–1)
BILIRUB SERPL-MCNC: 6.3 MG/DL (ref 0.2–1)
BORDETELLA PARAPERTUSSIS BY PCR: NOT DETECTED
BUN SERPL-MCNC: 15 MG/DL (ref 6–20)
BUN/CREAT SERPL: 12 (ref 12–20)
C PNEUM DNA SPEC QL NAA+PROBE: NOT DETECTED
CALCIUM SERPL-MCNC: 8 MG/DL (ref 8.5–10.1)
CHLORIDE SERPL-SCNC: 110 MMOL/L (ref 97–108)
CO2 SERPL-SCNC: 20 MMOL/L (ref 21–32)
COMMENT:: NORMAL
CREAT SERPL-MCNC: 1.21 MG/DL (ref 0.55–1.02)
DIFFERENTIAL METHOD BLD: ABNORMAL
EOSINOPHIL # BLD: 0 K/UL (ref 0–0.4)
EOSINOPHIL NFR BLD: 1 % (ref 0–7)
ERYTHROCYTE [DISTWIDTH] IN BLOOD BY AUTOMATED COUNT: 16.5 % (ref 11.5–14.5)
EST. AVERAGE GLUCOSE BLD GHB EST-MCNC: 180 MG/DL
FLUAV SUBTYP SPEC NAA+PROBE: NOT DETECTED
FLUBV RNA SPEC QL NAA+PROBE: NOT DETECTED
GLOBULIN SER CALC-MCNC: 3.7 G/DL (ref 2–4)
GLUCOSE BLD STRIP.AUTO-MCNC: 238 MG/DL (ref 65–117)
GLUCOSE BLD STRIP.AUTO-MCNC: 311 MG/DL (ref 65–117)
GLUCOSE BLD STRIP.AUTO-MCNC: 366 MG/DL (ref 65–117)
GLUCOSE BLD STRIP.AUTO-MCNC: 422 MG/DL (ref 65–117)
GLUCOSE BLD STRIP.AUTO-MCNC: 463 MG/DL (ref 65–117)
GLUCOSE BLD STRIP.AUTO-MCNC: 475 MG/DL (ref 65–117)
GLUCOSE BLD STRIP.AUTO-MCNC: 501 MG/DL (ref 65–117)
GLUCOSE SERPL-MCNC: 157 MG/DL (ref 65–100)
HADV DNA SPEC QL NAA+PROBE: NOT DETECTED
HBA1C MFR BLD: 7.9 % (ref 4–5.6)
HCOV 229E RNA SPEC QL NAA+PROBE: NOT DETECTED
HCOV HKU1 RNA SPEC QL NAA+PROBE: NOT DETECTED
HCOV NL63 RNA SPEC QL NAA+PROBE: NOT DETECTED
HCOV OC43 RNA SPEC QL NAA+PROBE: NOT DETECTED
HCT VFR BLD AUTO: 29.3 % (ref 35–47)
HGB BLD-MCNC: 9.9 G/DL (ref 11.5–16)
HMPV RNA SPEC QL NAA+PROBE: NOT DETECTED
HPIV1 RNA SPEC QL NAA+PROBE: NOT DETECTED
HPIV2 RNA SPEC QL NAA+PROBE: NOT DETECTED
HPIV3 RNA SPEC QL NAA+PROBE: NOT DETECTED
HPIV4 RNA SPEC QL NAA+PROBE: NOT DETECTED
IMM GRANULOCYTES # BLD AUTO: 0 K/UL
IMM GRANULOCYTES NFR BLD AUTO: 0 %
LACTATE SERPL-SCNC: 2.9 MMOL/L (ref 0.4–2)
LACTATE SERPL-SCNC: 4.6 MMOL/L (ref 0.4–2)
LYMPHOCYTES # BLD: 0.2 K/UL (ref 0.8–3.5)
LYMPHOCYTES NFR BLD: 4 % (ref 12–49)
M PNEUMO DNA SPEC QL NAA+PROBE: NOT DETECTED
MCH RBC QN AUTO: 36.7 PG (ref 26–34)
MCHC RBC AUTO-ENTMCNC: 33.8 G/DL (ref 30–36.5)
MCV RBC AUTO: 108.5 FL (ref 80–99)
MONOCYTES # BLD: 0.2 K/UL (ref 0–1)
MONOCYTES NFR BLD: 5 % (ref 5–13)
NEUTS SEG # BLD: 3.9 K/UL (ref 1.8–8)
NEUTS SEG NFR BLD: 90 % (ref 32–75)
NRBC # BLD: 0 K/UL (ref 0–0.01)
NRBC BLD-RTO: 0 PER 100 WBC
PATH REV BLD -IMP: ABNORMAL
PLATELET # BLD AUTO: 47 K/UL (ref 150–400)
PMV BLD AUTO: 9.5 FL (ref 8.9–12.9)
POTASSIUM SERPL-SCNC: 3.8 MMOL/L (ref 3.5–5.1)
PROT SERPL-MCNC: 5.9 G/DL (ref 6.4–8.2)
RBC # BLD AUTO: 2.7 M/UL (ref 3.8–5.2)
RBC MORPH BLD: ABNORMAL
RBC MORPH BLD: ABNORMAL
RSV RNA SPEC QL NAA+PROBE: NOT DETECTED
RV+EV RNA SPEC QL NAA+PROBE: NOT DETECTED
SARS-COV-2 RNA RESP QL NAA+PROBE: NOT DETECTED
SERVICE CMNT-IMP: ABNORMAL
SODIUM SERPL-SCNC: 138 MMOL/L (ref 136–145)
SPECIMEN HOLD: NORMAL
WBC # BLD AUTO: 4.3 K/UL (ref 3.6–11)
WBC MORPH BLD: ABNORMAL

## 2024-11-24 PROCEDURE — 97535 SELF CARE MNGMENT TRAINING: CPT

## 2024-11-24 PROCEDURE — 2580000003 HC RX 258: Performed by: STUDENT IN AN ORGANIZED HEALTH CARE EDUCATION/TRAINING PROGRAM

## 2024-11-24 PROCEDURE — 87040 BLOOD CULTURE FOR BACTERIA: CPT

## 2024-11-24 PROCEDURE — 97161 PT EVAL LOW COMPLEX 20 MIN: CPT

## 2024-11-24 PROCEDURE — 80053 COMPREHEN METABOLIC PANEL: CPT

## 2024-11-24 PROCEDURE — 36415 COLL VENOUS BLD VENIPUNCTURE: CPT

## 2024-11-24 PROCEDURE — 6370000000 HC RX 637 (ALT 250 FOR IP): Performed by: STUDENT IN AN ORGANIZED HEALTH CARE EDUCATION/TRAINING PROGRAM

## 2024-11-24 PROCEDURE — 99222 1ST HOSP IP/OBS MODERATE 55: CPT | Performed by: INTERNAL MEDICINE

## 2024-11-24 PROCEDURE — 6360000002 HC RX W HCPCS: Performed by: STUDENT IN AN ORGANIZED HEALTH CARE EDUCATION/TRAINING PROGRAM

## 2024-11-24 PROCEDURE — 2060000000 HC ICU INTERMEDIATE R&B

## 2024-11-24 PROCEDURE — 97165 OT EVAL LOW COMPLEX 30 MIN: CPT

## 2024-11-24 PROCEDURE — 83036 HEMOGLOBIN GLYCOSYLATED A1C: CPT

## 2024-11-24 PROCEDURE — 82962 GLUCOSE BLOOD TEST: CPT

## 2024-11-24 PROCEDURE — 85025 COMPLETE CBC W/AUTO DIFF WBC: CPT

## 2024-11-24 PROCEDURE — P9047 ALBUMIN (HUMAN), 25%, 50ML: HCPCS | Performed by: INTERNAL MEDICINE

## 2024-11-24 PROCEDURE — 97530 THERAPEUTIC ACTIVITIES: CPT

## 2024-11-24 PROCEDURE — 6360000002 HC RX W HCPCS: Performed by: INTERNAL MEDICINE

## 2024-11-24 PROCEDURE — 83605 ASSAY OF LACTIC ACID: CPT

## 2024-11-24 RX ORDER — ALBUMIN (HUMAN) 12.5 G/50ML
25 SOLUTION INTRAVENOUS EVERY 6 HOURS
Status: DISCONTINUED | OUTPATIENT
Start: 2024-11-24 | End: 2024-11-26 | Stop reason: HOSPADM

## 2024-11-24 RX ORDER — TRAMADOL HYDROCHLORIDE 50 MG/1
50 TABLET ORAL EVERY 6 HOURS PRN
Status: DISCONTINUED | OUTPATIENT
Start: 2024-11-24 | End: 2024-11-28 | Stop reason: HOSPADM

## 2024-11-24 RX ORDER — INSULIN LISPRO 100 [IU]/ML
8 INJECTION, SOLUTION INTRAVENOUS; SUBCUTANEOUS ONCE
Status: COMPLETED | OUTPATIENT
Start: 2024-11-24 | End: 2024-11-24

## 2024-11-24 RX ORDER — INSULIN LISPRO 100 [IU]/ML
8 INJECTION, SOLUTION INTRAVENOUS; SUBCUTANEOUS ONCE
Status: DISCONTINUED | OUTPATIENT
Start: 2024-11-24 | End: 2024-11-25

## 2024-11-24 RX ORDER — ACETAMINOPHEN 500 MG
500 TABLET ORAL EVERY 6 HOURS PRN
Status: DISCONTINUED | OUTPATIENT
Start: 2024-11-24 | End: 2024-11-28 | Stop reason: HOSPADM

## 2024-11-24 RX ORDER — INSULIN LISPRO 100 [IU]/ML
0-8 INJECTION, SOLUTION INTRAVENOUS; SUBCUTANEOUS
Status: DISCONTINUED | OUTPATIENT
Start: 2024-11-24 | End: 2024-11-26

## 2024-11-24 RX ORDER — INSULIN GLARGINE 100 [IU]/ML
10 INJECTION, SOLUTION SUBCUTANEOUS NIGHTLY
Status: DISCONTINUED | OUTPATIENT
Start: 2024-11-24 | End: 2024-11-25

## 2024-11-24 RX ORDER — INSULIN LISPRO 100 [IU]/ML
2 INJECTION, SOLUTION INTRAVENOUS; SUBCUTANEOUS ONCE
Status: COMPLETED | OUTPATIENT
Start: 2024-11-24 | End: 2024-11-24

## 2024-11-24 RX ORDER — INSULIN GLARGINE 100 [IU]/ML
10 INJECTION, SOLUTION SUBCUTANEOUS NIGHTLY
Status: DISCONTINUED | OUTPATIENT
Start: 2024-11-24 | End: 2024-11-24

## 2024-11-24 RX ADMIN — RIFAXIMIN 550 MG: 550 TABLET ORAL at 20:25

## 2024-11-24 RX ADMIN — LACTULOSE 10 G: 20 SOLUTION ORAL at 20:26

## 2024-11-24 RX ADMIN — INSULIN LISPRO 3 UNITS: 100 INJECTION, SOLUTION INTRAVENOUS; SUBCUTANEOUS at 12:25

## 2024-11-24 RX ADMIN — LACTULOSE 10 G: 20 SOLUTION ORAL at 09:03

## 2024-11-24 RX ADMIN — TRAMADOL HYDROCHLORIDE 50 MG: 50 TABLET ORAL at 15:20

## 2024-11-24 RX ADMIN — SODIUM CHLORIDE: 9 INJECTION, SOLUTION INTRAVENOUS at 01:14

## 2024-11-24 RX ADMIN — INSULIN LISPRO 8 UNITS: 100 INJECTION, SOLUTION INTRAVENOUS; SUBCUTANEOUS at 20:49

## 2024-11-24 RX ADMIN — WATER 2000 MG: 1 INJECTION INTRAMUSCULAR; INTRAVENOUS; SUBCUTANEOUS at 00:25

## 2024-11-24 RX ADMIN — ALBUMIN (HUMAN) 25 G: 0.25 INJECTION, SOLUTION INTRAVENOUS at 22:31

## 2024-11-24 RX ADMIN — TRAMADOL HYDROCHLORIDE 50 MG: 50 TABLET ORAL at 20:26

## 2024-11-24 RX ADMIN — CEFEPIME 2000 MG: 2 INJECTION, POWDER, FOR SOLUTION INTRAVENOUS at 12:29

## 2024-11-24 RX ADMIN — PANTOPRAZOLE SODIUM 40 MG: 40 TABLET, DELAYED RELEASE ORAL at 09:03

## 2024-11-24 RX ADMIN — LACTULOSE 10 G: 20 SOLUTION ORAL at 14:33

## 2024-11-24 RX ADMIN — RIFAXIMIN 550 MG: 550 TABLET ORAL at 01:15

## 2024-11-24 RX ADMIN — INSULIN GLARGINE 10 UNITS: 100 INJECTION, SOLUTION SUBCUTANEOUS at 17:31

## 2024-11-24 RX ADMIN — INSULIN LISPRO 2 UNITS: 100 INJECTION, SOLUTION INTRAVENOUS; SUBCUTANEOUS at 18:57

## 2024-11-24 RX ADMIN — ACETAMINOPHEN 500 MG: 500 TABLET ORAL at 09:16

## 2024-11-24 RX ADMIN — INSULIN LISPRO 8 UNITS: 100 INJECTION, SOLUTION INTRAVENOUS; SUBCUTANEOUS at 17:31

## 2024-11-24 RX ADMIN — INSULIN LISPRO 1 UNITS: 100 INJECTION, SOLUTION INTRAVENOUS; SUBCUTANEOUS at 06:47

## 2024-11-24 RX ADMIN — RIFAXIMIN 550 MG: 550 TABLET ORAL at 09:03

## 2024-11-24 ASSESSMENT — PAIN SCALES - GENERAL
PAINLEVEL_OUTOF10: 8
PAINLEVEL_OUTOF10: 7
PAINLEVEL_OUTOF10: 4

## 2024-11-24 ASSESSMENT — PAIN DESCRIPTION - LOCATION
LOCATION: BACK
LOCATION: BACK

## 2024-11-24 ASSESSMENT — PAIN DESCRIPTION - ORIENTATION: ORIENTATION: LOWER

## 2024-11-24 ASSESSMENT — PAIN DESCRIPTION - DESCRIPTORS: DESCRIPTORS: ACHING

## 2024-11-24 NOTE — PROGRESS NOTES
Latest Reference Range & Units 11/24/24 17:12 11/24/24 17:22   POC Glucose 65 - 117 mg/dL 501 (H) 463 (H)   (H): Data is abnormally high    Dr Cano aware of BS results. Orders placed by MD

## 2024-11-24 NOTE — ED PROVIDER NOTES
[facility] EMERGENCY DEP  EMERGENCY DEPARTMENT ENCOUNTER      Pt Name: Tiffanie Morgan  MRN: [id number]  Birthdate 1970  Date of evaluation: 2024  Provider: Augustin Aleman MD      HISTORY OF PRESENT ILLNESS      54-year-old female with history of diabetes, low back pain, cirrhosis related to alcohol use, currently evaluated at [facility] for transplant presents to the emergency department with fever either yesterday or today along with some episodes of confusion.  No abdominal pain.  She has had a cough without sick contacts.    The history is provided by the patient, medical records and the spouse.           Nursing Notes were reviewed.    REVIEW OF SYSTEMS         Review of Systems        PAST MEDICAL HISTORY     Past Medical History:   Diagnosis Date    Anemia     Cirrhosis     cirrhosis    Depression     Diabetes - II 3/9/2015    Diagnosed 3/9/2015 with A1c of 8.8%; treatment initiated with dietary changes, exercise 5 days/wk and started on Metformin XR 500mg daily x 2 weeks, then increase to 500mg AC breakfast and dinner.     LBP (low back pain) 2010    Mixed hyperlipidemia 2012    Nephrolithiasis 2010    OA (osteoarthritis) 2010    Smoker 2010         SURGICAL HISTORY       Past Surgical History:   Procedure Laterality Date    ANKLE FRACTURE SURGERY      BREAST SURGERY      CYST REMOVAL      GYN       X 3    TONSILLECTOMY      UPPER GASTROINTESTINAL ENDOSCOPY      UPPER GASTROINTESTINAL ENDOSCOPY N/A 2023    EGD ESOPHAGOGASTRODUODENOSCOPY performed by Doron Kuo MD at [facility] ENDOSCOPY    UPPER GASTROINTESTINAL ENDOSCOPY N/A 2024    ESOPHAGOGASTRODUODENOSCOPY performed by Doron Kuo MD at [facility] ENDOSCOPY    UPPER GASTROINTESTINAL ENDOSCOPY N/A 2024    EGD DIAGNOSTIC ONLY performed by Doron Kuo MD at [facility] ENDOSCOPY         CURRENT MEDICATIONS       Previous Medications    BLOOD GLUCOSE TEST STRIPS (EXACTECH TEST) STRIP    1 each

## 2024-11-24 NOTE — H&P
History & Physical    Primary Care Provider: Homero Hoyos MD  Source of Information: Patient and chart review    History of Presenting Illness:   Tiffanie Morgan is a 54 y.o. female with past medical history of former smoker, chronic back pain, DMII, kidney stone with recurrent urosepsis (s/p bilateral lithotripsy and stent placement, now removed in 2024), AMA+ with possible PBC component, and alcohol related cirrhosis, d ii, mdd, gerd who presented to ed with complaints of increased confusion    The patient denies any fever, chills, chest or abdominal pain, nausea, vomiting, cough, congestion, recent illness, palpitations, or dysuria.    Remarkable vitals on ER Presentation: Tmax 101.3, hr to 102  Labs Remarkable for: cr 1.58, alb 2.7, glu 243, LA 5.8, inr 1.6,   ER Images: ct head, cxr: no acute process.  ER Rx: vanc, zosyn, 2L NS bolus     Review of Systems:  Pertinent items are noted in the History of Present Illness.     Past Medical History:   Diagnosis Date    Anemia     Cirrhosis     cirrhosis    Depression     Diabetes - II 3/9/2015    Diagnosed 3/9/2015 with A1c of 8.8%; treatment initiated with dietary changes, exercise 5 days/wk and started on Metformin XR 500mg daily x 2 weeks, then increase to 500mg AC breakfast and dinner.     LBP (low back pain) 2010    Mixed hyperlipidemia 2012    Nephrolithiasis 2010    OA (osteoarthritis) 2010    Smoker 2010      Past Surgical History:   Procedure Laterality Date    ANKLE FRACTURE SURGERY      BREAST SURGERY      CYST REMOVAL      GYN       X 3    TONSILLECTOMY      UPPER GASTROINTESTINAL ENDOSCOPY      UPPER GASTROINTESTINAL ENDOSCOPY N/A 2023    EGD ESOPHAGOGASTRODUODENOSCOPY performed by Doron Kuo MD at [facility] ENDOSCOPY    UPPER GASTROINTESTINAL ENDOSCOPY N/A 2024    ESOPHAGOGASTRODUODENOSCOPY performed by Doron Kuo MD at [facility] ENDOSCOPY    UPPER GASTROINTESTINAL ENDOSCOPY N/A  Not detected NOTD      Rapid Influenza A By PCR Not detected NOTD      Rapid Influenza B By PCR Not detected NOTD     Ammonia    Collection Time: 11/23/24  7:32 PM   Result Value Ref Range    Ammonia 23 <32 UMOL/L   Protime-INR    Collection Time: 11/23/24  7:32 PM   Result Value Ref Range    INR 1.6 (H) 0.9 - 1.1      Protime 16.1 (H) 9.0 - 11.1 sec   Comprehensive Metabolic Panel    Collection Time: 11/23/24  8:35 PM   Result Value Ref Range    Sodium 135 (L) 136 - 145 mmol/L    Potassium 4.4 3.5 - 5.1 mmol/L    Chloride 104 97 - 108 mmol/L    CO2 22 21 - 32 mmol/L    Anion Gap 9 2 - 12 mmol/L    Glucose 242 (H) 65 - 100 mg/dL    BUN 18 6 - 20 MG/DL    Creatinine 1.58 (H) 0.55 - 1.02 MG/DL    BUN/Creatinine Ratio 11 (L) 12 - 20      Est, Glom Filt Rate 38 (L) >60 ml/min/1.73m2    Calcium 9.3 8.5 - 10.1 MG/DL    Total Bilirubin 7.9 (H) 0.2 - 1.0 MG/DL    ALT 46 12 - 78 U/L    AST 84 (H) 15 - 37 U/L    Alk Phosphatase 96 45 - 117 U/L    Total Protein 7.0 6.4 - 8.2 g/dL    Albumin 2.7 (L) 3.5 - 5.0 g/dL    Globulin 4.3 (H) 2.0 - 4.0 g/dL    Albumin/Globulin Ratio 0.6 (L) 1.1 - 2.2     Extra Tubes Hold    Collection Time: 11/23/24  8:35 PM   Result Value Ref Range    Specimen HOld 1PST     Comment:        Add-on orders for these samples will be processed based on acceptable specimen integrity and analyte stability, which may vary by analyte.   Extra Tubes Hold    Collection Time: 11/23/24  8:54 PM   Result Value Ref Range    Specimen HOld 4BLDC(2ORANGE,2GREEN)     Comment:        Add-on orders for these samples will be processed based on acceptable specimen integrity and analyte stability, which may vary by analyte.         Imaging:     Assessment:     Tiffanie Morgan is a 54 y.o. female with past medical history of former smoker, chronic back pain, DMII, kidney stone with recurrent urosepsis (s/p bilateral lithotripsy and stent placement, now removed in 4/2024), AMA+ with possible PBC component, and alcohol related

## 2024-11-24 NOTE — PROGRESS NOTES
Hospitalist Progress Note  Shahram Jacobson MD  Answering service: [phone] OR [phone] from in house phone        Date of Service:  2024  NAME:  Tiffanie Morgan  :  1970  MRN:  [id number]      Admission Summary:   Tiffanie Morgan is a 54 y.o. female with past medical history of former smoker, chronic back pain, DMII, kidney stone with recurrent urosepsis (s/p bilateral lithotripsy and stent placement, now removed in 2024), AMA+ with possible PBC component, and alcohol related cirrhosis, d ii, mdd, gerd who presented to ed with complaints of increased confusion     The patient denies any fever, chills, chest or abdominal pain, nausea, vomiting, cough, congestion, recent illness, palpitations, or dysuria.     Remarkable vitals on ER Presentation: Tmax 101.3, hr to 102  Labs Remarkable for: cr 1.58, alb 2.7, glu 243, LA 5.8, inr 1.6,   ER Images: ct head, cxr: no acute process.  ER Rx: vanc, zosyn, 2L NS bolus    Interval history / Subjective:   Seen and examined for follow up of sepsis and fever. No acute complaints. She reports feeling fine.      Assessment & Plan:     Severe Sepsis  -continue cefepime  -unclear cause at this time  -blood cultures negative so far  -UA negative  -CT CAP negative for acute findings    Hypotension  -Infectious workup negative so far  -Could be chronic   -monitor     Fever  -100.4 F today  -no clear cause     Metabolic Encephalopathy - on admission   -likely from #1 above  -ammonia levels are normal  -no confusion at the time of my exam     Cirrhosis /hyper bili /cholestasis/thrombocytopenia  -Continue p.o. lactulose and rifaximin  -Hold spironolactone and Lasix and allow for iv hydration  -Hepatology consulted     Acute renal failure  -Creatinine 1.58 with baseline 0.8  -Likely of prerenal etiology  -Monitor with IV fluid resuscitations  -Avoid nephrotoxic

## 2024-11-24 NOTE — ED NOTES
ED TO INPATIENT SBAR HANDOFF    Patient Name: Tiffanie Morgan   :  1970  54 y.o.   MRN:  [id number]  ED Room #:  ER03/03     Situation  Code Status: Full Code   Allergies: Patient has no known allergies.  Weight: Patient Vitals for the past 96 hrs (Last 3 readings):   Weight   24 1915 64.9 kg (143 lb)       Arrived from: home    Chief Complaint:   Chief Complaint   Patient presents with    Altered Mental Status       Hospital Problem/Diagnosis:  Principal Problem:    Severe sepsis (HCC)  Resolved Problems:    * No resolved hospital problems. *      Mobility: new onset weakness- able to walk more steady after fluids done  ED Fall Risk: Presents to emergency department  because of falls (Syncope, seizure, or loss of consciousness): No, Age > 70: No, Altered Mental Status, Intoxication with alcohol or substance confusion (Disorientation, impaired judgment, poor safety awaremess, or inability to follow instructions): Yes, Impaired Mobility: Ambulates or transfers with assistive devices or assistance; Unable to ambulate or transer.: Yes, Nursing Judgement: Yes   Fell in ED or prior to admission: no   Restraints: no     Sitter: no   Family/Caregiver Present: yes    Neet to know social/safety information: N/A    Background  History:   Past Medical History:   Diagnosis Date    Anemia     Cirrhosis     cirrhosis    Depression     Diabetes - II 3/9/2015    Diagnosed 3/9/2015 with A1c of 8.8%; treatment initiated with dietary changes, exercise 5 days/wk and started on Metformin XR 500mg daily x 2 weeks, then increase to 500mg AC breakfast and dinner.     LBP (low back pain) 2010    Mixed hyperlipidemia 2012    Nephrolithiasis 2010    OA (osteoarthritis) 2010    Smoker 2010       Assessment    Abnormal Assessment Findings: confusion (more oriented and coherent after fluids and abx), jaundice, slight ecchymosis to forehead, weakness and unsteady prior to fluids  Imaging:   CT Head W/O

## 2024-11-24 NOTE — PROGRESS NOTES
OCCUPATIONAL THERAPY EVALUATION/DISCHARGE  Patient: Tiffanie Morgan (54 y.o. female)  Date: 11/24/2024  Primary Diagnosis: Septicemia (HCC) [A41.9]  Disorientation [R41.0]  Severe sepsis (HCC) [A41.9, R65.20]         Precautions: Fall Risk                  ASSESSMENT :  Based on the objective data below, the patient presents at her functional baseline, completing hands-free ADLs with [place].  Pt with baseline higher level cognitive deficits; however, good safety awareness and ADL sequencing noted.  Pt educated on importance of OOB activity during hospitalization, as well as, ADL/IADL energy conservation, with good understanding verbalized.  Pt reports good PRN assist from  within home environment.  Given pt Independent with ADLs, no further acute OT needs identified, with OT answering pt/family's questions/concerns prior to discharge and all parties thanking therapist for his efforts.    Functional Outcome Measure:  The patient scored 100/100 on the Barthel Index outcome measure.      Further skilled acute occupational therapy is not indicated at this time.     PLAN :  Recommend with staff: OOB meals, Active ADL engagement    Recommendation for discharge: (in order for the patient to meet his/her long term goals):   No skilled occupational therapy    Other factors to consider for discharge: no additional factors    IF patient discharges home will need the following DME: none     SUBJECTIVE:   Patient stated, “Thanks for coming by.”    OBJECTIVE DATA SUMMARY:     Past Medical History:   Diagnosis Date    Anemia     Cirrhosis     cirrhosis    Depression     Diabetes - II 3/9/2015    Diagnosed 3/9/2015 with A1c of 8.8%; treatment initiated with dietary changes, exercise 5 days/wk and started on Metformin XR 500mg daily x 2 weeks, then increase to 500mg AC breakfast and dinner.     LBP (low back pain) 4/27/2010    Mixed hyperlipidemia 7/8/2012    Nephrolithiasis 4/27/2010    OA (osteoarthritis) 4/27/2010  DEFICITS:    Cognitive/Behavioral Status:    Orientation  Overall Orientation Status: Within Normal Limits  Orientation Level: Oriented X4  Cognition  Overall Cognitive Status: Exceptions  Arousal/Alertness: Appears intact  Following Commands: Appears intact  Attention Span: Appears intact  Memory: Impaired;Decreased recall of recent events;Decreased short term memory  Safety Judgement: Decreased awareness of need for assistance;Decreased awareness of need for safety (present at baseline)  Insights: Decreased awareness of deficits  Initiation: Appears intact  Sequencing: Appears intact    Range of Motion:   AROM: Within functional limits  PROM: Within functional limits      Strength:  Strength: Within functional limits      Coordination:  Coordination: Within functional limits            Tone & Sensation:   Tone: Normal  Sensation: Intact      Functional Mobility and Transfers for ADLs:  Bed Mobility:     Bed Mobility Training  Bed Mobility Training: Yes  Overall Level of Assistance: Modified independent  Supine to Sit: Modified independent  Scooting: Modified independent    Transfers:     Transfer Training  Transfer Training: Yes  Overall Level of Assistance: Independent  Sit to Stand: Independent  Stand to Sit: Independent  Stand Pivot Transfers: Independent  Bed to Chair: Independent                                   Balance:      Balance  Sitting: Intact  Standing: Intact      ADL Assessment:          Feeding: Independent       Grooming: Independent       UE Bathing: Independent            LE Bathing: Independent       UE Dressing: Independent       LE Dressing: Independent       Toileting: Independent              Functional Mobility: Independent             ADL Intervention and task modifications:    Patient instructed and indicated understanding the benefits of maintaining activity tolerance, functional mobility, and independence with self care tasks during acute stay  to ensure safe return home and to

## 2024-11-24 NOTE — ED PROVIDER NOTES
ED SIGN OUT NOTE  Care assumed at [facility] 9:19 PM EST    Patient was signed out to me by Dr. Aleman.     Patient is awaiting labs, imaging, admission.    /64   Pulse (!) 102   Temp 99.1 °F (37.3 °C) (Oral)   Resp 19   Ht 1.575 m (5' 2\")   Wt 64.9 kg (143 lb)   SpO2 93%   BMI 26.16 kg/m²     Labs Reviewed   CBC WITH AUTO DIFFERENTIAL - Abnormal; Notable for the following components:       Result Value    RBC 3.11 (*)     Hematocrit 34.8 (*)     .9 (*)     MCH 37.0 (*)     RDW 16.8 (*)     Platelets 55 (*)     Neutrophils % 87 (*)     Lymphocytes % 3 (*)     Immature Granulocytes % 1 (*)     Lymphocytes Absolute 0.2 (*)     Immature Granulocytes Absolute 0.1 (*)     All other components within normal limits   BASIC METABOLIC PANEL - Abnormal; Notable for the following components:    Sodium 134 (*)     CO2 19 (*)     Glucose 243 (*)     Creatinine 1.49 (*)     BUN/Creatinine Ratio 11 (*)     Est, Glom Filt Rate 41 (*)     All other components within normal limits   HEPATIC FUNCTION PANEL - Abnormal; Notable for the following components:    Albumin 2.7 (*)     Globulin 4.4 (*)     Albumin/Globulin Ratio 0.6 (*)     Total Bilirubin 8.0 (*)     All other components within normal limits   LACTIC ACID - Abnormal; Notable for the following components:    Lactic Acid, Plasma 5.8 (*)     All other components within normal limits   PROTIME-INR - Abnormal; Notable for the following components:    INR 1.6 (*)     Protime 16.1 (*)     All other components within normal limits   COVID-19 & INFLUENZA COMBO   AMMONIA   EXTRA TUBES HOLD   LACTIC ACID   URINALYSIS WITH REFLEX TO CULTURE   COMPREHENSIVE METABOLIC PANEL     XR CHEST PORTABLE   Final Result   Hypoinflated, grossly clear lungs.         Electronically signed by Saw Phillip MD      CT Head W/O Contrast    (Results Pending)       ED Course as of 11/23/24 2119   Sat Nov 23, 2024 2009 I have independently viewed the obtained radiographic images

## 2024-11-24 NOTE — FLOWSHEET NOTE
11/24/24 1155   Vital Signs   BP (!) 95/46   MAP (Calculated) 62   MAP (mmHg) (!) 62   BP Location Right upper arm     Dr. Cano made aware of BP. No news orders at this time. Will continue to monitor per orders

## 2024-11-24 NOTE — ED TRIAGE NOTES
TRIAGE NOTE:  Patient arrives from home with an increase in confusion, more so than usual.    Patient is a liver patient of Dr. Kuo.   arrives with patient.    Patient is unsteady on her feet.      She has not been compliant with her mediations the last few days related to the confusion.

## 2024-11-24 NOTE — PROGRESS NOTES
0900 - spoke with Dr Cano regarding patient current temperature (100.4) and no Tylenol orders. Orders being placed by MD.

## 2024-11-24 NOTE — PROGRESS NOTES
PHYSICAL THERAPY EVALUATION/DISCHARGE    Patient: Tiffanie Morgan (54 y.o. female)  Date: 11/24/2024  Primary Diagnosis: Septicemia (HCC) [A41.9]  Disorientation [R41.0]  Severe sepsis (HCC) [A41.9, R65.20]       Precautions: Fall Risk                      ASSESSMENT AND RECOMMENDATIONS:  Patient is a 53 y/o female admitted for management of sepsis, seen for PT evaluation this date.  Patient tolerated PT intervention well, performing all mobility at an overall S-Independent level while remaining hemodynamically stable.  Pt presents at her functional baseline with some higher level cognitive deficits which remain unchanged.  Instructed pt and pt's family in activity progression/pacing, importance of continued OOB activity throughout hospitalization, fall prevention techniques, and home safety adaptations.  Based on the objective data below, the patient has no further acute PT needs.  Recommending discharge home once medically appropriate.     Functional Outcome Measure:  The patient scored 22 on the [facility] outcome measure which is indicative of no subacute PT needs.          Further skilled acute physical therapy is not indicated at this time.       PLAN :  Recommendation for discharge: (in order for the patient to meet his/her long term goals):   No skilled physical therapy    Other factors to consider for discharge: no additional factors    IF patient discharges home will need the following DME: none       SUBJECTIVE:   Patient stated “I Feel fine.”    OBJECTIVE DATA SUMMARY:   Patient received supine in bed; RN cleared patient for participation in session.  On RA.   Past Medical History:   Diagnosis Date    Anemia     Cirrhosis     cirrhosis    Depression     Diabetes - II 3/9/2015    Diagnosed 3/9/2015 with A1c of 8.8%; treatment initiated with dietary changes, exercise 5 days/wk and started on Metformin XR 500mg daily x 2 weeks, then increase to 500mg AC breakfast and dinner.     LBP (low back pain) 4/27/2010

## 2024-11-25 PROBLEM — K76.9 LIVER DISEASE, UNSPECIFIED: Status: ACTIVE | Noted: 2024-11-25

## 2024-11-25 PROBLEM — A41.9 SEPTICEMIA (HCC): Status: ACTIVE | Noted: 2024-11-25

## 2024-11-25 LAB
ALBUMIN SERPL-MCNC: 2.8 G/DL (ref 3.5–5)
ALBUMIN/GLOB SERPL: 0.8 (ref 1.1–2.2)
ALP SERPL-CCNC: 119 U/L (ref 45–117)
ALT SERPL-CCNC: 38 U/L (ref 12–78)
ANION GAP SERPL CALC-SCNC: 6 MMOL/L (ref 2–12)
AST SERPL-CCNC: 75 U/L (ref 15–37)
BASOPHILS # BLD: 0 K/UL (ref 0–0.1)
BASOPHILS NFR BLD: 0 % (ref 0–1)
BILIRUB SERPL-MCNC: 6 MG/DL (ref 0.2–1)
BUN SERPL-MCNC: 10 MG/DL (ref 6–20)
BUN/CREAT SERPL: 14 (ref 12–20)
CALCIUM SERPL-MCNC: 8.7 MG/DL (ref 8.5–10.1)
CHLORIDE SERPL-SCNC: 110 MMOL/L (ref 97–108)
CO2 SERPL-SCNC: 21 MMOL/L (ref 21–32)
CREAT SERPL-MCNC: 0.74 MG/DL (ref 0.55–1.02)
DIFFERENTIAL METHOD BLD: ABNORMAL
EOSINOPHIL # BLD: 0.2 K/UL (ref 0–0.4)
EOSINOPHIL NFR BLD: 5 % (ref 0–7)
ERYTHROCYTE [DISTWIDTH] IN BLOOD BY AUTOMATED COUNT: 16.6 % (ref 11.5–14.5)
FERRITIN SERPL-MCNC: 114 NG/ML (ref 26–388)
GLOBULIN SER CALC-MCNC: 3.3 G/DL (ref 2–4)
GLUCOSE BLD STRIP.AUTO-MCNC: 267 MG/DL (ref 65–117)
GLUCOSE BLD STRIP.AUTO-MCNC: 274 MG/DL (ref 65–117)
GLUCOSE BLD STRIP.AUTO-MCNC: 315 MG/DL (ref 65–117)
GLUCOSE BLD STRIP.AUTO-MCNC: 354 MG/DL (ref 65–117)
GLUCOSE BLD STRIP.AUTO-MCNC: 397 MG/DL (ref 65–117)
GLUCOSE SERPL-MCNC: 251 MG/DL (ref 65–100)
HCT VFR BLD AUTO: 27 % (ref 35–47)
HGB BLD-MCNC: 9.2 G/DL (ref 11.5–16)
IMM GRANULOCYTES # BLD AUTO: 0 K/UL
IMM GRANULOCYTES NFR BLD AUTO: 0 %
INR PPP: 1.6 (ref 0.9–1.1)
IRON SATN MFR SERPL: 37 % (ref 20–50)
IRON SERPL-MCNC: 71 UG/DL (ref 35–150)
LYMPHOCYTES # BLD: 0.2 K/UL (ref 0.8–3.5)
LYMPHOCYTES NFR BLD: 5 % (ref 12–49)
MCH RBC QN AUTO: 36.4 PG (ref 26–34)
MCHC RBC AUTO-ENTMCNC: 34.1 G/DL (ref 30–36.5)
MCV RBC AUTO: 106.7 FL (ref 80–99)
MONOCYTES # BLD: 0.3 K/UL (ref 0–1)
MONOCYTES NFR BLD: 9 % (ref 5–13)
NEUTS SEG # BLD: 3 K/UL (ref 1.8–8)
NEUTS SEG NFR BLD: 81 % (ref 32–75)
NRBC # BLD: 0 K/UL (ref 0–0.01)
NRBC BLD-RTO: 0 PER 100 WBC
PLATELET # BLD AUTO: 48 K/UL (ref 150–400)
PMV BLD AUTO: 10.4 FL (ref 8.9–12.9)
POTASSIUM SERPL-SCNC: 3.5 MMOL/L (ref 3.5–5.1)
PROT SERPL-MCNC: 6.1 G/DL (ref 6.4–8.2)
PROTHROMBIN TIME: 16.5 SEC (ref 9–11.1)
RBC # BLD AUTO: 2.53 M/UL (ref 3.8–5.2)
RBC MORPH BLD: ABNORMAL
RBC MORPH BLD: ABNORMAL
SERVICE CMNT-IMP: ABNORMAL
SODIUM SERPL-SCNC: 137 MMOL/L (ref 136–145)
TIBC SERPL-MCNC: 191 UG/DL (ref 250–450)
WBC # BLD AUTO: 3.7 K/UL (ref 3.6–11)

## 2024-11-25 PROCEDURE — 80053 COMPREHEN METABOLIC PANEL: CPT

## 2024-11-25 PROCEDURE — 82728 ASSAY OF FERRITIN: CPT

## 2024-11-25 PROCEDURE — 99222 1ST HOSP IP/OBS MODERATE 55: CPT | Performed by: CLINICAL NURSE SPECIALIST

## 2024-11-25 PROCEDURE — 2060000000 HC ICU INTERMEDIATE R&B

## 2024-11-25 PROCEDURE — 6360000002 HC RX W HCPCS: Performed by: INTERNAL MEDICINE

## 2024-11-25 PROCEDURE — 85025 COMPLETE CBC W/AUTO DIFF WBC: CPT

## 2024-11-25 PROCEDURE — 2580000003 HC RX 258: Performed by: STUDENT IN AN ORGANIZED HEALTH CARE EDUCATION/TRAINING PROGRAM

## 2024-11-25 PROCEDURE — 6360000002 HC RX W HCPCS: Performed by: STUDENT IN AN ORGANIZED HEALTH CARE EDUCATION/TRAINING PROGRAM

## 2024-11-25 PROCEDURE — 85610 PROTHROMBIN TIME: CPT

## 2024-11-25 PROCEDURE — P9047 ALBUMIN (HUMAN), 25%, 50ML: HCPCS | Performed by: INTERNAL MEDICINE

## 2024-11-25 PROCEDURE — 6370000000 HC RX 637 (ALT 250 FOR IP): Performed by: CLINICAL NURSE SPECIALIST

## 2024-11-25 PROCEDURE — 83550 IRON BINDING TEST: CPT

## 2024-11-25 PROCEDURE — 82962 GLUCOSE BLOOD TEST: CPT

## 2024-11-25 PROCEDURE — 6370000000 HC RX 637 (ALT 250 FOR IP): Performed by: STUDENT IN AN ORGANIZED HEALTH CARE EDUCATION/TRAINING PROGRAM

## 2024-11-25 PROCEDURE — 83540 ASSAY OF IRON: CPT

## 2024-11-25 PROCEDURE — 36415 COLL VENOUS BLD VENIPUNCTURE: CPT

## 2024-11-25 RX ORDER — INSULIN LISPRO 100 [IU]/ML
1 INJECTION, SOLUTION INTRAVENOUS; SUBCUTANEOUS ONCE
Status: COMPLETED | OUTPATIENT
Start: 2024-11-25 | End: 2024-11-25

## 2024-11-25 RX ORDER — INSULIN LISPRO 100 [IU]/ML
5 INJECTION, SOLUTION INTRAVENOUS; SUBCUTANEOUS
Status: DISCONTINUED | OUTPATIENT
Start: 2024-11-25 | End: 2024-11-27

## 2024-11-25 RX ORDER — INSULIN GLARGINE 100 [IU]/ML
18 INJECTION, SOLUTION SUBCUTANEOUS NIGHTLY
Status: DISCONTINUED | OUTPATIENT
Start: 2024-11-25 | End: 2024-11-27

## 2024-11-25 RX ADMIN — TRAMADOL HYDROCHLORIDE 50 MG: 50 TABLET ORAL at 21:33

## 2024-11-25 RX ADMIN — INSULIN LISPRO 5 UNITS: 100 INJECTION, SOLUTION INTRAVENOUS; SUBCUTANEOUS at 18:35

## 2024-11-25 RX ADMIN — INSULIN LISPRO 8 UNITS: 100 INJECTION, SOLUTION INTRAVENOUS; SUBCUTANEOUS at 12:56

## 2024-11-25 RX ADMIN — LACTULOSE 10 G: 20 SOLUTION ORAL at 21:30

## 2024-11-25 RX ADMIN — INSULIN GLARGINE 18 UNITS: 100 INJECTION, SOLUTION SUBCUTANEOUS at 21:35

## 2024-11-25 RX ADMIN — LACTULOSE 10 G: 20 SOLUTION ORAL at 10:33

## 2024-11-25 RX ADMIN — TRAMADOL HYDROCHLORIDE 50 MG: 50 TABLET ORAL at 10:42

## 2024-11-25 RX ADMIN — ALBUMIN (HUMAN) 25 G: 0.25 INJECTION, SOLUTION INTRAVENOUS at 17:10

## 2024-11-25 RX ADMIN — LACTULOSE 10 G: 20 SOLUTION ORAL at 15:00

## 2024-11-25 RX ADMIN — ALBUMIN (HUMAN) 25 G: 0.25 INJECTION, SOLUTION INTRAVENOUS at 21:28

## 2024-11-25 RX ADMIN — ALBUMIN (HUMAN) 25 G: 0.25 INJECTION, SOLUTION INTRAVENOUS at 03:00

## 2024-11-25 RX ADMIN — TRAMADOL HYDROCHLORIDE 50 MG: 50 TABLET ORAL at 03:03

## 2024-11-25 RX ADMIN — ALBUMIN (HUMAN) 25 G: 0.25 INJECTION, SOLUTION INTRAVENOUS at 10:42

## 2024-11-25 RX ADMIN — INSULIN LISPRO 1 UNITS: 100 INJECTION, SOLUTION INTRAVENOUS; SUBCUTANEOUS at 12:56

## 2024-11-25 RX ADMIN — CEFEPIME 2000 MG: 2 INJECTION, POWDER, FOR SOLUTION INTRAVENOUS at 00:12

## 2024-11-25 RX ADMIN — INSULIN LISPRO 6 UNITS: 100 INJECTION, SOLUTION INTRAVENOUS; SUBCUTANEOUS at 21:36

## 2024-11-25 RX ADMIN — INSULIN LISPRO 8 UNITS: 100 INJECTION, SOLUTION INTRAVENOUS; SUBCUTANEOUS at 18:36

## 2024-11-25 RX ADMIN — INSULIN LISPRO 4 UNITS: 100 INJECTION, SOLUTION INTRAVENOUS; SUBCUTANEOUS at 07:25

## 2024-11-25 RX ADMIN — RIFAXIMIN 550 MG: 550 TABLET ORAL at 21:31

## 2024-11-25 RX ADMIN — PANTOPRAZOLE SODIUM 40 MG: 40 TABLET, DELAYED RELEASE ORAL at 10:33

## 2024-11-25 RX ADMIN — RIFAXIMIN 550 MG: 550 TABLET ORAL at 10:34

## 2024-11-25 ASSESSMENT — PAIN DESCRIPTION - ORIENTATION
ORIENTATION: MID
ORIENTATION: MID

## 2024-11-25 ASSESSMENT — PAIN SCALES - GENERAL
PAINLEVEL_OUTOF10: 7
PAINLEVEL_OUTOF10: 0

## 2024-11-25 ASSESSMENT — PAIN DESCRIPTION - DESCRIPTORS
DESCRIPTORS: ACHING
DESCRIPTORS: ACHING

## 2024-11-25 ASSESSMENT — PAIN DESCRIPTION - LOCATION
LOCATION: BACK

## 2024-11-25 ASSESSMENT — PAIN - FUNCTIONAL ASSESSMENT: PAIN_FUNCTIONAL_ASSESSMENT: ACTIVITIES ARE NOT PREVENTED

## 2024-11-25 ASSESSMENT — PAIN SCALES - WONG BAKER: WONGBAKER_NUMERICALRESPONSE: NO HURT

## 2024-11-25 NOTE — PLAN OF CARE
Problem: Discharge Planning  Goal: Discharge to home or other facility with appropriate resources  Outcome: Progressing  Flowsheets (Taken 11/25/2024 1050)  Discharge to home or other facility with appropriate resources:   Identify barriers to discharge with patient and caregiver   Identify discharge learning needs (meds, wound care, etc)     Problem: Safety - Adult  Goal: Free from fall injury  11/25/2024 1420 by Thelma Norwood, RN  Outcome: Adequate for Discharge     Problem: Pain  Goal: Verbalizes/displays adequate comfort level or baseline comfort level  11/25/2024 1420 by Thelma Norwood, RN  Outcome: Progressing

## 2024-11-25 NOTE — CONSULTS
cirrhosis    Depression     Diabetes - II 3/9/2015    Diagnosed 3/9/2015 with A1c of 8.8%; treatment initiated with dietary changes, exercise 5 days/wk and started on Metformin XR 500mg daily x 2 weeks, then increase to 500mg AC breakfast and dinner.     LBP (low back pain) 4/27/2010    Mixed hyperlipidemia 7/8/2012    Nephrolithiasis 4/27/2010    OA (osteoarthritis) 4/27/2010    Smoker 4/27/2010        INITIAL DX: Septicemia (HCC) [A41.9]  Disorientation [R41.0]  Severe sepsis (HCC) [A41.9, R65.20]     Current Treatment     TX: Treatment for sepsis    Hospital Course   Clinical progress has been uncomplicated    Diabetes History   Type 2 Diabetes  Ambulatory BG management provided by: Homero Hoyos MD      Lab Results   Component Value Date    LABA1C 7.9 (H) 11/24/2024    LABA1C 7.2 (H) 09/07/2024    LABA1C 7.4 (H) 01/20/2024     Diabetes-related Medical History  Acute complications  None  Neurological complications  None  Microvascular disease  None  Macrovascular disease  None  Other associated conditions     Liver Disease    Diabetes Medication History  Diabetes drug class Diabetes drug name Additional Comments   Biguanide  Metformin 750 BID   Sulfonylureas Glimepiride 4mg BID   GLP-1 Jamel Ozempic 1mg every 7 days  States she has lost about 20 lbs since starting this     Diabetes self-management practices:   Eating pattern: her and her  state that they have tried to improve their diet, but still sometimes eat fast food and carbohydrate-rich foods        [x] Snacks: often snacks late at night    [x] Beverages: sometimes drinks soda    [x] Dentition status: missing teeth     Physical activity pattern: likely sedentary    Monitoring pattern: checks sugars once in the morning at home       Taking medications pattern  [x] Consistent administration  [x] Affordable    Social determinants of health impacting diabetes self-management practices    Concerned that you need to know more about how to stay  practices impeding diabetes control  Discussed diabetes survival skills related to  Healthy Plate eating plan; given handouts  Role of physical activity in improving insulin sensitivity and action  Procedure for blood glucose monitoring & options for low-cost products  Medications plan at discharge     Billing Code(s)   [] 94011 IP subsequent hospital care - 50 minutes   [] 17384 IP subsequent hospital care - 35 minutes   [] 93626 IP subsequent hospital care - 25 minutes   [] 76716 IP initial hospital care - 40 minutes     Before making these care recommendations, I personally reviewed the hospitalization record, including notes, laboratory & diagnostic data and current medications, and examined the patient at the bedside.    Melanie Howe, CNS Student  [facility]  Access via [employer]

## 2024-11-25 NOTE — CARE COORDINATION
Care Management Initial Assessment       RUR: 23%  Readmission? No  1st IM letter given? N  1st  letter given: No    CM at the bedside to confirm demographics and insurance info. Pt lives with spouse in 1 level home with 3 steps to enter. She needs supervision for ADLs and ambulates independently. DME - shower chair. PCP verified. No hx of HH/SNF/IPR. Pharm - [facility] on [address].     CM will continue to follow.    Sana Carranza RN CM    Via perfect serve      11/25/24 1151   Service Assessment   Patient Orientation Alert and Oriented   Cognition Alert   History Provided By Patient   Primary Caregiver Spouse   Support Systems Spouse/Significant Other;Family Members   PCP Verified by CM Yes   Last Visit to PCP Within last 6 months   Prior Functional Level Assistance with the following:;Bathing;Dressing;Housework;Mobility   Current Functional Level Assistance with the following:;Bathing;Dressing;Housework;Mobility   Can patient return to prior living arrangement Unknown at present   Ability to make needs known: Good   Family able to assist with home care needs: Yes   Would you like for me to discuss the discharge plan with any other family members/significant others, and if so, who? Yes   CM/SW Referral ADLs/IADLs   Social/Functional History   Lives With Spouse   Type of Home House   Home Layout One level   Receives Help From Family   ADL Assistance Needs assistance   Ambulation Assistance Needs assistance   Transfer Assistance Needs assistance   Active  No   Occupation Unemployed   Discharge Planning   Type of Residence House   Living Arrangements Spouse/Significant Other   Current Services Prior To Admission Durable Medical Equipment   Type of Home Care Services OT;PT   Services At/After Discharge   Transition of Care Consult (CM Consult) Home Health;DME/Supply Assistance   Services At/After Discharge DME;PT;OT;Transport   Mode of Transport at Discharge  Van   Confirm Follow Up Transport Family

## 2024-11-25 NOTE — CONSULTS
[facility]     Doron Kuo MD, FACP, MACG, FAASLD   MD Adia Hills PA-C April S Ashworth, [facility]   Siobhan Sosa, [facility]   Gretel Banuelososta, [facility]  Grey Bettencourt, [facility]   Arlyn Mora, [facility]   Lisa Escotoon, [facility]   at [facility]   [address], [address]   [place]  [address] [phone]   FAX: [phone]  [facility]   [address], [address]   [place]  [address] [phone]   FAX: [phone]       HEPATOLOGY CONSULT NOTE  I was asked to see this patient in consultation for evaluation and management of cirrhosis.  I have reviewed the   Emergency room note, Hospital admission note, Notes by all other physicians who have seen the patient during this hospitalization to date.  I have reviewed the problem list, all past medical history and the reason for this hospitalization.  I have reviewed the allergies and the medications the patient was taking at home prior to this hospitalization.    HISTORY:  The patient is regularly followed and cared for at [facility].  She is a 54 y.o. female who was found to have chronic liver disease and cirrhosis in 5/2021    The patient had been consuming 10 alcohol drinks every day for several years.    She has been abstinent from all alcohol since 8/2023.     The patient has developed the following complications of cirrhosis: esophageal varices, variceal bleeding, ascites and hepatic encephalopathy.     She had chronic UTIs and was hospitalized with  sepsis and hypotension in 2/2024.   She was found to have bilateral renal calculi and underwent lithotripsy and all stones fragments passed..    She has not had further UTIs    The patient was listed for liver transplant at [facility] 3/2024.      She was last seen  diagnosis is based upon serology and an elevation in ALP.     A liver biopsy has not been performed.    The AMA is strongly positive and the ALP is significantly elevated.        The patient was being treated with MAGDALENE 1000 mg every day until 9/2023 when [facility] stopped the medication 10/2023.   MAGDALENE was restarted for a brief period then discontinued due to no change in ALP.      Ascites   Ascites developed for the first time in 8/2023.  Paracentesis was first performed 8/2023.   The last paracentesis was in 2/2024.    Ascites is well controlled on spironolactone 100 mg and furosemide 20 mg.      The patient was counseled regarding the need to maintain sodium restriction and the types of foods containing high amounts of sodium to be avoided.     Lower extremity edema  Lower edema has resolved on the current dose of diuretics.,       Esophageal varices   The patient has esophageal varices without previous bleeding  Esophageal varices have been banded in 9/2021, 11/2022, 3/2023, 9/2023.  Nadolol was discontinued due to hypotension   EGD was last performed in 8/2024.  Varices were banded.    Will repeat EGD while she is here to see if additional banding is needed     Hepatic encephalopathy   Overt HE developed for the first time 9/2023.  HE is being treated with Lactulose TID and Xifaxan     She was hospitalized for encephalopathy 9/6/2024   He is now well controlled with lactulose BID and xifaxan     Acute kidney injury  The patient has developed an acute rise in serum creatinine to 1.58 mg.  FIONA is secondary to cirrhosis, diuretics and infection.    This is unlikely to be FIONA-HRS and should respond to IV fluids.    Will start IV albumin 25%, 25 gms Q6H  Will check urine NA.     Screening for Hepatocellular Carcinoma  HCC screening was last performed in 11/2024 and was negative for HCC.    Will repeat US in 6 months.     SYSTEM REVIEW:  Constitution systems: Negative for fever, chills, weight gain, weight loss.   Eyes:

## 2024-11-25 NOTE — PROGRESS NOTES
in the office 2 weeks ago in 11/2024.     She came to [facility] because of fevers to 101.3.        In the ED Laboratory studies were significant for:    Sna 134, Scr 1.49 mg, ammonia 23, AST 84, ALT 48, , TBILI 8.0 mg, ALB 2.7 gm, WBC 6.1, HB 11.5 gms, PLT 55, INR 1.6    Imaging of the liver with CT scan demonstrated cirrhosis, no liver mass, minimal ascites..    Hospital Course:  UA appears negative and UCX not sent.    She is on IV ABX.  Fevers have resolved  Blood cultures have been obtained and are NGTD    11/25- mild tachycardia. Antibiotics discontinued    Hepatology Plan:    Continue IV albumin  EGD tomorrow. NPO at midnight.     ASSESSMENT AND PLAN:  Cirrhosis  The diagnosis of cirrhosis is based upon imaging, Fibroscan and laboratory studies.      Cirrhosis is secondary to PBC and alcohol.       The CTP is 10.  Child class C.  MELD 21.       The patient is currently on the liver transplant waiting list at [facility].     Alcohol associated liver disease  The diagnosis is based upon a history of consuming alcohol in excess for many years, Fibroscan CAP score, AST>ALT     A liver biopsy has not been performed.   Fibroscan performed in 8/2021 demonstrated  75.0 kPa and  suggesting SLD and cirrhosis.       The patient has consumed 10 alcoholic beverages daily for many years  There was a period of abstinence from 6/20211932-5634.    She returned to consuming alcohol in excess until 8/2023 when she developed complications of cirrhosis.   The patient has been abstinent from alcohol since 8/2023.  Discussed the need to remain abstinent from alcohol.     The last positive PETH was in 9/2023 and was only 49.   Several PETH tests have been negative since that time.   She continues getting treatment with counseling and is motivated in her recovery.     Primary Biliary Cholangitis  The diagnosis is based upon serology and an elevation in ALP.     A liver biopsy has not been performed.    The AMA is strongly  positive and the ALP is significantly elevated.        The patient was being treated with MAGDALENE 1000 mg every day until 9/2023 when [facility] stopped the medication 10/2023.     MAGDALENE was restarted for a brief period then discontinued due to no change in ALP.      Ascites   Ascites developed for the first time in 8/2023.  Paracentesis was first performed 8/2023.   The last paracentesis was in 2/2024.    Ascites is well controlled on spironolactone 100 mg and furosemide 20 mg.      The patient was counseled regarding the need to maintain sodium restriction and the types of foods containing high amounts of sodium to be avoided.     Lower extremity edema  Lower edema has resolved on the current dose of diuretics.,       Esophageal varices   The patient has esophageal varices without previous bleeding  Esophageal varices have been banded in 9/2021, 11/2022, 3/2023, 9/2023, 8/2024  Nadolol was discontinued due to hypotension   Will repeat EGD while she is here to see if additional banding is needed     Hepatic encephalopathy   Overt HE developed for the first time 9/2023.  HE is being treated with Lactulose TID and Xifaxan     She was hospitalized for encephalopathy 9/6/2024   He is now well controlled with lactulose BID and xifaxan     Acute kidney injury  The patient has developed an acute rise in serum creatinine to 1.58 mg.  FIONA is secondary to cirrhosis, diuretics and infection.    This is unlikely to be FIONA-HRS and should respond to IV fluids.    Resolved with IV albumin 25%, 25 gms Q6H    Screening for Hepatocellular Carcinoma  HCC screening was last performed in 11/2024 and was negative for HCC.    Will repeat US in 6 months.     SYSTEM REVIEW:  Constitution systems: Negative for fever, chills, weight gain, weight loss.   Eyes: Negative for visual changes.  ENT: Negative for sore throat, painful swallowing.   Respiratory: Negative for cough, hemoptysis, SOB.   Cardiology: Negative for chest pain, palpitations.  GI:

## 2024-11-25 NOTE — PROGRESS NOTES
Hospitalist Progress Note  Shahram Jacobson MD  Answering service: [phone] OR [phone] from in house phone        Date of Service:  2024  NAME:  Tiffanie Morgan  :  1970  MRN:  [id number]      Admission Summary:   Tiffanie Morgan is a 54 y.o. female with past medical history of former smoker, chronic back pain, DMII, kidney stone with recurrent urosepsis (s/p bilateral lithotripsy and stent placement, now removed in 2024), AMA+ with possible PBC component, and alcohol related cirrhosis, d ii, mdd, gerd who presented to ed with complaints of increased confusion     The patient denies any fever, chills, chest or abdominal pain, nausea, vomiting, cough, congestion, recent illness, palpitations, or dysuria.     Remarkable vitals on ER Presentation: Tmax 101.3, hr to 102  Labs Remarkable for: cr 1.58, alb 2.7, glu 243, LA 5.8, inr 1.6,   ER Images: ct head, cxr: no acute process.  ER Rx: vanc, zosyn, 2L NS bolus    Interval history / Subjective:   Seen and examined for follow up of sepsis and fever. She reports feeling fine. No acute complaints. No fever.      Assessment & Plan:     Severe Sepsis - ruled out  -continue cefepime  -unclear cause at this time  -blood cultures negative so far  -UA negative  -CT CAP negative for acute findings  -No infectious source found  -Watching off of antibiotics    Hypotension  -Infectious workup negative so far  -Could be chronic   -monitor   -BP looks good    Fever  -No fever >24 hours     Lactic acidosis   -Likely due to metformin in the setting of cirrhosis  -Stop metformin     Metabolic Encephalopathy - on admission   -likely from #1 above  -ammonia levels are normal  -no confusion at the time of my exam     Cirrhosis /hyper bili /cholestasis/thrombocytopenia  -Continue p.o. lactulose and rifaximin  -Hold spironolactone and Lasix and allow for iv

## 2024-11-26 ENCOUNTER — ANESTHESIA (OUTPATIENT)
Facility: HOSPITAL | Age: 54
DRG: 682 | End: 2024-11-26
Payer: COMMERCIAL

## 2024-11-26 ENCOUNTER — ANESTHESIA EVENT (OUTPATIENT)
Facility: HOSPITAL | Age: 54
DRG: 682 | End: 2024-11-26
Payer: COMMERCIAL

## 2024-11-26 LAB
ALBUMIN SERPL-MCNC: 3.9 G/DL (ref 3.5–5)
ALBUMIN/GLOB SERPL: 1.6 (ref 1.1–2.2)
ALP SERPL-CCNC: 124 U/L (ref 45–117)
ALT SERPL-CCNC: 35 U/L (ref 12–78)
ANION GAP SERPL CALC-SCNC: 6 MMOL/L (ref 2–12)
AST SERPL-CCNC: 61 U/L (ref 15–37)
BASOPHILS # BLD: 0 K/UL (ref 0–0.1)
BASOPHILS NFR BLD: 1 % (ref 0–1)
BILIRUB SERPL-MCNC: 5.6 MG/DL (ref 0.2–1)
BUN SERPL-MCNC: 8 MG/DL (ref 6–20)
BUN/CREAT SERPL: 9 (ref 12–20)
CALCIUM SERPL-MCNC: 9.2 MG/DL (ref 8.5–10.1)
CHLORIDE SERPL-SCNC: 110 MMOL/L (ref 97–108)
CO2 SERPL-SCNC: 20 MMOL/L (ref 21–32)
CREAT SERPL-MCNC: 0.92 MG/DL (ref 0.55–1.02)
DIFFERENTIAL METHOD BLD: ABNORMAL
EOSINOPHIL # BLD: 0.1 K/UL (ref 0–0.4)
EOSINOPHIL NFR BLD: 4 % (ref 0–7)
ERYTHROCYTE [DISTWIDTH] IN BLOOD BY AUTOMATED COUNT: 16.7 % (ref 11.5–14.5)
GLOBULIN SER CALC-MCNC: 2.4 G/DL (ref 2–4)
GLUCOSE BLD STRIP.AUTO-MCNC: 227 MG/DL (ref 65–117)
GLUCOSE BLD STRIP.AUTO-MCNC: 320 MG/DL (ref 65–117)
GLUCOSE BLD STRIP.AUTO-MCNC: 395 MG/DL (ref 65–117)
GLUCOSE BLD STRIP.AUTO-MCNC: 397 MG/DL (ref 65–117)
GLUCOSE BLD STRIP.AUTO-MCNC: 425 MG/DL (ref 65–117)
GLUCOSE BLD STRIP.AUTO-MCNC: 443 MG/DL (ref 65–117)
GLUCOSE SERPL-MCNC: 398 MG/DL (ref 65–100)
HCT VFR BLD AUTO: 24.7 % (ref 35–47)
HGB BLD-MCNC: 8.4 G/DL (ref 11.5–16)
IMM GRANULOCYTES # BLD AUTO: 0 K/UL (ref 0–0.04)
IMM GRANULOCYTES NFR BLD AUTO: 1 % (ref 0–0.5)
LYMPHOCYTES # BLD: 0.4 K/UL (ref 0.8–3.5)
LYMPHOCYTES NFR BLD: 13 % (ref 12–49)
MCH RBC QN AUTO: 36.8 PG (ref 26–34)
MCHC RBC AUTO-ENTMCNC: 34 G/DL (ref 30–36.5)
MCV RBC AUTO: 108.3 FL (ref 80–99)
MONOCYTES # BLD: 0.5 K/UL (ref 0–1)
MONOCYTES NFR BLD: 17 % (ref 5–13)
NEUTS SEG # BLD: 1.8 K/UL (ref 1.8–8)
NEUTS SEG NFR BLD: 64 % (ref 32–75)
NRBC # BLD: 0 K/UL (ref 0–0.01)
NRBC BLD-RTO: 0 PER 100 WBC
PLATELET # BLD AUTO: 49 K/UL (ref 150–400)
PMV BLD AUTO: 9.6 FL (ref 8.9–12.9)
POTASSIUM SERPL-SCNC: 3.6 MMOL/L (ref 3.5–5.1)
PROT SERPL-MCNC: 6.3 G/DL (ref 6.4–8.2)
RBC # BLD AUTO: 2.28 M/UL (ref 3.8–5.2)
RBC MORPH BLD: ABNORMAL
RBC MORPH BLD: ABNORMAL
SERVICE CMNT-IMP: ABNORMAL
SODIUM SERPL-SCNC: 136 MMOL/L (ref 136–145)
WBC # BLD AUTO: 2.8 K/UL (ref 3.6–11)

## 2024-11-26 PROCEDURE — 43235 EGD DIAGNOSTIC BRUSH WASH: CPT | Performed by: STUDENT IN AN ORGANIZED HEALTH CARE EDUCATION/TRAINING PROGRAM

## 2024-11-26 PROCEDURE — 6370000000 HC RX 637 (ALT 250 FOR IP): Performed by: STUDENT IN AN ORGANIZED HEALTH CARE EDUCATION/TRAINING PROGRAM

## 2024-11-26 PROCEDURE — 36415 COLL VENOUS BLD VENIPUNCTURE: CPT

## 2024-11-26 PROCEDURE — 85025 COMPLETE CBC W/AUTO DIFF WBC: CPT

## 2024-11-26 PROCEDURE — 1200000000 HC SEMI PRIVATE

## 2024-11-26 PROCEDURE — 3700000000 HC ANESTHESIA ATTENDED CARE: Performed by: STUDENT IN AN ORGANIZED HEALTH CARE EDUCATION/TRAINING PROGRAM

## 2024-11-26 PROCEDURE — 3600007502: Performed by: STUDENT IN AN ORGANIZED HEALTH CARE EDUCATION/TRAINING PROGRAM

## 2024-11-26 PROCEDURE — 82962 GLUCOSE BLOOD TEST: CPT

## 2024-11-26 PROCEDURE — 80053 COMPREHEN METABOLIC PANEL: CPT

## 2024-11-26 PROCEDURE — 3600007512: Performed by: STUDENT IN AN ORGANIZED HEALTH CARE EDUCATION/TRAINING PROGRAM

## 2024-11-26 PROCEDURE — 3700000001 HC ADD 15 MINUTES (ANESTHESIA): Performed by: STUDENT IN AN ORGANIZED HEALTH CARE EDUCATION/TRAINING PROGRAM

## 2024-11-26 PROCEDURE — 6360000002 HC RX W HCPCS: Performed by: INTERNAL MEDICINE

## 2024-11-26 PROCEDURE — P9047 ALBUMIN (HUMAN), 25%, 50ML: HCPCS | Performed by: INTERNAL MEDICINE

## 2024-11-26 PROCEDURE — 2720000010 HC SURG SUPPLY STERILE: Performed by: STUDENT IN AN ORGANIZED HEALTH CARE EDUCATION/TRAINING PROGRAM

## 2024-11-26 PROCEDURE — 7100000011 HC PHASE II RECOVERY - ADDTL 15 MIN: Performed by: STUDENT IN AN ORGANIZED HEALTH CARE EDUCATION/TRAINING PROGRAM

## 2024-11-26 PROCEDURE — 6370000000 HC RX 637 (ALT 250 FOR IP): Performed by: CLINICAL NURSE SPECIALIST

## 2024-11-26 PROCEDURE — 6360000002 HC RX W HCPCS: Performed by: NURSE ANESTHETIST, CERTIFIED REGISTERED

## 2024-11-26 PROCEDURE — 7100000010 HC PHASE II RECOVERY - FIRST 15 MIN: Performed by: STUDENT IN AN ORGANIZED HEALTH CARE EDUCATION/TRAINING PROGRAM

## 2024-11-26 PROCEDURE — 0DJ08ZZ INSPECTION OF UPPER INTESTINAL TRACT, VIA NATURAL OR ARTIFICIAL OPENING ENDOSCOPIC: ICD-10-PCS | Performed by: STUDENT IN AN ORGANIZED HEALTH CARE EDUCATION/TRAINING PROGRAM

## 2024-11-26 PROCEDURE — 99231 SBSQ HOSP IP/OBS SF/LOW 25: CPT | Performed by: CLINICAL NURSE SPECIALIST

## 2024-11-26 RX ORDER — SODIUM CHLORIDE 0.9 % (FLUSH) 0.9 %
5-40 SYRINGE (ML) INJECTION EVERY 12 HOURS SCHEDULED
Status: DISCONTINUED | OUTPATIENT
Start: 2024-11-26 | End: 2024-11-26 | Stop reason: HOSPADM

## 2024-11-26 RX ORDER — SODIUM CHLORIDE 9 MG/ML
25 INJECTION, SOLUTION INTRAVENOUS PRN
Status: DISCONTINUED | OUTPATIENT
Start: 2024-11-26 | End: 2024-11-26 | Stop reason: HOSPADM

## 2024-11-26 RX ORDER — LIDOCAINE HYDROCHLORIDE 20 MG/ML
INJECTION, SOLUTION EPIDURAL; INFILTRATION; INTRACAUDAL; PERINEURAL
Status: DISCONTINUED | OUTPATIENT
Start: 2024-11-26 | End: 2024-11-26 | Stop reason: SDUPTHER

## 2024-11-26 RX ORDER — INSULIN LISPRO 100 [IU]/ML
0-8 INJECTION, SOLUTION INTRAVENOUS; SUBCUTANEOUS EVERY 4 HOURS
Status: DISCONTINUED | OUTPATIENT
Start: 2024-11-26 | End: 2024-11-27

## 2024-11-26 RX ORDER — METFORMIN HYDROCHLORIDE 750 MG/1
750 TABLET, EXTENDED RELEASE ORAL 2 TIMES DAILY WITH MEALS
Qty: 90 TABLET | Refills: 1 | Status: SHIPPED | OUTPATIENT
Start: 2024-11-26 | End: 2024-11-27 | Stop reason: HOSPADM

## 2024-11-26 RX ORDER — SODIUM CHLORIDE 0.9 % (FLUSH) 0.9 %
5-40 SYRINGE (ML) INJECTION PRN
Status: DISCONTINUED | OUTPATIENT
Start: 2024-11-26 | End: 2024-11-26 | Stop reason: HOSPADM

## 2024-11-26 RX ADMIN — INSULIN LISPRO 2 UNITS: 100 INJECTION, SOLUTION INTRAVENOUS; SUBCUTANEOUS at 21:20

## 2024-11-26 RX ADMIN — LACTULOSE 10 G: 20 SOLUTION ORAL at 15:26

## 2024-11-26 RX ADMIN — INSULIN LISPRO 6 UNITS: 100 INJECTION, SOLUTION INTRAVENOUS; SUBCUTANEOUS at 18:20

## 2024-11-26 RX ADMIN — LACTULOSE 10 G: 20 SOLUTION ORAL at 21:20

## 2024-11-26 RX ADMIN — TRAMADOL HYDROCHLORIDE 50 MG: 50 TABLET ORAL at 18:21

## 2024-11-26 RX ADMIN — INSULIN GLARGINE 18 UNITS: 100 INJECTION, SOLUTION SUBCUTANEOUS at 21:20

## 2024-11-26 RX ADMIN — PROPOFOL 20 MG: 10 INJECTION, EMULSION INTRAVENOUS at 11:48

## 2024-11-26 RX ADMIN — ALBUMIN (HUMAN) 25 G: 0.25 INJECTION, SOLUTION INTRAVENOUS at 09:08

## 2024-11-26 RX ADMIN — INSULIN HUMAN 15 UNITS: 100 INJECTION, SUSPENSION SUBCUTANEOUS at 09:05

## 2024-11-26 RX ADMIN — LIDOCAINE HYDROCHLORIDE 60 MG: 20 INJECTION, SOLUTION EPIDURAL; INFILTRATION; INTRACAUDAL; PERINEURAL at 11:46

## 2024-11-26 RX ADMIN — INSULIN LISPRO 5 UNITS: 100 INJECTION, SOLUTION INTRAVENOUS; SUBCUTANEOUS at 17:18

## 2024-11-26 RX ADMIN — RIFAXIMIN 550 MG: 550 TABLET ORAL at 21:20

## 2024-11-26 RX ADMIN — INSULIN LISPRO 8 UNITS: 100 INJECTION, SOLUTION INTRAVENOUS; SUBCUTANEOUS at 07:24

## 2024-11-26 RX ADMIN — LIDOCAINE HYDROCHLORIDE 40 MG: 20 INJECTION, SOLUTION EPIDURAL; INFILTRATION; INTRACAUDAL; PERINEURAL at 11:47

## 2024-11-26 RX ADMIN — PROPOFOL 100 MG: 10 INJECTION, EMULSION INTRAVENOUS at 11:47

## 2024-11-26 RX ADMIN — ALBUMIN (HUMAN) 25 G: 0.25 INJECTION, SOLUTION INTRAVENOUS at 03:32

## 2024-11-26 RX ADMIN — INSULIN LISPRO 12 UNITS: 100 INJECTION, SOLUTION INTRAVENOUS; SUBCUTANEOUS at 15:41

## 2024-11-26 RX ADMIN — INSULIN LISPRO 8 UNITS: 100 INJECTION, SOLUTION INTRAVENOUS; SUBCUTANEOUS at 13:17

## 2024-11-26 ASSESSMENT — PAIN SCALES - GENERAL: PAINLEVEL_OUTOF10: 0

## 2024-11-26 NOTE — PERIOP NOTE
TRANSFER - IN REPORT:    Verbal report received from Rosanne Morgan  being received from [facility] for ordered procedure      Report consisted of patient's Situation, Background, Assessment and   Recommendations(SBAR).     Information from the following report(s) Nurse Handoff Report was reviewed with the receiving nurse.    Opportunity for questions and clarification was provided.      Assessment will be completed upon patient's arrival to unit and care assumed.      Initial RN admission and assessment performed and documented in Endoscopy navigator.     Patient evaluated by anesthesia in pre-procedure holding.     All procedural vital signs, airway assessment, and level of consciousness information monitored and recorded by anesthesia staff on the anesthesia record.     Report received from CRNA post procedure.  Patient transported to recovery area by RN.    Endoscopy post procedure time out was performed and specimens were verified with physician.    Endoscope was pre-cleaned at bedside immediately following procedure by ZAHRA.     TRANSFER - OUT REPORT:    Verbal report given to Rosanne Morgan  being transferred to [facility] for routine post-op       Report consisted of patient's Situation, Background, Assessment and   Recommendations(SBAR).     Information from the following report(s) Surgery Report was reviewed with the receiving nurse.           Lines:   Peripheral IV 11/26/24 Right Forearm (Active)        Opportunity for questions and clarification was provided.

## 2024-11-26 NOTE — ANESTHESIA POSTPROCEDURE EVALUATION
Department of Anesthesiology  Postprocedure Note    Patient: Tiffanie Morgan  MRN: [id number]  YOB: 1970  Date of evaluation: 11/26/2024    Procedure Summary       Date: 11/26/24 Room / Location: [facility] ENDO 03 / [facility] ENDOSCOPY    Anesthesia Start: 1141 Anesthesia Stop: 1203    Procedure: ESOPHAGOGASTRODUODENOSCOPY (Upper GI Region) Diagnosis:       Anemia, unspecified type      (Anemia, unspecified type [D64.9])    Surgeons: Deirdre Rodriguez MD Responsible Provider: Beau Nice MD    Anesthesia Type: MAC ASA Status: 3            Anesthesia Type: MAC    Janes Phase I: Janes Score: 10    Janes Phase II: Janes Score: 9    Anesthesia Post Evaluation    Patient location during evaluation: PACU  Patient participation: complete - patient participated  Level of consciousness: awake  Pain score: 0  Airway patency: patent  Nausea & Vomiting: no nausea  Cardiovascular status: blood pressure returned to baseline and hemodynamically stable  Respiratory status: acceptable  Hydration status: stable  Pain management: adequate and satisfactory to patient    No notable events documented.  
Admission

## 2024-11-26 NOTE — PROGRESS NOTES
[facility]     Doron Kuo MD, FACP, MACG, FAASLD   MD Adia Hills PA-C April S Ashworth, [facility]   Siobhan Sosa, [facility]   Gretel Banuelososta, [facility]  Grey Bettencourt, [facility]   Arlyn Mora, [facility]   Lisa Escotoon, [facility]   at [facility]   [address], [address]   [place]  [address] [phone]   FAX: [phone]  [facility]   [address], [address]   [place]  [address] [phone]   FAX: [phone]       HEPATOLOGY CONSULT NOTE  I was asked to see this patient in consultation for evaluation and management of cirrhosis.  I have reviewed the   Emergency room note, Hospital admission note, Notes by all other physicians who have seen the patient during this hospitalization to date.  I have reviewed the problem list, all past medical history and the reason for this hospitalization.  I have reviewed the allergies and the medications the patient was taking at home prior to this hospitalization.    HISTORY:  The patient is regularly followed and cared for at [facility].  She is a 54 y.o. female who was found to have chronic liver disease and cirrhosis in 5/2021    The patient had been consuming 10 alcohol drinks every day for several years.    She has been abstinent from all alcohol since 8/2023.     The patient has developed the following complications of cirrhosis: esophageal varices, variceal bleeding, ascites and hepatic encephalopathy.     She had chronic UTIs and was hospitalized with  sepsis and hypotension in 2/2024.   She was found to have bilateral renal calculi and underwent lithotripsy and all stones fragments passed..    She has not had further UTIs    The patient was listed for liver transplant at [facility] 3/2024.      She was last seen    Eyes: Negative for visual changes.  ENT: Negative for sore throat, painful swallowing.   Respiratory: Negative for cough, hemoptysis, SOB.   Cardiology: Negative for chest pain, palpitations.  GI:  Negative for constipation or diarrhea.  : Negative for urinary frequency, dysuria, hematuria, nocturia.   Skin: Negative for rash.  Hematology: Negative for easy bruising, blood clots.    Musculo-skelatal: Negative for back pain, muscle pain, weakness.  Neurologic: Negative for headaches, dizziness, vertigo, memory problems not related to HE.  Psychology: Negative for anxiety, depression.       FAMILY HISTORY:  The father  of lung cancer   The mother has/had the following chronic disease(s): Colon cancer, COPD, Osteoporosis.    There is no family history of liver disease.    There is no family history of immune disorders.     SOCIAL HISTORY:  The patient is .    The patient has 3 children, 1 passed of SIDS.   The patient currently smokes 1/2 pack of tobacco daily.    The patient drank up to 1 pint per day for 3 years. She decreased to weekends only 2 years ago. All alcohol was discontinued 2021 then relapsed. Now abstinent since 2023.   The patient lost her job.       PHYSICAL EXAMINATION:  VS: BP 98/71   Pulse 94   Temp 97.3 °F (36.3 °C) (Temporal)   Resp 16   Ht 1.575 m (5' 2\")   Wt 71.1 kg (156 lb 11.2 oz)   SpO2 94%   BMI 28.66 kg/m²     General:  No acute distress.   Eyes:  Sclera icteric  ENT:  No oral lesions.  Thyroid normal.  Nodes:  No adenopathy.   Skin:  No spider angiomata.  No jaundice.  Respiratory:  Lungs clear to auscultation.   Cardiovascular:  Regular heart rate.  Abdomen:  Soft non-tender, No obvious ascites.  Extremities:  No lower extremity edema.  No muscle wasting.  Neurologic:  Alert and oriented.  Cranial nerves grossly intact.  No asterixis.      LABORATORY:  Lab Results   Component Value Date    ALT 35 2024    AST 61 (H) 2024    ALKPHOS 124 (H) 2024

## 2024-11-26 NOTE — ANESTHESIA PRE PROCEDURE
unspecified K76.9    Septicemia (HCC) A41.9       Past Medical History:        Diagnosis Date    Anemia     Cirrhosis     cirrhosis    Depression     Diabetes - II 3/9/2015    Diagnosed 3/9/2015 with A1c of 8.8%; treatment initiated with dietary changes, exercise 5 days/wk and started on Metformin XR 500mg daily x 2 weeks, then increase to 500mg AC breakfast and dinner.     LBP (low back pain) 2010    Mixed hyperlipidemia 2012    Nephrolithiasis 2010    OA (osteoarthritis) 2010    Smoker 2010       Past Surgical History:        Procedure Laterality Date    ANKLE FRACTURE SURGERY      BREAST SURGERY      CYST REMOVAL      GYN       X 3    TONSILLECTOMY      UPPER GASTROINTESTINAL ENDOSCOPY      UPPER GASTROINTESTINAL ENDOSCOPY N/A 2023    EGD ESOPHAGOGASTRODUODENOSCOPY performed by Doron Kuo MD at [facility] ENDOSCOPY    UPPER GASTROINTESTINAL ENDOSCOPY N/A 2024    ESOPHAGOGASTRODUODENOSCOPY performed by Doron Kuo MD at [facility] ENDOSCOPY    UPPER GASTROINTESTINAL ENDOSCOPY N/A 2024    EGD DIAGNOSTIC ONLY performed by Doron Kuo MD at [facility] ENDOSCOPY       Social History:    Social History     Tobacco Use    Smoking status: Former     Current packs/day: 0.50     Average packs/day: 0.5 packs/day for 20.0 years (10.0 ttl pk-yrs)     Types: Cigarettes    Smokeless tobacco: Never   Substance Use Topics    Alcohol use: Not Currently     Alcohol/week: 16.7 standard drinks of alcohol                                Counseling given: Not Answered      Vital Signs (Current):   Vitals:    24 0359 24 0558 24 0817 24 1000   BP:   (!) 111/57    Pulse: 88 99 99 97   Resp:   16    Temp:   98.6 °F (37 °C)    TempSrc:   Oral    SpO2:   96%    Weight:       Height:                                                  BP Readings from Last 3 Encounters:   24 (!) 111/57   24 125/78   24 107/63       NPO Status: Time of last liquid

## 2024-11-26 NOTE — PROGRESS NOTES
Hospitalist Progress Note  Ozzy Nicholas MD  Answering service: [phone]        Date of Service:  2024  NAME:  Tiffanie Morgan  :  1970  MRN:  [id number]      Admission Summary:     Patient admitted with initial concern for sepsis.    Interval history / Subjective:     No acute complaint at this time.     Assessment & Plan:     SIRS  -Patient presented with fever and tachycardia  -Unclear source, blood cultures negative, UA negative, CT abdominal pelvis negative for acute findings, respiratory viral panel negative  -Fever is not recurrent, watching off of antibiotics    Hypotension  -Infectious workup negative, hemodynamically improved  -Continue monitor    Lactic acidosis  -Likely due to metformin in the setting of cirrhosis  -Also possibly due to tissue hypoperfusion from dehydration  -Metformin has been discontinued  -Improving with IV fluids    FIONA  -FIONA likely in the setting of diuretic use, cirrhosis  -Patient presented with serum creatinine of 1.49, peaked at 1.58 and trending down  -Treated with IV fluids, holding diuretics  -FIONA now resolved    Acute metabolic encephalopathy  -Probably related to SIRS as well as FIONA  -CT head negative for acute process  -Mental status improving    Liver cirrhosis  -Liver cirrhosis due to alcohol use  -Stable, hepatology following    Esophageal varices  -Patient required multiple banding's in the past  -Plan for EGD today by hepatology for reevaluation    Ascites  -Well-controlled with diuretics at home, currently on hold due to FIONA  -No ascites present on imaging this admission  -May consider resumption of diuretics as outpatient    Hepatic encephalopathy  -Continue lactulose and rifaximin  -Patient with stable mental status    Diabetes type 2 with hyperglycemia  -At baseline diabetes is fairly well-controlled with hemoglobin A1c of 7.9  -Patient still with  significant hyperglycemia  -Consult diabetes management team  -Will need Lantus dose adjusted, continue Premeal insulin and insulin sliding scale coverage    GERD  -Continue PPI     Code status: Full  Prophylaxis: SCDs  Care Plan discussed with: Patient and patient's  present at bedside.  Anticipated Disposition: 24 to 48 hours  Central Line:   None             Review of Systems:   Pertinent items are noted in HPI.         Vital Signs:    Last 24hrs VS reviewed since prior progress note. Most recent are:  Vitals:    11/26/24 0558   BP:    Pulse: 99   Resp:    Temp:    SpO2:        No intake or output data in the 24 hours ending 11/26/24 0754     Physical Examination:     I had a face to face encounter with this patient and independently examined them on 11/26/2024 as outlined below:          General : alert x 3, awake, no acute distress,   HEENT: PEERL, EOMI, moist mucus membrane, TM clear  Neck: supple, no JVD, no meningeal signs  Chest: Clear to auscultation bilaterally   CVS: S1 S2 heard, Capillary refill less than 2 seconds  Abd: soft/ non tender, non distended, BS physiological,   Ext: no clubbing, no cyanosis, no edema, brisk 2+ DP pulses  Neuro/Psych: pleasant mood and affect, CN 2-12 grossly intact, sensory grossly within normal limit, Strength 5/5 in all extremities, DTR 1+ x 4  Skin: warm            Data Review:    Review and/or order of clinical lab test    I have independently reviewed and interpreted patient's lab and all other diagnostic data    Notes reviewed from all clinical/nonclinical/nursing services involved in patient's clinical care. Care coordination discussions were held with appropriate clinical/nonclinical/ nursing providers based on care coordination needs.     Labs:     Recent Labs     11/25/24  0743 11/26/24  0526   WBC 3.7 2.8*   HGB 9.2* 8.4*   HCT 27.0* 24.7*   PLT 48* 49*     Recent Labs     11/24/24  0112 11/25/24  0743 11/26/24  0526    137 136   K 3.8 3.5 3.6   CL

## 2024-11-26 NOTE — H&P
[facility]     Doron Kuo MD, FACP, MACG, FAASLD   MD Adia Hills PA-C April S Ashworth, [facility]   Siobhan Sosa, [facility]   Gretel Bijan, [facility]  Grey Bettencourt, [facility]   Arlyn Mora, [facility]   Lisa Escotoon, [facility]   [address], [address]   [place]  [address] [phone]   FAX: [phone]  [facility]   [address], [address]   [place]  [address] [phone]   FAX: [phone]         PRE-PROCEDURE NOTE - EGD    H and P from admission    Allergies reviewed.  Out-patient medicaton list reviewed.    Patient Active Problem List   Diagnosis    Type 2 diabetes mellitus (HCC)    Primary biliary cholangitis (HCC)    Renal calculus or stone    Anemia    Insomnia    Esophageal varices (HCC)    Alcohol induced liver disorder (HCC)    Ascites    Alcohol dependence in remission (HCC)    Thrombocytopenia (HCC)    Hepatic encephalopathy (HCC)    Chronic bilateral low back pain    Cirrhosis (HCC)    Liver transplant candidate    Hyponatremia    Cirrhosis of liver with ascites (HCC)    Alcoholic cirrhosis of liver with ascites (HCC)    Diabetes mellitus (HCC)    Primary biliary cirrhosis (HCC)    Acute hepatic encephalopathy (HCC)    Severe sepsis (HCC)    Liver disease, unspecified    Septicemia (HCC)         No Known Allergies    No current facility-administered medications on file prior to encounter.     Current Outpatient Medications on File Prior to Encounter   Medication Sig Dispense Refill    traMADol (ULTRAM) 50 MG tablet Take 1 tablet by mouth every 6 hours as needed for Pain for up to 30 days. Max Daily Amount: 200 mg 50 tablet 0    pantoprazole (PROTONIX) 40 MG tablet TAKE ONE TABLET BY MOUTH EVERY DAY 30 tablet 2    glimepiride (AMARYL) 4 MG    Component Value Date    AST 61 (H) 11/26/2024    ALT 35 11/26/2024    BILITOT 5.6 (H) 11/26/2024     Lab Results   Component Value Date    INR 1.6 (H) 11/25/2024          PRE-PROCEDURE DOCUMENTATION  The risks of the procedure were discussed with the patient.  These included reaction to anesthesia, pain, perforation and bleeding.    All questions were answered.    The patient wishes to proceed with the procedure.    ASA status  3    Airway assessment:   Mouth opening:  3   Mallampati:  3   Dentition:  missing     ASSESSMENT AND PLAN:  EGD to assess for esophageal and/or gastric varices.  Sedation per anesthesiology      Deirdre Rodriguez MD

## 2024-11-26 NOTE — OP NOTE
[facility]     Doron Kuo MD, FACP, MACG, FAASLD   MD Adia Hills, VANESSA Briscoe, [facility]   Siobhan Sosa, [facility]   Gretel Thakkar, [facility]  Grey Bettencourt, [facility]   Arlynericka Mora, [facility]   Lisa Lee, [facility]   [address], [address]   [place]  [address] [phone]   FAX: [phone]  [facility]   [address]   [place]  [address] [phone]   FAX: [phone]         UPPER ENDOSCOPY PROCEDURE NOTE    NAME: Tiffanie Morgan  :  1970  MRN:  [id number]    INDICATION: Cirrhosis.  Screening for esophageal varices with variceal ligation if  indicated.    : Deirdre Rodriguez MD    SURGICAL ASSISTANT:  None    PROSTHETIC DEVISES, TISSUE GRAFTS, ORGAN TRANSPLANTS:  Not applicable     ANESTHESIA/SEDATION: MAC Sedation per anesthesiology         PROCEDURE DESCRIPTION:  [employer] consent was obtained from the patient for the procedure.  All risks and benefits of the procedure explained.     The procedure was performed in the endoscopy suite.  The patient was laying on a stretcher and moved to the left lateral decubitus position prior to administration of sedation.      Sedation was administered by anesthesiology.  See their note for details.      The endoscope was inserted into the mouth and advanced under direct vision to the stomach. Large amount of solid food in gastric body and antrum which resulted in us aborting procedure.  Careful inspection of upper gastrointestinal tract was made as the endoscope was inserted and withdrawn.    The findings and interventions are described below.      FINDINGS:  Esophagus:    Z line at 37 cm distal to incisors  2 columns of varices with red [place]. After discussion with

## 2024-11-27 ENCOUNTER — APPOINTMENT (OUTPATIENT)
Facility: HOSPITAL | Age: 54
DRG: 682 | End: 2024-11-27
Payer: COMMERCIAL

## 2024-11-27 ENCOUNTER — ANESTHESIA EVENT (OUTPATIENT)
Facility: HOSPITAL | Age: 54
DRG: 682 | End: 2024-11-27
Payer: COMMERCIAL

## 2024-11-27 ENCOUNTER — ANESTHESIA (OUTPATIENT)
Facility: HOSPITAL | Age: 54
DRG: 682 | End: 2024-11-27
Payer: COMMERCIAL

## 2024-11-27 VITALS
SYSTOLIC BLOOD PRESSURE: 117 MMHG | DIASTOLIC BLOOD PRESSURE: 69 MMHG | OXYGEN SATURATION: 96 % | WEIGHT: 156.7 LBS | HEART RATE: 86 BPM | HEIGHT: 62 IN | TEMPERATURE: 98.1 F | BODY MASS INDEX: 28.84 KG/M2 | RESPIRATION RATE: 18 BRPM

## 2024-11-27 LAB
GLUCOSE BLD STRIP.AUTO-MCNC: 110 MG/DL (ref 65–117)
GLUCOSE BLD STRIP.AUTO-MCNC: 111 MG/DL (ref 65–117)
GLUCOSE BLD STRIP.AUTO-MCNC: 154 MG/DL (ref 65–117)
GLUCOSE BLD STRIP.AUTO-MCNC: 158 MG/DL (ref 65–117)
SERVICE CMNT-IMP: ABNORMAL
SERVICE CMNT-IMP: ABNORMAL
SERVICE CMNT-IMP: NORMAL
SERVICE CMNT-IMP: NORMAL

## 2024-11-27 PROCEDURE — 3600007512: Performed by: STUDENT IN AN ORGANIZED HEALTH CARE EDUCATION/TRAINING PROGRAM

## 2024-11-27 PROCEDURE — 6370000000 HC RX 637 (ALT 250 FOR IP): Performed by: STUDENT IN AN ORGANIZED HEALTH CARE EDUCATION/TRAINING PROGRAM

## 2024-11-27 PROCEDURE — 82962 GLUCOSE BLOOD TEST: CPT

## 2024-11-27 PROCEDURE — 06L38CZ OCCLUSION OF ESOPHAGEAL VEIN WITH EXTRALUMINAL DEVICE, VIA NATURAL OR ARTIFICIAL OPENING ENDOSCOPIC: ICD-10-PCS | Performed by: STUDENT IN AN ORGANIZED HEALTH CARE EDUCATION/TRAINING PROGRAM

## 2024-11-27 PROCEDURE — 3700000000 HC ANESTHESIA ATTENDED CARE: Performed by: STUDENT IN AN ORGANIZED HEALTH CARE EDUCATION/TRAINING PROGRAM

## 2024-11-27 PROCEDURE — 3700000001 HC ADD 15 MINUTES (ANESTHESIA): Performed by: STUDENT IN AN ORGANIZED HEALTH CARE EDUCATION/TRAINING PROGRAM

## 2024-11-27 PROCEDURE — 99232 SBSQ HOSP IP/OBS MODERATE 35: CPT | Performed by: CLINICAL NURSE SPECIALIST

## 2024-11-27 PROCEDURE — 3600007502: Performed by: STUDENT IN AN ORGANIZED HEALTH CARE EDUCATION/TRAINING PROGRAM

## 2024-11-27 PROCEDURE — 7100000010 HC PHASE II RECOVERY - FIRST 15 MIN: Performed by: STUDENT IN AN ORGANIZED HEALTH CARE EDUCATION/TRAINING PROGRAM

## 2024-11-27 PROCEDURE — 6360000002 HC RX W HCPCS: Performed by: NURSE ANESTHETIST, CERTIFIED REGISTERED

## 2024-11-27 PROCEDURE — 2720000010 HC SURG SUPPLY STERILE: Performed by: STUDENT IN AN ORGANIZED HEALTH CARE EDUCATION/TRAINING PROGRAM

## 2024-11-27 PROCEDURE — 76705 ECHO EXAM OF ABDOMEN: CPT

## 2024-11-27 PROCEDURE — 1100000000 HC RM PRIVATE

## 2024-11-27 RX ORDER — INSULIN GLARGINE 100 [IU]/ML
0.3 INJECTION, SOLUTION SUBCUTANEOUS NIGHTLY
Status: DISCONTINUED | OUTPATIENT
Start: 2024-11-27 | End: 2024-11-28 | Stop reason: HOSPADM

## 2024-11-27 RX ORDER — LACTULOSE 10 G/15ML
10 SOLUTION ORAL 3 TIMES DAILY
Qty: 4050 ML | Refills: 5 | Status: SHIPPED
Start: 2024-11-27

## 2024-11-27 RX ORDER — SODIUM CHLORIDE 0.9 % (FLUSH) 0.9 %
5-40 SYRINGE (ML) INJECTION EVERY 12 HOURS SCHEDULED
Status: CANCELLED | OUTPATIENT
Start: 2024-11-27

## 2024-11-27 RX ORDER — SEMAGLUTIDE 2.68 MG/ML
2 INJECTION, SOLUTION SUBCUTANEOUS
Qty: 3 ML | Refills: 1 | Status: SHIPPED | OUTPATIENT
Start: 2024-11-27

## 2024-11-27 RX ORDER — SODIUM CHLORIDE 0.9 % (FLUSH) 0.9 %
5-40 SYRINGE (ML) INJECTION PRN
Status: CANCELLED | OUTPATIENT
Start: 2024-11-27

## 2024-11-27 RX ORDER — SODIUM CHLORIDE 9 MG/ML
25 INJECTION, SOLUTION INTRAVENOUS PRN
Status: CANCELLED | OUTPATIENT
Start: 2024-11-27

## 2024-11-27 RX ORDER — INSULIN LISPRO 100 [IU]/ML
0-8 INJECTION, SOLUTION INTRAVENOUS; SUBCUTANEOUS
Status: DISCONTINUED | OUTPATIENT
Start: 2024-11-27 | End: 2024-11-28 | Stop reason: HOSPADM

## 2024-11-27 RX ORDER — INSULIN GLARGINE 100 [IU]/ML
22 INJECTION, SOLUTION SUBCUTANEOUS NIGHTLY
Status: CANCELLED | OUTPATIENT
Start: 2024-11-27

## 2024-11-27 RX ORDER — INSULIN LISPRO 100 [IU]/ML
6 INJECTION, SOLUTION INTRAVENOUS; SUBCUTANEOUS
Status: DISCONTINUED | OUTPATIENT
Start: 2024-11-27 | End: 2024-11-28 | Stop reason: HOSPADM

## 2024-11-27 RX ORDER — LIDOCAINE HYDROCHLORIDE 20 MG/ML
INJECTION, SOLUTION EPIDURAL; INFILTRATION; INTRACAUDAL; PERINEURAL
Status: DISCONTINUED | OUTPATIENT
Start: 2024-11-27 | End: 2024-11-27 | Stop reason: SDUPTHER

## 2024-11-27 RX ADMIN — PANTOPRAZOLE SODIUM 40 MG: 40 TABLET, DELAYED RELEASE ORAL at 08:27

## 2024-11-27 RX ADMIN — PROPOFOL 50 MG: 10 INJECTION, EMULSION INTRAVENOUS at 13:50

## 2024-11-27 RX ADMIN — TRAMADOL HYDROCHLORIDE 50 MG: 50 TABLET ORAL at 00:13

## 2024-11-27 RX ADMIN — RIFAXIMIN 550 MG: 550 TABLET ORAL at 08:27

## 2024-11-27 RX ADMIN — TRAMADOL HYDROCHLORIDE 50 MG: 50 TABLET ORAL at 14:58

## 2024-11-27 RX ADMIN — LACTULOSE 10 G: 20 SOLUTION ORAL at 14:58

## 2024-11-27 RX ADMIN — LIDOCAINE HYDROCHLORIDE 100 MG: 20 INJECTION, SOLUTION EPIDURAL; INFILTRATION; INTRACAUDAL; PERINEURAL at 13:47

## 2024-11-27 RX ADMIN — TRAMADOL HYDROCHLORIDE 50 MG: 50 TABLET ORAL at 08:27

## 2024-11-27 RX ADMIN — LACTULOSE 10 G: 20 SOLUTION ORAL at 08:28

## 2024-11-27 RX ADMIN — PROPOFOL 50 MG: 10 INJECTION, EMULSION INTRAVENOUS at 13:48

## 2024-11-27 RX ADMIN — PROPOFOL 50 MG: 10 INJECTION, EMULSION INTRAVENOUS at 13:53

## 2024-11-27 RX ADMIN — PROPOFOL 100 MG: 10 INJECTION, EMULSION INTRAVENOUS at 13:47

## 2024-11-27 ASSESSMENT — PAIN DESCRIPTION - DESCRIPTORS
DESCRIPTORS: ACHING
DESCRIPTORS: ACHING

## 2024-11-27 ASSESSMENT — PAIN SCALES - GENERAL
PAINLEVEL_OUTOF10: 7
PAINLEVEL_OUTOF10: 7

## 2024-11-27 ASSESSMENT — PAIN DESCRIPTION - ORIENTATION: ORIENTATION: UPPER

## 2024-11-27 ASSESSMENT — PAIN DESCRIPTION - FREQUENCY: FREQUENCY: CONTINUOUS

## 2024-11-27 ASSESSMENT — PAIN DESCRIPTION - PAIN TYPE: TYPE: CHRONIC PAIN

## 2024-11-27 ASSESSMENT — PAIN - FUNCTIONAL ASSESSMENT: PAIN_FUNCTIONAL_ASSESSMENT: NONE - DENIES PAIN

## 2024-11-27 ASSESSMENT — PAIN DESCRIPTION - LOCATION
LOCATION: BACK
LOCATION: BACK

## 2024-11-27 NOTE — ANESTHESIA POSTPROCEDURE EVALUATION
Department of Anesthesiology  Postprocedure Note    Patient: Tiffanie Morgan  MRN: [id number]  YOB: 1970  Date of evaluation: 11/27/2024    Procedure Summary       Date: 11/27/24 Room / Location: [facility] ENDO 02 / [facility] ENDOSCOPY    Anesthesia Start: 1338 Anesthesia Stop: 1357    Procedure: ESOPHAGOGASTRODUODENOSCOPY (Upper GI Region) Diagnosis:       Anemia, unspecified type      (Anemia, unspecified type [D64.9])    Surgeons: Deirdre Rodriguez MD Responsible Provider: Eddie Plascencia MD    Anesthesia Type: MAC ASA Status: 3            Anesthesia Type: MAC    Janes Phase I: Janes Score: 10    Janes Phase II: Janes Score: 9    Anesthesia Post Evaluation    Patient location during evaluation: PACU  Patient participation: complete - patient participated  Level of consciousness: sleepy but conscious and responsive to verbal stimuli  Pain score: 0  Airway patency: patent  Nausea & Vomiting: no vomiting and no nausea  Cardiovascular status: blood pressure returned to baseline and hemodynamically stable  Respiratory status: acceptable  Hydration status: stable    No notable events documented.

## 2024-11-27 NOTE — H&P
[facility]     Doron Kuo MD, FACP, MACG, FAASLD   MD Adia Hills PA-C April S Ashworth, [facility]   Siobhan Sosa, [facility]   Gretel Bijan, [facility]  Grey Bettencourt, [facility]   Arlyn Mora, [facility]   Lisa Escotoon, [facility]   [address]   [place]  [address] [phone]   FAX: [phone]  [facility]   [address]   [place]  [address] [phone]   FAX: [phone]         PRE-PROCEDURE NOTE - EGD    H and P from admission   Allergies reviewed.  Medicaton list reviewed.    Patient Active Problem List   Diagnosis    Type 2 diabetes mellitus (HCC)    Primary biliary cholangitis (HCC)    Renal calculus or stone    Anemia    Insomnia    Esophageal varices (HCC)    Alcohol induced liver disorder (HCC)    Ascites    Alcohol dependence in remission (HCC)    Thrombocytopenia (HCC)    Hepatic encephalopathy (HCC)    Chronic bilateral low back pain    Cirrhosis (HCC)    Liver transplant candidate    Hyponatremia    Cirrhosis of liver with ascites (HCC)    Alcoholic cirrhosis of liver with ascites (HCC)    Diabetes mellitus (HCC)    Primary biliary cirrhosis (HCC)    Acute hepatic encephalopathy (HCC)    Severe sepsis (HCC)    Liver disease, unspecified    Septicemia (HCC)         No Known Allergies    No current facility-administered medications on file prior to encounter.     Current Outpatient Medications on File Prior to Encounter   Medication Sig Dispense Refill    traMADol (ULTRAM) 50 MG tablet Take 1 tablet by mouth every 6 hours as needed for Pain for up to 30 days. Max Daily Amount: 200 mg 50 tablet 0    pantoprazole (PROTONIX) 40 MG tablet TAKE ONE TABLET BY MOUTH EVERY DAY 30 tablet 2    glimepiride (AMARYL) 4 MG tablet Take

## 2024-11-27 NOTE — ANESTHESIA PRE PROCEDURE
FB   Dental:          Pulmonary:Negative Pulmonary ROS and normal exam  breath sounds clear to auscultation                            ROS comment: Former smoker - 10 pack years   Cardiovascular:  Exercise tolerance: good (>4 METS)        NYHA Classification: IV  ECG reviewed  Rhythm: regular  Rate: normal  Echocardiogram reviewed         Beta Blocker:  Not on Beta Blocker      ROS comment:   TTE (9/12/24):  ·  Left Ventricle: Normal left ventricular systolic function with a visually estimated EF of 65 - 70%. Left ventricle size is normal. Normal wall thickness. Normal wall motion.    ECG (9/7/24):  Normal sinus rhythm   Left anterior fascicular block   Right bundle branch block   ** Bifascicular block **   When compared with ECG of 23-FEB-2024 09:09,   QRS axis shifted left        Neuro/Psych:   (+) psychiatric history (Alcohol dependence in remission):depression/anxiety              ROS comment: Acute metabolic encephalopathy  Hepatic encephalopathy GI/Hepatic/Renal:   (+) GERD:, liver disease (Alcoholic cirrhosis of liver with ascites;  Primary biliary cholangitis;): esophageal varices, renal disease: kidney stones         ROS comment: Esophageal varices.   Endo/Other:    (+) DiabetesType II DM, using insulin, blood dyscrasia (Hgb = 8.4, Platelets = 49K on 11/26/24): thrombocytopenia and anemia, arthritis: OA..                  ROS comment: SIRS Abdominal:              PE comment: Deferred   Vascular: negative vascular ROS.         Other Findings:       Anesthesia Plan      MAC     ASA 3       Induction: intravenous.      Anesthetic plan and risks discussed with patient.      Plan discussed with CRNA.    Attending anesthesiologist reviewed and agrees with Preprocedure content            Eddie Plascencia MD   11/27/2024

## 2024-11-27 NOTE — OP NOTE
How Severe Are Your Spot(S)?: moderate [facility]     Doron Kuo MD, FACP, MACG, FAASLD   MD Adia Hills, VANESSA Briscoe, [facility]   Siobhan Sosa, [facility]   Gretel Thakkar, [facility]-  Grey Bettencourt, [facility]   Arlynericka Mora, [facility]   Lisa Lee, [facility]   [address], [address]   [place]  [address] [phone]   FAX: [phone]  [facility]   [address], [address]   [place]  [address] [phone]   FAX: [phone]         UPPER ENDOSCOPY PROCEDURE NOTE    NAME: Tiffanie Morgan  :  1970  MRN:  [id number]    INDICATION: Cirrhosis.  Screening for esophageal varices with variceal ligation if  indicated.    : Deirdre Rodriguez MD    SURGICAL ASSISTANT:  None    PROSTHETIC DEVISES, TISSUE GRAFTS, ORGAN TRANSPLANTS:  Not applicable     ANESTHESIA/SEDATION: MAC Sedation per anesthesiology         PROCEDURE DESCRIPTION:  [employer] consent was obtained from the patient for the procedure.  All risks and benefits of the procedure explained.     The procedure was performed in the endoscopy suite.  The patient was laying on a stretcher and moved to the left lateral decubitus position prior to administration of sedation.  Sedation was administered by anesthesiology.  See their note for details.        The endoscope was inserted into the mouth and advanced under direct vision to the second portion of the duodenum.  Careful inspection of upper gastrointestinal tract was made as the endoscope was inserted and withdrawn.  Retroflexion of the endoscope to view of the cardia of the stomach was performed.  The findings and interventions are described below.      FINDINGS:  Esophagus:    Z line at 37 cm distal to incisors  One column of varices noted with red taras sign. Initial  What Type Of Note Output Would You Prefer (Optional)?: Bullet Format attempt to place band failed due to scar tissue preventing a good suction. Repeat attempt successful and one band placed with good decompression of the column.   Scars of previous banding    Stomach:   Normal  Moderate portal hypertensive gastropathy throughout entire stomach. Touch friability    Duodenum:   Normal bulb and second portion    SPECIMENS COLLECTED:   None      INTERVENTIONS:  None    COMPLICATIONS: None.  The patient tolerated the procedure well.    EBL: Negligible.           RECOMMENDATIONS:  Return to wards for ongoing care.  Soft diet today.  Repeat endoscopy to reassess varices and need for banding in 3 months.  Deirdre Rodriguez MD     What Is The Reason For Today's Visit?: Annual Full Body Skin Examination with No Concerns

## 2024-11-27 NOTE — PLAN OF CARE
Problem: Chronic Conditions and Co-morbidities  Goal: Patient's chronic conditions and co-morbidity symptoms are monitored and maintained or improved  11/27/2024 0034 by Dale Grant RN  Outcome: Progressing  Flowsheets  Taken 11/27/2024 0000 by Dale Grant RN  Care Plan - Patient's Chronic Conditions and Co-Morbidity Symptoms are Monitored and Maintained or Improved:   Monitor and assess patient's chronic conditions and comorbid symptoms for stability, deterioration, or improvement   Collaborate with multidisciplinary team to address chronic and comorbid conditions and prevent exacerbation or deterioration   Update acute care plan with appropriate goals if chronic or comorbid symptoms are exacerbated and prevent overall improvement and discharge  Taken 11/26/2024 2000 by Corin Alvarez RN (Ha)  Care Plan - Patient's Chronic Conditions and Co-Morbidity Symptoms are Monitored and Maintained or Improved: Monitor and assess patient's chronic conditions and comorbid symptoms for stability, deterioration, or improvement     Problem: Discharge Planning  Goal: Discharge to home or other facility with appropriate resources  11/27/2024 0034 by Dale Grant RN  Outcome: Progressing  Flowsheets  Taken 11/27/2024 0000 by Dale Grant RN  Discharge to home or other facility with appropriate resources:   Identify barriers to discharge with patient and caregiver   Arrange for needed discharge resources and transportation as appropriate   Identify discharge learning needs (meds, wound care, etc)  Taken 11/26/2024 2000 by Corin Alvarez RN (Ha)  Discharge to home or other facility with appropriate resources: Identify barriers to discharge with patient and caregiver     Problem: Safety - Adult  Goal: Free from fall injury  11/27/2024 0034 by Dale Grant RN  Outcome: Progressing  Flowsheets (Taken 11/27/2024 0000)  Free From Fall Injury:   Instruct family/caregiver on patient safety   Based on  caregiver fall risk screen, instruct family/caregiver to ask for assistance with transferring infant if caregiver noted to have fall risk factors     Problem: ABCDS Injury Assessment  Goal: Absence of physical injury  11/27/2024 0034 by Dale Grant RN  Outcome: Progressing  Flowsheets (Taken 11/27/2024 0000)  Absence of Physical Injury: Implement safety measures based on patient assessment     Problem: Pain  Goal: Verbalizes/displays adequate comfort level or baseline comfort level  11/27/2024 0034 by Dale Grant RN  Outcome: Progressing  Flowsheets (Taken 11/27/2024 0000)  Verbalizes/displays adequate comfort level or baseline comfort level:   Encourage patient to monitor pain and request assistance   Assess pain using appropriate pain scale   Administer analgesics based on type and severity of pain and evaluate response   Implement non-pharmacological measures as appropriate and evaluate response   Consider cultural and social influences on pain and pain management   Notify Licensed Independent Practitioner if interventions unsuccessful or patient reports new pain

## 2024-11-27 NOTE — PROGRESS NOTES
TRANSFER - IN REPORT:    Verbal report received from EBENEZER Coombs on Tiffanie TAY Morgan  being received from  for routine progression of patient care      Report consisted of patient's Situation, Background, Assessment and   Recommendations(SBAR).     Information from the following report(s) Nurse Handoff Report, Adult Overview, Intake/Output, MAR, and Recent Results was reviewed with the receiving nurse.    Opportunity for questions and clarification was provided.      Awaiting for pt's arrival to unit.

## 2024-11-27 NOTE — CARE COORDINATION
Transition of Care Plan:    RUR: 21% High   Prior Level of Functioning: Independent   Disposition: Home w/ spouse  JUDY: Thurs. 11/28  Follow up appointments: PCP, specialist   DME needed: None noted   Transportation at discharge: Spouse  IM/IMM Medicare/ letter given: NA  Is patient a  and connected with VA? NA  Caregiver Contact: Spouse,   Discharge Caregiver contacted prior to discharge? Upon patient request  Care Conference needed? No  Barriers to discharge: Medical    CM reviewed chart. Per review and ID rounds, patient underwent EGD yesterday, Tues. 11/26. Plan for repeat EGD today, Wed. 11/27. Anticipated DC within 24 hours pending medical progress and final recommendations. CM will follow as needed.     BIANCA Cote   [phone]

## 2024-11-27 NOTE — DIABETES MGMT
[facility]ES HEALTH  DIABETES MANAGEMENT CONSULT    Consulted by Provider for advanced nursing evaluation and care for inpatient blood glucose management.    Evaluation and Action Plan   Tiffanie Morgan is 54 y.o. female with a past medical history of T2DM, chronic back pain, kidney stone with recurrent urosepsis, alcohol-related cirrhosis, AMA+ with possible PBC component, and GERD, who presented to the ED  with complaints of increased confusion. She is currently being treated with IV antibiotics for sepsis of unclear origin. : BG markedly elevated this AM, 443. Patient endorsed 1 cranberry juice with dinner/ and consumed 2 zero sugar yogurts last evening. Ordered one time dose NPH this AM and corrective insulin given - slated for endo procedure today, so is NPO.  Conferred with Dr. Currie (endocrinologist and Diabetes Health medical director) re: discontinuing Metformin at discharge.  Since renal status WNL, and she does not show signs of metabolic acidosis outside of elevated lactate, it would be appropriate to continue Metformin at PTA dosing at hospital discharge.     She states that she has been compliant with her diabetes medications at home, and her and her  report that they try to manage their diet, but have some questions about diet modifications. Provided education regarding carbohydrate limiting, portioning foods, and general dietary modifications for diabetes. She reports that she checks blood sugars once a day in the morning.    Blood glucose pattern      Significant diabetes-related events over the past 24-72 hours  A1C: 7.9% (9/3/24)  Fasting B  Basal:  Adjusted to Lantus 15 units QHS; one time NPH dose given  Correction: medium dose corrective scale, q4h      Management Rationale Action Plan   Medication   Basal needs Using .2 units/kg/D Continue  Lantus to 15 units nightly  One time NPH 15 units today   Nutritional needs Mealtime coverage START Lispro 5 
disease  None  Other associated conditions     Liver Disease    Diabetes Medication History  Diabetes drug class Diabetes drug name Additional Comments   Biguanide  Metformin 750 BID   Sulfonylureas Glimepiride 4mg BID   GLP-1 Jamel Ozempic 1mg every 7 days  States she has lost about 20 lbs since starting this     Diabetes self-management practices:   Eating pattern: her and her  state that they have tried to improve their diet, but still sometimes eat fast food and carbohydrate-rich foods        [x] Snacks: often snacks late at night    [x] Beverages: sometimes drinks soda    [x] Dentition status: missing teeth     Physical activity pattern: likely sedentary    Monitoring pattern: checks sugars once in the morning at home       Taking medications pattern  [x] Consistent administration  [x] Affordable    Social determinants of health impacting diabetes self-management practices    Concerned that you need to know more about how to stay healthy with diabetes    Overall evaluation:    [x] Not achieving A1c target with drug therapy & self-care practices    had been consuming 10 alcohol drinks every day for several years, abstinent since 8/23.  Chronic UTIs.  Listed for liver transplant at [facility] 3/24  Subjective        Objective   Physical exam  General Overweight female in no acute distress. Conversant and cooperative  Neuro  Alert, oriented   Vital Signs   Vitals:    11/27/24 0818   BP: 116/65   Pulse: 93   Resp: 17   Temp: 98.4 °F (36.9 °C)   SpO2: 96%     Skin  Warm and dry. No acanthosis noted along neckline. No lipohypertrophy or lipoatrophy noted at injection sites   Extremities No foot wounds    Laboratory  Recent Labs     11/25/24  0743 11/26/24  0526   WBC 3.7 2.8*   HGB 9.2* 8.4*   HCT 27.0* 24.7*   .7* 108.3*   PLT 48* 49*     Recent Labs     11/25/24  0743 11/26/24  0526    136   K 3.5 3.6   * 110*   CO2 21 20*   BUN 10 8   CREATININE 0.74 0.92     Lab Results   Component Value Date

## 2024-11-27 NOTE — DISCHARGE SUMMARY
Discharge Summary       PATIENT ID: Tiffanie Morgan  MRN: [id number]   YOB: 1970    DATE OF ADMISSION: 11/23/2024  7:19 PM    DATE OF DISCHARGE: 11/27/2024   PRIMARY CARE PROVIDER: Homero Hoyos MD     ATTENDING PHYSICIAN: Ozzy Nicholas  DISCHARGING PROVIDER: Ozzy Nicholas MD      CONSULTATIONS: IP CONSULT TO HEPATOLOGY  IP CONSULT TO DIABETES MANAGEMENT  IP CONSULT TO DIABETES MANAGEMENT    PROCEDURES/SURGERIES: Procedure(s):  ESOPHAGOGASTRODUODENOSCOPY    ADMISSION SUMMARY AND HOSPITAL COURSE:     Tiffanie Morgan is a 54 y.o. female with past medical history of former smoker, chronic back pain, DMII, kidney stone with recurrent urosepsis (s/p bilateral lithotripsy and stent placement, now removed in 4/2024), AMA+ with possible PBC component, and alcohol related cirrhosis, d ii, mdd, gerd who presented to ed with complaints of increased confusion.    Patient with initial concern of sepsis, presented with fever and tachycardia.  Workup including blood cultures, UA, CT abdomen and pelvis and respiratory viral panel all negative.  Fever resolved and no recurrence.  Patient also presented with FIONA in the setting of diuretic use and cirrhosis.  Patient on Lasix and Aldactone which were held and subsequently renal function normalized.  Patient with known history of alcoholic liver cirrhosis and prior history of ascites requiring paracentesis.  Follow-up ultrasound shows only minimal ascites not amenable for paracentesis.  Patient also with known history of esophageal varices.  Patient followed by hepatology this admission and underwent EGD and placement of 1 band.  Patient also with known history of diabetes, fairly well-controlled with hemoglobin A1c of 7.9.  Patient was noted hyperglycemic during this admission and followed by diabetes management team and managed with Lantus and Humalog.  On discharge plan is to resume metformin at home and home Ozempic dose was increased.  Patient to follow-up    pantoprazole 40 MG tablet  Commonly known as: PROTONIX  TAKE ONE TABLET BY MOUTH EVERY DAY     rifAXIMin 550 MG tablet  Commonly known as: Xifaxan  TAKE ONE TABLET BY MOUTH TWICE A DAY, IN THE MORNING AND AT BEDTIME     spironolactone 50 MG tablet  Commonly known as: ALDACTONE  Take 1 tablet by mouth daily     traMADol 50 MG tablet  Commonly known as: Ultram  Take 1 tablet by mouth every 6 hours as needed for Pain for up to 30 days. Max Daily Amount: 200 mg            STOP taking these medications      metFORMIN 750 MG extended release tablet  Commonly known as: GLUCOPHAGE-XR     Ozempic (1 MG/DOSE) 4 MG/3ML Sopn sc injection  Generic drug: Semaglutide (1 MG/DOSE)  Replaced by: Ozempic (2 MG/DOSE) 8 MG/3ML Sopn sc injection               Where to Get Your Medications        These medications were sent to [id number] [place], [place] - [address] -  [phone] - [phone]  [address] [address]      Phone: [phone]   Ozempic (2 MG/DOSE) 8 MG/3ML Sopn sc injection       Information about where to get these medications is not yet available    Ask your nurse or doctor about these medications  lactulose 10 GM/15ML solution         DISPOSITION:  *  Home With:   OT  PT  HH  RN       Long term SNF/Inpatient Rehab    Independent/assisted living    Hospice    Other:     PATIENT CONDITION AT DISCHARGE:     Functional status    Poor     Deconditioned    * Independent      Cognition   *  Lucid     Forgetful     Dementia      Catheters/lines (plus indication)    Ferguson     PICC     PEG    * None      Code status    * Full code     DNR      PHYSICAL EXAMINATION AT DISCHARGE:    General:          Alert, cooperative, no distress, appears stated age.     HEENT:           Atraumatic, anicteric sclerae, pink conjunctivae                          No oral ulcers, mucosa moist, throat clear, dentition fair  Neck:               Supple, symmetrical  Lungs:             Clear to

## 2024-11-27 NOTE — PROGRESS NOTES
Initial RN admission and assessment performed and documented in Endoscopy navigator.     Patient evaluated by anesthesia in pre-procedure holding.     All procedural vital signs, airway assessment, and level of consciousness information monitored and recorded by anesthesia staff on the anesthesia record.     Report received from CRNA post procedure.  Patient transported to recovery area by RN.    Endoscopy post procedure time out was performed and specimens were verified with physician.    Endoscope was pre-cleaned at bedside immediately following procedure by emmanuel santiago.

## 2024-11-27 NOTE — PROGRESS NOTES
Pt sent to endo for scheduled procedure.       1400 -- report called to unit from endoscopy post procedure.

## 2024-11-29 LAB
BACTERIA SPEC CULT: NORMAL
BACTERIA SPEC CULT: NORMAL
SERVICE CMNT-IMP: NORMAL
SERVICE CMNT-IMP: NORMAL

## 2024-11-30 DIAGNOSIS — K74.60 CIRRHOSIS OF LIVER WITH ASCITES, UNSPECIFIED HEPATIC CIRRHOSIS TYPE (HCC): ICD-10-CM

## 2024-11-30 DIAGNOSIS — R18.8 CIRRHOSIS OF LIVER WITH ASCITES, UNSPECIFIED HEPATIC CIRRHOSIS TYPE (HCC): ICD-10-CM

## 2024-12-02 ENCOUNTER — TELEPHONE (OUTPATIENT)
Age: 54
End: 2024-12-02

## 2024-12-02 DIAGNOSIS — K76.82 HEPATIC ENCEPHALOPATHY (HCC): ICD-10-CM

## 2024-12-02 DIAGNOSIS — M54.6 PAIN IN THORACIC SPINE: ICD-10-CM

## 2024-12-02 RX ORDER — SPIRONOLACTONE 50 MG/1
50 TABLET, FILM COATED ORAL DAILY
Qty: 30 TABLET | Refills: 0 | Status: SHIPPED | OUTPATIENT
Start: 2024-12-02

## 2024-12-02 NOTE — PROGRESS NOTES
Physician Progress Note      PATIENT:               APRIL WARNER  CSN #:                  [id number]  :                       1970  ADMIT DATE:       2024 7:19 PM  DISCH DATE:        2024 6:44 PM  RESPONDING  PROVIDER #:        Ozzy Nicholas MD        QUERY TEXT:    Type of Anemia: Please provide further specificity, if known.    Clinical indicators include: anemia, bleeding, hgb  Options provided:  -- Anemia due to acute blood loss  -- Anemia due to chronic blood loss  -- Anemia due to iron deficiency  -- Anemia due to postoperative blood loss  -- Anemia due to chronic disease  -- Other - I will add my own diagnosis  -- Disagree - Not applicable / Not valid  -- Disagree - Clinically Unable to determine / Unknown        PROVIDER RESPONSE TEXT:    The patient has anemia due to acute blood loss.      Electronically signed by:  Ozzy Nicholas MD 2024 8:56 AM

## 2024-12-02 NOTE — TELEPHONE ENCOUNTER
----- Message from Dr. Deirdre Rodriguez MD sent at 11/27/2024  2:02 PM EST -----  Patient will be discharged from hospital today. Please arrange hospital discharge follow up in the office in ~4 weeks.     Thank you

## 2024-12-02 NOTE — TELEPHONE ENCOUNTER
Tiffanie VÁZQUEZ [facility] Clinical Staff (supporting Homero Hoyos MD)1 hour ago (10:31 AM)       I just got of the hospital last Wednesday.  They changed my medicine.  No metforman and increase ozempic to 2 per use once a week.  I m  a made another appointment. It is January 16.  The soonest I could get     Thanks  Tiffanie   [phone]

## 2024-12-03 NOTE — TELEPHONE ENCOUNTER
Patient comment: Send to [facility] pharmacy. Their system is up     Last appointment: 9/3/24  Next appointment: 1/16/25  Previous refill encounter(s): 11/15/24 #50    Requested Prescriptions     Pending Prescriptions Disp Refills    traMADol (ULTRAM) 50 MG tablet 50 tablet 0     Sig: Take 1 tablet by mouth every 6 hours as needed for Pain for up to 30 days. Max Daily Amount: 200 mg         For Pharmacy Admin Tracking Only    Program: Medication Refill  CPA in place:    Recommendation Provided To:   Intervention Detail: New Rx: 1, reason: Patient Preference  Intervention Accepted By:   Gap Closed?:    Time Spent (min): 5

## 2024-12-04 RX ORDER — TRAMADOL HYDROCHLORIDE 50 MG/1
50 TABLET ORAL EVERY 6 HOURS PRN
Qty: 50 TABLET | Refills: 0 | Status: SHIPPED | OUTPATIENT
Start: 2024-12-04 | End: 2025-01-03

## 2024-12-10 LAB
ALBUMIN SERPL-MCNC: 4.2 G/DL (ref 3.8–4.9)
ALP SERPL-CCNC: 150 IU/L (ref 44–121)
ALT SERPL-CCNC: 28 IU/L (ref 0–32)
AST SERPL-CCNC: 48 IU/L (ref 0–40)
BASOPHILS # BLD AUTO: 0 X10E3/UL (ref 0–0.2)
BASOPHILS NFR BLD AUTO: 1 %
BILIRUB DIRECT SERPL-MCNC: 3.67 MG/DL (ref 0–0.4)
BILIRUB SERPL-MCNC: 9.2 MG/DL (ref 0–1.2)
BUN SERPL-MCNC: 6 MG/DL (ref 6–24)
BUN/CREAT SERPL: 8 (ref 9–23)
CALCIUM SERPL-MCNC: 10.6 MG/DL (ref 8.7–10.2)
CHLORIDE SERPL-SCNC: 91 MMOL/L (ref 96–106)
CO2 SERPL-SCNC: 25 MMOL/L (ref 20–29)
CREAT SERPL-MCNC: 0.76 MG/DL (ref 0.57–1)
EGFRCR SERPLBLD CKD-EPI 2021: 92 ML/MIN/1.73
EOSINOPHIL # BLD AUTO: 0.1 X10E3/UL (ref 0–0.4)
EOSINOPHIL NFR BLD AUTO: 1 %
ERYTHROCYTE [DISTWIDTH] IN BLOOD BY AUTOMATED COUNT: 13.8 % (ref 11.7–15.4)
GLUCOSE SERPL-MCNC: 384 MG/DL (ref 70–99)
HCT VFR BLD AUTO: 36.5 % (ref 34–46.6)
HGB BLD-MCNC: 12.3 G/DL (ref 11.1–15.9)
IMM GRANULOCYTES # BLD AUTO: 0 X10E3/UL (ref 0–0.1)
IMM GRANULOCYTES NFR BLD AUTO: 1 %
INR PPP: 1.5 (ref 0.9–1.2)
LYMPHOCYTES # BLD AUTO: 0.3 X10E3/UL (ref 0.7–3.1)
LYMPHOCYTES NFR BLD AUTO: 7 %
MCH RBC QN AUTO: 35.1 PG (ref 26.6–33)
MCHC RBC AUTO-ENTMCNC: 33.7 G/DL (ref 31.5–35.7)
MCV RBC AUTO: 104 FL (ref 79–97)
MONOCYTES # BLD AUTO: 0.4 X10E3/UL (ref 0.1–0.9)
MONOCYTES NFR BLD AUTO: 7 %
MORPHOLOGY BLD-IMP: ABNORMAL
NEUTROPHILS # BLD AUTO: 4.2 X10E3/UL (ref 1.4–7)
NEUTROPHILS NFR BLD AUTO: 83 %
PLATELET # BLD AUTO: 79 X10E3/UL (ref 150–450)
POTASSIUM SERPL-SCNC: 4.4 MMOL/L (ref 3.5–5.2)
PROT SERPL-MCNC: 8.1 G/DL (ref 6–8.5)
PROTHROMBIN TIME: 16.2 SEC (ref 9.1–12)
RBC # BLD AUTO: 3.5 X10E6/UL (ref 3.77–5.28)
SODIUM SERPL-SCNC: 132 MMOL/L (ref 134–144)
WBC # BLD AUTO: 5 X10E3/UL (ref 3.4–10.8)

## 2024-12-12 LAB
AFP L3 MFR SERPL: NORMAL % (ref 0–9.9)
AFP SERPL-MCNC: 2.8 NG/ML (ref 0–9.2)

## 2024-12-13 ENCOUNTER — TELEPHONE (OUTPATIENT)
Age: 54
End: 2024-12-13

## 2024-12-21 ENCOUNTER — HOSPITAL ENCOUNTER (EMERGENCY)
Facility: HOSPITAL | Age: 54
Discharge: ANOTHER HOSPITAL | End: 2024-12-21
Attending: STUDENT IN AN ORGANIZED HEALTH CARE EDUCATION/TRAINING PROGRAM
Payer: COMMERCIAL

## 2024-12-21 VITALS
TEMPERATURE: 98.5 F | RESPIRATION RATE: 19 BRPM | HEIGHT: 63 IN | DIASTOLIC BLOOD PRESSURE: 67 MMHG | SYSTOLIC BLOOD PRESSURE: 120 MMHG | BODY MASS INDEX: 27.38 KG/M2 | OXYGEN SATURATION: 96 % | HEART RATE: 101 BPM | WEIGHT: 154.54 LBS

## 2024-12-21 DIAGNOSIS — A41.9 SEPTICEMIA (HCC): ICD-10-CM

## 2024-12-21 DIAGNOSIS — T86.40 COMPLICATION OF TRANSPLANTED LIVER, UNSPECIFIED COMPLICATION (HCC): Primary | ICD-10-CM

## 2024-12-21 LAB
ALBUMIN SERPL-MCNC: 3 G/DL (ref 3.5–5)
ALBUMIN/GLOB SERPL: 0.8 (ref 1.1–2.2)
ALP SERPL-CCNC: 250 U/L (ref 45–117)
ALT SERPL-CCNC: 398 U/L (ref 12–78)
AMMONIA PLAS-SCNC: 28 UMOL/L
ANION GAP SERPL CALC-SCNC: 8 MMOL/L (ref 2–12)
APPEARANCE UR: CLEAR
ARTERIAL PATENCY WRIST A: POSITIVE
AST SERPL-CCNC: 144 U/L (ref 15–37)
BACTERIA URNS QL MICRO: ABNORMAL /HPF
BASE EXCESS BLD CALC-SCNC: 2.1 MMOL/L
BASOPHILS # BLD: 0.1 K/UL (ref 0–0.1)
BASOPHILS NFR BLD: 1 % (ref 0–1)
BDY SITE: ABNORMAL
BILIRUB SERPL-MCNC: 3.1 MG/DL (ref 0.2–1)
BILIRUB UR QL CFM: NEGATIVE
BUN SERPL-MCNC: 16 MG/DL (ref 6–20)
BUN/CREAT SERPL: 22 (ref 12–20)
CALCIUM SERPL-MCNC: 9.4 MG/DL (ref 8.5–10.1)
CHLORIDE SERPL-SCNC: 105 MMOL/L (ref 97–108)
CO2 SERPL-SCNC: 23 MMOL/L (ref 21–32)
COLOR UR: ABNORMAL
CREAT SERPL-MCNC: 0.73 MG/DL (ref 0.55–1.02)
DIFFERENTIAL METHOD BLD: ABNORMAL
EKG ATRIAL RATE: 115 BPM
EKG DIAGNOSIS: NORMAL
EKG P AXIS: 61 DEGREES
EKG P-R INTERVAL: 166 MS
EKG Q-T INTERVAL: 348 MS
EKG QRS DURATION: 112 MS
EKG QTC CALCULATION (BAZETT): 481 MS
EKG R AXIS: -30 DEGREES
EKG T AXIS: 48 DEGREES
EKG VENTRICULAR RATE: 115 BPM
EOSINOPHIL # BLD: 0.5 K/UL (ref 0–0.4)
EOSINOPHIL NFR BLD: 4 % (ref 0–7)
EPITH CASTS URNS QL MICRO: ABNORMAL /LPF
ERYTHROCYTE [DISTWIDTH] IN BLOOD BY AUTOMATED COUNT: 16.5 % (ref 11.5–14.5)
GAS FLOW.O2 O2 DELIVERY SYS: ABNORMAL
GLOBULIN SER CALC-MCNC: 3.7 G/DL (ref 2–4)
GLUCOSE SERPL-MCNC: 168 MG/DL (ref 65–100)
GLUCOSE UR STRIP.AUTO-MCNC: NEGATIVE MG/DL
HCO3 BLD-SCNC: 25.5 MMOL/L (ref 21–28)
HCT VFR BLD AUTO: 29.7 % (ref 35–47)
HGB BLD-MCNC: 10.4 G/DL (ref 11.5–16)
HGB UR QL STRIP: NEGATIVE
HYALINE CASTS URNS QL MICRO: ABNORMAL /LPF (ref 0–5)
IMM GRANULOCYTES # BLD AUTO: 0.3 K/UL (ref 0–0.04)
IMM GRANULOCYTES NFR BLD AUTO: 2 % (ref 0–0.5)
KETONES UR QL STRIP.AUTO: NEGATIVE MG/DL
LACTATE SERPL-SCNC: 1.3 MMOL/L (ref 0.4–2)
LACTATE SERPL-SCNC: 3.3 MMOL/L (ref 0.4–2)
LEUKOCYTE ESTERASE UR QL STRIP.AUTO: ABNORMAL
LIPASE SERPL-CCNC: 12 U/L (ref 13–75)
LYMPHOCYTES # BLD: 0.5 K/UL (ref 0.8–3.5)
LYMPHOCYTES NFR BLD: 4 % (ref 12–49)
MAGNESIUM SERPL-MCNC: 1.6 MG/DL (ref 1.6–2.4)
MCH RBC QN AUTO: 35.7 PG (ref 26–34)
MCHC RBC AUTO-ENTMCNC: 35 G/DL (ref 30–36.5)
MCV RBC AUTO: 102.1 FL (ref 80–99)
MONOCYTES # BLD: 1.3 K/UL (ref 0–1)
MONOCYTES NFR BLD: 10 % (ref 5–13)
NEUTS SEG # BLD: 9.8 K/UL (ref 1.8–8)
NEUTS SEG NFR BLD: 79 % (ref 32–75)
NITRITE UR QL STRIP.AUTO: NEGATIVE
NRBC # BLD: 0.09 K/UL (ref 0–0.01)
NRBC BLD-RTO: 0.7 PER 100 WBC
PCO2 BLD: 33 MMHG (ref 35–48)
PH BLD: 7.5 (ref 7.35–7.45)
PH UR STRIP: 5.5 (ref 5–8)
PLATELET # BLD AUTO: 101 K/UL (ref 150–400)
PMV BLD AUTO: 10.8 FL (ref 8.9–12.9)
PO2 BLD: 66 MMHG (ref 83–108)
POTASSIUM SERPL-SCNC: 4.1 MMOL/L (ref 3.5–5.1)
PROCALCITONIN SERPL-MCNC: 2.51 NG/ML
PROT SERPL-MCNC: 6.7 G/DL (ref 6.4–8.2)
PROT UR STRIP-MCNC: 30 MG/DL
RBC # BLD AUTO: 2.91 M/UL (ref 3.8–5.2)
RBC #/AREA URNS HPF: ABNORMAL /HPF (ref 0–5)
RBC MORPH BLD: ABNORMAL
RBC MORPH BLD: ABNORMAL
SAO2 % BLD: 94.5 % (ref 92–97)
SODIUM SERPL-SCNC: 136 MMOL/L (ref 136–145)
SP GR UR REFRACTOMETRY: 1.02 (ref 1–1.03)
SPECIMEN TYPE: ABNORMAL
UROBILINOGEN UR QL STRIP.AUTO: 0.2 EU/DL (ref 0.2–1)
WBC # BLD AUTO: 12.5 K/UL (ref 3.6–11)
WBC URNS QL MICRO: ABNORMAL /HPF (ref 0–4)

## 2024-12-21 PROCEDURE — 99285 EMERGENCY DEPT VISIT HI MDM: CPT

## 2024-12-21 PROCEDURE — 82140 ASSAY OF AMMONIA: CPT

## 2024-12-21 PROCEDURE — 87040 BLOOD CULTURE FOR BACTERIA: CPT

## 2024-12-21 PROCEDURE — 6360000002 HC RX W HCPCS: Performed by: STUDENT IN AN ORGANIZED HEALTH CARE EDUCATION/TRAINING PROGRAM

## 2024-12-21 PROCEDURE — 83690 ASSAY OF LIPASE: CPT

## 2024-12-21 PROCEDURE — 36600 WITHDRAWAL OF ARTERIAL BLOOD: CPT

## 2024-12-21 PROCEDURE — 83605 ASSAY OF LACTIC ACID: CPT

## 2024-12-21 PROCEDURE — 96361 HYDRATE IV INFUSION ADD-ON: CPT

## 2024-12-21 PROCEDURE — 81001 URINALYSIS AUTO W/SCOPE: CPT

## 2024-12-21 PROCEDURE — 93005 ELECTROCARDIOGRAM TRACING: CPT | Performed by: STUDENT IN AN ORGANIZED HEALTH CARE EDUCATION/TRAINING PROGRAM

## 2024-12-21 PROCEDURE — 36415 COLL VENOUS BLD VENIPUNCTURE: CPT

## 2024-12-21 PROCEDURE — 2580000003 HC RX 258: Performed by: STUDENT IN AN ORGANIZED HEALTH CARE EDUCATION/TRAINING PROGRAM

## 2024-12-21 PROCEDURE — 83735 ASSAY OF MAGNESIUM: CPT

## 2024-12-21 PROCEDURE — 85025 COMPLETE CBC W/AUTO DIFF WBC: CPT

## 2024-12-21 PROCEDURE — 84145 PROCALCITONIN (PCT): CPT

## 2024-12-21 PROCEDURE — 96365 THER/PROPH/DIAG IV INF INIT: CPT

## 2024-12-21 PROCEDURE — 80053 COMPREHEN METABOLIC PANEL: CPT

## 2024-12-21 PROCEDURE — 82803 BLOOD GASES ANY COMBINATION: CPT

## 2024-12-21 RX ORDER — 0.9 % SODIUM CHLORIDE 0.9 %
1000 INTRAVENOUS SOLUTION INTRAVENOUS ONCE
Status: COMPLETED | OUTPATIENT
Start: 2024-12-21 | End: 2024-12-21

## 2024-12-21 RX ADMIN — SODIUM CHLORIDE 1000 ML: 9 INJECTION, SOLUTION INTRAVENOUS at 04:10

## 2024-12-21 RX ADMIN — PIPERACILLIN AND TAZOBACTAM 4500 MG: 4; .5 INJECTION, POWDER, FOR SOLUTION INTRAVENOUS at 06:31

## 2024-12-21 RX ADMIN — SODIUM CHLORIDE 1000 ML: 9 INJECTION, SOLUTION INTRAVENOUS at 06:33

## 2024-12-21 ASSESSMENT — PAIN DESCRIPTION - ONSET: ONSET: ON-GOING

## 2024-12-21 ASSESSMENT — PAIN - FUNCTIONAL ASSESSMENT
PAIN_FUNCTIONAL_ASSESSMENT: 0-10
PAIN_FUNCTIONAL_ASSESSMENT: ACTIVITIES ARE NOT PREVENTED

## 2024-12-21 ASSESSMENT — PAIN DESCRIPTION - ORIENTATION: ORIENTATION: RIGHT;LEFT

## 2024-12-21 ASSESSMENT — PAIN DESCRIPTION - LOCATION: LOCATION: ABDOMEN

## 2024-12-21 ASSESSMENT — PAIN DESCRIPTION - FREQUENCY: FREQUENCY: CONTINUOUS

## 2024-12-21 ASSESSMENT — PAIN SCALES - GENERAL: PAINLEVEL_OUTOF10: 10

## 2024-12-21 ASSESSMENT — PAIN DESCRIPTION - DESCRIPTORS: DESCRIPTORS: SHARP

## 2024-12-21 ASSESSMENT — PAIN DESCRIPTION - PAIN TYPE: TYPE: ACUTE PAIN

## 2024-12-21 NOTE — ED TRIAGE NOTES
Pt comes in from home with CC of a fever after a liver transplant on Sunday. Pt was DC by [facility] on 12/20.

## 2024-12-21 NOTE — ED PROVIDER NOTES
[facility] EMERGENCY DEP  EMERGENCY DEPARTMENT ENCOUNTER      Pt Name: Tiffanie Morgan  MRN: [id number]  Birthdate 1970  Date of evaluation: 12/21/2024  Provider: Basilio Turner MD    CHIEF COMPLAINT       Chief Complaint   Patient presents with    Fever    Post-op Problem         HISTORY OF PRESENT ILLNESS   (Location/Symptom, Timing/Onset, Context/Setting, Quality, Duration, Modifying Factors, Severity)  Note limiting factors.   Is a 54-year-old female presenting to the emergency department for fevers and confusion.  Patient recently underwent liver transplant at [facility] was discharged yesterday afternoon at approximately 5:30 PM on the morning getting home patient had taken a shower and started having fevers, chills  called transplant coordinator at [facility] was recommended to come to the emergency department for further evaluation.  Patient's  feels that she is more confused than normal  was asking the patient questions regarding day of the week month and year unable to recall.  Patient denies any chest pain, shortness of breath is having some mild abdominal pain.            Review of External Medical Records:     Nursing Notes were reviewed.    REVIEW OF SYSTEMS    (2-9 systems for level 4, 10 or more for level 5)     Review of Systems    Except as noted above the remainder of the review of systems was reviewed and negative.       PAST MEDICAL HISTORY     Past Medical History:   Diagnosis Date    Anemia     Cirrhosis     cirrhosis    Depression     Diabetes - II 3/9/2015    Diagnosed 3/9/2015 with A1c of 8.8%; treatment initiated with dietary changes, exercise 5 days/wk and started on Metformin XR 500mg daily x 2 weeks, then increase to 500mg AC breakfast and dinner.     LBP (low back pain) 4/27/2010    Mixed hyperlipidemia 7/8/2012    Nephrolithiasis 4/27/2010    OA (osteoarthritis) 4/27/2010    Smoker 4/27/2010         SURGICAL HISTORY       Past Surgical History:   Procedure Laterality  12/21/24 0150 12/21/24 0150 12/21/24 0150 12/21/24 0150 12/21/24 0150   121/68   99.8 °F (37.7 °C) Oral (!) 121 25 94 %      Height Weight         -- --                       There is no height or weight on file to calculate BMI.    Physical Exam  Vitals and nursing note reviewed.   Constitutional:       Appearance: She is ill-appearing.   HENT:      Head: Normocephalic and atraumatic.      Nose: Nose normal.   Eyes:      Extraocular Movements: Extraocular movements intact.      Pupils: Pupils are equal, round, and reactive to light.   Cardiovascular:      Rate and Rhythm: Regular rhythm. Tachycardia present.      Pulses: Normal pulses.      Heart sounds: Normal heart sounds.   Pulmonary:      Effort: Pulmonary effort is normal.      Breath sounds: Normal breath sounds.   Abdominal:      General: Bowel sounds are normal.      Palpations: Abdomen is soft.      Tenderness: There is generalized abdominal tenderness.          Comments: Surgical incision clean dry and intact nonerythematous no areas of dehiscence.   Musculoskeletal:         General: Normal range of motion.      Cervical back: Normal range of motion and neck supple.   Skin:     General: Skin is warm and dry.      Coloration: Skin is jaundiced.   Neurological:      General: No focal deficit present.      Mental Status: She is alert. She is confused.      GCS: GCS eye subscore is 4. GCS verbal subscore is 5. GCS motor subscore is 6.   Psychiatric:         Mood and Affect: Mood is anxious.         Behavior: Behavior is slowed.         DIAGNOSTIC RESULTS     EKG: All EKG's are interpreted by the Emergency Department Physician who either signs or Co-signs this chart in the absence of a cardiologist.        RADIOLOGY:   Non-plain film images such as CT, Ultrasound and MRI are read by the radiologist. Plain radiographic images are visualized and preliminarily interpreted by the emergency physician with the below findings:        Interpretation per the

## 2024-12-26 DIAGNOSIS — M54.6 PAIN IN THORACIC SPINE: ICD-10-CM

## 2024-12-27 DIAGNOSIS — M54.6 PAIN IN THORACIC SPINE: ICD-10-CM

## 2024-12-27 RX ORDER — TRAMADOL HYDROCHLORIDE 50 MG/1
50 TABLET ORAL EVERY 6 HOURS PRN
Qty: 50 TABLET | Refills: 0 | Status: CANCELLED | OUTPATIENT
Start: 2024-12-27 | End: 2025-01-26

## 2024-12-27 NOTE — TELEPHONE ENCOUNTER
Last appointment: 9/3/24  Next appointment: 1/13/25  Previous refill encounter(s): 12/4/24 #50    Requested Prescriptions     Pending Prescriptions Disp Refills    traMADol (ULTRAM) 50 MG tablet [Pharmacy Med Name: TRAMADOL HCL 50MG TABS] 50 tablet 0     Sig: Take 1 tablet by mouth every 6 hours as needed for Pain for up to 30 days. Max Daily Amount: 200 mg         For Pharmacy Admin Tracking Only    Program: Medication Refill  CPA in place:    Recommendation Provided To:   Intervention Detail: New Rx: 1, reason: Patient Preference  Intervention Accepted By:   Gap Closed?:    Time Spent (min): 5

## 2024-12-30 DIAGNOSIS — M54.6 PAIN IN THORACIC SPINE: ICD-10-CM

## 2024-12-30 RX ORDER — TRAMADOL HYDROCHLORIDE 50 MG/1
50 TABLET ORAL EVERY 6 HOURS PRN
Qty: 50 TABLET | Refills: 0 | OUTPATIENT
Start: 2024-12-30 | End: 2025-01-26

## 2024-12-30 RX ORDER — TRAMADOL HYDROCHLORIDE 50 MG/1
50 TABLET ORAL EVERY 6 HOURS PRN
Qty: 50 TABLET | Refills: 0 | Status: SHIPPED | OUTPATIENT
Start: 2024-12-30 | End: 2025-01-29

## 2025-01-10 ENCOUNTER — TELEPHONE (OUTPATIENT)
Age: 55
End: 2025-01-10

## 2025-01-10 NOTE — TELEPHONE ENCOUNTER
Tiffanie VÁZQUEZ [facility] Clinical Staff (supporting Homero Hoyos MD)17 minutes ago (4:05 PM)       [facility] gave me a muscle relaxer for 5 days after my transplant for my back. They said I would have to get it refilled with Dr Hoyos because they are only treating me for the liver  [phone]

## 2025-01-13 ENCOUNTER — OFFICE VISIT (OUTPATIENT)
Age: 55
End: 2025-01-13

## 2025-01-13 VITALS
SYSTOLIC BLOOD PRESSURE: 131 MMHG | HEART RATE: 87 BPM | OXYGEN SATURATION: 98 % | WEIGHT: 148.8 LBS | HEIGHT: 63 IN | BODY MASS INDEX: 26.36 KG/M2 | TEMPERATURE: 98.2 F | RESPIRATION RATE: 18 BRPM | DIASTOLIC BLOOD PRESSURE: 76 MMHG

## 2025-01-13 DIAGNOSIS — E11.9 TYPE 2 DIABETES MELLITUS WITHOUT COMPLICATION, WITHOUT LONG-TERM CURRENT USE OF INSULIN (HCC): ICD-10-CM

## 2025-01-13 DIAGNOSIS — M54.6 PAIN IN THORACIC SPINE: ICD-10-CM

## 2025-01-13 DIAGNOSIS — K21.9 GASTROESOPHAGEAL REFLUX DISEASE WITHOUT ESOPHAGITIS: ICD-10-CM

## 2025-01-13 DIAGNOSIS — Z94.4 S/P LIVER TRANSPLANT (HCC): Primary | ICD-10-CM

## 2025-01-13 DIAGNOSIS — K70.31 ALCOHOLIC CIRRHOSIS OF LIVER WITH ASCITES (HCC): ICD-10-CM

## 2025-01-13 LAB — HBA1C MFR BLD: 6.1 %

## 2025-01-13 RX ORDER — TRAMADOL HYDROCHLORIDE 50 MG/1
50 TABLET ORAL EVERY 6 HOURS PRN
Qty: 90 TABLET | Refills: 0 | Status: SHIPPED | OUTPATIENT
Start: 2025-01-13 | End: 2025-02-12

## 2025-01-13 RX ORDER — PREDNISONE 5 MG/1
10 TABLET ORAL DAILY
COMMUNITY

## 2025-01-13 RX ORDER — MYCOPHENOLATE MOFETIL 250 MG/1
CAPSULE ORAL
COMMUNITY

## 2025-01-13 RX ORDER — FLUTICASONE PROPIONATE 50 MCG
2 SPRAY, SUSPENSION (ML) NASAL DAILY PRN
COMMUNITY

## 2025-01-13 RX ORDER — ASPIRIN 81 MG/1
81 TABLET, CHEWABLE ORAL DAILY
COMMUNITY
Start: 2024-12-17

## 2025-01-13 RX ORDER — ACYCLOVIR 400 MG/1
TABLET ORAL
COMMUNITY
Start: 2024-12-20

## 2025-01-13 RX ORDER — SULFAMETHOXAZOLE AND TRIMETHOPRIM 800; 160 MG/1; MG/1
TABLET ORAL
COMMUNITY
Start: 2025-01-07 | End: 2025-01-13 | Stop reason: ALTCHOICE

## 2025-01-13 RX ORDER — VALGANCICLOVIR 450 MG/1
450 TABLET, FILM COATED ORAL DAILY
COMMUNITY

## 2025-01-13 RX ORDER — TACROLIMUS 1 MG/1
8 CAPSULE ORAL EVERY 12 HOURS
COMMUNITY
Start: 2025-01-12

## 2025-01-13 RX ORDER — INSULIN ASPART 100 [IU]/ML
INJECTION, SOLUTION INTRAVENOUS; SUBCUTANEOUS
COMMUNITY

## 2025-01-13 RX ORDER — HUMAN INSULIN 100 [IU]/ML
INJECTION, SUSPENSION SUBCUTANEOUS
COMMUNITY
Start: 2025-01-09

## 2025-01-13 RX ORDER — ELECTROLYTES/DEXTROSE
1 SOLUTION, ORAL ORAL DAILY
COMMUNITY

## 2025-01-13 RX ORDER — TERCONAZOLE 4 MG/G
CREAM VAGINAL
COMMUNITY
Start: 2024-10-28 | End: 2025-01-13 | Stop reason: ALTCHOICE

## 2025-01-13 RX ORDER — SENNOSIDES 8.6 MG/1
TABLET, COATED ORAL
COMMUNITY

## 2025-01-13 RX ORDER — PEN NEEDLE, DIABETIC 32GX 5/32"
NEEDLE, DISPOSABLE MISCELLANEOUS
COMMUNITY
Start: 2025-01-09

## 2025-01-13 RX ORDER — ACETAMINOPHEN 325 MG/1
650 TABLET ORAL EVERY 8 HOURS PRN
COMMUNITY
Start: 2024-12-17

## 2025-01-13 RX ORDER — METHOCARBAMOL 500 MG/1
500 TABLET, FILM COATED ORAL 4 TIMES DAILY
Qty: 50 TABLET | Refills: 1 | Status: SHIPPED | OUTPATIENT
Start: 2025-01-13 | End: 2025-02-07

## 2025-01-13 ASSESSMENT — PATIENT HEALTH QUESTIONNAIRE - PHQ9
SUM OF ALL RESPONSES TO PHQ QUESTIONS 1-9: 0
2. FEELING DOWN, DEPRESSED OR HOPELESS: NOT AT ALL
SUM OF ALL RESPONSES TO PHQ9 QUESTIONS 1 & 2: 0
1. LITTLE INTEREST OR PLEASURE IN DOING THINGS: NOT AT ALL

## 2025-01-13 NOTE — PROGRESS NOTES
Chief Complaint   Patient presents with    Follow-up     Patient is here today for a follow up.      \"Have you been to the ER, urgent care clinic since your last visit?  Hospitalized since your last visit?\"    12/21/24 [facility] for altered mental status    “Have you seen or consulted any other health care providers outside of [facility] since your last visit?”    NO        “Have you had a colorectal cancer screening such as a colonoscopy/FIT/Cologuard?    NO    Date of last Colonoscopy: 8/3/2020  No cologuard on file  Date of last FIT: 9/21/2023   No flexible sigmoidoscopy on file         Click Here for Release of Records Request

## 2025-02-07 DIAGNOSIS — M54.6 PAIN IN THORACIC SPINE: ICD-10-CM

## 2025-02-07 NOTE — TELEPHONE ENCOUNTER
Patient comment: Hi This was able to refill on the 5th. I actually have two days left. But my pharmacy is closed on Sunday and Hugo is working so I won’t have a ride. I am starting to do a little better with the comb of the relaxer Thanks     Last appointment: 1/13/25  Next appointment: 3/14/25  Previous refill encounter(s): 1/13/25 #90    Requested Prescriptions     Pending Prescriptions Disp Refills    traMADol (ULTRAM) 50 MG tablet 90 tablet 0     Sig: Take 1 tablet by mouth every 6 hours as needed for Pain for up to 30 days. Max Daily Amount: 200 mg         For Pharmacy Admin Tracking Only    Program: Medication Refill  CPA in place:    Recommendation Provided To:   Intervention Detail: New Rx: 1, reason: Patient Preference  Intervention Accepted By:   Gap Closed?:    Time Spent (min): 5

## 2025-02-10 RX ORDER — TRAMADOL HYDROCHLORIDE 50 MG/1
50 TABLET ORAL EVERY 6 HOURS PRN
Qty: 90 TABLET | Refills: 0 | Status: SHIPPED | OUTPATIENT
Start: 2025-02-10 | End: 2025-03-12

## 2025-02-14 DIAGNOSIS — M54.6 PAIN IN THORACIC SPINE: ICD-10-CM

## 2025-02-14 NOTE — TELEPHONE ENCOUNTER
Last appointment: 1/13/25  Next appointment: 3/14/25  Previous refill encounter(s): 1/13/25 #50 with 1 refill    Requested Prescriptions     Pending Prescriptions Disp Refills    methocarbamol (ROBAXIN) 500 MG tablet [Pharmacy Med Name: METHOCARBAMOL 500MG TABS] 50 tablet 1     Sig: TAKE ONE TABLET BY MOUTH FOUR TIMES A DAY         For Pharmacy Admin Tracking Only    Program: Medication Refill  CPA in place:    Recommendation Provided To:   Intervention Detail: New Rx: 1, reason: Patient Preference  Intervention Accepted By:   Gap Closed?:    Time Spent (min): 5

## 2025-02-17 RX ORDER — METHOCARBAMOL 500 MG/1
500 TABLET, FILM COATED ORAL 4 TIMES DAILY
Qty: 50 TABLET | Refills: 1 | Status: SHIPPED | OUTPATIENT
Start: 2025-02-17

## 2025-03-14 ENCOUNTER — OFFICE VISIT (OUTPATIENT)
Age: 55
End: 2025-03-14

## 2025-03-14 VITALS
HEART RATE: 103 BPM | SYSTOLIC BLOOD PRESSURE: 122 MMHG | BODY MASS INDEX: 27.32 KG/M2 | DIASTOLIC BLOOD PRESSURE: 82 MMHG | HEIGHT: 63 IN | OXYGEN SATURATION: 100 % | RESPIRATION RATE: 18 BRPM | TEMPERATURE: 98.7 F | WEIGHT: 154.2 LBS

## 2025-03-14 DIAGNOSIS — Z94.4 S/P LIVER TRANSPLANT (HCC): ICD-10-CM

## 2025-03-14 DIAGNOSIS — K70.31 ALCOHOLIC CIRRHOSIS OF LIVER WITH ASCITES (HCC): Primary | ICD-10-CM

## 2025-03-14 DIAGNOSIS — K74.3 PRIMARY BILIARY CIRRHOSIS (HCC): ICD-10-CM

## 2025-03-14 DIAGNOSIS — E11.9 TYPE 2 DIABETES MELLITUS WITHOUT COMPLICATION, WITHOUT LONG-TERM CURRENT USE OF INSULIN (HCC): ICD-10-CM

## 2025-03-14 DIAGNOSIS — M54.6 PAIN IN THORACIC SPINE: ICD-10-CM

## 2025-03-14 LAB — GLUCOSE, POC: 183 MG/DL

## 2025-03-14 RX ORDER — FUROSEMIDE 20 MG/1
20 TABLET ORAL DAILY
COMMUNITY
Start: 2025-02-05

## 2025-03-14 RX ORDER — DAPSONE 25 MG/1
50 TABLET ORAL DAILY
COMMUNITY

## 2025-03-14 RX ORDER — GABAPENTIN 100 MG/1
100 CAPSULE ORAL 2 TIMES DAILY
COMMUNITY
Start: 2025-03-12

## 2025-03-14 RX ORDER — SEMAGLUTIDE 0.68 MG/ML
INJECTION, SOLUTION SUBCUTANEOUS
COMMUNITY
Start: 2025-02-26

## 2025-03-14 RX ORDER — SODIUM POLYSTYRENE SULFONATE 4.1 MEQ/G
POWDER, FOR SUSPENSION ORAL; RECTAL
COMMUNITY
Start: 2025-01-29

## 2025-03-14 RX ORDER — TRAMADOL HYDROCHLORIDE 50 MG/1
50 TABLET ORAL EVERY 8 HOURS PRN
Qty: 90 TABLET | Refills: 0 | Status: SHIPPED | OUTPATIENT
Start: 2025-03-14 | End: 2025-04-13

## 2025-03-14 RX ORDER — URSODIOL 500 MG/1
500 TABLET, FILM COATED ORAL 2 TIMES DAILY
COMMUNITY
Start: 2025-03-12

## 2025-03-14 ASSESSMENT — PATIENT HEALTH QUESTIONNAIRE - PHQ9
SUM OF ALL RESPONSES TO PHQ QUESTIONS 1-9: 2
2. FEELING DOWN, DEPRESSED OR HOPELESS: SEVERAL DAYS
SUM OF ALL RESPONSES TO PHQ QUESTIONS 1-9: 2
1. LITTLE INTEREST OR PLEASURE IN DOING THINGS: SEVERAL DAYS

## 2025-03-14 ASSESSMENT — ENCOUNTER SYMPTOMS
ABDOMINAL PAIN: 0
ANAL BLEEDING: 0
BLOOD IN STOOL: 0
CHEST TIGHTNESS: 0
BACK PAIN: 1
SHORTNESS OF BREATH: 0

## 2025-03-14 NOTE — PROGRESS NOTES
Chief Complaint   Patient presents with    Follow-up     Patient is here today for a 2 month follow up.      \"Have you been to the ER, urgent care clinic since your last visit?  Hospitalized since your last visit?\"    NO    “Have you seen or consulted any other health care providers outside of [facility] since your last visit?”    [facility]        “Have you had a colorectal cancer screening such as a colonoscopy/FIT/Cologuard?    NO    Date of last Colonoscopy: 8/3/2020  No cologuard on file  Date of last FIT: 9/21/2023   No flexible sigmoidoscopy on file         Click Here for Release of Records Request

## 2025-03-14 NOTE — PROGRESS NOTES
NAME:  Tiffanie Morgan   :   1970   MRN:   [id number]     Date/Time:  3/14/2025 5:15 AM  Subjective:f/u dm dm,having some  difficulty  with reflux from ozempic.Taking basal and mealtime insulin followed at [facility]   Back Pain  This is a chronic problem. The problem is unchanged. The pain is present in the thoracic spine. The quality of the pain is described as aching. Pertinent negatives include no abdominal pain or fever.   Abnormal Lab  This is a chronic problem. The problem occurs daily. The problem has been unchanged. Associated symptoms include fatigue. Pertinent negatives include no abdominal pain, arthralgias, change in bowel habit or fever.    Diabetes  She presents for her follow-up diabetic visit. She has type 2 diabetes mellitus. The initial diagnosis of diabetes was made 3 years ago. Pertinent negatives for hypoglycemia include no dizziness. Associated symptoms include fatigue. Pertinent negatives for diabetes include no blurred vision, no chest pain, no polyuria, no visual change and no weight loss.     Review of Systems   Constitutional:  Positive for fatigue.   HENT:  Negative for congestion.    Respiratory:  Negative for chest tightness and shortness of breath.    Cardiovascular:  Negative for chest pain.   Gastrointestinal:  Negative for abdominal pain, anal bleeding and blood in stool.   Musculoskeletal:  Positive for back pain. Negative for arthralgias.   Psychiatric/Behavioral:  Negative for agitation.              Medications reviewed:  Current Outpatient Medications   Medication Sig    methocarbamol (ROBAXIN) 500 MG tablet TAKE ONE TABLET BY MOUTH FOUR TIMES A DAY    acetaminophen (TYLENOL) 325 MG tablet Take 2 tablets by mouth every 8 hours as needed    aspirin 81 MG chewable tablet Take 1 tablet by mouth daily    Continuous Glucose  (DEXCOM G7 ) MALACHI     Continuous Glucose Sensor (DEXCOM G7 SENSOR) MISC     fluticasone (FLONASE) 50 MCG/ACT nasal spray 2

## 2025-04-03 DIAGNOSIS — M54.6 PAIN IN THORACIC SPINE: ICD-10-CM

## 2025-04-03 RX ORDER — METHOCARBAMOL 500 MG/1
500 TABLET, FILM COATED ORAL 4 TIMES DAILY
Qty: 50 TABLET | Refills: 1 | Status: SHIPPED | OUTPATIENT
Start: 2025-04-03

## 2025-04-03 NOTE — TELEPHONE ENCOUNTER
Last appointment: 3/14/25  Next appointment: 5/14/25  Previous refill encounter(s): 2/17/25 #50 with 1 refill    Requested Prescriptions     Pending Prescriptions Disp Refills    methocarbamol (ROBAXIN) 500 MG tablet [Pharmacy Med Name: METHOCARBAMOL 500MG TABS] 50 tablet 1     Sig: TAKE ONE TABLET BY MOUTH FOUR TIMES A DAY         For Pharmacy Admin Tracking Only    Program: Medication Refill  CPA in place:    Recommendation Provided To:   Intervention Detail: New Rx: 1, reason: Patient Preference  Intervention Accepted By:   Gap Closed?:    Time Spent (min): 5

## 2025-04-10 ENCOUNTER — TELEPHONE (OUTPATIENT)
Age: 55
End: 2025-04-10

## 2025-04-10 DIAGNOSIS — M54.6 PAIN IN THORACIC SPINE: ICD-10-CM

## 2025-04-10 DIAGNOSIS — M54.31 SCIATICA OF RIGHT SIDE: Primary | ICD-10-CM

## 2025-04-10 RX ORDER — PREDNISONE 5 MG/1
TABLET ORAL
Qty: 1 EACH | Refills: 0 | Status: SHIPPED | OUTPATIENT
Start: 2025-04-10

## 2025-04-10 RX ORDER — TRAMADOL HYDROCHLORIDE 50 MG/1
50 TABLET ORAL EVERY 8 HOURS PRN
Qty: 90 TABLET | Refills: 0 | Status: SHIPPED | OUTPATIENT
Start: 2025-04-10 | End: 2025-05-10

## 2025-04-17 ENCOUNTER — TELEPHONE (OUTPATIENT)
Age: 55
End: 2025-04-17

## 2025-04-17 DIAGNOSIS — K21.9 GASTROESOPHAGEAL REFLUX DISEASE WITHOUT ESOPHAGITIS: ICD-10-CM

## 2025-04-17 RX ORDER — PANTOPRAZOLE SODIUM 40 MG/1
40 TABLET, DELAYED RELEASE ORAL DAILY
Qty: 30 TABLET | Refills: 2 | OUTPATIENT
Start: 2025-04-17

## 2025-04-17 NOTE — TELEPHONE ENCOUNTER
[facility] would like for you to take over my Gabapentim 100mg. Are you okay with that? I am do for a refill. If you need something from them just let me know.  Thanks  Tiffanie  [phone]

## 2025-04-18 DIAGNOSIS — M54.6 PAIN IN THORACIC SPINE: Primary | ICD-10-CM

## 2025-04-18 RX ORDER — GABAPENTIN 100 MG/1
100 CAPSULE ORAL 2 TIMES DAILY
Qty: 180 CAPSULE | Refills: 1 | Status: SHIPPED | OUTPATIENT
Start: 2025-04-18 | End: 2025-10-15

## 2025-04-18 RX ORDER — PANTOPRAZOLE SODIUM 40 MG/1
40 TABLET, DELAYED RELEASE ORAL DAILY
Qty: 30 TABLET | Refills: 2 | Status: SHIPPED | OUTPATIENT
Start: 2025-04-18

## 2025-04-23 RX ORDER — FUROSEMIDE 20 MG/1
20 TABLET ORAL DAILY
Qty: 90 TABLET | Refills: 1 | Status: SHIPPED | OUTPATIENT
Start: 2025-04-23

## 2025-04-23 NOTE — TELEPHONE ENCOUNTER
Last appointment: 3/14/25  Next appointment: 5/14/25  Previous refill encounter(s): 8/27/24 #90 with 1 refill    Requested Prescriptions     Pending Prescriptions Disp Refills    furosemide (LASIX) 20 MG tablet 90 tablet 1     Sig: Take 1 tablet by mouth daily         For Pharmacy Admin Tracking Only    Program: Medication Refill  CPA in place:    Recommendation Provided To:   Intervention Detail: New Rx: 1, reason: Patient Preference  Intervention Accepted By:   Gap Closed?:    Time Spent (min): 5

## 2025-05-02 ENCOUNTER — TELEPHONE (OUTPATIENT)
Age: 55
End: 2025-05-02

## 2025-05-02 DIAGNOSIS — M54.6 PAIN IN THORACIC SPINE: ICD-10-CM

## 2025-05-02 DIAGNOSIS — M54.31 SCIATICA OF RIGHT SIDE: ICD-10-CM

## 2025-05-02 RX ORDER — TRAMADOL HYDROCHLORIDE 50 MG/1
50 TABLET ORAL EVERY 6 HOURS PRN
Qty: 120 TABLET | Refills: 0 | Status: SHIPPED | OUTPATIENT
Start: 2025-05-02 | End: 2025-05-03 | Stop reason: SDUPTHER

## 2025-05-02 RX ORDER — PREDNISONE 5 MG/1
TABLET ORAL
Qty: 1 EACH | Refills: 0 | Status: SHIPPED | OUTPATIENT
Start: 2025-05-02

## 2025-05-02 NOTE — TELEPHONE ENCOUNTER
Per the pt, [facility] did not receive the Tramadol Rx that was e-scribed over.  I called the pharmacy and their phones are not working.  I called the store and asked the manager to transfer me to the pharmacy and he stated they were having telephone and communications issues with the pharmacy phones.

## 2025-05-03 DIAGNOSIS — M54.31 SCIATICA OF RIGHT SIDE: ICD-10-CM

## 2025-05-03 DIAGNOSIS — M54.6 PAIN IN THORACIC SPINE: ICD-10-CM

## 2025-05-03 RX ORDER — TRAMADOL HYDROCHLORIDE 50 MG/1
50 TABLET ORAL EVERY 6 HOURS PRN
Qty: 120 TABLET | Refills: 0 | Status: SHIPPED | OUTPATIENT
Start: 2025-05-03 | End: 2025-06-02

## 2025-05-03 RX ORDER — TRAMADOL HYDROCHLORIDE 50 MG/1
TABLET ORAL
Qty: 90 TABLET | Refills: 0 | OUTPATIENT
Start: 2025-05-03

## 2025-05-06 DIAGNOSIS — M54.31 SCIATICA OF RIGHT SIDE: ICD-10-CM

## 2025-05-06 DIAGNOSIS — M54.6 PAIN IN THORACIC SPINE: ICD-10-CM

## 2025-05-06 RX ORDER — TRAMADOL HYDROCHLORIDE 50 MG/1
50 TABLET ORAL EVERY 6 HOURS PRN
Qty: 120 TABLET | Refills: 0 | OUTPATIENT
Start: 2025-05-06 | End: 2025-06-05

## 2025-05-11 NOTE — PROGRESS NOTES
NAME:  Tiffanie Morgan   :   1970   MRN:   [id number]     Date/Time:  2025 2:48 PM  Subjective:f/u dm2 chronic back pain,s/p Liver Transplant.Troubled by chronic midback pain requiring daily tramadol.C/O worsening peripheral neuropathyand hair loss fromTacrolimus   HPI   Back Pain  This is a chronic problem. The problem is unchanged. The pain is present in the thoracic spine. The quality of the pain is described as aching. Pertinent negatives include no abdominal pain or fever.   Abnormal Lab  This is a chronic problem. The problem occurs daily. The problem has been unchanged. Associated symptoms include fatigue. Pertinent negatives include no abdominal pain, arthralgias, change in bowel habit or fever.    Diabetes  She presents for her follow-up diabetic visit. She has type 2 diabetes mellitus. The initial diagnosis of diabetes was made 3 years ago. Pertinent negatives for hypoglycemia include no dizziness. Associated symptoms include fatigue. Pertinent negatives for diabetes include no blurred vision, no chest pain, no polyuria, no visual change and no weight loss.     Review of Systems   Constitutional:  Positive for fatigue.   HENT:  Negative for congestion.    Respiratory:  Negative for chest tightness.    Cardiovascular:  Negative for chest pain and palpitations.   Gastrointestinal:  Negative for abdominal pain, constipation and diarrhea.   Musculoskeletal:  Positive for back pain.   Neurological:  Positive for numbness.             Medications reviewed:  Current Outpatient Medications   Medication Sig    traMADol (ULTRAM) 50 MG tablet Take 1 tablet by mouth every 6 hours as needed for Pain for up to 30 days. Intended supply: 5 days. Take lowest dose possible to manage pain Max Daily Amount: 200 mg    predniSONE 5 MG (21) TBPK Take as directed by package insert    furosemide (LASIX) 20 MG tablet Take 1 tablet by mouth daily    pantoprazole (PROTONIX) 40 MG tablet Take 1 tablet by mouth

## 2025-05-14 ENCOUNTER — OFFICE VISIT (OUTPATIENT)
Age: 55
End: 2025-05-14
Payer: COMMERCIAL

## 2025-05-14 VITALS
RESPIRATION RATE: 18 BRPM | TEMPERATURE: 98 F | WEIGHT: 158.2 LBS | HEIGHT: 63 IN | BODY MASS INDEX: 28.03 KG/M2 | DIASTOLIC BLOOD PRESSURE: 80 MMHG | OXYGEN SATURATION: 97 % | HEART RATE: 96 BPM | SYSTOLIC BLOOD PRESSURE: 126 MMHG

## 2025-05-14 DIAGNOSIS — G62.9 POLYNEUROPATHY: ICD-10-CM

## 2025-05-14 DIAGNOSIS — Z94.4 S/P LIVER TRANSPLANT (HCC): ICD-10-CM

## 2025-05-14 DIAGNOSIS — E11.9 TYPE 2 DIABETES MELLITUS WITHOUT COMPLICATION, WITHOUT LONG-TERM CURRENT USE OF INSULIN (HCC): Primary | ICD-10-CM

## 2025-05-14 DIAGNOSIS — M54.6 PAIN IN THORACIC SPINE: ICD-10-CM

## 2025-05-14 DIAGNOSIS — K74.3 PRIMARY BILIARY CIRRHOSIS (HCC): ICD-10-CM

## 2025-05-14 DIAGNOSIS — L65.9 ALOPECIA: ICD-10-CM

## 2025-05-14 DIAGNOSIS — K70.31 ALCOHOLIC CIRRHOSIS OF LIVER WITH ASCITES (HCC): ICD-10-CM

## 2025-05-14 LAB — HBA1C MFR BLD: 5.6 %

## 2025-05-14 PROCEDURE — 3044F HG A1C LEVEL LT 7.0%: CPT | Performed by: FAMILY MEDICINE

## 2025-05-14 PROCEDURE — 99214 OFFICE O/P EST MOD 30 MIN: CPT | Performed by: FAMILY MEDICINE

## 2025-05-14 PROCEDURE — 83036 HEMOGLOBIN GLYCOSYLATED A1C: CPT | Performed by: FAMILY MEDICINE

## 2025-05-14 RX ORDER — TACROLIMUS 1 MG/1
TABLET, EXTENDED RELEASE ORAL
COMMUNITY
Start: 2025-05-13

## 2025-05-14 RX ORDER — METHOCARBAMOL 500 MG/1
500 TABLET, FILM COATED ORAL 4 TIMES DAILY PRN
Qty: 100 TABLET | Refills: 1 | Status: SHIPPED | OUTPATIENT
Start: 2025-05-14

## 2025-05-14 RX ORDER — TACROLIMUS 4 MG/1
TABLET, EXTENDED RELEASE ORAL
COMMUNITY
Start: 2025-05-13

## 2025-05-14 SDOH — ECONOMIC STABILITY: FOOD INSECURITY: WITHIN THE PAST 12 MONTHS, THE FOOD YOU BOUGHT JUST DIDN'T LAST AND YOU DIDN'T HAVE MONEY TO GET MORE.: SOMETIMES TRUE

## 2025-05-14 SDOH — ECONOMIC STABILITY: FOOD INSECURITY: WITHIN THE PAST 12 MONTHS, YOU WORRIED THAT YOUR FOOD WOULD RUN OUT BEFORE YOU GOT MONEY TO BUY MORE.: NEVER TRUE

## 2025-05-14 ASSESSMENT — ENCOUNTER SYMPTOMS
CHEST TIGHTNESS: 0
BACK PAIN: 1
ABDOMINAL PAIN: 0
CONSTIPATION: 0
DIARRHEA: 0

## 2025-05-14 ASSESSMENT — PATIENT HEALTH QUESTIONNAIRE - PHQ9
2. FEELING DOWN, DEPRESSED OR HOPELESS: SEVERAL DAYS
SUM OF ALL RESPONSES TO PHQ QUESTIONS 1-9: 2
SUM OF ALL RESPONSES TO PHQ QUESTIONS 1-9: 2
1. LITTLE INTEREST OR PLEASURE IN DOING THINGS: SEVERAL DAYS
SUM OF ALL RESPONSES TO PHQ QUESTIONS 1-9: 2
SUM OF ALL RESPONSES TO PHQ QUESTIONS 1-9: 2

## 2025-05-14 NOTE — PROGRESS NOTES
Chief Complaint   Patient presents with    Follow-up     Patient is here today for a 2 month follow up.      \"Have you been to the ER, urgent care clinic since your last visit?  Hospitalized since your last visit?\"    NO    “Have you seen or consulted any other health care providers outside of [facility] since your last visit?”    NO        “Have you had a colorectal cancer screening such as a colonoscopy/FIT/Cologuard?    NO    Date of last Colonoscopy: 8/3/2020  No cologuard on file  Date of last FIT: 9/21/2023   No flexible sigmoidoscopy on file         Click Here for Release of Records Request

## 2025-05-29 ENCOUNTER — TRANSCRIBE ORDERS (OUTPATIENT)
Facility: HOSPITAL | Age: 55
End: 2025-05-29

## 2025-05-29 DIAGNOSIS — M85.859 OSTEOPENIA OF NECK OF FEMUR, UNSPECIFIED LATERALITY: Primary | ICD-10-CM

## 2025-06-02 DIAGNOSIS — M54.6 PAIN IN THORACIC SPINE: ICD-10-CM

## 2025-06-03 RX ORDER — GABAPENTIN 100 MG/1
300 CAPSULE ORAL 2 TIMES DAILY
Qty: 540 CAPSULE | Refills: 0 | Status: SHIPPED | OUTPATIENT
Start: 2025-06-03 | End: 2025-09-01

## 2025-06-03 NOTE — TELEPHONE ENCOUNTER
Patient comment: I need a refill because we up the dose to 3 two times a day on my last visit     Rx pended with updated dose as per pt request. Please sign if appropriate.    Last appointment: 5/14/25  Next appointment: 8/20/25    Requested Prescriptions     Pending Prescriptions Disp Refills    gabapentin (NEURONTIN) 100 MG capsule 540 capsule 0     Sig: Take 3 capsules by mouth 2 times daily for 90 days. Max Daily Amount: 600 mg         For Pharmacy Admin Tracking Only    Program: Medication Refill  CPA in place:    Recommendation Provided To:   Intervention Detail: New Rx: 1, reason: Patient Preference  Intervention Accepted By:   Gap Closed?:    Time Spent (min): 5

## 2025-06-05 DIAGNOSIS — M54.6 PAIN IN THORACIC SPINE: Primary | ICD-10-CM

## 2025-06-05 DIAGNOSIS — M79.7 FIBROMYALGIA: ICD-10-CM

## 2025-06-06 RX ORDER — TRAMADOL HYDROCHLORIDE 50 MG/1
50 TABLET ORAL EVERY 6 HOURS PRN
Qty: 120 TABLET | Refills: 0 | Status: SHIPPED | OUTPATIENT
Start: 2025-06-06 | End: 2025-07-06

## 2025-06-13 NOTE — TELEPHONE ENCOUNTER
Previous Rx was not received by the pharmacy due to phone/ internet issues. Rx re-pended.          For Pharmacy Admin Tracking Only    Program: Medication Refill  CPA in place:    Recommendation Provided To:   Intervention Detail: New Rx: 1, reason: Patient Preference  Intervention Accepted By:   Gap Closed?:    Time Spent (min): 5   PROVIDER:[TOKEN:[[id number]],FOLLOWUP:[1 month]]

## 2025-06-17 ENCOUNTER — HOSPITAL ENCOUNTER (OUTPATIENT)
Facility: HOSPITAL | Age: 55
Discharge: HOME | End: 2025-06-20
Payer: COMMERCIAL

## 2025-06-17 VITALS — HEIGHT: 63 IN | BODY MASS INDEX: 28.17 KG/M2 | WEIGHT: 159 LBS

## 2025-06-17 DIAGNOSIS — M85.859 OSTEOPENIA OF NECK OF FEMUR, UNSPECIFIED LATERALITY: ICD-10-CM

## 2025-06-17 PROCEDURE — 77080 DXA BONE DENSITY AXIAL: CPT

## 2025-07-07 ENCOUNTER — OFFICE VISIT (OUTPATIENT)
Age: 55
End: 2025-07-07
Payer: COMMERCIAL

## 2025-07-07 VITALS
BODY MASS INDEX: 28.35 KG/M2 | RESPIRATION RATE: 17 BRPM | TEMPERATURE: 97.6 F | OXYGEN SATURATION: 99 % | DIASTOLIC BLOOD PRESSURE: 84 MMHG | SYSTOLIC BLOOD PRESSURE: 128 MMHG | WEIGHT: 160 LBS | HEIGHT: 63 IN | HEART RATE: 95 BPM

## 2025-07-07 DIAGNOSIS — M54.6 PAIN IN THORACIC SPINE: ICD-10-CM

## 2025-07-07 DIAGNOSIS — K74.3 PRIMARY BILIARY CHOLANGITIS (HCC): ICD-10-CM

## 2025-07-07 DIAGNOSIS — K70.31 ALCOHOLIC CIRRHOSIS OF LIVER WITH ASCITES (HCC): ICD-10-CM

## 2025-07-07 DIAGNOSIS — Z94.4 S/P LIVER TRANSPLANT (HCC): ICD-10-CM

## 2025-07-07 DIAGNOSIS — G62.9 POLYNEUROPATHY: ICD-10-CM

## 2025-07-07 DIAGNOSIS — E11.9 TYPE 2 DIABETES MELLITUS WITHOUT COMPLICATION, WITHOUT LONG-TERM CURRENT USE OF INSULIN (HCC): Primary | ICD-10-CM

## 2025-07-07 PROCEDURE — 99215 OFFICE O/P EST HI 40 MIN: CPT | Performed by: FAMILY MEDICINE

## 2025-07-07 PROCEDURE — 3044F HG A1C LEVEL LT 7.0%: CPT | Performed by: FAMILY MEDICINE

## 2025-07-07 RX ORDER — PATIROMER 8.4 G/1
8.4 POWDER, FOR SUSPENSION ORAL DAILY
COMMUNITY
Start: 2025-06-05

## 2025-07-07 RX ORDER — SEMAGLUTIDE 1.34 MG/ML
INJECTION, SOLUTION SUBCUTANEOUS
COMMUNITY
Start: 2025-06-26

## 2025-07-07 RX ORDER — METFORMIN HYDROCHLORIDE 750 MG/1
TABLET, EXTENDED RELEASE ORAL
COMMUNITY
Start: 2025-06-05

## 2025-07-07 RX ORDER — GABAPENTIN 300 MG/1
300 CAPSULE ORAL 3 TIMES DAILY
Qty: 90 CAPSULE | Refills: 5 | Status: SHIPPED | OUTPATIENT
Start: 2025-07-07 | End: 2026-01-03

## 2025-07-07 RX ORDER — TRAMADOL HYDROCHLORIDE 50 MG/1
50 TABLET ORAL EVERY 6 HOURS PRN
Qty: 90 TABLET | Refills: 0 | Status: SHIPPED | OUTPATIENT
Start: 2025-07-07 | End: 2025-08-06

## 2025-07-07 ASSESSMENT — PATIENT HEALTH QUESTIONNAIRE - PHQ9
SUM OF ALL RESPONSES TO PHQ QUESTIONS 1-9: 2
SUM OF ALL RESPONSES TO PHQ QUESTIONS 1-9: 2
1. LITTLE INTEREST OR PLEASURE IN DOING THINGS: SEVERAL DAYS
SUM OF ALL RESPONSES TO PHQ QUESTIONS 1-9: 2
SUM OF ALL RESPONSES TO PHQ QUESTIONS 1-9: 2
2. FEELING DOWN, DEPRESSED OR HOPELESS: SEVERAL DAYS

## 2025-07-07 ASSESSMENT — ENCOUNTER SYMPTOMS
BACK PAIN: 1
CHEST TIGHTNESS: 0
ABDOMINAL DISTENTION: 0
ABDOMINAL PAIN: 0
SHORTNESS OF BREATH: 0

## 2025-07-07 NOTE — PROGRESS NOTES
Chief Complaint   Patient presents with    Follow-up     Patient is here today for a 2 month follow up.      \"Have you been to the ER, urgent care clinic since your last visit?  Hospitalized since your last visit?\"    NO    “Have you seen or consulted any other health care providers outside of [facility] since your last visit?”    [facility]     “Have you had a pap smear?”    NO    Date of last Cervical Cancer screen (HPV or PAP): 6/19/2020         “Have you had a colorectal cancer screening such as a colonoscopy/FIT/Cologuard?    NO    Date of last Colonoscopy: 8/3/2020  No cologuard on file  Date of last FIT: 9/21/2023   No flexible sigmoidoscopy on file         Click Here for Release of Records Request    
rash      Lab Data Reviewed:    No results found for this or any previous visit (from the past 24 hours).      Tiffanie was seen today for follow-up.    Diagnoses and all orders for this visit:    Type 2 diabetes mellitus without complication, without long-term current use of insulin (HCC),well controlled    Alcoholic cirrhosis of liver with ascites (HCC)    Primary biliary cholangitis (HCC)    S/P liver transplant (HCC)    Pain in thoracic spine,stable  -     traMADol (ULTRAM) 50 MG tablet; Take 1 tablet by mouth every 6 hours as needed for Pain for up to 30 days. Intended supply: 7 days. Take lowest dose possible to manage pain Max Daily Amount: 200 mg    Polyneuropathy,worsening,presumed from DM,will in crease Gabapentin  -     gabapentin (NEURONTIN) 300 MG capsule; Take 1 capsule by mouth 3 times daily for 180 days. Intended supply: 30 days Max Daily Amount: 900 mg  -     traMADol (ULTRAM) 50 MG tablet; Take 1 tablet by mouth every 6 hours as needed for Pain for up to 30 days. Intended supply: 7 days. Take lowest dose possible to manage pain Max Daily Amount: 200 mg          Attending Physician: Homero Hoyos MD

## 2025-07-09 ENCOUNTER — TELEPHONE (OUTPATIENT)
Age: 55
End: 2025-07-09

## 2025-07-09 NOTE — TELEPHONE ENCOUNTER
Pt requesting the name of the hand doctor you referred her to for her trigger finger on her last visit.  She can be reached at [phone].

## 2025-07-11 ENCOUNTER — CLINICAL DOCUMENTATION (OUTPATIENT)
Age: 55
End: 2025-07-11

## 2025-07-11 NOTE — PROGRESS NOTES
Called [facility] to schedule pt, they said pt has to speak to their billing ofc first due to large balance on acct, called pt to inform her of this, pt declines appt with [facility]  due she had a bad experience with them as far as her treatment. [facility]. Referral sent to Dr Israel Li

## 2025-07-20 DIAGNOSIS — K21.9 GASTROESOPHAGEAL REFLUX DISEASE WITHOUT ESOPHAGITIS: ICD-10-CM

## 2025-07-21 NOTE — TELEPHONE ENCOUNTER
Last appointment: 7/7/25  Next appointment: 9/17/25  Previous refill encounter(s): 4/18/25 #30 with 2 refills    Requested Prescriptions     Pending Prescriptions Disp Refills    pantoprazole (PROTONIX) 40 MG tablet [Pharmacy Med Name: PANTOPRAZOLE SODIUM 40MG TBEC] 90 tablet 3     Sig: TAKE ONE TABLET BY MOUTH EVERY DAY         For Pharmacy Admin Tracking Only    Program: Medication Refill  CPA in place:    Recommendation Provided To:   Intervention Detail: New Rx: 1, reason: Patient Preference  Intervention Accepted By:   Gap Closed?:    Time Spent (min): 5

## 2025-07-22 RX ORDER — PANTOPRAZOLE SODIUM 40 MG/1
40 TABLET, DELAYED RELEASE ORAL DAILY
Qty: 90 TABLET | Refills: 3 | Status: SHIPPED | OUTPATIENT
Start: 2025-07-22

## 2025-08-05 DIAGNOSIS — M54.6 PAIN IN THORACIC SPINE: ICD-10-CM

## 2025-08-05 DIAGNOSIS — G62.9 POLYNEUROPATHY: ICD-10-CM

## 2025-08-07 ENCOUNTER — CLINICAL DOCUMENTATION (OUTPATIENT)
Age: 55
End: 2025-08-07

## 2025-08-07 RX ORDER — TRAMADOL HYDROCHLORIDE 50 MG/1
50 TABLET ORAL EVERY 6 HOURS PRN
Qty: 90 TABLET | Refills: 0 | Status: SHIPPED | OUTPATIENT
Start: 2025-08-07 | End: 2025-09-06

## 2025-08-25 DIAGNOSIS — M54.6 PAIN IN THORACIC SPINE: Primary | ICD-10-CM

## 2025-08-25 RX ORDER — METHOCARBAMOL 500 MG/1
500 TABLET, FILM COATED ORAL 4 TIMES DAILY PRN
Qty: 100 TABLET | Refills: 1 | Status: SHIPPED | OUTPATIENT
Start: 2025-08-25

## 2025-09-04 DIAGNOSIS — G62.9 POLYNEUROPATHY: ICD-10-CM

## 2025-09-04 DIAGNOSIS — M54.6 PAIN IN THORACIC SPINE: ICD-10-CM

## 2025-09-05 RX ORDER — TRAMADOL HYDROCHLORIDE 50 MG/1
50 TABLET ORAL EVERY 6 HOURS PRN
Qty: 90 TABLET | Refills: 0 | Status: SHIPPED | OUTPATIENT
Start: 2025-09-05 | End: 2025-10-05

## (undated) DEVICE — ORISE PROKNIFE 3.0 MM ELECTRODE: Brand: ORISE™ PROKNIFE

## (undated) DEVICE — LIGATOR BND 7 2.8/8.6-11.5MM -- SUPERVIEW SUPER 7 BX/2.

## (undated) DEVICE — LIGATOR ENDOSCP DIA8.6-11.5MM MULT DISP SPDBND LIGATOR SUP

## (undated) DEVICE — TUBING HYDR IRR --

## (undated) DEVICE — ORISE PROKNIFE 1.5 MM ELECTRODE: Brand: ORISE™ PROKNIFE

## (undated) DEVICE — LIGATOR ENDOSCP L2.8MM DIA8.6-11.5MM MULT BND SPEEDBAND

## (undated) DEVICE — TUBING IRRIG COMPATIBLE W ERBE MEDIVATOR PMP HYDR

## (undated) DEVICE — MULTIPLE BAND LIGATOR: Brand: SPEEDBAND SUPERVIEW SUPER 7